# Patient Record
Sex: FEMALE | Race: WHITE | NOT HISPANIC OR LATINO | Employment: OTHER | URBAN - METROPOLITAN AREA
[De-identification: names, ages, dates, MRNs, and addresses within clinical notes are randomized per-mention and may not be internally consistent; named-entity substitution may affect disease eponyms.]

---

## 2017-01-26 ENCOUNTER — TRANSCRIBE ORDERS (OUTPATIENT)
Dept: ADMINISTRATIVE | Facility: HOSPITAL | Age: 62
End: 2017-01-26

## 2017-01-26 DIAGNOSIS — Z13.820 SPECIAL SCREENING FOR OSTEOPOROSIS: Primary | ICD-10-CM

## 2017-02-07 ENCOUNTER — HOSPITAL ENCOUNTER (OUTPATIENT)
Dept: BONE DENSITY | Facility: IMAGING CENTER | Age: 62
Discharge: HOME | End: 2017-02-07
Payer: MEDICARE

## 2017-02-07 DIAGNOSIS — Z13.820 SPECIAL SCREENING FOR OSTEOPOROSIS: ICD-10-CM

## 2017-02-07 PROCEDURE — 77080 DXA BONE DENSITY AXIAL: CPT

## 2017-06-26 ENCOUNTER — TRANSCRIBE ORDERS (OUTPATIENT)
Dept: ADMINISTRATIVE | Facility: HOSPITAL | Age: 62
End: 2017-06-26

## 2017-06-26 DIAGNOSIS — Z12.31 VISIT FOR SCREENING MAMMOGRAM: Primary | ICD-10-CM

## 2017-06-28 ENCOUNTER — HOSPITAL ENCOUNTER (OUTPATIENT)
Dept: RADIOLOGY | Facility: HOSPITAL | Age: 62
Discharge: HOME | End: 2017-06-28
Payer: MEDICARE

## 2017-06-28 ENCOUNTER — TRANSCRIBE ORDERS (OUTPATIENT)
Dept: ADMINISTRATIVE | Facility: HOSPITAL | Age: 62
End: 2017-06-28

## 2017-06-28 ENCOUNTER — HOSPITAL ENCOUNTER (OUTPATIENT)
Dept: BONE DENSITY | Facility: IMAGING CENTER | Age: 62
Discharge: HOME | End: 2017-06-28
Payer: MEDICARE

## 2017-06-28 DIAGNOSIS — M25.50 PAIN IN JOINT, SITE UNSPECIFIED: Primary | ICD-10-CM

## 2017-06-28 DIAGNOSIS — M81.8 IDIOPATHIC OSTEOPOROSIS: ICD-10-CM

## 2017-06-28 DIAGNOSIS — M25.50 PAIN IN JOINT, SITE UNSPECIFIED: ICD-10-CM

## 2017-06-28 DIAGNOSIS — Z12.31 VISIT FOR SCREENING MAMMOGRAM: ICD-10-CM

## 2017-06-28 PROCEDURE — 73502 X-RAY EXAM HIP UNI 2-3 VIEWS: CPT

## 2017-06-28 PROCEDURE — G0202 SCR MAMMO BI INCL CAD: HCPCS

## 2017-07-03 ENCOUNTER — HOSPITAL ENCOUNTER (OUTPATIENT)
Dept: MAMMOGRAPHY | Facility: CLINIC | Age: 62
Discharge: HOME | End: 2017-07-03
Payer: MEDICARE

## 2017-07-03 ENCOUNTER — HOSPITAL ENCOUNTER (OUTPATIENT)
Dept: ULTRASOUND IMAGING | Facility: CLINIC | Age: 62
Discharge: HOME | End: 2017-07-03
Payer: MEDICARE

## 2017-07-03 DIAGNOSIS — R92.8 ABNORMAL MAMMOGRAM: ICD-10-CM

## 2017-07-03 DIAGNOSIS — R92.8 MAMMOGRAM ABNORMAL: ICD-10-CM

## 2017-07-03 PROCEDURE — G0206 DX MAMMO INCL CAD UNI: HCPCS

## 2017-07-03 PROCEDURE — 76642 ULTRASOUND BREAST LIMITED: CPT

## 2017-12-27 ENCOUNTER — TRANSCRIBE ORDERS (OUTPATIENT)
Dept: ADMINISTRATIVE | Facility: HOSPITAL | Age: 62
End: 2017-12-27

## 2017-12-27 DIAGNOSIS — R92.8 ABNORMAL MAMMOGRAM: Primary | ICD-10-CM

## 2018-01-11 ENCOUNTER — TRANSCRIBE ORDERS (OUTPATIENT)
Dept: ULTRASOUND IMAGING | Facility: CLINIC | Age: 63
End: 2018-01-11

## 2018-01-11 ENCOUNTER — HOSPITAL ENCOUNTER (OUTPATIENT)
Dept: MAMMOGRAPHY | Facility: CLINIC | Age: 63
Discharge: HOME | End: 2018-01-11
Payer: MEDICARE

## 2018-01-11 ENCOUNTER — HOSPITAL ENCOUNTER (OUTPATIENT)
Dept: ULTRASOUND IMAGING | Facility: CLINIC | Age: 63
Discharge: HOME | End: 2018-01-11
Payer: MEDICARE

## 2018-01-11 ENCOUNTER — GENERIC CONVERSION - ENCOUNTER (OUTPATIENT)
Dept: OTHER | Facility: OTHER | Age: 63
End: 2018-01-11

## 2018-01-11 DIAGNOSIS — R92.8 ABNORMAL MAMMOGRAM: ICD-10-CM

## 2018-01-11 DIAGNOSIS — R92.8 ABNORMAL MAMMOGRAM: Primary | ICD-10-CM

## 2018-01-11 PROCEDURE — 76642 ULTRASOUND BREAST LIMITED: CPT

## 2018-07-12 ENCOUNTER — HOSPITAL ENCOUNTER (OUTPATIENT)
Dept: MAMMOGRAPHY | Facility: CLINIC | Age: 63
Discharge: HOME | End: 2018-07-12
Payer: MEDICARE

## 2018-07-12 ENCOUNTER — HOSPITAL ENCOUNTER (OUTPATIENT)
Dept: ULTRASOUND IMAGING | Facility: CLINIC | Age: 63
Discharge: HOME | End: 2018-07-12
Payer: MEDICARE

## 2018-07-12 DIAGNOSIS — R92.8 ABNORMAL MAMMOGRAM: ICD-10-CM

## 2018-07-12 DIAGNOSIS — Z12.39 SCREENING BREAST EXAMINATION: ICD-10-CM

## 2018-07-12 PROCEDURE — 77067 SCR MAMMO BI INCL CAD: CPT

## 2018-07-12 PROCEDURE — 76642 ULTRASOUND BREAST LIMITED: CPT

## 2018-09-19 ENCOUNTER — TELEPHONE (OUTPATIENT)
Dept: NEUROLOGY | Facility: CLINIC | Age: 63
End: 2018-09-19

## 2018-09-20 ENCOUNTER — TELEPHONE (OUTPATIENT)
Dept: NEUROLOGY | Facility: CLINIC | Age: 63
End: 2018-09-20

## 2018-09-24 ENCOUNTER — TELEPHONE (OUTPATIENT)
Dept: NEUROLOGY | Facility: CLINIC | Age: 63
End: 2018-09-24

## 2018-11-07 ENCOUNTER — OFFICE VISIT (OUTPATIENT)
Dept: NEUROLOGY | Facility: CLINIC | Age: 63
End: 2018-11-07
Payer: MEDICARE

## 2018-11-07 VITALS
DIASTOLIC BLOOD PRESSURE: 70 MMHG | BODY MASS INDEX: 20.4 KG/M2 | WEIGHT: 130 LBS | HEIGHT: 67 IN | HEART RATE: 77 BPM | SYSTOLIC BLOOD PRESSURE: 118 MMHG

## 2018-11-07 DIAGNOSIS — G21.11 NEUROLEPTIC-INDUCED PARKINSONISM (HCC): Primary | ICD-10-CM

## 2018-11-07 PROBLEM — F32.A DEPRESSION: Status: ACTIVE | Noted: 2018-11-07

## 2018-11-07 PROBLEM — E03.9 THYROID ACTIVITY DECREASED: Status: ACTIVE | Noted: 2018-11-07

## 2018-11-07 PROBLEM — F41.9 ANXIETY: Status: ACTIVE | Noted: 2018-11-07

## 2018-11-07 PROBLEM — G20 PARKINSONISM (HCC): Status: ACTIVE | Noted: 2018-11-07

## 2018-11-07 PROBLEM — G20.C PARKINSONISM: Status: ACTIVE | Noted: 2018-11-07

## 2018-11-07 PROCEDURE — 99204 OFFICE O/P NEW MOD 45 MIN: CPT | Performed by: PSYCHIATRY & NEUROLOGY

## 2018-11-07 RX ORDER — BUPROPION HYDROCHLORIDE 150 MG/1
TABLET, EXTENDED RELEASE ORAL 2 TIMES DAILY
COMMUNITY
Start: 2018-08-16

## 2018-11-07 RX ORDER — LEVOTHYROXINE SODIUM 0.03 MG/1
50 TABLET ORAL
COMMUNITY
Start: 2018-09-26

## 2018-11-07 RX ORDER — MIRTAZAPINE 30 MG/1
30 TABLET, FILM COATED ORAL
COMMUNITY
Start: 2018-09-12

## 2018-11-07 RX ORDER — RISPERIDONE 1 MG/1
1 TABLET, FILM COATED ORAL 4 TIMES DAILY
COMMUNITY
Start: 2018-08-16

## 2018-11-07 NOTE — LETTER
November 7, 2018     [place] [address]    Patient: Cathy Guillen   YOB: 1955   Date of Visit: 11/7/2018       Dear Dr Trevon Ontiveros: Thank you for referring Cathy Guillen to me for evaluation  Below are my notes for this consultation  If you have questions, please do not hesitate to call me  I look forward to following your patient along with you  Dr Abbe Power, thank you for your note, it was very helpful  Sincerely,        Marcos Fairchild MD        CC: Brian Rush MD  11/7/2018  5:52 PM  Sign at close encounter  Assessment/Plan:    Parkinsonism [facility])  The patient is a 69-year-old woman with past medical history significant for longstanding psychiatric disease on chronic neuroleptics who presents for evaluation of parkinsonism which began about 10 years ago  The patient has been on risperidone since 2001  On examination the patient has prominent global and focal bradykinesia but no rigidity, tremor or postural instability  I think most likely this is drug-induced parkinsonism however it is difficult to know for sure just based on physical exam   The lack of typical non motor symptoms often seen in Parkinson's disease further support the diagnosis of drug-induced parkinsonism over idiopathic Parkinson's disease  I presented a couple of options to the patient and her sister  One is watchful waiting to see how her symptoms change over time, as Parkinson's disease should progress but drug-induced parkinsonism should stay relatively stable  The other would be to acquire a ELIZ scan  Unfortunately Medicare may not approve this particular indication for ELIZ scan (it is approved for differentiating types of tremors but not isolated slowness)  Another complication with attempting to get the DaTSCAN is that the patient would need to come off of most of her psychotropic medications for 48-72 hours      The patient elected for the watchful waiting option at this point  She will follow up with me in 5 months  We also discussed the option of trying to switch her neuroleptics to 1 that is less likely to induce drug-induced parkinsonism, specifically Seroquel or Clozaril  She would like to discuss this possibility with you in clinic in the future  Thank you for referring this patient for consultation  There are no diagnoses linked to this encounter  Subjective:     Patient ID: Bernice Vasquez is a 70 y o  female  I had the pleasure of seeing your patient, Bernice Vasquez in the 2020 [facility] at the [facility] for Neuroscience  The patient is a 70y o  year old female who presents for evaluation of possible parkinson's disease  The patient has been following with her psychiatrist in [address] [place] [address] since [address]  She has a longstanding history of psychiatric disease and has been on neuroleptics for more than 2 decades  She moved to the [facility] about 2 years ago and has been living with her nephew which has been quite stressful at times  Recently she has started taking a 4th dose of risperidone as needed for her anxiety  The patient and her sister reports that her symptoms of parkinsonism began at least 10 years ago  Her symptoms have consisted of shuffling gait, stooped posture and drooling  They have been more prominent to the patient, her family and her psychiatrist over the past 2-3 years  He may attempt was made to reduce the patient's neuroleptics however this was not tolerated  The symptoms do not interfere with any of her activities of daily life  She has never seen a neurologist for this in the past   Has never been on any medications to treat the symptoms  She has started physical therapy which they found quite helpful for her gait  Regarding motor symptoms:   Tremor: very slight, occasional over the past few months with action   Slowness: yes   For so long  Stiffness: no   Dystonia: toes feel like they want to curl  Changes in facial expression: yes, many years   Changes in voice: about the same   Changes in writing: messy, smaller as she writes   Changes in gait: shuffling, slow  Falls: once 2012 broke hip  2016 broke shoulder  Loss of balance   Freezing: when she gets tired and in pain in the right hip it becomes hard to move the right leg  Trouble with swallowing:no   Clumsiness/dexterity: some trouble trying shoes  Regarding non-motor symptoms:   Anosmia: no   Constipation: no   Urinary sx: nocturia, uses depends  x2   Lightheadedness: no   Changes in Mood: well controlled  Trouble with sleep:     REM behavior Disorder: no   Memory trouble: Yes, sometimes gets in the way of day-to-day  Hallucinations: some misperceptions  Animals  Only lasts for a moment  Recent     The following portions of the patient's history were reviewed and updated as appropriate: allergies, current medications, past family history, past medical history, past social history, past surgical history and problem list       Objective:      /70 (BP Location: Right arm, Patient Position: Standing, Cuff Size: Standard)   Pulse 77   Ht 5' 7" (1 702 m)   Wt 59 kg (130 lb)   BMI 20 36 kg/m²    Not orthostic by pressure       Physical Exam    Neurological Exam    GENERAL MEDICAL EXAMINATION:  General appearance: alert, in no apparent distress  Appropriately dressed and groomed  Conversing and interacting appropriately  Eyes: Sclera are non-injected  Ears, nose, Mouth, Throat: Mucous membranes are moist    Resp: Breathing comfortably on RA   Musculoskeletal: No evidence of deformities  No contractures  No Edema  Skin: No visible rashes  Warm and well perfused  Psych: normal and appropriate affect     NEUROLOGICAL EXAMINATION:  Mental Status: Alert and oriented to person place and time  Able to relate history without difficulty   Attentive: able to name RAMIREZ backward without difficulty  Language is fluent and appropriate with normal prosody  There were no paraphasic errors  Speech was not dysarthric  There was no pallilalia noted  Able to follow both midline and appendicular commands  Significant response latency     Cranial Nerves:  II:  pupils equally round and briskly reactive to light, both directly and consensually  III-IV-VI: Full and conjugate, extra-ocular eye movements in all directions of gaze  No nystagmus or diplopia  Intact smooth pursuit  VII: Face is symmetric at rest and with activation; symmetric speed and excursion with smile  IX-X: Palate elevates symmetrically  XII: No tongue deviation or fasciculations    Motor: Overall   moderate reduction in spontaneous movements  Normal muscle bulk throughout  There were no dyskinesias or dystonia noted  No pronator drift or rebound  No asterixis or myoclonus noted                                      Speech  1    Facial Expression  3    Rigidity - Neck      Rigidity - Upper Extremity (Right)  0    Rigidity - Upper Extremity (Left)   0    Rigidity - Lower Extremity (Right)  0    Rigidity - Lower Extremity (Left)   0    Finger Taps (Right)   1 decrement    Finger Taps (Left)   2 slow    Hand Movement (Right)  1 slow     Hand Movement (Left)   1 slow     Pronation/Supination (Right)  1 slow     Pronation/Supination (Left)   1 slow     Toe Tapping (Right) 1 slow     Toe Tapping (Left) 1 slow     Leg Agility (Right)  1 slow     Leg Agility (Left)   1 slow     Arising from Chair   2    Gait   1    Freezing of Gait 0    Postural Stability   1    Posture 3    Global spontaneity of movement 3    Postural Tremor (Right) 1    Postural Tremor (Left) 0    Kinetic Tremor (Right)  1    Kinetic Tremor (Left)  1    Rest tremor amplitude RUE 0    Rest tremor amplitude LUE 0    Rest tremor amplitude RLE 0    Reset tremor amplitude LLE 0    Lip/Jaw Tremor  0    Consistency of tremor 0    Motor Exam Total:  28      Strength:   Delt Bi Tri We FE FF    C5 C6 C7 C6 C7 C8/T1   R 5 5 5 5 5 5   L 5 5 5 5 5 5      IP Quad Hamst TA    L2 L3 L4-S1 L4   R 5 5 5 5   L 5 5 5 5     Reflexes:  Brisk throughout    Coordination: Finger to nose without dysmetria bilaterally  No intention tremor  Gait: Able to rise from a chair with some difficulty  Posture was stooped, mostly bent at the waist  Narrow-base and stable stance  Normal initiation  Decreased stride length, step height, and arm swing R>L  Turn completed in 4 steps  Pull test was normal      Review of Systems   Constitutional: Positive for fatigue and unexpected weight change  Negative for appetite change and fever  HENT: Positive for trouble swallowing  Negative for hearing loss, tinnitus and voice change  Eyes: Negative  Negative for photophobia and pain  Respiratory: Negative  Negative for shortness of breath  Cardiovascular: Negative  Negative for palpitations  Gastrointestinal: Negative  Negative for nausea and vomiting  Endocrine: Negative  Negative for cold intolerance and heat intolerance  Hair loss   Genitourinary: Negative  Negative for dysuria, frequency and urgency  Loss of bladder control     Musculoskeletal: Positive for back pain, gait problem (balance issues), joint swelling and myalgias  Negative for neck pain  Skin: Negative  Negative for rash  Neurological: Negative for dizziness, tremors, seizures, syncope, facial asymmetry, speech difficulty, weakness, light-headedness, numbness and headaches  Memory problems     Hematological: Negative  Does not bruise/bleed easily  Psychiatric/Behavioral: Positive for confusion and sleep disturbance (snoring and increased sleepiness)  Negative for hallucinations  The patient is nervous/anxious           Depression       Guy Rojas MD  Medical Director   [facility]  [facility]

## 2018-11-07 NOTE — ASSESSMENT & PLAN NOTE
The patient is a 59-year-old woman with past medical history significant for longstanding psychiatric disease on chronic neuroleptics who presents for evaluation of parkinsonism which began about 10 years ago  The patient has been on risperidone since 2001  On examination the patient has prominent global and focal bradykinesia but no rigidity, tremor or postural instability  I think most likely this is drug-induced parkinsonism however it is difficult to know for sure just based on physical exam   The lack of typical non motor symptoms often seen in Parkinson's disease further support the diagnosis of drug-induced parkinsonism over idiopathic Parkinson's disease  I presented a couple of options to the patient and her sister  One is watchful waiting to see how her symptoms change over time, as Parkinson's disease should progress but drug-induced parkinsonism should stay relatively stable  The other would be to acquire a ELIZ scan  Unfortunately Medicare may not approve this particular indication for ELIZ scan (it is approved for differentiating types of tremors but not isolated slowness)  Another complication with attempting to get the DaTSCAN is that the patient would need to come off of most of her psychotropic medications for 48-72 hours  The patient elected for the watchful waiting option at this point  She will follow up with me in 5 months  We also discussed the option of trying to switch her neuroleptics to 1 that is less likely to induce drug-induced parkinsonism, specifically Seroquel or Clozaril  She would like to discuss this possibility with you in clinic in the future  Thank you for referring this patient for consultation

## 2018-11-07 NOTE — PROGRESS NOTES
Assessment/Plan:    Parkinsonism [facility])  The patient is a 78-year-old woman with past medical history significant for longstanding psychiatric disease on chronic neuroleptics who presents for evaluation of parkinsonism which began about 10 years ago  The patient has been on risperidone since 2001  On examination the patient has prominent global and focal bradykinesia but no rigidity, tremor or postural instability  I think most likely this is drug-induced parkinsonism however it is difficult to know for sure just based on physical exam   The lack of typical non motor symptoms often seen in Parkinson's disease further support the diagnosis of drug-induced parkinsonism over idiopathic Parkinson's disease  I presented a couple of options to the patient and her sister  One is watchful waiting to see how her symptoms change over time, as Parkinson's disease should progress but drug-induced parkinsonism should stay relatively stable  The other would be to acquire a ELIZ scan  Unfortunately Medicare may not approve this particular indication for ELIZ scan (it is approved for differentiating types of tremors but not isolated slowness)  Another complication with attempting to get the DaTSCAN is that the patient would need to come off of most of her psychotropic medications for 48-72 hours  The patient elected for the watchful waiting option at this point  She will follow up with me in 5 months  We also discussed the option of trying to switch her neuroleptics to 1 that is less likely to induce drug-induced parkinsonism, specifically Seroquel or Clozaril  She would like to discuss this possibility with you in clinic in the future  Thank you for referring this patient for consultation  There are no diagnoses linked to this encounter  Subjective:     Patient ID: Tatyana Watson is a 70 y o  female      I had the pleasure of seeing your patient, Tatyana Watson in the 2020 [facility] at the [facility]  [facility] for Neuroscience  The patient is a 70y o  year old female who presents for evaluation of possible parkinson's disease  The patient has been following with her psychiatrist in [address] [place] [address] since [address]  She has a longstanding history of psychiatric disease and has been on neuroleptics for more than 2 decades  She moved to the [facility] about 2 years ago and has been living with her nephew which has been quite stressful at times  Recently she has started taking a 4th dose of risperidone as needed for her anxiety  The patient and her sister reports that her symptoms of parkinsonism began at least 10 years ago  Her symptoms have consisted of shuffling gait, stooped posture and drooling  They have been more prominent to the patient, her family and her psychiatrist over the past 2-3 years  He may attempt was made to reduce the patient's neuroleptics however this was not tolerated  The symptoms do not interfere with any of her activities of daily life  She has never seen a neurologist for this in the past   Has never been on any medications to treat the symptoms  She has started physical therapy which they found quite helpful for her gait  Regarding motor symptoms:   Tremor: very slight, occasional over the past few months with action   Slowness: yes  For so long  Stiffness: no   Dystonia: toes feel like they want to curl  Changes in facial expression: yes, many years   Changes in voice: about the same   Changes in writing: messy, smaller as she writes   Changes in gait: shuffling, slow  Falls: once 2012 broke hip  2016 broke shoulder  Loss of balance   Freezing: when she gets tired and in pain in the right hip it becomes hard to move the right leg  Trouble with swallowing:no   Clumsiness/dexterity: some trouble trying shoes  Regarding non-motor symptoms:   Anosmia: no   Constipation: no   Urinary sx: nocturia, uses depends   x2   Lightheadedness: no   Changes in Mood: well controlled  Trouble with sleep:     REM behavior Disorder: no   Memory trouble: Yes, sometimes gets in the way of day-to-day  Hallucinations: some misperceptions  Animals  Only lasts for a moment  Recent     The following portions of the patient's history were reviewed and updated as appropriate: allergies, current medications, past family history, past medical history, past social history, past surgical history and problem list       Objective:      /70 (BP Location: Right arm, Patient Position: Standing, Cuff Size: Standard)   Pulse 77   Ht 5' 7" (1 702 m)   Wt 59 kg (130 lb)   BMI 20 36 kg/m²   Not orthostic by pressure       Physical Exam    Neurological Exam    GENERAL MEDICAL EXAMINATION:  General appearance: alert, in no apparent distress  Appropriately dressed and groomed  Conversing and interacting appropriately  Eyes: Sclera are non-injected  Ears, nose, Mouth, Throat: Mucous membranes are moist    Resp: Breathing comfortably on RA   Musculoskeletal: No evidence of deformities  No contractures  No Edema  Skin: No visible rashes  Warm and well perfused  Psych: normal and appropriate affect     NEUROLOGICAL EXAMINATION:  Mental Status: Alert and oriented to person place and time  Able to relate history without difficulty  Attentive: able to name RAMIREZ backward without difficulty  Language is fluent and appropriate with normal prosody  There were no paraphasic errors  Speech was not dysarthric  There was no pallilalia noted  Able to follow both midline and appendicular commands  Significant response latency     Cranial Nerves:  II:  pupils equally round and briskly reactive to light, both directly and consensually  III-IV-VI: Full and conjugate, extra-ocular eye movements in all directions of gaze  No nystagmus or diplopia  Intact smooth pursuit  VII: Face is symmetric at rest and with activation; symmetric speed and excursion with smile     IX-X: Palate elevates symmetrically  XII: No tongue deviation or fasciculations    Motor: Overall   moderate reduction in spontaneous movements  Normal muscle bulk throughout  There were no dyskinesias or dystonia noted  No pronator drift or rebound  No asterixis or myoclonus noted  Speech  1    Facial Expression  3    Rigidity - Neck      Rigidity - Upper Extremity (Right)  0    Rigidity - Upper Extremity (Left)   0    Rigidity - Lower Extremity (Right)  0    Rigidity - Lower Extremity (Left)   0    Finger Taps (Right)   1 decrement    Finger Taps (Left)   2 slow    Hand Movement (Right)  1 slow     Hand Movement (Left)   1 slow     Pronation/Supination (Right)  1 slow     Pronation/Supination (Left)   1 slow     Toe Tapping (Right) 1 slow     Toe Tapping (Left) 1 slow     Leg Agility (Right)  1 slow     Leg Agility (Left)   1 slow     Arising from Chair   2    Gait   1    Freezing of Gait 0    Postural Stability   1    Posture 3    Global spontaneity of movement 3    Postural Tremor (Right) 1    Postural Tremor (Left) 0    Kinetic Tremor (Right)  1    Kinetic Tremor (Left)  1    Rest tremor amplitude RUE 0    Rest tremor amplitude LUE 0    Rest tremor amplitude RLE 0    Reset tremor amplitude LLE 0    Lip/Jaw Tremor  0    Consistency of tremor 0    Motor Exam Total:  28      Strength:   Delt Bi Tri We FE FF    C5 C6 C7 C6 C7 C8/T1   R 5 5 5 5 5 5   L 5 5 5 5 5 5      IP Quad Hamst TA    L2 L3 L4-S1 L4   R 5 5 5 5   L 5 5 5 5     Reflexes:  Brisk throughout    Coordination: Finger to nose without dysmetria bilaterally  No intention tremor  Gait: Able to rise from a chair with some difficulty  Posture was stooped, mostly bent at the waist  Narrow-base and stable stance  Normal initiation  Decreased stride length, step height, and arm swing R>L  Turn completed in 4 steps  Pull test was normal      Review of Systems   Constitutional: Positive for fatigue and unexpected weight change   Negative for appetite change and fever  HENT: Positive for trouble swallowing  Negative for hearing loss, tinnitus and voice change  Eyes: Negative  Negative for photophobia and pain  Respiratory: Negative  Negative for shortness of breath  Cardiovascular: Negative  Negative for palpitations  Gastrointestinal: Negative  Negative for nausea and vomiting  Endocrine: Negative  Negative for cold intolerance and heat intolerance  Hair loss   Genitourinary: Negative  Negative for dysuria, frequency and urgency  Loss of bladder control     Musculoskeletal: Positive for back pain, gait problem (balance issues), joint swelling and myalgias  Negative for neck pain  Skin: Negative  Negative for rash  Neurological: Negative for dizziness, tremors, seizures, syncope, facial asymmetry, speech difficulty, weakness, light-headedness, numbness and headaches  Memory problems     Hematological: Negative  Does not bruise/bleed easily  Psychiatric/Behavioral: Positive for confusion and sleep disturbance (snoring and increased sleepiness)  Negative for hallucinations  The patient is nervous/anxious           Depression       Hudson Helms MD  Medical Director   [facility]  [facility]

## 2019-05-08 ENCOUNTER — APPOINTMENT (EMERGENCY)
Dept: RADIOLOGY | Facility: HOSPITAL | Age: 64
DRG: 481 | End: 2019-05-08
Payer: MEDICARE

## 2019-05-08 ENCOUNTER — HOSPITAL ENCOUNTER (INPATIENT)
Facility: HOSPITAL | Age: 64
LOS: 5 days | Discharge: NURSING, REHAB OR LONG-TERM CARE | DRG: 481 | End: 2019-05-13
Attending: EMERGENCY MEDICINE | Admitting: ORTHOPAEDIC SURGERY
Payer: MEDICARE

## 2019-05-08 DIAGNOSIS — S72.141A CLOSED DISPLACED INTERTROCHANTERIC FRACTURE OF RIGHT FEMUR, INITIAL ENCOUNTER (HCC): ICD-10-CM

## 2019-05-08 DIAGNOSIS — G21.11 NEUROLEPTIC-INDUCED PARKINSONISM (HCC): ICD-10-CM
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
## 2019-05-08 DIAGNOSIS — S72.001A CLOSED RIGHT HIP FRACTURE (HCC): ICD-10-CM

## 2019-05-08 LAB
ALBUMIN SERPL BCP-MCNC: 4 G/DL (ref 3.5–5)
ALP SERPL-CCNC: 100 U/L (ref 46–116)
ALT SERPL W P-5'-P-CCNC: 34 U/L (ref 12–78)
ANION GAP SERPL CALCULATED.3IONS-SCNC: 11 MMOL/L (ref 4–13)
APTT PPP: 20 SECONDS (ref 26–38)
AST SERPL W P-5'-P-CCNC: 28 U/L (ref 5–45)
ATRIAL RATE: 82 BPM
BASOPHILS # BLD AUTO: 0.01 THOUSANDS/ΜL (ref 0–0.1)
BASOPHILS NFR BLD AUTO: 0 % (ref 0–1)
BILIRUB SERPL-MCNC: 0.2 MG/DL (ref 0.2–1)
BUN SERPL-MCNC: 13 MG/DL (ref 5–25)
CALCIUM SERPL-MCNC: 9.7 MG/DL (ref 8.3–10.1)
CHLORIDE SERPL-SCNC: 104 MMOL/L (ref 100–108)
CO2 SERPL-SCNC: 28 MMOL/L (ref 21–32)
CREAT SERPL-MCNC: 1.18 MG/DL (ref 0.6–1.3)
EOSINOPHIL # BLD AUTO: 0.22 THOUSAND/ΜL (ref 0–0.61)
EOSINOPHIL NFR BLD AUTO: 5 % (ref 0–6)
ERYTHROCYTE [DISTWIDTH] IN BLOOD BY AUTOMATED COUNT: 12.5 % (ref 11.6–15.1)
GFR SERPL CREATININE-BSD FRML MDRD: 49 ML/MIN/1.73SQ M
GLUCOSE SERPL-MCNC: 88 MG/DL (ref 65–140)
HCT VFR BLD AUTO: 36.8 % (ref 34.8–46.1)
HGB BLD-MCNC: 11.6 G/DL (ref 11.5–15.4)
IMM GRANULOCYTES # BLD AUTO: 0.01 THOUSAND/UL (ref 0–0.2)
IMM GRANULOCYTES NFR BLD AUTO: 0 % (ref 0–2)
INR PPP: 0.95 (ref 0.86–1.17)
LYMPHOCYTES # BLD AUTO: 1.33 THOUSANDS/ΜL (ref 0.6–4.47)
LYMPHOCYTES NFR BLD AUTO: 29 % (ref 14–44)
MCH RBC QN AUTO: 30.6 PG (ref 26.8–34.3)
MCHC RBC AUTO-ENTMCNC: 31.5 G/DL (ref 31.4–37.4)
MCV RBC AUTO: 97 FL (ref 82–98)
MONOCYTES # BLD AUTO: 0.51 THOUSAND/ΜL (ref 0.17–1.22)
MONOCYTES NFR BLD AUTO: 11 % (ref 4–12)
NEUTROPHILS # BLD AUTO: 2.57 THOUSANDS/ΜL (ref 1.85–7.62)
NEUTS SEG NFR BLD AUTO: 55 % (ref 43–75)
NRBC BLD AUTO-RTO: 0 /100 WBCS
P AXIS: 49 DEGREES
PLATELET # BLD AUTO: 204 THOUSANDS/UL (ref 149–390)
PMV BLD AUTO: 9.7 FL (ref 8.9–12.7)
POTASSIUM SERPL-SCNC: 4 MMOL/L (ref 3.5–5.3)
PR INTERVAL: 130 MS
PROT SERPL-MCNC: 7.7 G/DL (ref 6.4–8.2)
PROTHROMBIN TIME: 12.1 SECONDS (ref 11.8–14.2)
QRS AXIS: 59 DEGREES
QRSD INTERVAL: 70 MS
QT INTERVAL: 386 MS
QTC INTERVAL: 450 MS
RBC # BLD AUTO: 3.79 MILLION/UL (ref 3.81–5.12)
SODIUM SERPL-SCNC: 143 MMOL/L (ref 136–145)
T WAVE AXIS: 34 DEGREES
VENTRICULAR RATE: 82 BPM
WBC # BLD AUTO: 4.65 THOUSAND/UL (ref 4.31–10.16)

## 2019-05-08 PROCEDURE — 85025 COMPLETE CBC W/AUTO DIFF WBC: CPT | Performed by: PHYSICIAN ASSISTANT

## 2019-05-08 PROCEDURE — 36415 COLL VENOUS BLD VENIPUNCTURE: CPT | Performed by: PHYSICIAN ASSISTANT

## 2019-05-08 PROCEDURE — 99284 EMERGENCY DEPT VISIT MOD MDM: CPT | Performed by: PHYSICIAN ASSISTANT

## 2019-05-08 PROCEDURE — 93005 ELECTROCARDIOGRAM TRACING: CPT

## 2019-05-08 PROCEDURE — 99221 1ST HOSP IP/OBS SF/LOW 40: CPT | Performed by: PHYSICIAN ASSISTANT

## 2019-05-08 PROCEDURE — 99285 EMERGENCY DEPT VISIT HI MDM: CPT

## 2019-05-08 PROCEDURE — 80053 COMPREHEN METABOLIC PANEL: CPT | Performed by: PHYSICIAN ASSISTANT

## 2019-05-08 PROCEDURE — 85610 PROTHROMBIN TIME: CPT | Performed by: PHYSICIAN ASSISTANT

## 2019-05-08 PROCEDURE — 93010 ELECTROCARDIOGRAM REPORT: CPT | Performed by: INTERNAL MEDICINE

## 2019-05-08 PROCEDURE — 85730 THROMBOPLASTIN TIME PARTIAL: CPT | Performed by: PHYSICIAN ASSISTANT

## 2019-05-08 PROCEDURE — 71046 X-RAY EXAM CHEST 2 VIEWS: CPT

## 2019-05-08 PROCEDURE — 96374 THER/PROPH/DIAG INJ IV PUSH: CPT

## 2019-05-08 PROCEDURE — 73502 X-RAY EXAM HIP UNI 2-3 VIEWS: CPT

## 2019-05-08 RX ORDER — MIRTAZAPINE 15 MG/1
30 TABLET, FILM COATED ORAL
Status: DISCONTINUED | OUTPATIENT
Start: 2019-05-08 | End: 2019-05-11

## 2019-05-08 RX ORDER — HYDROMORPHONE HCL/PF 1 MG/ML
0.5 SYRINGE (ML) INJECTION ONCE
Status: COMPLETED | OUTPATIENT
Start: 2019-05-08 | End: 2019-05-08

## 2019-05-08 RX ORDER — LEVOTHYROXINE SODIUM 0.03 MG/1
25 TABLET ORAL
Status: DISCONTINUED | OUTPATIENT
Start: 2019-05-09 | End: 2019-05-13 | Stop reason: HOSPADM

## 2019-05-08 RX ORDER — ONDANSETRON 2 MG/ML
4 INJECTION INTRAMUSCULAR; INTRAVENOUS EVERY 6 HOURS PRN
Status: DISCONTINUED | OUTPATIENT
Start: 2019-05-08 | End: 2019-05-11

## 2019-05-08 RX ORDER — BUPROPION HYDROCHLORIDE 150 MG/1
150 TABLET, EXTENDED RELEASE ORAL 2 TIMES DAILY
Status: DISCONTINUED | OUTPATIENT
Start: 2019-05-08 | End: 2019-05-13 | Stop reason: HOSPADM

## 2019-05-08 RX ORDER — SODIUM CHLORIDE, SODIUM LACTATE, POTASSIUM CHLORIDE, CALCIUM CHLORIDE 600; 310; 30; 20 MG/100ML; MG/100ML; MG/100ML; MG/100ML
75 INJECTION, SOLUTION INTRAVENOUS CONTINUOUS
Status: DISCONTINUED | OUTPATIENT
Start: 2019-05-09 | End: 2019-05-09

## 2019-05-08 RX ORDER — OXYCODONE HYDROCHLORIDE AND ACETAMINOPHEN 5; 325 MG/1; MG/1
1 TABLET ORAL EVERY 4 HOURS PRN
Status: DISCONTINUED | OUTPATIENT
Start: 2019-05-08 | End: 2019-05-11

## 2019-05-08 RX ORDER — OXYCODONE HYDROCHLORIDE AND ACETAMINOPHEN 5; 325 MG/1; MG/1
2 TABLET ORAL EVERY 4 HOURS PRN
Status: DISCONTINUED | OUTPATIENT
Start: 2019-05-08 | End: 2019-05-11

## 2019-05-08 RX ORDER — RISPERIDONE 1 MG/1
1 TABLET, FILM COATED ORAL 4 TIMES DAILY
Status: DISCONTINUED | OUTPATIENT
Start: 2019-05-08 | End: 2019-05-11

## 2019-05-08 RX ORDER — FENTANYL CITRATE 50 UG/ML
1 INJECTION, SOLUTION INTRAMUSCULAR; INTRAVENOUS ONCE
Status: COMPLETED | OUTPATIENT
Start: 2019-05-08 | End: 2019-05-08

## 2019-05-08 RX ADMIN — BUPROPION HYDROCHLORIDE 150 MG: 150 TABLET, FILM COATED, EXTENDED RELEASE ORAL at 23:00

## 2019-05-08 RX ADMIN — RISPERIDONE 1 MG: 1 TABLET ORAL at 23:00

## 2019-05-08 RX ADMIN — MIRTAZAPINE 30 MG: 15 TABLET, FILM COATED ORAL at 23:00

## 2019-05-08 RX ADMIN — MORPHINE SULFATE 2 MG: 2 INJECTION, SOLUTION INTRAMUSCULAR; INTRAVENOUS at 23:14

## 2019-05-08 RX ADMIN — OXYCODONE AND ACETAMINOPHEN 2 TABLET: 5; 325 TABLET ORAL at 21:26

## 2019-05-08 RX ADMIN — SODIUM CHLORIDE, SODIUM LACTATE, POTASSIUM CHLORIDE, AND CALCIUM CHLORIDE 75 ML/HR: .6; .31; .03; .02 INJECTION, SOLUTION INTRAVENOUS at 23:01

## 2019-05-08 RX ADMIN — HYDROMORPHONE HYDROCHLORIDE 0.5 MG: 1 INJECTION, SOLUTION INTRAMUSCULAR; INTRAVENOUS; SUBCUTANEOUS at 19:03

## 2019-05-08 RX ADMIN — HYDROMORPHONE HYDROCHLORIDE 0.5 MG: 1 INJECTION, SOLUTION INTRAMUSCULAR; INTRAVENOUS; SUBCUTANEOUS at 17:59

## 2019-05-09 ENCOUNTER — ANESTHESIA EVENT (INPATIENT)
Dept: PERIOP | Facility: HOSPITAL | Age: 64
DRG: 481 | End: 2019-05-09
Payer: MEDICARE

## 2019-05-09 ENCOUNTER — ANESTHESIA (INPATIENT)
Dept: PERIOP | Facility: HOSPITAL | Age: 64
DRG: 481 | End: 2019-05-09
Payer: MEDICARE

## 2019-05-09 ENCOUNTER — APPOINTMENT (INPATIENT)
Dept: RADIOLOGY | Facility: HOSPITAL | Age: 64
DRG: 481 | End: 2019-05-09
Payer: MEDICARE

## 2019-05-09 LAB
ANION GAP SERPL CALCULATED.3IONS-SCNC: 10 MMOL/L (ref 4–13)
BASOPHILS # BLD AUTO: 0.01 THOUSANDS/ΜL (ref 0–0.1)
BASOPHILS NFR BLD AUTO: 0 % (ref 0–1)
BUN SERPL-MCNC: 15 MG/DL (ref 5–25)
CALCIUM SERPL-MCNC: 9 MG/DL (ref 8.3–10.1)
CHLORIDE SERPL-SCNC: 103 MMOL/L (ref 100–108)
CO2 SERPL-SCNC: 26 MMOL/L (ref 21–32)
CREAT SERPL-MCNC: 0.91 MG/DL (ref 0.6–1.3)
EOSINOPHIL # BLD AUTO: 0.01 THOUSAND/ΜL (ref 0–0.61)
EOSINOPHIL NFR BLD AUTO: 0 % (ref 0–6)
ERYTHROCYTE [DISTWIDTH] IN BLOOD BY AUTOMATED COUNT: 12.4 % (ref 11.6–15.1)
GFR SERPL CREATININE-BSD FRML MDRD: 66 ML/MIN/1.73SQ M
GLUCOSE SERPL-MCNC: 125 MG/DL (ref 65–140)
HCT VFR BLD AUTO: 33.3 % (ref 34.8–46.1)
HGB BLD-MCNC: 10.6 G/DL (ref 11.5–15.4)
IMM GRANULOCYTES # BLD AUTO: 0.03 THOUSAND/UL (ref 0–0.2)
IMM GRANULOCYTES NFR BLD AUTO: 1 % (ref 0–2)
LYMPHOCYTES # BLD AUTO: 0.54 THOUSANDS/ΜL (ref 0.6–4.47)
LYMPHOCYTES NFR BLD AUTO: 8 % (ref 14–44)
MCH RBC QN AUTO: 30.5 PG (ref 26.8–34.3)
MCHC RBC AUTO-ENTMCNC: 31.8 G/DL (ref 31.4–37.4)
MCV RBC AUTO: 96 FL (ref 82–98)
MONOCYTES # BLD AUTO: 0.59 THOUSAND/ΜL (ref 0.17–1.22)
MONOCYTES NFR BLD AUTO: 9 % (ref 4–12)
NEUTROPHILS # BLD AUTO: 5.41 THOUSANDS/ΜL (ref 1.85–7.62)
NEUTS SEG NFR BLD AUTO: 82 % (ref 43–75)
NRBC BLD AUTO-RTO: 0 /100 WBCS
PLATELET # BLD AUTO: 201 THOUSANDS/UL (ref 149–390)
PMV BLD AUTO: 9.7 FL (ref 8.9–12.7)
POTASSIUM SERPL-SCNC: 4.6 MMOL/L (ref 3.5–5.3)
RBC # BLD AUTO: 3.47 MILLION/UL (ref 3.81–5.12)
SODIUM SERPL-SCNC: 139 MMOL/L (ref 136–145)
TSH SERPL DL<=0.05 MIU/L-ACNC: 2.44 UIU/ML (ref 0.36–3.74)
WBC # BLD AUTO: 6.59 THOUSAND/UL (ref 4.31–10.16)

## 2019-05-09 PROCEDURE — 73502 X-RAY EXAM HIP UNI 2-3 VIEWS: CPT

## 2019-05-09 PROCEDURE — 27245 TREAT THIGH FRACTURE: CPT | Performed by: ORTHOPAEDIC SURGERY

## 2019-05-09 PROCEDURE — 0QS606Z REPOSITION RIGHT UPPER FEMUR WITH INTRAMEDULLARY INTERNAL FIXATION DEVICE, OPEN APPROACH: ICD-10-PCS | Performed by: ORTHOPAEDIC SURGERY

## 2019-05-09 PROCEDURE — 99232 SBSQ HOSP IP/OBS MODERATE 35: CPT | Performed by: INTERNAL MEDICINE

## 2019-05-09 PROCEDURE — 80048 BASIC METABOLIC PNL TOTAL CA: CPT | Performed by: ORTHOPAEDIC SURGERY

## 2019-05-09 PROCEDURE — 84443 ASSAY THYROID STIM HORMONE: CPT | Performed by: INTERNAL MEDICINE

## 2019-05-09 PROCEDURE — 85025 COMPLETE CBC W/AUTO DIFF WBC: CPT | Performed by: ORTHOPAEDIC SURGERY

## 2019-05-09 PROCEDURE — C1713 ANCHOR/SCREW BN/BN,TIS/BN: HCPCS | Performed by: ORTHOPAEDIC SURGERY

## 2019-05-09 PROCEDURE — 99223 1ST HOSP IP/OBS HIGH 75: CPT | Performed by: ORTHOPAEDIC SURGERY

## 2019-05-09 PROCEDURE — C1769 GUIDE WIRE: HCPCS | Performed by: ORTHOPAEDIC SURGERY

## 2019-05-09 DEVICE — 11.0MM TI TROCH FIXATION NAIL SCREW/95MM - STERILE: Type: IMPLANTABLE DEVICE | Site: HIP | Status: FUNCTIONAL

## 2019-05-09 DEVICE — 5.0MM TI LOCKING SCREW 34MM- FOR IM NAILS-STERILE: Type: IMPLANTABLE DEVICE | Site: HIP | Status: FUNCTIONAL

## 2019-05-09 DEVICE — 12MM/130 DEG TI CANN TROCH FIXATION NAIL 170MM-STERILE: Type: IMPLANTABLE DEVICE | Site: HIP | Status: FUNCTIONAL

## 2019-05-09 RX ORDER — DOCUSATE SODIUM 100 MG/1
100 CAPSULE, LIQUID FILLED ORAL 2 TIMES DAILY
Status: DISCONTINUED | OUTPATIENT
Start: 2019-05-09 | End: 2019-05-13 | Stop reason: HOSPADM

## 2019-05-09 RX ORDER — MEPERIDINE HYDROCHLORIDE 25 MG/ML
12.5 INJECTION INTRAMUSCULAR; INTRAVENOUS; SUBCUTANEOUS
Status: DISCONTINUED | OUTPATIENT
Start: 2019-05-09 | End: 2019-05-09 | Stop reason: HOSPADM

## 2019-05-09 RX ORDER — LABETALOL 20 MG/4 ML (5 MG/ML) INTRAVENOUS SYRINGE
5 AS NEEDED
Status: DISCONTINUED | OUTPATIENT
Start: 2019-05-09 | End: 2019-05-09 | Stop reason: HOSPADM

## 2019-05-09 RX ORDER — EPHEDRINE SULFATE 50 MG/ML
INJECTION INTRAVENOUS AS NEEDED
Status: DISCONTINUED | OUTPATIENT
Start: 2019-05-09 | End: 2019-05-09 | Stop reason: SURG

## 2019-05-09 RX ORDER — PROPOFOL 10 MG/ML
INJECTION, EMULSION INTRAVENOUS AS NEEDED
Status: DISCONTINUED | OUTPATIENT
Start: 2019-05-09 | End: 2019-05-09 | Stop reason: SURG

## 2019-05-09 RX ORDER — MAGNESIUM HYDROXIDE/ALUMINUM HYDROXICE/SIMETHICONE 120; 1200; 1200 MG/30ML; MG/30ML; MG/30ML
30 SUSPENSION ORAL EVERY 6 HOURS PRN
Status: DISCONTINUED | OUTPATIENT
Start: 2019-05-09 | End: 2019-05-13 | Stop reason: HOSPADM

## 2019-05-09 RX ORDER — ONDANSETRON 2 MG/ML
4 INJECTION INTRAMUSCULAR; INTRAVENOUS ONCE AS NEEDED
Status: DISCONTINUED | OUTPATIENT
Start: 2019-05-09 | End: 2019-05-09 | Stop reason: HOSPADM

## 2019-05-09 RX ORDER — CEFAZOLIN SODIUM 1 G/50ML
1000 SOLUTION INTRAVENOUS EVERY 8 HOURS
Status: COMPLETED | OUTPATIENT
Start: 2019-05-09 | End: 2019-05-10

## 2019-05-09 RX ORDER — CEFAZOLIN SODIUM 1 G/50ML
1000 SOLUTION INTRAVENOUS ONCE
Status: COMPLETED | OUTPATIENT
Start: 2019-05-09 | End: 2019-05-09

## 2019-05-09 RX ORDER — HYDROMORPHONE HCL/PF 1 MG/ML
0.2 SYRINGE (ML) INJECTION
Status: DISCONTINUED | OUTPATIENT
Start: 2019-05-09 | End: 2019-05-09 | Stop reason: HOSPADM

## 2019-05-09 RX ORDER — FENTANYL CITRATE 50 UG/ML
INJECTION, SOLUTION INTRAMUSCULAR; INTRAVENOUS AS NEEDED
Status: DISCONTINUED | OUTPATIENT
Start: 2019-05-09 | End: 2019-05-09 | Stop reason: SURG

## 2019-05-09 RX ORDER — BISACODYL 10 MG
10 SUPPOSITORY, RECTAL RECTAL DAILY PRN
Status: DISCONTINUED | OUTPATIENT
Start: 2019-05-09 | End: 2019-05-13

## 2019-05-09 RX ORDER — BUPIVACAINE HYDROCHLORIDE 7.5 MG/ML
INJECTION, SOLUTION INTRASPINAL AS NEEDED
Status: DISCONTINUED | OUTPATIENT
Start: 2019-05-09 | End: 2019-05-09 | Stop reason: SURG

## 2019-05-09 RX ORDER — SODIUM CHLORIDE, SODIUM LACTATE, POTASSIUM CHLORIDE, CALCIUM CHLORIDE 600; 310; 30; 20 MG/100ML; MG/100ML; MG/100ML; MG/100ML
75 INJECTION, SOLUTION INTRAVENOUS CONTINUOUS
Status: DISCONTINUED | OUTPATIENT
Start: 2019-05-09 | End: 2019-05-10

## 2019-05-09 RX ORDER — ONDANSETRON 2 MG/ML
INJECTION INTRAMUSCULAR; INTRAVENOUS AS NEEDED
Status: DISCONTINUED | OUTPATIENT
Start: 2019-05-09 | End: 2019-05-09 | Stop reason: SURG

## 2019-05-09 RX ORDER — CALCIUM CARBONATE 200(500)MG
1000 TABLET,CHEWABLE ORAL DAILY PRN
Status: DISCONTINUED | OUTPATIENT
Start: 2019-05-09 | End: 2019-05-13 | Stop reason: HOSPADM

## 2019-05-09 RX ORDER — ACETAMINOPHEN 325 MG/1
650 TABLET ORAL EVERY 6 HOURS PRN
Status: DISCONTINUED | OUTPATIENT
Start: 2019-05-09 | End: 2019-05-13 | Stop reason: HOSPADM

## 2019-05-09 RX ORDER — HYDRALAZINE HYDROCHLORIDE 20 MG/ML
5 INJECTION INTRAMUSCULAR; INTRAVENOUS AS NEEDED
Status: DISCONTINUED | OUTPATIENT
Start: 2019-05-09 | End: 2019-05-09 | Stop reason: HOSPADM

## 2019-05-09 RX ORDER — PANTOPRAZOLE SODIUM 40 MG/1
40 TABLET, DELAYED RELEASE ORAL DAILY
Status: DISCONTINUED | OUTPATIENT
Start: 2019-05-10 | End: 2019-05-13 | Stop reason: HOSPADM

## 2019-05-09 RX ORDER — SIMETHICONE 80 MG
80 TABLET,CHEWABLE ORAL 4 TIMES DAILY PRN
Status: DISCONTINUED | OUTPATIENT
Start: 2019-05-09 | End: 2019-05-13 | Stop reason: HOSPADM

## 2019-05-09 RX ORDER — PROPOFOL 10 MG/ML
INJECTION, EMULSION INTRAVENOUS CONTINUOUS PRN
Status: DISCONTINUED | OUTPATIENT
Start: 2019-05-09 | End: 2019-05-09 | Stop reason: SURG

## 2019-05-09 RX ORDER — PROMETHAZINE HYDROCHLORIDE 25 MG/ML
6.25 INJECTION, SOLUTION INTRAMUSCULAR; INTRAVENOUS ONCE AS NEEDED
Status: DISCONTINUED | OUTPATIENT
Start: 2019-05-09 | End: 2019-05-09 | Stop reason: HOSPADM

## 2019-05-09 RX ORDER — SENNOSIDES 8.6 MG
1 TABLET ORAL DAILY
Status: DISCONTINUED | OUTPATIENT
Start: 2019-05-10 | End: 2019-05-13

## 2019-05-09 RX ORDER — FENTANYL CITRATE/PF 50 MCG/ML
25 SYRINGE (ML) INJECTION
Status: DISCONTINUED | OUTPATIENT
Start: 2019-05-09 | End: 2019-05-09 | Stop reason: HOSPADM

## 2019-05-09 RX ORDER — HYDROMORPHONE HCL/PF 1 MG/ML
0.5 SYRINGE (ML) INJECTION
Status: DISCONTINUED | OUTPATIENT
Start: 2019-05-09 | End: 2019-05-09 | Stop reason: HOSPADM

## 2019-05-09 RX ORDER — ONDANSETRON 2 MG/ML
4 INJECTION INTRAMUSCULAR; INTRAVENOUS EVERY 6 HOURS PRN
Status: DISCONTINUED | OUTPATIENT
Start: 2019-05-09 | End: 2019-05-13 | Stop reason: HOSPADM

## 2019-05-09 RX ADMIN — PROPOFOL 70 MCG/KG/MIN: 10 INJECTION, EMULSION INTRAVENOUS at 18:28

## 2019-05-09 RX ADMIN — MORPHINE SULFATE 2 MG: 2 INJECTION, SOLUTION INTRAMUSCULAR; INTRAVENOUS at 04:31

## 2019-05-09 RX ADMIN — EPHEDRINE SULFATE 5 MG: 50 INJECTION, SOLUTION INTRAVENOUS at 18:44

## 2019-05-09 RX ADMIN — CEFAZOLIN SODIUM 1000 MG: 1 SOLUTION INTRAVENOUS at 19:50

## 2019-05-09 RX ADMIN — PHENYLEPHRINE HYDROCHLORIDE 50 MCG: 10 INJECTION INTRAVENOUS at 18:48

## 2019-05-09 RX ADMIN — PROPOFOL 20 MG: 10 INJECTION, EMULSION INTRAVENOUS at 18:07

## 2019-05-09 RX ADMIN — OXYCODONE AND ACETAMINOPHEN 2 TABLET: 5; 325 TABLET ORAL at 05:30

## 2019-05-09 RX ADMIN — FENTANYL CITRATE 50 MCG: 50 INJECTION, SOLUTION INTRAMUSCULAR; INTRAVENOUS at 18:05

## 2019-05-09 RX ADMIN — PHENYLEPHRINE HYDROCHLORIDE 100 MCG: 10 INJECTION INTRAVENOUS at 18:58

## 2019-05-09 RX ADMIN — LEVOTHYROXINE SODIUM 25 MCG: 25 TABLET ORAL at 05:30

## 2019-05-09 RX ADMIN — BUPROPION HYDROCHLORIDE 150 MG: 150 TABLET, FILM COATED, EXTENDED RELEASE ORAL at 10:07

## 2019-05-09 RX ADMIN — ONDANSETRON 4 MG: 2 INJECTION INTRAMUSCULAR; INTRAVENOUS at 19:12

## 2019-05-09 RX ADMIN — RISPERIDONE 1 MG: 1 TABLET ORAL at 09:02

## 2019-05-09 RX ADMIN — BUPIVACAINE HYDROCHLORIDE IN DEXTROSE 1.4 ML: 7.5 INJECTION, SOLUTION SUBARACHNOID at 18:20

## 2019-05-09 RX ADMIN — PHENYLEPHRINE HYDROCHLORIDE 100 MCG: 10 INJECTION INTRAVENOUS at 18:53

## 2019-05-09 RX ADMIN — PROPOFOL 20 MG: 10 INJECTION, EMULSION INTRAVENOUS at 18:24

## 2019-05-09 RX ADMIN — PHENYLEPHRINE HYDROCHLORIDE 50 MCG: 10 INJECTION INTRAVENOUS at 18:43

## 2019-05-09 RX ADMIN — PHENYLEPHRINE HYDROCHLORIDE 100 MCG: 10 INJECTION INTRAVENOUS at 19:01

## 2019-05-09 RX ADMIN — CEFAZOLIN SODIUM 1000 MG: 1 SOLUTION INTRAVENOUS at 18:36

## 2019-05-09 RX ADMIN — RISPERIDONE 1 MG: 1 TABLET ORAL at 22:13

## 2019-05-09 RX ADMIN — SERTRALINE HYDROCHLORIDE 50 MG: 50 TABLET ORAL at 09:02

## 2019-05-09 RX ADMIN — OXYCODONE AND ACETAMINOPHEN 2 TABLET: 5; 325 TABLET ORAL at 10:06

## 2019-05-09 RX ADMIN — PHENYLEPHRINE HYDROCHLORIDE 200 MCG: 10 INJECTION INTRAVENOUS at 19:08

## 2019-05-09 RX ADMIN — MIRTAZAPINE 30 MG: 15 TABLET, FILM COATED ORAL at 22:13

## 2019-05-09 RX ADMIN — PHENYLEPHRINE HYDROCHLORIDE 100 MCG: 10 INJECTION INTRAVENOUS at 19:14

## 2019-05-10 ENCOUNTER — APPOINTMENT (INPATIENT)
Dept: CT IMAGING | Facility: HOSPITAL | Age: 64
DRG: 481 | End: 2019-05-10
Payer: MEDICARE

## 2019-05-10 ENCOUNTER — APPOINTMENT (INPATIENT)
Dept: NEUROLOGY | Facility: HOSPITAL | Age: 64
DRG: 481 | End: 2019-05-10
Attending: PSYCHIATRY & NEUROLOGY
Payer: MEDICARE

## 2019-05-10 PROBLEM — R41.0 DELIRIUM: Status: ACTIVE | Noted: 2019-05-10

## 2019-05-10 LAB
ANION GAP SERPL CALCULATED.3IONS-SCNC: 7 MMOL/L (ref 4–13)
BUN SERPL-MCNC: 10 MG/DL (ref 5–25)
CALCIUM SERPL-MCNC: 8.7 MG/DL (ref 8.3–10.1)
CHLORIDE SERPL-SCNC: 103 MMOL/L (ref 100–108)
CO2 SERPL-SCNC: 27 MMOL/L (ref 21–32)
CREAT SERPL-MCNC: 0.88 MG/DL (ref 0.6–1.3)
ERYTHROCYTE [DISTWIDTH] IN BLOOD BY AUTOMATED COUNT: 12.1 % (ref 11.6–15.1)
GFR SERPL CREATININE-BSD FRML MDRD: 69 ML/MIN/1.73SQ M
GLUCOSE SERPL-MCNC: 106 MG/DL (ref 65–140)
HCT VFR BLD AUTO: 30.3 % (ref 34.8–46.1)
HGB BLD-MCNC: 9.8 G/DL (ref 11.5–15.4)
MCH RBC QN AUTO: 30.7 PG (ref 26.8–34.3)
MCHC RBC AUTO-ENTMCNC: 32.3 G/DL (ref 31.4–37.4)
MCV RBC AUTO: 95 FL (ref 82–98)
PLATELET # BLD AUTO: 151 THOUSANDS/UL (ref 149–390)
PMV BLD AUTO: 9.2 FL (ref 8.9–12.7)
POTASSIUM SERPL-SCNC: 3.9 MMOL/L (ref 3.5–5.3)
RBC # BLD AUTO: 3.19 MILLION/UL (ref 3.81–5.12)
SODIUM SERPL-SCNC: 137 MMOL/L (ref 136–145)
WBC # BLD AUTO: 6.13 THOUSAND/UL (ref 4.31–10.16)

## 2019-05-10 PROCEDURE — 97530 THERAPEUTIC ACTIVITIES: CPT

## 2019-05-10 PROCEDURE — 95816 EEG AWAKE AND DROWSY: CPT | Performed by: PSYCHIATRY & NEUROLOGY

## 2019-05-10 PROCEDURE — 70450 CT HEAD/BRAIN W/O DYE: CPT

## 2019-05-10 PROCEDURE — 95816 EEG AWAKE AND DROWSY: CPT

## 2019-05-10 PROCEDURE — 99232 SBSQ HOSP IP/OBS MODERATE 35: CPT | Performed by: INTERNAL MEDICINE

## 2019-05-10 PROCEDURE — 97163 PT EVAL HIGH COMPLEX 45 MIN: CPT

## 2019-05-10 PROCEDURE — 80048 BASIC METABOLIC PNL TOTAL CA: CPT | Performed by: PHYSICIAN ASSISTANT

## 2019-05-10 PROCEDURE — G8979 MOBILITY GOAL STATUS: HCPCS

## 2019-05-10 PROCEDURE — G8987 SELF CARE CURRENT STATUS: HCPCS

## 2019-05-10 PROCEDURE — G8988 SELF CARE GOAL STATUS: HCPCS

## 2019-05-10 PROCEDURE — 85027 COMPLETE CBC AUTOMATED: CPT | Performed by: PHYSICIAN ASSISTANT

## 2019-05-10 PROCEDURE — G8978 MOBILITY CURRENT STATUS: HCPCS

## 2019-05-10 PROCEDURE — 99024 POSTOP FOLLOW-UP VISIT: CPT | Performed by: PHYSICIAN ASSISTANT

## 2019-05-10 PROCEDURE — 99223 1ST HOSP IP/OBS HIGH 75: CPT | Performed by: PSYCHIATRY & NEUROLOGY

## 2019-05-10 PROCEDURE — 97167 OT EVAL HIGH COMPLEX 60 MIN: CPT

## 2019-05-10 RX ORDER — LANOLIN ALCOHOL/MO/W.PET/CERES
3 CREAM (GRAM) TOPICAL
Status: DISCONTINUED | OUTPATIENT
Start: 2019-05-10 | End: 2019-05-13 | Stop reason: HOSPADM

## 2019-05-10 RX ORDER — OXYCODONE HYDROCHLORIDE AND ACETAMINOPHEN 5; 325 MG/1; MG/1
1 TABLET ORAL EVERY 6 HOURS PRN
Qty: 30 TABLET | Refills: 0 | Status: SHIPPED | OUTPATIENT
Start: 2019-05-10 | End: 2019-05-20

## 2019-05-10 RX ORDER — ASPIRIN 325 MG
325 TABLET, DELAYED RELEASE (ENTERIC COATED) ORAL 2 TIMES DAILY
Qty: 60 TABLET | Refills: 0 | Status: SHIPPED | OUTPATIENT
Start: 2019-05-10 | End: 2019-06-16

## 2019-05-10 RX ADMIN — MIRTAZAPINE 30 MG: 15 TABLET, FILM COATED ORAL at 21:32

## 2019-05-10 RX ADMIN — CEFAZOLIN SODIUM 1000 MG: 1 SOLUTION INTRAVENOUS at 05:50

## 2019-05-10 RX ADMIN — RISPERIDONE 1 MG: 1 TABLET ORAL at 12:02

## 2019-05-10 RX ADMIN — PANTOPRAZOLE SODIUM 40 MG: 40 TABLET, DELAYED RELEASE ORAL at 09:02

## 2019-05-10 RX ADMIN — BUPROPION HYDROCHLORIDE 150 MG: 150 TABLET, FILM COATED, EXTENDED RELEASE ORAL at 18:50

## 2019-05-10 RX ADMIN — ENOXAPARIN SODIUM 40 MG: 40 INJECTION SUBCUTANEOUS at 09:01

## 2019-05-10 RX ADMIN — RISPERIDONE 1 MG: 1 TABLET ORAL at 18:50

## 2019-05-10 RX ADMIN — RISPERIDONE 1 MG: 1 TABLET ORAL at 09:02

## 2019-05-10 RX ADMIN — ACETAMINOPHEN 650 MG: 325 TABLET ORAL at 09:39

## 2019-05-10 RX ADMIN — SENNOSIDES 8.6 MG: 8.6 TABLET, FILM COATED ORAL at 09:01

## 2019-05-10 RX ADMIN — DOCUSATE SODIUM 100 MG: 100 CAPSULE, LIQUID FILLED ORAL at 09:01

## 2019-05-10 RX ADMIN — LEVOTHYROXINE SODIUM 25 MCG: 25 TABLET ORAL at 06:28

## 2019-05-10 RX ADMIN — RISPERIDONE 1 MG: 1 TABLET ORAL at 21:32

## 2019-05-10 RX ADMIN — DOCUSATE SODIUM 100 MG: 100 CAPSULE, LIQUID FILLED ORAL at 18:50

## 2019-05-10 RX ADMIN — SODIUM CHLORIDE, SODIUM LACTATE, POTASSIUM CHLORIDE, AND CALCIUM CHLORIDE 75 ML/HR: .6; .31; .03; .02 INJECTION, SOLUTION INTRAVENOUS at 04:06

## 2019-05-10 RX ADMIN — BUPROPION HYDROCHLORIDE 150 MG: 150 TABLET, FILM COATED, EXTENDED RELEASE ORAL at 09:02

## 2019-05-10 RX ADMIN — ACETAMINOPHEN 650 MG: 325 TABLET ORAL at 14:26

## 2019-05-10 RX ADMIN — SERTRALINE HYDROCHLORIDE 50 MG: 50 TABLET ORAL at 09:01

## 2019-05-10 RX ADMIN — MELATONIN TAB 3 MG 3 MG: 3 TAB at 21:32

## 2019-05-11 LAB
ANION GAP SERPL CALCULATED.3IONS-SCNC: 6 MMOL/L (ref 4–13)
BUN SERPL-MCNC: 10 MG/DL (ref 5–25)
CALCIUM SERPL-MCNC: 8.5 MG/DL (ref 8.3–10.1)
CHLORIDE SERPL-SCNC: 104 MMOL/L (ref 100–108)
CO2 SERPL-SCNC: 29 MMOL/L (ref 21–32)
CREAT SERPL-MCNC: 0.98 MG/DL (ref 0.6–1.3)
ERYTHROCYTE [DISTWIDTH] IN BLOOD BY AUTOMATED COUNT: 12.4 % (ref 11.6–15.1)
GFR SERPL CREATININE-BSD FRML MDRD: 61 ML/MIN/1.73SQ M
GLUCOSE SERPL-MCNC: 106 MG/DL (ref 65–140)
HCT VFR BLD AUTO: 29.5 % (ref 34.8–46.1)
HGB BLD-MCNC: 9.3 G/DL (ref 11.5–15.4)
MCH RBC QN AUTO: 30.5 PG (ref 26.8–34.3)
MCHC RBC AUTO-ENTMCNC: 31.5 G/DL (ref 31.4–37.4)
MCV RBC AUTO: 97 FL (ref 82–98)
PLATELET # BLD AUTO: 148 THOUSANDS/UL (ref 149–390)
PMV BLD AUTO: 9.7 FL (ref 8.9–12.7)
POTASSIUM SERPL-SCNC: 3.7 MMOL/L (ref 3.5–5.3)
RBC # BLD AUTO: 3.05 MILLION/UL (ref 3.81–5.12)
SODIUM SERPL-SCNC: 139 MMOL/L (ref 136–145)
VIT B12 SERPL-MCNC: 473 PG/ML (ref 100–900)
WBC # BLD AUTO: 7.42 THOUSAND/UL (ref 4.31–10.16)

## 2019-05-11 PROCEDURE — 99232 SBSQ HOSP IP/OBS MODERATE 35: CPT | Performed by: INTERNAL MEDICINE

## 2019-05-11 PROCEDURE — 99024 POSTOP FOLLOW-UP VISIT: CPT | Performed by: ORTHOPAEDIC SURGERY

## 2019-05-11 PROCEDURE — 85027 COMPLETE CBC AUTOMATED: CPT | Performed by: PHYSICIAN ASSISTANT

## 2019-05-11 PROCEDURE — 99232 SBSQ HOSP IP/OBS MODERATE 35: CPT | Performed by: PSYCHIATRY & NEUROLOGY

## 2019-05-11 PROCEDURE — 82607 VITAMIN B-12: CPT | Performed by: PSYCHIATRY & NEUROLOGY

## 2019-05-11 PROCEDURE — 80048 BASIC METABOLIC PNL TOTAL CA: CPT | Performed by: PHYSICIAN ASSISTANT

## 2019-05-11 RX ORDER — MIRTAZAPINE 15 MG/1
15 TABLET, FILM COATED ORAL
Status: DISCONTINUED | OUTPATIENT
Start: 2019-05-11 | End: 2019-05-13 | Stop reason: HOSPADM

## 2019-05-11 RX ORDER — RISPERIDONE 1 MG/1
0.5 TABLET, FILM COATED ORAL 4 TIMES DAILY
Status: DISCONTINUED | OUTPATIENT
Start: 2019-05-11 | End: 2019-05-11

## 2019-05-11 RX ORDER — OXYCODONE HYDROCHLORIDE AND ACETAMINOPHEN 5; 325 MG/1; MG/1
0.5 TABLET ORAL EVERY 4 HOURS PRN
Status: DISCONTINUED | OUTPATIENT
Start: 2019-05-11 | End: 2019-05-13 | Stop reason: HOSPADM

## 2019-05-11 RX ADMIN — OXYCODONE AND ACETAMINOPHEN 1 TABLET: 5; 325 TABLET ORAL at 18:09

## 2019-05-11 RX ADMIN — SENNOSIDES 8.6 MG: 8.6 TABLET, FILM COATED ORAL at 08:51

## 2019-05-11 RX ADMIN — DOCUSATE SODIUM 100 MG: 100 CAPSULE, LIQUID FILLED ORAL at 18:09

## 2019-05-11 RX ADMIN — LEVOTHYROXINE SODIUM 25 MCG: 25 TABLET ORAL at 05:14

## 2019-05-11 RX ADMIN — RISPERIDONE 1 MG: 1 TABLET ORAL at 08:51

## 2019-05-11 RX ADMIN — ENOXAPARIN SODIUM 40 MG: 40 INJECTION SUBCUTANEOUS at 08:51

## 2019-05-11 RX ADMIN — BUPROPION HYDROCHLORIDE 150 MG: 150 TABLET, FILM COATED, EXTENDED RELEASE ORAL at 18:08

## 2019-05-11 RX ADMIN — BUPROPION HYDROCHLORIDE 150 MG: 150 TABLET, FILM COATED, EXTENDED RELEASE ORAL at 08:52

## 2019-05-11 RX ADMIN — PANTOPRAZOLE SODIUM 40 MG: 40 TABLET, DELAYED RELEASE ORAL at 08:51

## 2019-05-11 RX ADMIN — DOCUSATE SODIUM 100 MG: 100 CAPSULE, LIQUID FILLED ORAL at 08:51

## 2019-05-11 RX ADMIN — SERTRALINE HYDROCHLORIDE 50 MG: 50 TABLET ORAL at 08:30

## 2019-05-12 LAB
ANION GAP SERPL CALCULATED.3IONS-SCNC: 8 MMOL/L (ref 4–13)
BUN SERPL-MCNC: 17 MG/DL (ref 5–25)
CALCIUM SERPL-MCNC: 8.6 MG/DL (ref 8.3–10.1)
CHLORIDE SERPL-SCNC: 103 MMOL/L (ref 100–108)
CO2 SERPL-SCNC: 26 MMOL/L (ref 21–32)
CREAT SERPL-MCNC: 0.88 MG/DL (ref 0.6–1.3)
ERYTHROCYTE [DISTWIDTH] IN BLOOD BY AUTOMATED COUNT: 12.4 % (ref 11.6–15.1)
GFR SERPL CREATININE-BSD FRML MDRD: 69 ML/MIN/1.73SQ M
GLUCOSE SERPL-MCNC: 128 MG/DL (ref 65–140)
HCT VFR BLD AUTO: 29 % (ref 34.8–46.1)
HGB BLD-MCNC: 9.2 G/DL (ref 11.5–15.4)
MCH RBC QN AUTO: 30.7 PG (ref 26.8–34.3)
MCHC RBC AUTO-ENTMCNC: 31.7 G/DL (ref 31.4–37.4)
MCV RBC AUTO: 97 FL (ref 82–98)
PLATELET # BLD AUTO: 193 THOUSANDS/UL (ref 149–390)
PMV BLD AUTO: 9.3 FL (ref 8.9–12.7)
POTASSIUM SERPL-SCNC: 4.1 MMOL/L (ref 3.5–5.3)
RBC # BLD AUTO: 3 MILLION/UL (ref 3.81–5.12)
SODIUM SERPL-SCNC: 137 MMOL/L (ref 136–145)
WBC # BLD AUTO: 10.86 THOUSAND/UL (ref 4.31–10.16)

## 2019-05-12 PROCEDURE — 99024 POSTOP FOLLOW-UP VISIT: CPT | Performed by: ORTHOPAEDIC SURGERY

## 2019-05-12 PROCEDURE — 85027 COMPLETE CBC AUTOMATED: CPT | Performed by: PHYSICIAN ASSISTANT

## 2019-05-12 PROCEDURE — 99232 SBSQ HOSP IP/OBS MODERATE 35: CPT | Performed by: INTERNAL MEDICINE

## 2019-05-12 PROCEDURE — 0T9B70Z DRAINAGE OF BLADDER WITH DRAINAGE DEVICE, VIA NATURAL OR ARTIFICIAL OPENING: ICD-10-PCS | Performed by: ORTHOPAEDIC SURGERY

## 2019-05-12 PROCEDURE — 80048 BASIC METABOLIC PNL TOTAL CA: CPT | Performed by: PHYSICIAN ASSISTANT

## 2019-05-12 RX ORDER — RISPERIDONE 1 MG/1
0.5 TABLET, FILM COATED ORAL 3 TIMES DAILY
Status: DISCONTINUED | OUTPATIENT
Start: 2019-05-13 | End: 2019-05-13 | Stop reason: HOSPADM

## 2019-05-12 RX ORDER — RISPERIDONE 1 MG/1
0.5 TABLET, FILM COATED ORAL
Status: DISCONTINUED | OUTPATIENT
Start: 2019-05-12 | End: 2019-05-12

## 2019-05-12 RX ADMIN — SERTRALINE HYDROCHLORIDE 50 MG: 50 TABLET ORAL at 08:29

## 2019-05-12 RX ADMIN — MELATONIN TAB 3 MG 3 MG: 3 TAB at 22:21

## 2019-05-12 RX ADMIN — BUPROPION HYDROCHLORIDE 150 MG: 150 TABLET, FILM COATED, EXTENDED RELEASE ORAL at 08:28

## 2019-05-12 RX ADMIN — ENOXAPARIN SODIUM 40 MG: 40 INJECTION SUBCUTANEOUS at 08:29

## 2019-05-12 RX ADMIN — MIRTAZAPINE 15 MG: 15 TABLET, FILM COATED ORAL at 22:21

## 2019-05-12 RX ADMIN — SENNOSIDES 8.6 MG: 8.6 TABLET, FILM COATED ORAL at 08:29

## 2019-05-12 RX ADMIN — DOCUSATE SODIUM 100 MG: 100 CAPSULE, LIQUID FILLED ORAL at 19:00

## 2019-05-12 RX ADMIN — RISPERIDONE 0.5 MG: 1 TABLET ORAL at 19:00

## 2019-05-12 RX ADMIN — ONDANSETRON 4 MG: 2 INJECTION INTRAMUSCULAR; INTRAVENOUS at 08:42

## 2019-05-12 RX ADMIN — RISPERIDONE 0.5 MG: 1 TABLET ORAL at 14:00

## 2019-05-12 RX ADMIN — PANTOPRAZOLE SODIUM 40 MG: 40 TABLET, DELAYED RELEASE ORAL at 08:29

## 2019-05-12 RX ADMIN — LEVOTHYROXINE SODIUM 25 MCG: 25 TABLET ORAL at 05:02

## 2019-05-12 RX ADMIN — DOCUSATE SODIUM 100 MG: 100 CAPSULE, LIQUID FILLED ORAL at 08:29

## 2019-05-12 RX ADMIN — BUPROPION HYDROCHLORIDE 150 MG: 150 TABLET, FILM COATED, EXTENDED RELEASE ORAL at 18:59

## 2019-05-13 VITALS
WEIGHT: 135.14 LBS | SYSTOLIC BLOOD PRESSURE: 125 MMHG | HEART RATE: 82 BPM | RESPIRATION RATE: 18 BRPM | DIASTOLIC BLOOD PRESSURE: 63 MMHG | BODY MASS INDEX: 21.72 KG/M2 | OXYGEN SATURATION: 96 % | HEIGHT: 66 IN | TEMPERATURE: 98.2 F

## 2019-05-13 PROBLEM — R41.0 DELIRIUM: Status: RESOLVED | Noted: 2019-05-10 | Resolved: 2019-05-13

## 2019-05-13 PROCEDURE — 99024 POSTOP FOLLOW-UP VISIT: CPT | Performed by: PHYSICIAN ASSISTANT

## 2019-05-13 PROCEDURE — 99239 HOSP IP/OBS DSCHRG MGMT >30: CPT | Performed by: HOSPITALIST

## 2019-05-13 RX ADMIN — PSYLLIUM HUSK 1 PACKET: 3.4 POWDER ORAL at 10:46

## 2019-05-13 RX ADMIN — SERTRALINE HYDROCHLORIDE 50 MG: 50 TABLET ORAL at 10:44

## 2019-05-13 RX ADMIN — PANTOPRAZOLE SODIUM 40 MG: 40 TABLET, DELAYED RELEASE ORAL at 10:47

## 2019-05-13 RX ADMIN — BUPROPION HYDROCHLORIDE 150 MG: 150 TABLET, FILM COATED, EXTENDED RELEASE ORAL at 10:45

## 2019-05-13 RX ADMIN — OXYCODONE HYDROCHLORIDE AND ACETAMINOPHEN 0.5 TABLET: 5; 325 TABLET ORAL at 10:44

## 2019-05-13 RX ADMIN — RISPERIDONE 0.5 MG: 1 TABLET ORAL at 10:43

## 2019-05-13 RX ADMIN — LEVOTHYROXINE SODIUM 25 MCG: 25 TABLET ORAL at 05:24

## 2019-05-23 ENCOUNTER — OFFICE VISIT (OUTPATIENT)
Dept: OBGYN CLINIC | Facility: CLINIC | Age: 64
End: 2019-05-23

## 2019-05-23 ENCOUNTER — APPOINTMENT (OUTPATIENT)
Dept: RADIOLOGY | Facility: CLINIC | Age: 64
End: 2019-05-23
Payer: COMMERCIAL

## 2019-05-23 VITALS — DIASTOLIC BLOOD PRESSURE: 70 MMHG | SYSTOLIC BLOOD PRESSURE: 120 MMHG | HEART RATE: 78 BPM

## 2019-05-23 DIAGNOSIS — Z47.89 AFTERCARE FOLLOWING SURGERY OF THE MUSCULOSKELETAL SYSTEM: ICD-10-CM

## 2019-05-23 DIAGNOSIS — Z47.89 AFTERCARE FOLLOWING SURGERY OF THE MUSCULOSKELETAL SYSTEM: Primary | ICD-10-CM

## 2019-05-23 PROCEDURE — 73502 X-RAY EXAM HIP UNI 2-3 VIEWS: CPT

## 2019-05-23 PROCEDURE — 99024 POSTOP FOLLOW-UP VISIT: CPT | Performed by: ORTHOPAEDIC SURGERY

## 2019-05-23 RX ORDER — OXYCODONE HYDROCHLORIDE AND ACETAMINOPHEN 5; 325 MG/1; MG/1
1 TABLET ORAL EVERY 8 HOURS PRN
COMMUNITY
End: 2019-06-16

## 2019-06-16 ENCOUNTER — HOSPITAL ENCOUNTER (EMERGENCY)
Facility: HOSPITAL | Age: 64
Discharge: HOME | End: 2019-06-16
Attending: EMERGENCY MEDICINE | Admitting: EMERGENCY MEDICINE
Payer: MEDICARE

## 2019-06-16 ENCOUNTER — APPOINTMENT (EMERGENCY)
Dept: RADIOLOGY | Facility: HOSPITAL | Age: 64
End: 2019-06-16
Payer: MEDICARE

## 2019-06-16 ENCOUNTER — APPOINTMENT (EMERGENCY)
Dept: CT IMAGING | Facility: HOSPITAL | Age: 64
End: 2019-06-16
Payer: MEDICARE

## 2019-06-16 VITALS
OXYGEN SATURATION: 93 % | DIASTOLIC BLOOD PRESSURE: 61 MMHG | WEIGHT: 130 LBS | BODY MASS INDEX: 23.04 KG/M2 | SYSTOLIC BLOOD PRESSURE: 120 MMHG | RESPIRATION RATE: 18 BRPM | TEMPERATURE: 99.4 F | HEIGHT: 63 IN | HEART RATE: 88 BPM

## 2019-06-16 DIAGNOSIS — J18.9 COMMUNITY ACQUIRED PNEUMONIA OF RIGHT LOWER LOBE OF LUNG: Primary | ICD-10-CM

## 2019-06-16 LAB
ALBUMIN SERPL BCP-MCNC: 3.2 G/DL (ref 3.5–5)
ALP SERPL-CCNC: 130 U/L (ref 46–116)
ALT SERPL W P-5'-P-CCNC: 31 U/L (ref 12–78)
ANION GAP SERPL CALCULATED.3IONS-SCNC: 9 MMOL/L (ref 4–13)
APTT PPP: 38 SECONDS (ref 26–38)
AST SERPL W P-5'-P-CCNC: 19 U/L (ref 5–45)
BASOPHILS # BLD AUTO: 0.02 THOUSANDS/ΜL (ref 0–0.1)
BASOPHILS NFR BLD AUTO: 0 % (ref 0–1)
BILIRUB SERPL-MCNC: 0.4 MG/DL (ref 0.2–1)
BILIRUB UR QL STRIP: NEGATIVE
BUN SERPL-MCNC: 13 MG/DL (ref 5–25)
CALCIUM SERPL-MCNC: 9.9 MG/DL (ref 8.3–10.1)
CHLORIDE SERPL-SCNC: 101 MMOL/L (ref 100–108)
CLARITY UR: ABNORMAL
CO2 SERPL-SCNC: 28 MMOL/L (ref 21–32)
COLOR UR: YELLOW
CREAT SERPL-MCNC: 0.98 MG/DL (ref 0.6–1.3)
EOSINOPHIL # BLD AUTO: 0.07 THOUSAND/ΜL (ref 0–0.61)
EOSINOPHIL NFR BLD AUTO: 1 % (ref 0–6)
ERYTHROCYTE [DISTWIDTH] IN BLOOD BY AUTOMATED COUNT: 13.2 % (ref 11.6–15.1)
GFR SERPL CREATININE-BSD FRML MDRD: 61 ML/MIN/1.73SQ M
GLUCOSE SERPL-MCNC: 124 MG/DL (ref 65–140)
GLUCOSE UR STRIP-MCNC: NEGATIVE MG/DL
HCT VFR BLD AUTO: 31 % (ref 34.8–46.1)
HGB BLD-MCNC: 9.6 G/DL (ref 11.5–15.4)
HGB UR QL STRIP.AUTO: NEGATIVE
IMM GRANULOCYTES # BLD AUTO: 0.06 THOUSAND/UL (ref 0–0.2)
IMM GRANULOCYTES NFR BLD AUTO: 1 % (ref 0–2)
INR PPP: 1.14 (ref 0.86–1.17)
KETONES UR STRIP-MCNC: NEGATIVE MG/DL
LACTATE SERPL-SCNC: 1 MMOL/L (ref 0.5–2)
LEUKOCYTE ESTERASE UR QL STRIP: NEGATIVE
LIPASE SERPL-CCNC: 161 U/L (ref 73–393)
LYMPHOCYTES # BLD AUTO: 0.66 THOUSANDS/ΜL (ref 0.6–4.47)
LYMPHOCYTES NFR BLD AUTO: 6 % (ref 14–44)
MCH RBC QN AUTO: 29.7 PG (ref 26.8–34.3)
MCHC RBC AUTO-ENTMCNC: 31 G/DL (ref 31.4–37.4)
MCV RBC AUTO: 96 FL (ref 82–98)
MONOCYTES # BLD AUTO: 0.8 THOUSAND/ΜL (ref 0.17–1.22)
MONOCYTES NFR BLD AUTO: 7 % (ref 4–12)
NEUTROPHILS # BLD AUTO: 9.96 THOUSANDS/ΜL (ref 1.85–7.62)
NEUTS SEG NFR BLD AUTO: 85 % (ref 43–75)
NITRITE UR QL STRIP: NEGATIVE
NRBC BLD AUTO-RTO: 0 /100 WBCS
PH UR STRIP.AUTO: 8.5 [PH]
PLATELET # BLD AUTO: 460 THOUSANDS/UL (ref 149–390)
PMV BLD AUTO: 9.1 FL (ref 8.9–12.7)
POTASSIUM SERPL-SCNC: 4 MMOL/L (ref 3.5–5.3)
PROT SERPL-MCNC: 7.9 G/DL (ref 6.4–8.2)
PROT UR STRIP-MCNC: NEGATIVE MG/DL
PROTHROMBIN TIME: 13.9 SECONDS (ref 11.8–14.2)
RBC # BLD AUTO: 3.23 MILLION/UL (ref 3.81–5.12)
SODIUM SERPL-SCNC: 138 MMOL/L (ref 136–145)
SP GR UR STRIP.AUTO: 1.01 (ref 1–1.03)
UROBILINOGEN UR QL STRIP.AUTO: 0.2 E.U./DL
WBC # BLD AUTO: 11.57 THOUSAND/UL (ref 4.31–10.16)

## 2019-06-16 PROCEDURE — 74177 CT ABD & PELVIS W/CONTRAST: CPT

## 2019-06-16 PROCEDURE — 80053 COMPREHEN METABOLIC PANEL: CPT | Performed by: EMERGENCY MEDICINE

## 2019-06-16 PROCEDURE — 36415 COLL VENOUS BLD VENIPUNCTURE: CPT

## 2019-06-16 PROCEDURE — 87040 BLOOD CULTURE FOR BACTERIA: CPT | Performed by: EMERGENCY MEDICINE

## 2019-06-16 PROCEDURE — 83605 ASSAY OF LACTIC ACID: CPT | Performed by: EMERGENCY MEDICINE

## 2019-06-16 PROCEDURE — 85730 THROMBOPLASTIN TIME PARTIAL: CPT | Performed by: EMERGENCY MEDICINE

## 2019-06-16 PROCEDURE — 71046 X-RAY EXAM CHEST 2 VIEWS: CPT

## 2019-06-16 PROCEDURE — 93005 ELECTROCARDIOGRAM TRACING: CPT

## 2019-06-16 PROCEDURE — 85610 PROTHROMBIN TIME: CPT | Performed by: EMERGENCY MEDICINE

## 2019-06-16 PROCEDURE — 99284 EMERGENCY DEPT VISIT MOD MDM: CPT | Performed by: EMERGENCY MEDICINE

## 2019-06-16 PROCEDURE — 83690 ASSAY OF LIPASE: CPT | Performed by: EMERGENCY MEDICINE

## 2019-06-16 PROCEDURE — 99285 EMERGENCY DEPT VISIT HI MDM: CPT

## 2019-06-16 PROCEDURE — 81003 URINALYSIS AUTO W/O SCOPE: CPT | Performed by: EMERGENCY MEDICINE

## 2019-06-16 PROCEDURE — 85025 COMPLETE CBC W/AUTO DIFF WBC: CPT | Performed by: EMERGENCY MEDICINE

## 2019-06-16 RX ORDER — DOXYCYCLINE HYCLATE 100 MG/1
100 CAPSULE ORAL ONCE
Status: COMPLETED | OUTPATIENT
Start: 2019-06-16 | End: 2019-06-16

## 2019-06-16 RX ORDER — DOXYCYCLINE HYCLATE 100 MG/1
100 CAPSULE ORAL 2 TIMES DAILY
Qty: 14 CAPSULE | Refills: 0 | Status: SHIPPED | OUTPATIENT
Start: 2019-06-16 | End: 2019-06-23

## 2019-06-16 RX ORDER — DILTIAZEM HYDROCHLORIDE 5 MG/ML
INJECTION INTRAVENOUS
Status: DISCONTINUED
Start: 2019-06-16 | End: 2019-06-16

## 2019-06-16 RX ADMIN — DOXYCYCLINE HYCLATE 100 MG: 100 CAPSULE ORAL at 06:55

## 2019-06-16 RX ADMIN — IOHEXOL 85 ML: 350 INJECTION, SOLUTION INTRAVENOUS at 01:36

## 2019-06-17 LAB
ATRIAL RATE: 90 BPM
P AXIS: 29 DEGREES
PR INTERVAL: 126 MS
QRS AXIS: 43 DEGREES
QRSD INTERVAL: 68 MS
QT INTERVAL: 342 MS
QTC INTERVAL: 418 MS
T WAVE AXIS: 10 DEGREES
VENTRICULAR RATE: 90 BPM

## 2019-06-17 PROCEDURE — 93010 ELECTROCARDIOGRAM REPORT: CPT | Performed by: INTERNAL MEDICINE

## 2019-06-20 ENCOUNTER — APPOINTMENT (OUTPATIENT)
Dept: RADIOLOGY | Facility: CLINIC | Age: 64
End: 2019-06-20
Payer: MEDICARE

## 2019-06-20 ENCOUNTER — OFFICE VISIT (OUTPATIENT)
Dept: OBGYN CLINIC | Facility: CLINIC | Age: 64
End: 2019-06-20

## 2019-06-20 VITALS
SYSTOLIC BLOOD PRESSURE: 118 MMHG | DIASTOLIC BLOOD PRESSURE: 80 MMHG | WEIGHT: 127 LBS | HEIGHT: 63 IN | HEART RATE: 72 BPM | BODY MASS INDEX: 22.5 KG/M2

## 2019-06-20 DIAGNOSIS — Z47.89 AFTERCARE FOLLOWING SURGERY OF THE MUSCULOSKELETAL SYSTEM: Primary | ICD-10-CM

## 2019-06-20 DIAGNOSIS — Z47.89 AFTERCARE FOLLOWING SURGERY OF THE MUSCULOSKELETAL SYSTEM: ICD-10-CM

## 2019-06-20 PROCEDURE — 1124F ACP DISCUSS-NO DSCNMKR DOCD: CPT | Performed by: ORTHOPAEDIC SURGERY

## 2019-06-20 PROCEDURE — 99024 POSTOP FOLLOW-UP VISIT: CPT | Performed by: ORTHOPAEDIC SURGERY

## 2019-06-20 PROCEDURE — 73502 X-RAY EXAM HIP UNI 2-3 VIEWS: CPT

## 2019-06-21 LAB
BACTERIA BLD CULT: NORMAL
BACTERIA BLD CULT: NORMAL

## 2019-07-03 ENCOUNTER — TELEPHONE (OUTPATIENT)
Dept: OBGYN CLINIC | Facility: CLINIC | Age: 64
End: 2019-07-03

## 2019-07-03 DIAGNOSIS — Z47.89 AFTERCARE FOLLOWING SURGERY OF THE MUSCULOSKELETAL SYSTEM: Primary | ICD-10-CM

## 2019-07-03 NOTE — TELEPHONE ENCOUNTER
Dr Yasir Paz    Patient was dc'd from home PT as of today 7/3/2019  Please refer to outpatient services

## 2019-07-05 ENCOUNTER — TELEPHONE (OUTPATIENT)
Dept: OBGYN CLINIC | Facility: HOSPITAL | Age: 64
End: 2019-07-05

## 2019-07-05 NOTE — TELEPHONE ENCOUNTER
Patient is calling to provide information in reference to where to send her pt orders  [facility] [address] [address] [phone]  Fax [phone]    I forwarded the PT referral in the patient's chart to [facility] [address]

## 2019-09-12 ENCOUNTER — TRANSCRIBE ORDERS (OUTPATIENT)
Dept: ADMINISTRATIVE | Facility: HOSPITAL | Age: 64
End: 2019-09-12

## 2019-09-12 DIAGNOSIS — Z09 FOLLOW UP: Primary | ICD-10-CM

## 2019-09-19 ENCOUNTER — HOSPITAL ENCOUNTER (OUTPATIENT)
Dept: ULTRASOUND IMAGING | Facility: CLINIC | Age: 64
Discharge: HOME | End: 2019-09-19
Payer: MEDICARE

## 2019-09-19 ENCOUNTER — HOSPITAL ENCOUNTER (OUTPATIENT)
Dept: MAMMOGRAPHY | Facility: CLINIC | Age: 64
Discharge: HOME | End: 2019-09-19
Payer: MEDICARE

## 2019-09-19 VITALS — WEIGHT: 121 LBS | HEIGHT: 67 IN | BODY MASS INDEX: 18.99 KG/M2

## 2019-09-19 DIAGNOSIS — Z09 FOLLOW UP: ICD-10-CM

## 2019-09-19 PROCEDURE — 77066 DX MAMMO INCL CAD BI: CPT

## 2019-09-19 PROCEDURE — 76642 ULTRASOUND BREAST LIMITED: CPT

## 2019-09-20 ENCOUNTER — APPOINTMENT (OUTPATIENT)
Dept: RADIOLOGY | Facility: CLINIC | Age: 64
End: 2019-09-20
Payer: MEDICARE

## 2019-09-20 ENCOUNTER — OFFICE VISIT (OUTPATIENT)
Dept: OBGYN CLINIC | Facility: CLINIC | Age: 64
End: 2019-09-20
Payer: MEDICARE

## 2019-09-20 VITALS
HEIGHT: 67 IN | SYSTOLIC BLOOD PRESSURE: 116 MMHG | BODY MASS INDEX: 19.62 KG/M2 | HEART RATE: 68 BPM | WEIGHT: 125 LBS | DIASTOLIC BLOOD PRESSURE: 72 MMHG

## 2019-09-20 DIAGNOSIS — M25.551 PAIN OF BOTH HIP JOINTS: ICD-10-CM

## 2019-09-20 DIAGNOSIS — M25.551 PAIN IN RIGHT HIP: ICD-10-CM

## 2019-09-20 DIAGNOSIS — M70.61 GREATER TROCHANTERIC BURSITIS OF RIGHT HIP: ICD-10-CM

## 2019-09-20 DIAGNOSIS — M25.552 PAIN OF BOTH HIP JOINTS: ICD-10-CM

## 2019-09-20 DIAGNOSIS — M25.551 RIGHT HIP PAIN: ICD-10-CM

## 2019-09-20 DIAGNOSIS — Z47.89 AFTERCARE FOLLOWING SURGERY OF THE MUSCULOSKELETAL SYSTEM: Primary | ICD-10-CM

## 2019-09-20 PROBLEM — S72.141A CLOSED DISPLACED INTERTROCHANTERIC FRACTURE OF RIGHT FEMUR (HCC): Status: RESOLVED | Noted: 2019-05-08 | Resolved: 2019-09-20

## 2019-09-20 PROCEDURE — 1124F ACP DISCUSS-NO DSCNMKR DOCD: CPT | Performed by: ORTHOPAEDIC SURGERY

## 2019-09-20 PROCEDURE — 73502 X-RAY EXAM HIP UNI 2-3 VIEWS: CPT

## 2019-09-20 PROCEDURE — 99213 OFFICE O/P EST LOW 20 MIN: CPT | Performed by: ORTHOPAEDIC SURGERY

## 2019-09-20 PROCEDURE — 20610 DRAIN/INJ JOINT/BURSA W/O US: CPT | Performed by: ORTHOPAEDIC SURGERY

## 2019-09-20 RX ORDER — LIDOCAINE HYDROCHLORIDE 10 MG/ML
7 INJECTION, SOLUTION EPIDURAL; INFILTRATION; INTRACAUDAL; PERINEURAL
Status: COMPLETED | OUTPATIENT
Start: 2019-09-20 | End: 2019-09-20

## 2019-09-20 RX ORDER — BETAMETHASONE SODIUM PHOSPHATE AND BETAMETHASONE ACETATE 3; 3 MG/ML; MG/ML
6 INJECTION, SUSPENSION INTRA-ARTICULAR; INTRALESIONAL; INTRAMUSCULAR; SOFT TISSUE
Status: COMPLETED | OUTPATIENT
Start: 2019-09-20 | End: 2019-09-20

## 2019-09-20 RX ORDER — MAG HYDROX/ALUMINUM HYD/SIMETH 400-400-40
SUSPENSION, ORAL (FINAL DOSE FORM) ORAL
COMMUNITY

## 2019-09-20 RX ADMIN — BETAMETHASONE SODIUM PHOSPHATE AND BETAMETHASONE ACETATE 6 MG: 3; 3 INJECTION, SUSPENSION INTRA-ARTICULAR; INTRALESIONAL; INTRAMUSCULAR; SOFT TISSUE at 14:12

## 2019-09-20 RX ADMIN — LIDOCAINE HYDROCHLORIDE 7 ML: 10 INJECTION, SOLUTION EPIDURAL; INFILTRATION; INTRACAUDAL; PERINEURAL at 14:12

## 2019-09-20 NOTE — PROGRESS NOTES
Assessment:     1  Aftercare following surgery of the musculoskeletal system    2  Greater trochanteric bursitis of right hip    3  Pain in right hip        Plan:     Problem List Items Addressed This Visit        Musculoskeletal and Integument    Greater trochanteric bursitis of right hip    Relevant Medications    lidocaine (PF) (XYLOCAINE-MPF) 1 % injection 7 mL (Completed)    betamethasone acetate-betamethasone sodium phosphate (CELESTONE) injection 6 mg (Completed)    Other Relevant Orders    Ambulatory referral to Physical Therapy    Large joint arthrocentesis: R greater trochanteric bursa (Completed)       Other    Aftercare following surgery of the musculoskeletal system - Primary      Other Visit Diagnoses     Pain in right hip        Relevant Orders    XR hip/pelvis 4+ vw right if performed          Findings consistent with status post right hip TFN with greater trochanter bursitis  Discussed findings and treatment options with the patient  I reviewed patient's right hip x-ray with her and her family  I discussed prognosis of her hip condition  I think the greater trochanter bursa it is inflamed  I provided patient cortisone injection over the bursa  I advised patient to take over-the-counter NSAID  Patient will continue physical therapy at have added IT band stretching to her therapy  Cold compress over the right hip today  I discussed possibility of removing the hardware if her symptoms persist despite conservative treatment  All patient's questions were answered to their satisfaction  This note is created using dictation transcription  It may contain typographical errors, grammatical errors, improperly dictated words, background noise and other errors  Subjective:     Patient ID: Verdell Sandhoff is a 75 y o  female  Chief Complaint:  66-year-old female status post right TFN on 5/9/2019  Patient is walking with a cane    She has been complaining of pain along the outer aspect of her right hip when she is lying on the right side sleeping  She also has some pain with prolonged ambulation  She denies pain in her groin  She denies any injury  She has no fever, chills, or sweats  Allergy:  No Known Allergies  Medications:  all current active meds have been reviewed  Past Medical History:  Past Medical History:   Diagnosis Date    Anxiety     Depression     Memory loss     Thyroid activity decreased      Past Surgical History:  Past Surgical History:   Procedure Laterality Date    BREAST BIOPSY      years ago- Benign    JOINT REPLACEMENT Left     [place] OPEN RX FEMUR FX+INTRAMED KIRT Right 5/9/2019    Procedure: INSERTION NAIL IM FEMUR ANTEGRADE (TROCHANTERIC), RIGHT;  Surgeon: Gonzalo Maynard MD;  Location: [facility] OR;  Service: Orthopedics     Family History:  Family History   Problem Relation Age of Onset    Hypertension Mother     Parkinsonism Mother [de-identified]        memory issues     Depression Mother     Melanoma Father     No Known Problems Maternal Grandmother     No Known Problems Maternal Grandfather     No Known Problems Paternal Grandmother     Heart attack Paternal Grandfather     Depression Other     Skin cancer Sister     Breast cancer Maternal Aunt         over age 48   [de-identified] No Known Problems Paternal Aunt     No Known Problems Sister      Social History:  Social History     Substance and Sexual Activity   Alcohol Use Not Currently     Social History     Substance and Sexual Activity   Drug Use No     Social History     Tobacco Use   Smoking Status Never Smoker   Smokeless Tobacco Never Used     Review of Systems   Constitutional: Negative  HENT: Negative  Eyes: Negative  Respiratory: Negative  Cardiovascular: Negative  Gastrointestinal: Negative  Endocrine: Negative  Genitourinary: Negative  Musculoskeletal: Positive for arthralgias (Right hip) and gait problem (Use a cane)  Skin: Negative  Allergic/Immunologic: Negative  Hematological: Negative  Psychiatric/Behavioral: Negative  Objective:  BP Readings from Last 1 Encounters:   09/20/19 116/72      Wt Readings from Last 1 Encounters:   09/20/19 56 7 kg (125 lb)      BMI:   Estimated body mass index is 19 58 kg/m² as calculated from the following:    Height as of this encounter: 5' 7" (1 702 m)  Weight as of this encounter: 56 7 kg (125 lb)  BSA:   Estimated body surface area is 1 66 meters squared as calculated from the following:    Height as of this encounter: 5' 7" (1 702 m)  Weight as of this encounter: 56 7 kg (125 lb)  Physical Exam   Constitutional: She appears well-developed  HENT:   Head: Normocephalic and atraumatic  Eyes: Conjunctivae and EOM are normal    Neck: Neck supple  Pulmonary/Chest: Effort normal    Neurological: She is alert  Skin: Skin is warm  Psychiatric: She has a normal mood and affect  Nursing note and vitals reviewed  Right Hip Exam     Tenderness   The patient is experiencing tenderness in the greater trochanter  Range of Motion   The patient has normal right hip ROM  Tests   ALBERTO: negative  Bahman: positive    Other   Erythema: absent  Sensation: normal  Pulse: present            I have personally reviewed pertinent films in PACS and my interpretation is Right hip show TFN in good position  Intertrochanteric fracture has healed  There is slight protrusion of the dynamic screw laterally       Large joint arthrocentesis: R greater trochanteric bursa  Date/Time: 9/20/2019 2:12 PM  Consent given by: patient  Site marked: site marked  Timeout: Immediately prior to procedure a time out was called to verify the correct patient, procedure, equipment, support staff and site/side marked as required   Supporting Documentation  Indications: pain   Procedure Details  Location: hip - R greater trochanteric bursa  Preparation: Patient was prepped and draped in the usual sterile fashion  Needle size: 22 G  Ultrasound guidance: no  Approach: lateral  Medications administered: 7 mL lidocaine (PF) 1 %; 6 mg betamethasone acetate-betamethasone sodium phosphate 6 (3-3) mg/mL    Patient tolerance: patient tolerated the procedure well with no immediate complications  Dressing:  Sterile dressing applied

## 2019-12-05 ENCOUNTER — OFFICE VISIT (OUTPATIENT)
Dept: OBGYN CLINIC | Facility: CLINIC | Age: 64
End: 2019-12-05
Payer: MEDICARE

## 2019-12-05 VITALS
SYSTOLIC BLOOD PRESSURE: 112 MMHG | HEIGHT: 67 IN | HEART RATE: 78 BPM | DIASTOLIC BLOOD PRESSURE: 72 MMHG | WEIGHT: 125 LBS | BODY MASS INDEX: 19.62 KG/M2

## 2019-12-05 DIAGNOSIS — Z47.89 AFTERCARE FOLLOWING SURGERY OF THE MUSCULOSKELETAL SYSTEM: Primary | ICD-10-CM

## 2019-12-05 PROBLEM — M70.61 GREATER TROCHANTERIC BURSITIS OF RIGHT HIP: Status: RESOLVED | Noted: 2019-09-20 | Resolved: 2019-12-05

## 2019-12-05 PROCEDURE — 99213 OFFICE O/P EST LOW 20 MIN: CPT | Performed by: ORTHOPAEDIC SURGERY

## 2019-12-05 NOTE — ASSESSMENT & PLAN NOTE
Activities as tolerated  I advised patient to contact us if her symptoms returns  All patient's questions were answered to their satisfaction  This note is created using dictation transcription  It may contain typographical errors, grammatical errors, improperly dictated words, background noise and other errors

## 2019-12-05 NOTE — PROGRESS NOTES
Assessment:     1  Aftercare following surgery of the musculoskeletal system        Plan:     Problem List Items Addressed This Visit        Other    Aftercare following surgery of the musculoskeletal system - Primary     Activities as tolerated  I advised patient to contact us if her symptoms returns  All patient's questions were answered to their satisfaction  This note is created using dictation transcription  It may contain typographical errors, grammatical errors, improperly dictated words, background noise and other errors  Subjective:     Patient ID: Shahnaz José is a 75 y o  female  Chief Complaint:  57-year-old female follow-up right hip status post TFN nail on 5/9/2019  Patient is walking using a cane  The injection did provide her with good pain relief over the right hip  She still has occasional pain in the right hip especially sleeping on the right side      Allergy:  No Known Allergies  Medications:  all current active meds have been reviewed  Past Medical History:  Past Medical History:   Diagnosis Date    Anxiety     Depression     Memory loss     Thyroid activity decreased      Past Surgical History:  Past Surgical History:   Procedure Laterality Date    BREAST BIOPSY      years ago- Benign    JOINT REPLACEMENT Left     [place] OPEN RX FEMUR FX+INTRAMED KIRT Right 5/9/2019    Procedure: INSERTION NAIL IM FEMUR ANTEGRADE (TROCHANTERIC), RIGHT;  Surgeon: Deric Cortes MD;  Location: [facility] OR;  Service: Orthopedics     Family History:  Family History   Problem Relation Age of Onset    Hypertension Mother     Parkinsonism Mother [de-identified]        memory issues     Depression Mother     Melanoma Father     No Known Problems Maternal Grandmother     No Known Problems Maternal Grandfather     No Known Problems Paternal Grandmother     Heart attack Paternal Grandfather     Depression Other     Skin cancer Sister     Breast cancer Maternal Aunt         over age 48    No Known Problems Paternal Aunt     No Known Problems Sister      Social History:  Social History     Substance and Sexual Activity   Alcohol Use Not Currently     Social History     Substance and Sexual Activity   Drug Use No     Social History     Tobacco Use   Smoking Status Never Smoker   Smokeless Tobacco Never Used     Review of Systems   Constitutional: Negative  HENT: Negative  Eyes: Negative  Respiratory: Negative  Cardiovascular: Negative  Gastrointestinal: Negative  Endocrine: Negative  Genitourinary: Negative  Musculoskeletal: Positive for gait problem (Use a cane)  Negative for arthralgias  Skin: Negative  Allergic/Immunologic: Negative  Hematological: Negative  Psychiatric/Behavioral: Negative  Objective:  BP Readings from Last 1 Encounters:   12/05/19 112/72      Wt Readings from Last 1 Encounters:   12/05/19 56 7 kg (125 lb)      BMI:   Estimated body mass index is 19 58 kg/m² as calculated from the following:    Height as of this encounter: 5' 7" (1 702 m)  Weight as of this encounter: 56 7 kg (125 lb)  BSA:   Estimated body surface area is 1 66 meters squared as calculated from the following:    Height as of this encounter: 5' 7" (1 702 m)  Weight as of this encounter: 56 7 kg (125 lb)  Physical Exam   Constitutional: She is oriented to person, place, and time  She appears well-developed  HENT:   Head: Normocephalic and atraumatic  Eyes: Conjunctivae and EOM are normal    Neck: Neck supple  Pulmonary/Chest: Effort normal    Neurological: She is alert and oriented to person, place, and time  Skin: Skin is warm  Psychiatric: She has a normal mood and affect  Nursing note and vitals reviewed  Right Hip Exam     Tenderness   The patient is experiencing no tenderness  Range of Motion   The patient has normal right hip ROM  Muscle Strength   The patient has normal right hip strength      Tests   ALBERTO: negative  Bahman: negative    Other Erythema: absent  Scars: present (Incisions are well healed)  Sensation: normal  Pulse: present            No new images for review

## 2021-04-09 ENCOUNTER — IMMUNIZATIONS (OUTPATIENT)
Dept: FAMILY MEDICINE CLINIC | Facility: HOSPITAL | Age: 66
End: 2021-04-09

## 2021-04-09 DIAGNOSIS — Z23 ENCOUNTER FOR IMMUNIZATION: Primary | ICD-10-CM

## 2021-04-09 PROCEDURE — 91300 SARS-COV-2 / COVID-19 MRNA VACCINE (PFIZER-BIONTECH) 30 MCG: CPT

## 2021-04-09 PROCEDURE — 0001A SARS-COV-2 / COVID-19 MRNA VACCINE (PFIZER-BIONTECH) 30 MCG: CPT

## 2021-05-03 ENCOUNTER — IMMUNIZATIONS (OUTPATIENT)
Dept: FAMILY MEDICINE CLINIC | Facility: HOSPITAL | Age: 66
End: 2021-05-03

## 2021-05-03 DIAGNOSIS — Z23 ENCOUNTER FOR IMMUNIZATION: Primary | ICD-10-CM

## 2021-05-03 PROCEDURE — 0002A SARS-COV-2 / COVID-19 MRNA VACCINE (PFIZER-BIONTECH) 30 MCG: CPT

## 2021-05-03 PROCEDURE — 91300 SARS-COV-2 / COVID-19 MRNA VACCINE (PFIZER-BIONTECH) 30 MCG: CPT

## 2021-06-30 ENCOUNTER — APPOINTMENT (OUTPATIENT)
Dept: RADIOLOGY | Facility: CLINIC | Age: 66
End: 2021-06-30
Payer: MEDICARE

## 2021-06-30 ENCOUNTER — OFFICE VISIT (OUTPATIENT)
Dept: OBGYN CLINIC | Facility: CLINIC | Age: 66
End: 2021-06-30
Payer: MEDICARE

## 2021-06-30 VITALS
WEIGHT: 125 LBS | SYSTOLIC BLOOD PRESSURE: 116 MMHG | DIASTOLIC BLOOD PRESSURE: 68 MMHG | BODY MASS INDEX: 19.62 KG/M2 | HEIGHT: 67 IN

## 2021-06-30 DIAGNOSIS — Z47.89 AFTERCARE FOLLOWING SURGERY OF THE MUSCULOSKELETAL SYSTEM: ICD-10-CM

## 2021-06-30 DIAGNOSIS — M25.551 PAIN IN RIGHT HIP: Primary | ICD-10-CM

## 2021-06-30 DIAGNOSIS — M16.10 HIP ARTHRITIS: ICD-10-CM

## 2021-06-30 DIAGNOSIS — M70.61 GREATER TROCHANTERIC BURSITIS OF RIGHT HIP: ICD-10-CM

## 2021-06-30 PROCEDURE — 99213 OFFICE O/P EST LOW 20 MIN: CPT | Performed by: PHYSICIAN ASSISTANT

## 2021-06-30 PROCEDURE — 73502 X-RAY EXAM HIP UNI 2-3 VIEWS: CPT

## 2021-06-30 PROCEDURE — 20610 DRAIN/INJ JOINT/BURSA W/O US: CPT | Performed by: PHYSICIAN ASSISTANT

## 2021-06-30 RX ORDER — LIDOCAINE HYDROCHLORIDE 10 MG/ML
5 INJECTION, SOLUTION INFILTRATION; PERINEURAL
Status: COMPLETED | OUTPATIENT
Start: 2021-06-30 | End: 2021-06-30

## 2021-06-30 RX ORDER — BETAMETHASONE SODIUM PHOSPHATE AND BETAMETHASONE ACETATE 3; 3 MG/ML; MG/ML
6 INJECTION, SUSPENSION INTRA-ARTICULAR; INTRALESIONAL; INTRAMUSCULAR; SOFT TISSUE
Status: COMPLETED | OUTPATIENT
Start: 2021-06-30 | End: 2021-06-30

## 2021-06-30 RX ADMIN — BETAMETHASONE SODIUM PHOSPHATE AND BETAMETHASONE ACETATE 6 MG: 3; 3 INJECTION, SUSPENSION INTRA-ARTICULAR; INTRALESIONAL; INTRAMUSCULAR; SOFT TISSUE at 15:46

## 2021-06-30 RX ADMIN — LIDOCAINE HYDROCHLORIDE 5 ML: 10 INJECTION, SOLUTION INFILTRATION; PERINEURAL at 15:46

## 2021-06-30 NOTE — PROGRESS NOTES
77 y o female presents for right lateral hip pain has been present for few months  She notes pain when she sleeps on the right side or if she is walking  She walks with a cane  She is status post TFN for May of 2019  She denies any recent injury trauma or fall  Denies fevers or chills or any major weight changes  Denies left lower extremity complaints  Previous x-rays from September 2019 noted some prominence of the lag screw laterally  He denies groin pain  Review of Systems  Review of systems negative unless otherwise specified in HPI    Past Medical History  Past Medical History:   Diagnosis Date    Anxiety     Depression     Memory loss     Thyroid activity decreased      Past Surgical History  Past Surgical History:   Procedure Laterality Date    BREAST BIOPSY      years ago- Benign    JOINT REPLACEMENT Left     [place] OPEN RX FEMUR FX+INTRAMED KIRT Right 5/9/2019    Procedure: INSERTION NAIL IM FEMUR ANTEGRADE (TROCHANTERIC), RIGHT;  Surgeon: Britt Stanley MD;  Location:  MAIN OR;  Service: Orthopedics     Current Medications  Current Outpatient Medications on File Prior to Visit   Medication Sig Dispense Refill    buPROPion (WELLBUTRIN SR) 150 mg 12 hr tablet 2 (two) times a day        Calcium-Magnesium-Vitamin D (CALCIUM 500 PO) Take by mouth      Cholecalciferol (VITAMIN D3) 5000 units CAPS Take by mouth      levothyroxine 25 mcg tablet 50 mcg daily with breakfast       mirtazapine (REMERON) 30 mg tablet 30 mg daily at bedtime        Multiple Vitamins-Minerals (CENTRUM SILVER 50+WOMEN PO) Take 1 tablet by mouth daily      risperiDONE (RisperDAL) 1 mg tablet Take 1 mg by mouth 4 (four) times a day       sertraline (ZOLOFT) 50 mg tablet 50 mg daily with breakfast         No current facility-administered medications on file prior to visit       Recent Labs [facility])  0   Lab Value Date/Time    HCT 31 0 (L) 06/16/2019 0035    HGB 9 6 (L) 06/16/2019 0035    WBC 11 57 (H) 06/16/2019 0035    INR 1 14 06/16/2019 0035     Physical exam  Body mass index is 19 58 kg/m²  · General: Awake, Alert, Oriented  · Eyes: Pupils equal, round and reactive to light  · Heart: regular rate and rhythm  · Lungs: No audible wheezing  · Abdomen: soft  right Lower extremity  · Surgical incisions well healed without signs of infection  There is no swelling nor ecchymosis  There is some increased tenderness with palpation of the right greater trochanteric region and proximal IT band directly over the lag screw  Distally the IT band is nontender  Her pain about the hip is only on the lateral side  She has no posterior, anterior, medial, or groin pain right hip  She is able do a seated knee extension  Knee range of motion is 0-130 degrees  There is some pain with passive internal-external rotation of the hip  Light touch sensation is intact  Increased pain with abduction of the leg past midline over the lateral aspect  No pain with passive flexion of the hip in the seated position  Procedure  None today    Imaging  Personally reviewed x-rays right hip taken the office today which note slight progression and hip arthritis with prominent lag screw and bony overgrowth of the locking screw  1  Pain in right hip    2  Aftercare following surgery of the musculoskeletal system    3  Hip arthritis      Assessment:  right lateral hip pain status post TFN 05/09/2019 with prominent lag screw and progressive arthritis of the hip joint with IT band irritation  Large joint arthrocentesis: R greater trochanteric bursa  Universal Protocol:  Consent: Verbal consent obtained  Risks and benefits: risks, benefits and alternatives were discussed  Consent given by: patient  Time out: Immediately prior to procedure a "time out" was called to verify the correct patient, procedure, equipment, support staff and site/side marked as required    Timeout called at: 6/30/2021 3:45 PM   Patient understanding: patient states understanding of the procedure being performed  Site marked: the operative site was marked  Patient identity confirmed: verbally with patient    Supporting Documentation  Indications: pain   Procedure Details  Location: hip - R greater trochanteric bursa  Preparation: Patient was prepped and draped in the usual sterile fashion  Needle size: 22 G  Ultrasound guidance: no  Approach: lateral  Medications administered: 6 mg betamethasone acetate-betamethasone sodium phosphate 6 (3-3) mg/mL; 5 mL lidocaine 1 %    Patient tolerance: patient tolerated the procedure well with no immediate complications  Dressing:  Sterile dressing applied      Plan:  Weight-bearing as tolerated  Patient elected cortisone injection today  This is worked in the past   We will have her follow up on an as-needed basis only  She may apply ice 20 minutes once or twice to the area of the injection tonight and limit activity for the next 12-24 hours then resume normal activity  Was discussed with the patient that if her pain does not get better with injection that she may require hardware removal which is an operative procedure  This note was created with voice recognition software

## 2021-06-30 NOTE — PATIENT INSTRUCTIONS
Weight-bearing as tolerated  Patient elected cortisone injection today  This is worked in the past   We will have her follow up on an as-needed basis only  She may apply ice 20 minutes once or twice to the area of the injection tonight and limit activity for the next 12-24 hours then resume normal activity  Was discussed with the patient that if her pain does not get better with injection that she may require hardware removal which is an operative procedure

## 2021-09-30 ENCOUNTER — OFFICE VISIT (OUTPATIENT)
Dept: OBGYN CLINIC | Facility: CLINIC | Age: 66
End: 2021-09-30
Payer: MEDICARE

## 2021-09-30 VITALS
BODY MASS INDEX: 19.46 KG/M2 | WEIGHT: 124 LBS | DIASTOLIC BLOOD PRESSURE: 80 MMHG | HEIGHT: 67 IN | SYSTOLIC BLOOD PRESSURE: 130 MMHG

## 2021-09-30 DIAGNOSIS — M70.61 GREATER TROCHANTERIC BURSITIS OF RIGHT HIP: Primary | ICD-10-CM

## 2021-09-30 PROCEDURE — 99213 OFFICE O/P EST LOW 20 MIN: CPT | Performed by: ORTHOPAEDIC SURGERY

## 2021-09-30 PROCEDURE — 20610 DRAIN/INJ JOINT/BURSA W/O US: CPT | Performed by: ORTHOPAEDIC SURGERY

## 2021-09-30 RX ORDER — BETAMETHASONE SODIUM PHOSPHATE AND BETAMETHASONE ACETATE 3; 3 MG/ML; MG/ML
6 INJECTION, SUSPENSION INTRA-ARTICULAR; INTRALESIONAL; INTRAMUSCULAR; SOFT TISSUE
Status: COMPLETED | OUTPATIENT
Start: 2021-09-30 | End: 2021-09-30

## 2021-09-30 RX ORDER — LIDOCAINE HYDROCHLORIDE 10 MG/ML
7 INJECTION, SOLUTION EPIDURAL; INFILTRATION; INTRACAUDAL; PERINEURAL
Status: COMPLETED | OUTPATIENT
Start: 2021-09-30 | End: 2021-09-30

## 2021-09-30 RX ADMIN — BETAMETHASONE SODIUM PHOSPHATE AND BETAMETHASONE ACETATE 6 MG: 3; 3 INJECTION, SUSPENSION INTRA-ARTICULAR; INTRALESIONAL; INTRAMUSCULAR; SOFT TISSUE at 15:34

## 2021-09-30 RX ADMIN — LIDOCAINE HYDROCHLORIDE 7 ML: 10 INJECTION, SOLUTION EPIDURAL; INFILTRATION; INTRACAUDAL; PERINEURAL at 15:34

## 2021-09-30 NOTE — ASSESSMENT & PLAN NOTE
Findings consistent with right hip greater trochanteric bursitis  Findings and treatment options were discussed with the patient  Recommend repeat cortisone injection to the right greater trochanteric bursa today  She tolerated the procedure well  Advised to apply cold compress today  Dr Hussein Singh discussed with the screw from the TFN may be irritating that area  She can have surgery to remove the entire IMN, but patient wants to avoid any surgery at this time  She was given a new handout for IT band stretches  She will follow-up as needed if symptoms return  Advised patient soonest she can have another cortisone injection is in 3-4 months  All questions were answered to patient's satisfaction

## 2021-09-30 NOTE — PROGRESS NOTES
Assessment:     1  Greater trochanteric bursitis of right hip        Plan:    Patient was seen and examined by Dr Constance Francis and myself  Problem List Items Addressed This Visit        Musculoskeletal and Integument    Greater trochanteric bursitis of right hip - Primary     Findings consistent with right hip greater trochanteric bursitis  Findings and treatment options were discussed with the patient  Recommend repeat cortisone injection to the right greater trochanteric bursa today  She tolerated the procedure well  Advised to apply cold compress today  Dr Constance Francis discussed with the screw from the TFN may be irritating that area  She can have surgery to remove the entire IMN, but patient wants to avoid any surgery at this time  She was given a new handout for IT band stretches  She will follow-up as needed if symptoms return  Advised patient soonest she can have another cortisone injection is in 3-4 months  All questions were answered to patient's satisfaction  Relevant Medications    lidocaine (PF) (XYLOCAINE-MPF) 1 % injection 7 mL (Completed)    betamethasone acetate-betamethasone sodium phosphate (CELESTONE) injection 6 mg (Completed)    Other Relevant Orders    Large joint arthrocentesis: R greater trochanteric bursa (Completed)         Subjective:     Patient ID: Mg Jolley is a 74 y o  female  Chief Complaint: This is a 67-year-old female following up for right hip greater trochanteric bursitis  She has a history of a right TFN done in 2019 by Dr Constance Francis  She was last seen 3 months ago and had a cortisone injection to the right greater trochanteric bursa  It did provide some relief  It did not get rid of all the pain, but most of it  The pain returned a few weeks ago  It is still localized over the lateral aspect of the hip  She denies any groin pain  She is ambulating with assistance from a cane      Allergy:  No Known Allergies  Medications:  all current active meds have been reviewed  Past Medical History:  Past Medical History:   Diagnosis Date    Anxiety     Depression     Memory loss     Thyroid activity decreased      Past Surgical History:  Past Surgical History:   Procedure Laterality Date    BREAST BIOPSY      years ago- Benign    JOINT REPLACEMENT Left     [place] OPEN RX FEMUR FX+INTRAMED KIRT Right 5/9/2019    Procedure: INSERTION NAIL IM FEMUR ANTEGRADE (TROCHANTERIC), RIGHT;  Surgeon: Carlin Huffman MD;  Location: [facility] OR;  Service: Orthopedics     Family History:  Family History   Problem Relation Age of Onset    Hypertension Mother     Parkinsonism Mother [de-identified]        memory issues     Depression Mother     Melanoma Father     No Known Problems Maternal Grandmother     No Known Problems Maternal Grandfather     No Known Problems Paternal Grandmother     Heart attack Paternal Grandfather     Depression Other     Skin cancer Sister     Breast cancer Maternal Aunt         over age 48   [facility] No Known Problems Paternal Aunt     No Known Problems Sister      Social History:  Social History     Substance and Sexual Activity   Alcohol Use Not Currently     Social History     Substance and Sexual Activity   Drug Use No     Social History     Tobacco Use   Smoking Status Never Smoker   Smokeless Tobacco Never Used     Review of Systems   Constitutional: Negative  HENT: Negative  Eyes: Negative  Respiratory: Negative  Cardiovascular: Negative  Gastrointestinal: Negative  Endocrine: Negative  Genitourinary: Negative  Musculoskeletal: Positive for arthralgias and gait problem (using a cane)  Skin: Negative  Allergic/Immunologic: Negative  Hematological: Negative  Psychiatric/Behavioral: Negative            Objective:  BP Readings from Last 1 Encounters:   09/30/21 130/80      Wt Readings from Last 1 Encounters:   09/30/21 56 2 kg (124 lb)      BMI:   Estimated body mass index is 19 42 kg/m² as calculated from the following:    Height as of this encounter: 5' 7" (1 702 m)  Weight as of this encounter: 56 2 kg (124 lb)  BSA:   Estimated body surface area is 1 65 meters squared as calculated from the following:    Height as of this encounter: 5' 7" (1 702 m)  Weight as of this encounter: 56 2 kg (124 lb)  Physical Exam  Constitutional:       General: She is not in acute distress  Appearance: She is well-developed  HENT:      Head: Normocephalic  Eyes:      Conjunctiva/sclera: Conjunctivae normal       Pupils: Pupils are equal, round, and reactive to light  Pulmonary:      Effort: Pulmonary effort is normal  No respiratory distress  Musculoskeletal:         General: Tenderness present  Skin:     General: Skin is warm and dry  Neurological:      Mental Status: She is alert and oriented to person, place, and time  Psychiatric:         Behavior: Behavior normal        Right Hip Exam     Tenderness   The patient is experiencing tenderness in the greater trochanter  Range of Motion   Flexion: abnormal   External rotation: abnormal   Internal rotation: abnormal     Muscle Strength   Abduction: 4/5   Adduction: 4/5   Flexion: 4/5     Tests   Bahman: positive    Other   Erythema: absent  Scars: present  Sensation: normal  Pulse: present    Comments:    No pain in groin with passive range of motion of hip            No new imaging  Large joint arthrocentesis: R greater trochanteric bursa  Universal Protocol:  Consent: Verbal consent obtained    Risks and benefits: risks, benefits and alternatives were discussed  Consent given by: patient  Patient understanding: patient states understanding of the procedure being performed    Supporting Documentation  Indications: pain   Procedure Details  Location: hip - R greater trochanteric bursa  Preparation: Patient was prepped and draped in the usual sterile fashion  Needle size: 22 G  Ultrasound guidance: no  Approach: lateral  Medications administered: 7 mL lidocaine (PF) 1 %; 6 mg betamethasone acetate-betamethasone sodium phosphate 6 (3-3) mg/mL    Patient tolerance: patient tolerated the procedure well with no immediate complications  Dressing:  Sterile dressing applied

## 2021-12-30 ENCOUNTER — APPOINTMENT (OUTPATIENT)
Dept: RADIOLOGY | Facility: CLINIC | Age: 66
End: 2021-12-30
Payer: MEDICARE

## 2021-12-30 ENCOUNTER — OFFICE VISIT (OUTPATIENT)
Dept: URGENT CARE | Facility: CLINIC | Age: 66
End: 2021-12-30
Payer: MEDICARE

## 2021-12-30 ENCOUNTER — APPOINTMENT (EMERGENCY)
Dept: CT IMAGING | Facility: HOSPITAL | Age: 66
End: 2021-12-30
Payer: MEDICARE

## 2021-12-30 ENCOUNTER — APPOINTMENT (EMERGENCY)
Dept: RADIOLOGY | Facility: HOSPITAL | Age: 66
End: 2021-12-30
Payer: MEDICARE

## 2021-12-30 ENCOUNTER — HOSPITAL ENCOUNTER (EMERGENCY)
Facility: HOSPITAL | Age: 66
Discharge: HOME | End: 2021-12-30
Attending: EMERGENCY MEDICINE
Payer: MEDICARE

## 2021-12-30 VITALS
RESPIRATION RATE: 16 BRPM | HEART RATE: 85 BPM | TEMPERATURE: 98.6 F | HEIGHT: 67 IN | OXYGEN SATURATION: 97 % | BODY MASS INDEX: 20.4 KG/M2 | WEIGHT: 130 LBS

## 2021-12-30 VITALS
DIASTOLIC BLOOD PRESSURE: 77 MMHG | OXYGEN SATURATION: 94 % | BODY MASS INDEX: 20.4 KG/M2 | HEART RATE: 91 BPM | TEMPERATURE: 98.4 F | HEIGHT: 67 IN | SYSTOLIC BLOOD PRESSURE: 155 MMHG | RESPIRATION RATE: 16 BRPM | WEIGHT: 130 LBS

## 2021-12-30 DIAGNOSIS — S79.911A HIP INJURY, RIGHT, INITIAL ENCOUNTER: Primary | ICD-10-CM

## 2021-12-30 DIAGNOSIS — M25.551 HIP PAIN, ACUTE, RIGHT: ICD-10-CM

## 2021-12-30 DIAGNOSIS — S32.10XA SACRAL FRACTURE, CLOSED (HCC): Primary | ICD-10-CM

## 2021-12-30 DIAGNOSIS — S32.591A CLOSED FRACTURE OF RIGHT INFERIOR PUBIC RAMUS, INITIAL ENCOUNTER (HCC): ICD-10-CM

## 2021-12-30 PROCEDURE — 99284 EMERGENCY DEPT VISIT MOD MDM: CPT | Performed by: PHYSICIAN ASSISTANT

## 2021-12-30 PROCEDURE — 73502 X-RAY EXAM HIP UNI 2-3 VIEWS: CPT

## 2021-12-30 PROCEDURE — 99213 OFFICE O/P EST LOW 20 MIN: CPT

## 2021-12-30 PROCEDURE — G1004 CDSM NDSC: HCPCS

## 2021-12-30 PROCEDURE — G0463 HOSPITAL OUTPT CLINIC VISIT: HCPCS

## 2021-12-30 PROCEDURE — 72192 CT PELVIS W/O DYE: CPT

## 2021-12-30 PROCEDURE — 99284 EMERGENCY DEPT VISIT MOD MDM: CPT

## 2021-12-31 NOTE — DISCHARGE INSTRUCTIONS
Rest, ice  Tylenol/motrin for discomfort  Follow up with ortho for recheck  Take stool softener like colace twice a day to prevent constipation

## 2021-12-31 NOTE — ED PROVIDER NOTES
History  Chief Complaint   Patient presents with    Hip Pain     Pt arrives with sister, pt tripped over part of her bed and fell onto right hip on hardwood floor  Pt with previous right hip hardware from 2016  Denies head strike, denies LOC, denies thinners  Patient is a 73 y/o F with h/o Depression, memory loss that presents to the ED after a fall with right hip pain  History obtained by sister due to memory loss  Sister states she tripped on the bed frame last night and fell onto right side  She denies hitting head or LOC  She has pain to right hip  She was seen at urgent care, had xray which was suspicious for fracture of inferior pubic ramus  No numbness or weakness  Patient is having trouble ambulating  No other injuries  History provided by:  Patient and relative  Hip Pain  Location:  Right hip  Severity:  Moderate  Onset quality:  Sudden  Duration:  2 days  Timing:  Constant  Progression:  Unchanged  Chronicity:  New  Context:  Patient tripped over bed frame and fell onto right side  Relieved by:  Nothing  Worsened by:  Ambulation and palpation  Ineffective treatments:  Tylenol  Associated symptoms: no abdominal pain, no chest pain, no cough, no fever, no rash and no shortness of breath        Prior to Admission Medications   Prescriptions Last Dose Informant Patient Reported? Taking?    Calcium-Magnesium-Vitamin D (CALCIUM 500 PO)  Self Yes No   Sig: Take by mouth   Cholecalciferol (VITAMIN D3) 5000 units CAPS  Self Yes No   Sig: Take by mouth   Multiple Vitamins-Minerals (CENTRUM SILVER 50+WOMEN PO)  Self Yes No   Sig: Take 1 tablet by mouth daily   buPROPion (WELLBUTRIN SR) 150 mg 12 hr tablet  Self Yes No   Si (two) times a day     levothyroxine 25 mcg tablet  Self Yes No   Si mcg daily with breakfast    mirtazapine (REMERON) 30 mg tablet  Self Yes No   Si mg daily at bedtime     risperiDONE (RisperDAL) 1 mg tablet  Self Yes No   Sig: Take 1 mg by mouth 4 (four) times a day    sertraline (ZOLOFT) 50 mg tablet  Self Yes No   Si mg daily with breakfast        Facility-Administered Medications: None       Past Medical History:   Diagnosis Date    Anxiety     Depression     Memory loss     Thyroid activity decreased        Past Surgical History:   Procedure Laterality Date    BREAST BIOPSY      years ago- Benign    JOINT REPLACEMENT Left     DC OPEN RX FEMUR FX+INTRAMED KIRT Right 2019    Procedure: INSERTION NAIL IM FEMUR ANTEGRADE (TROCHANTERIC), RIGHT;  Surgeon: Yair Humphries MD;  Location:  MAIN OR;  Service: Orthopedics       Family History   Problem Relation Age of Onset    Hypertension Mother     Parkinsonism Mother [de-identified]        memory issues     Depression Mother     Melanoma Father     No Known Problems Maternal Grandmother     No Known Problems Maternal Grandfather     No Known Problems Paternal Grandmother     Heart attack Paternal Grandfather     Depression Other     Skin cancer Sister     Breast cancer Maternal Aunt         over age 48   Jean-Claude Luevano No Known Problems Paternal Aunt     No Known Problems Sister      I have reviewed and agree with the history as documented  E-Cigarette/Vaping     E-Cigarette/Vaping Substances     Social History     Tobacco Use    Smoking status: Never Smoker    Smokeless tobacco: Never Used   Substance Use Topics    Alcohol use: Not Currently    Drug use: No       Review of Systems   Constitutional: Negative for chills and fever  Respiratory: Negative for cough and shortness of breath  Cardiovascular: Negative for chest pain  Gastrointestinal: Negative for abdominal pain  Musculoskeletal:        Right hip pain   Skin: Negative for color change and rash  Neurological: Negative for dizziness, weakness and numbness  All other systems reviewed and are negative  Physical Exam  Physical Exam  Vitals and nursing note reviewed  Constitutional:       General: She is not in acute distress       Appearance: Normal appearance  She is well-developed, well-groomed and normal weight  She is not ill-appearing or diaphoretic  HENT:      Head: Normocephalic  No abrasion  Right Ear: External ear normal       Left Ear: External ear normal       Nose: Nose normal    Eyes:      Conjunctiva/sclera: Conjunctivae normal       Pupils: Pupils are equal    Cardiovascular:      Rate and Rhythm: Normal rate and regular rhythm  Heart sounds: Normal heart sounds  Pulmonary:      Effort: Pulmonary effort is normal       Breath sounds: Normal breath sounds  Chest:      Chest wall: No swelling or tenderness  Musculoskeletal:      Cervical back: Normal and normal range of motion  No spinous process tenderness or muscular tenderness  Thoracic back: Normal       Lumbar back: Normal       Right hip: Bony tenderness present  Left hip: Bony tenderness present  Right knee: Normal       Left knee: Normal       Right ankle: Normal       Left ankle: Normal       Comments: B/L UE FROM, Nontender to palpation   Skin:     General: Skin is warm and dry  Coloration: Skin is not pale  Findings: No bruising or erythema  Neurological:      Mental Status: She is alert and oriented to person, place, and time  Sensory: Sensation is intact  Motor: Motor function is intact  Gait: Gait abnormal    Psychiatric:         Mood and Affect: Mood normal          Speech: Speech normal          Behavior: Behavior is cooperative           Vital Signs  ED Triage Vitals   Temperature Pulse Respirations Blood Pressure SpO2   12/30/21 1455 12/30/21 1455 12/30/21 1455 12/30/21 1455 12/30/21 1455   98 4 °F (36 9 °C) 81 16 135/57 97 %      Temp Source Heart Rate Source Patient Position - Orthostatic VS BP Location FiO2 (%)   12/30/21 1455 12/30/21 1929 12/30/21 1455 12/30/21 1455 --   Temporal Monitor Sitting Left arm       Pain Score       12/30/21 1455       10 - Worst Possible Pain           Vitals:    12/30/21 1455 12/30/21 1929 12/30/21 1930   BP: 135/57 140/75 155/77   Pulse: 81 83 91   Patient Position - Orthostatic VS: Sitting Lying          Visual Acuity      ED Medications  Medications - No data to display    Diagnostic Studies  Results Reviewed     None                 CT pelvis wo contrast   Final Result by Sara Del Angel MD (12/30 1915)         1  Acute right sacral insufficiency fracture  2   Acute, nondisplaced right inferior pubic ramus fracture  Workstation performed: [id number]         XR hip/pelv 2-3 vws right if performed   Final Result by Padilla Stewart MD (12/30 1649)      No acute osseous abnormality  Degenerative changes as described  Workstation performed: [id number]                    Procedures  Procedures         ED Course  ED Course as of 12/30/21 1959   Thu Dec 30, 2021   1931 SPoke with patient and daughter  She states she would prefer to go home  She does have a wheelchair to get around and a walker  SBIRT 20yo+      Most Recent Value   SBIRT (22 yo +)    In order to provide better care to our patients, we are screening all of our patients for alcohol and drug use  Would it be okay to ask you these screening questions? Yes Filed at: 12/30/2021 1929   Initial Alcohol Screen: US AUDIT-C     1  How often do you have a drink containing alcohol? 0 Filed at: 12/30/2021 1929   2  How many drinks containing alcohol do you have on a typical day you are drinking? 0 Filed at: 12/30/2021 1929   3a  Male UNDER 65: How often do you have five or more drinks on one occasion? 0 Filed at: 12/30/2021 1929   3b  FEMALE Any Age, or MALE 65+: How often do you have 4 or more drinks on one occassion? 0 Filed at: 12/30/2021 1929   Audit-C Score 0 Filed at: 12/30/2021 1929   DOUG: How many times in the past year have you    Used an illegal drug or used a prescription medication for non-medical reasons?  Never Filed at: 12/30/2021 1929 MDM  Number of Diagnoses or Management Options  Closed fracture of right inferior pubic ramus, initial encounter [facility]): new and requires workup  Sacral fracture, closed [facility]): new and requires workup  Diagnosis management comments: Patient with hip pain after a fall last night, will order CT pelvis to r/o fracture  Patient with acute fracture of pubic ramus and sacrum  Patient would prefer to go home  She does get around at home with a W/C and lives with sister who agrees to take her home  ADvised f/u with ortho  Amount and/or Complexity of Data Reviewed  Tests in the radiology section of CPT®: ordered and reviewed  Obtain history from someone other than the patient: yes    Patient Progress  Patient progress: stable      Disposition  Final diagnoses:   Sacral fracture, closed ([facility] )   Closed fracture of right inferior pubic ramus, initial encounter ([facility] ) - right     Time reflects when diagnosis was documented in both MDM as applicable and the Disposition within this note     Time User Action Codes Description Comment    12/30/2021  7:26 PM David Model Add [S32 10XA] Sacral fracture, closed ([facility] [address] )     12/30/2021  7:27 PM Matute, [address] [place] [address] Closed fracture of right inferior pubic ramus, initial encounter ([facility] )     12/30/2021  7:27 PM Matute, [address] [place] [address] Closed fracture of right inferior pubic ramus, initial encounter [facility]) right      ED Disposition     ED Disposition Condition Date/Time Comment    Discharge Stable Thu Dec 30, 2021  7:32 PM Guru Mcelroy discharge to home/self care              Follow-up Information     Follow up With Specialties Details Why Contact Info Additional [address] [facility] Schedule an appointment as soon as possible for a visit  For recheck [address] [place] [phone]  [address] [place] [address] [facility], [address], [address]          Discharge Medication List as of 12/30/2021  7:34 PM      CONTINUE these medications which have NOT CHANGED    Details   buPROPion (WELLBUTRIN SR) 150 mg 12 hr tablet 2 (two) times a day  , Starting Thu 8/16/2018, Historical Med      Calcium-Magnesium-Vitamin D (CALCIUM 500 PO) Take by mouth, Historical Med      Cholecalciferol (VITAMIN D3) 5000 units CAPS Take by mouth, Historical Med      levothyroxine 25 mcg tablet 50 mcg daily with breakfast , Starting Wed 9/26/2018, Historical Med      mirtazapine (REMERON) 30 mg tablet 30 mg daily at bedtime  , Starting Wed 9/12/2018, Historical Med      Multiple Vitamins-Minerals (CENTRUM SILVER 50+WOMEN PO) Take 1 tablet by mouth daily, Historical Med      risperiDONE (RisperDAL) 1 mg tablet Take 1 mg by mouth 4 (four) times a day , Starting Thu 8/16/2018, Historical Med      sertraline (ZOLOFT) 50 mg tablet 50 mg daily with breakfast  , Starting Thu 8/16/2018, Historical Med             No discharge procedures on file      PDMP Review     None          ED Provider  Electronically Signed by           Vitaly Stokes PA-C  12/30/21 0004

## 2022-01-26 ENCOUNTER — APPOINTMENT (OUTPATIENT)
Dept: RADIOLOGY | Facility: CLINIC | Age: 67
End: 2022-01-26
Payer: MEDICARE

## 2022-01-26 ENCOUNTER — OFFICE VISIT (OUTPATIENT)
Dept: OBGYN CLINIC | Facility: CLINIC | Age: 67
End: 2022-01-26
Payer: MEDICARE

## 2022-01-26 VITALS — DIASTOLIC BLOOD PRESSURE: 68 MMHG | SYSTOLIC BLOOD PRESSURE: 112 MMHG

## 2022-01-26 DIAGNOSIS — M25.551 PAIN IN RIGHT HIP: ICD-10-CM

## 2022-01-26 DIAGNOSIS — S32.9XXA: Primary | ICD-10-CM

## 2022-01-26 PROCEDURE — 72170 X-RAY EXAM OF PELVIS: CPT

## 2022-01-26 PROCEDURE — 99213 OFFICE O/P EST LOW 20 MIN: CPT | Performed by: ORTHOPAEDIC SURGERY

## 2022-01-26 RX ORDER — OXYCODONE HYDROCHLORIDE AND ACETAMINOPHEN 5; 325 MG/1; MG/1
TABLET ORAL
COMMUNITY
Start: 2022-01-20

## 2022-01-26 NOTE — PROGRESS NOTES
Assessment:     1  Closed fracture of right pelvis, initial encounter [facility])        Plan:     Problem List Items Addressed This Visit        Musculoskeletal and Integument    Closed fracture of right pelvis, initial encounter ([address] ) - Primary     Findings consistent with healing right inferior pubic rami fracture and right sacral insufficiency fracture  Findings and treatment options were discussed with the patient and her sister  X-rays reviewed with them  She may transition to outpatient physical therapy at this time  Continue weight-bearing as tolerated to the right lower extremity with assistance  Discussed that she does have advanced osteoarthritis of the right hip which may be contributing to her pain  She will follow up in 2 months with repeat x-rays  All patient's questions were answered to their satisfaction  This note is created using dictation transcription  It may contain typographical errors, grammatical errors, improperly dictated words, background noise and other errors  Relevant Orders    XR pelvis ap only 1 or 2 vw    Ambulatory Referral to Physical Therapy         Subjective:     Patient ID: Erinn Dodge is a 74 y o  female  Chief Complaint: This is a 17-year-old white female with history of memory loss accompanied by her sister here for follow-up of her right hip  She arrives in a wheelchair today  She was last seen in September 2021 for right greater trochanteric bursitis  She was given a cortisone injection that time  Her sister states that she did not get any relief from the injection at that time  Then on December 30, 2021 she suffered a fall in her and landed on right hip  She seen emergency room and x-rays and CT scan revealed a right inferior rami fracture and right sacral insufficiency fracture  She is now residing home and getting home physical and occupational therapy  She gets up in ambulates with a walker    Her sister states she shows no discomfort when up and walking  She also has a history of a right hip TFN on 5/9/19  Allergy:  No Known Allergies  Medications:  all current active meds have been reviewed  Past Medical History:  Past Medical History:   Diagnosis Date    Anxiety     Depression     Memory loss     Thyroid activity decreased      Past Surgical History:  Past Surgical History:   Procedure Laterality Date    BREAST BIOPSY      years ago- Benign    JOINT REPLACEMENT Left     [place] OPEN RX FEMUR FX+INTRAMED KIRT Right 5/9/2019    Procedure: INSERTION NAIL IM FEMUR ANTEGRADE (TROCHANTERIC), RIGHT;  Surgeon: Ramiro Ortiz MD;  Location: [facility] OR;  Service: Orthopedics     Family History:  Family History   Problem Relation Age of Onset    Hypertension Mother     Parkinsonism Mother [de-identified]        memory issues     Depression Mother     Melanoma Father     No Known Problems Maternal Grandmother     No Known Problems Maternal Grandfather     No Known Problems Paternal Grandmother     Heart attack Paternal Grandfather     Depression Other     Skin cancer Sister     Breast cancer Maternal Aunt         over age 48   Mavis Cousin No Known Problems Paternal Aunt     No Known Problems Sister      Social History:  Social History     Substance and Sexual Activity   Alcohol Use Not Currently     Social History     Substance and Sexual Activity   Drug Use No     Social History     Tobacco Use   Smoking Status Never Smoker   Smokeless Tobacco Never Used     Review of Systems   Constitutional: Negative  HENT: Negative  Eyes: Negative  Respiratory: Negative  Cardiovascular: Negative  Gastrointestinal: Negative  Endocrine: Negative  Genitourinary: Negative  Musculoskeletal: Positive for arthralgias (Left hip) and gait problem (in a wheelchair)  Skin: Negative  Allergic/Immunologic: Negative  Hematological: Negative  Psychiatric/Behavioral: Negative            Objective:  BP Readings from Last 1 Encounters:   01/26/22 112/68      Wt Readings from Last 1 Encounters:   12/30/21 59 kg (130 lb)      BMI:   Estimated body mass index is 20 36 kg/m² as calculated from the following:    Height as of 12/30/21: 5' 7" (1 702 m)  Weight as of 12/30/21: 59 kg (130 lb)  BSA:   Estimated body surface area is 1 68 meters squared as calculated from the following:    Height as of 12/30/21: 5' 7" (1 702 m)  Weight as of 12/30/21: 59 kg (130 lb)  Physical Exam  Vitals and nursing note reviewed  Constitutional:       Appearance: Normal appearance  She is well-developed  HENT:      Head: Normocephalic and atraumatic  Right Ear: External ear normal       Left Ear: External ear normal    Eyes:      Extraocular Movements: Extraocular movements intact  Conjunctiva/sclera: Conjunctivae normal    Pulmonary:      Effort: Pulmonary effort is normal    Musculoskeletal:      Cervical back: Neck supple  Skin:     General: Skin is warm and dry  Neurological:      General: No focal deficit present  Mental Status: She is alert  Deep Tendon Reflexes: Reflexes are normal and symmetric  Psychiatric:         Mood and Affect: Mood normal          Behavior: Behavior normal        Right Hip Exam     Tenderness   The patient is experiencing tenderness in the greater trochanter  Tests   ALBERTO: negative  Bahman: negative    Other   Erythema: absent  Scars: present  Sensation: normal  Pulse: present    Comments:  Good passive ROM of hip  Patient shows no discomfort with passive ROM  Does not follow commands to do active ROM  Calf soft, compressible            I have personally reviewed pertinent films in PACS and my interpretation is X-rays of the pelvis reveal healing right sacral insufficiency and inferior rami fractures  There is advanced osteoarthritis of the right hip  Femoral IM nail is intact

## 2022-01-26 NOTE — ASSESSMENT & PLAN NOTE
Findings consistent with healing right inferior pubic rami fracture and right sacral insufficiency fracture  Findings and treatment options were discussed with the patient and her sister  X-rays reviewed with them  She may transition to outpatient physical therapy at this time  Continue weight-bearing as tolerated to the right lower extremity with assistance  Discussed that she does have advanced osteoarthritis of the right hip which may be contributing to her pain  She will follow up in 2 months with repeat x-rays  All patient's questions were answered to their satisfaction  This note is created using dictation transcription  It may contain typographical errors, grammatical errors, improperly dictated words, background noise and other errors

## 2022-05-10 ENCOUNTER — OFFICE VISIT (OUTPATIENT)
Dept: OBGYN CLINIC | Facility: CLINIC | Age: 67
End: 2022-05-10
Payer: MEDICARE

## 2022-05-10 ENCOUNTER — APPOINTMENT (OUTPATIENT)
Dept: RADIOLOGY | Facility: CLINIC | Age: 67
End: 2022-05-10
Payer: MEDICARE

## 2022-05-10 VITALS
HEIGHT: 67 IN | WEIGHT: 116 LBS | BODY MASS INDEX: 18.21 KG/M2 | SYSTOLIC BLOOD PRESSURE: 112 MMHG | DIASTOLIC BLOOD PRESSURE: 70 MMHG

## 2022-05-10 DIAGNOSIS — M70.61 GREATER TROCHANTERIC BURSITIS OF RIGHT HIP: ICD-10-CM

## 2022-05-10 DIAGNOSIS — S32.9XXD: ICD-10-CM

## 2022-05-10 DIAGNOSIS — S32.9XXD: Primary | ICD-10-CM

## 2022-05-10 PROBLEM — M16.11 PRIMARY OSTEOARTHRITIS OF RIGHT HIP: Status: ACTIVE | Noted: 2022-05-10

## 2022-05-10 PROCEDURE — 99213 OFFICE O/P EST LOW 20 MIN: CPT | Performed by: ORTHOPAEDIC SURGERY

## 2022-05-10 PROCEDURE — 72170 X-RAY EXAM OF PELVIS: CPT

## 2022-05-10 PROCEDURE — 20610 DRAIN/INJ JOINT/BURSA W/O US: CPT | Performed by: ORTHOPAEDIC SURGERY

## 2022-05-10 RX ORDER — LIDOCAINE HYDROCHLORIDE 10 MG/ML
7 INJECTION, SOLUTION INFILTRATION; PERINEURAL
Status: COMPLETED | OUTPATIENT
Start: 2022-05-10 | End: 2022-05-10

## 2022-05-10 RX ORDER — BETAMETHASONE SODIUM PHOSPHATE AND BETAMETHASONE ACETATE 3; 3 MG/ML; MG/ML
6 INJECTION, SUSPENSION INTRA-ARTICULAR; INTRALESIONAL; INTRAMUSCULAR; SOFT TISSUE
Status: COMPLETED | OUTPATIENT
Start: 2022-05-10 | End: 2022-05-10

## 2022-05-10 RX ADMIN — BETAMETHASONE SODIUM PHOSPHATE AND BETAMETHASONE ACETATE 6 MG: 3; 3 INJECTION, SUSPENSION INTRA-ARTICULAR; INTRALESIONAL; INTRAMUSCULAR; SOFT TISSUE at 13:23

## 2022-05-10 RX ADMIN — LIDOCAINE HYDROCHLORIDE 7 ML: 10 INJECTION, SOLUTION INFILTRATION; PERINEURAL at 13:23

## 2022-05-10 NOTE — ASSESSMENT & PLAN NOTE
Findings consistent with healed right sacral insufficiency and inferior pubic rami fractures, trochanteric bursitis  Imaging reviewed with patient and sister  In regards to fractures patient has no formal restrictions  Patient was given cortisone injection in trochanteric bursa, tolerated well, cold compress today  She can have cortisone injection every 3-4 months as needed  She can use tylenol or voltaren gel for pain control  Stationary bike or elliptical for low impact exercise  See patient as needed  All patient's questions were answered to her satisfaction  This note is created using dictation transcription  It may contain typographical errors, grammatical errors, improperly dictated words, background noise and other errors

## 2022-05-10 NOTE — PROGRESS NOTES
Assessment:     1  Closed fracture of right pelvis with routine healing, subsequent encounter    2  Greater trochanteric bursitis of right hip        Plan:     Problem List Items Addressed This Visit        Musculoskeletal and Integument    Greater trochanteric bursitis of right hip    Relevant Medications    lidocaine (XYLOCAINE) 1 % injection 7 mL (Completed)    betamethasone acetate-betamethasone sodium phosphate (CELESTONE) injection 6 mg (Completed)    Other Relevant Orders    Large joint arthrocentesis: R greater trochanteric bursa (Completed)      Other Visit Diagnoses     Closed fracture of right pelvis with routine healing, subsequent encounter    -  Primary    Relevant Orders    XR pelvis ap only 1 or 2 vw        Findings consistent with healed right sacral insufficiency and inferior pubic rami fractures, right greater trochanteric bursitis, and right hip osteoarthritis  Imaging reviewed with patient and sister  In regards to fractures patient has no formal restrictions  Patient was given cortisone injection in trochanteric bursa, tolerated well, cold compress today  She can have cortisone injection every 3-4 months as needed  She can use tylenol or voltaren gel for pain control  Stationary bike or elliptical for low impact exercise  See patient as needed  All patient's questions were answered to her satisfaction  This note is created using dictation transcription  It may contain typographical errors, grammatical errors, improperly dictated words, background noise and other errors  Subjective:     Patient ID: Alex Ledezma is a 74 y o  female  Chief Complaint:  74 yr old female in for follow up regarding her right sacral insufficiency and inferior pubic rami fracture from fall December 30, 2021  She has also treated for trochanteric bursitis last September  Patient has history of memory loss  She presents with her sister  It was discussed screw from TFN could be irritating trochanteric area   Patient is using SPC to ambulate  She does have pain with weight bearing in lateral hip region  Will experience intermittent anterior hip pain as well  Denies any back pain, numbness or tingling in right leg  Allergy:  No Known Allergies  Medications:  all current active meds have been reviewed  Past Medical History:  Past Medical History:   Diagnosis Date    Anxiety     Depression     Memory loss     Thyroid activity decreased      Past Surgical History:  Past Surgical History:   Procedure Laterality Date    BREAST BIOPSY      years ago- Benign    JOINT REPLACEMENT Left     [place] OPEN RX FEMUR FX+INTRAMED KIRT Right 5/9/2019    Procedure: INSERTION NAIL IM FEMUR ANTEGRADE (TROCHANTERIC), RIGHT;  Surgeon: Reinaldo Somers MD;  Location:  MAIN OR;  Service: Orthopedics     Family History:  Family History   Problem Relation Age of Onset    Hypertension Mother     Parkinsonism Mother [de-identified]        memory issues     Depression Mother     Melanoma Father     No Known Problems Maternal Grandmother     No Known Problems Maternal Grandfather     No Known Problems Paternal Grandmother     Heart attack Paternal Grandfather     Depression Other     Skin cancer Sister     Breast cancer Maternal Aunt         over age 48   Emaline Folds No Known Problems Paternal Aunt     No Known Problems Sister      Social History:  Social History     Substance and Sexual Activity   Alcohol Use Not Currently     Social History     Substance and Sexual Activity   Drug Use No     Social History     Tobacco Use   Smoking Status Never Smoker   Smokeless Tobacco Never Used     Review of Systems   Constitutional: Negative for chills and fever  HENT: Negative for ear pain and sore throat  Eyes: Negative for pain and visual disturbance  Respiratory: Negative for cough and shortness of breath  Cardiovascular: Negative for chest pain and palpitations  Gastrointestinal: Negative for abdominal pain and vomiting     Genitourinary: Negative for dysuria and hematuria  Musculoskeletal: Positive for arthralgias (Right hip)  Negative for back pain  Skin: Negative for color change and rash  Neurological: Negative for seizures and syncope  Psychiatric/Behavioral: Negative  All other systems reviewed and are negative  Objective:  BP Readings from Last 1 Encounters:   05/10/22 112/70      Wt Readings from Last 1 Encounters:   05/10/22 52 6 kg (116 lb)      BMI:   Estimated body mass index is 18 17 kg/m² as calculated from the following:    Height as of this encounter: 5' 7" (1 702 m)  Weight as of this encounter: 52 6 kg (116 lb)  BSA:   Estimated body surface area is 1 6 meters squared as calculated from the following:    Height as of this encounter: 5' 7" (1 702 m)  Weight as of this encounter: 52 6 kg (116 lb)  Physical Exam  Vitals and nursing note reviewed  Constitutional:       Appearance: Normal appearance  She is well-developed  HENT:      Head: Normocephalic and atraumatic  Right Ear: External ear normal       Left Ear: External ear normal    Eyes:      Extraocular Movements: Extraocular movements intact  Conjunctiva/sclera: Conjunctivae normal    Pulmonary:      Effort: Pulmonary effort is normal    Musculoskeletal:         General: Tenderness (right hip arthralgia ) present  Cervical back: Neck supple  Skin:     General: Skin is warm and dry  Neurological:      Mental Status: She is alert and oriented to person, place, and time  Deep Tendon Reflexes: Reflexes are normal and symmetric  Psychiatric:         Mood and Affect: Mood normal          Behavior: Behavior normal        Right Hip Exam     Tenderness   The patient is experiencing tenderness in the greater trochanter  Range of Motion   Flexion: normal   External rotation: 40   Internal rotation: 15     Muscle Strength   The patient has normal right hip strength      Tests   ALBERTO: negative  Bahman: negative    Other   Erythema: absent  Scars: absent  Sensation: normal  Pulse: present            I have personally reviewed pertinent films in PACS and my interpretation is xr pelvis demonstrates healed right inferior pubic rami and sacral insuffiency fractures maintained overal alignment and positon, stable hardware fixation TFN right, cannulated screws of left hip, severe osteoarthritis of right hip   Large joint arthrocentesis: R greater trochanteric bursa  Universal Protocol:  Consent: Verbal consent obtained    Risks and benefits: risks, benefits and alternatives were discussed  Consent given by: patient  Site marked: the operative site was marked  Patient identity confirmed: verbally with patient    Supporting Documentation  Indications: pain   Procedure Details  Location: hip - R greater trochanteric bursa  Preparation: Patient was prepped and draped in the usual sterile fashion  Needle size: 22 G  Ultrasound guidance: no  Approach: lateral  Medications administered: 7 mL lidocaine 1 %; 6 mg betamethasone acetate-betamethasone sodium phosphate 6 (3-3) mg/mL    Patient tolerance: patient tolerated the procedure well with no immediate complications  Dressing:  Sterile dressing applied        Scribe Attestation    I,:  Capo Rosenthal am acting as a scribe while in the presence of the attending physician :       I,:  Allyson Olvera MD personally performed the services described in this documentation    as scribed in my presence :

## 2022-09-06 ENCOUNTER — TELEPHONE (OUTPATIENT)
Dept: OBGYN CLINIC | Facility: HOSPITAL | Age: 67
End: 2022-09-06

## 2022-09-15 ENCOUNTER — OFFICE VISIT (OUTPATIENT)
Dept: OBGYN CLINIC | Facility: CLINIC | Age: 67
End: 2022-09-15
Payer: MEDICARE

## 2022-09-15 VITALS — DIASTOLIC BLOOD PRESSURE: 74 MMHG | SYSTOLIC BLOOD PRESSURE: 120 MMHG

## 2022-09-15 DIAGNOSIS — M70.61 GREATER TROCHANTERIC BURSITIS OF RIGHT HIP: Primary | ICD-10-CM

## 2022-09-15 PROCEDURE — 20610 DRAIN/INJ JOINT/BURSA W/O US: CPT | Performed by: ORTHOPAEDIC SURGERY

## 2022-09-15 PROCEDURE — 99213 OFFICE O/P EST LOW 20 MIN: CPT | Performed by: ORTHOPAEDIC SURGERY

## 2022-09-15 RX ORDER — BETAMETHASONE SODIUM PHOSPHATE AND BETAMETHASONE ACETATE 3; 3 MG/ML; MG/ML
6 INJECTION, SUSPENSION INTRA-ARTICULAR; INTRALESIONAL; INTRAMUSCULAR; SOFT TISSUE
Status: COMPLETED | OUTPATIENT
Start: 2022-09-15 | End: 2022-09-15

## 2022-09-15 RX ADMIN — BETAMETHASONE SODIUM PHOSPHATE AND BETAMETHASONE ACETATE 6 MG: 3; 3 INJECTION, SUSPENSION INTRA-ARTICULAR; INTRALESIONAL; INTRAMUSCULAR; SOFT TISSUE at 13:29

## 2022-09-15 NOTE — PROGRESS NOTES
Assessment:     1  Greater trochanteric bursitis of right hip        Plan:     Problem List Items Addressed This Visit        Musculoskeletal and Integument    Greater trochanteric bursitis of right hip - Primary     Findings consistent with healed right sacral insufficiency and inferior pubic rami fractures, right greater trochanteric bursitis, and right hip osteoarthritis  Patient was given cortisone injection in trochanteric bursa, tolerated well, cold compress today  She can have cortisone injection every 3-4 months as needed  However, I highly encouraged her to go to outpatient physical therapy for exercises  She did mention that she has no interest in going to formal physical therapy however  She can use tylenol or voltaren gel for pain control  I also discussed with her that she has significant osteoarthritis and hardware from previous surgery that feels prominent that may also be contributing to her pain  I discussed with her that there are surgical options to correct these problems if her symptoms worsen  She can follow up in 3-4 months or as needed for a possible repeat injection  All patient's questions were answered to her satisfaction  This note is created using dictation transcription  It may contain typographical errors, grammatical errors, improperly dictated words, background noise and other errors  Relevant Medications    betamethasone acetate-betamethasone sodium phosphate (CELESTONE) injection 6 mg (Completed)    Other Relevant Orders    Large joint arthrocentesis: R greater trochanteric bursa (Completed)    Ambulatory referral to Physical Therapy         Subjective:     Patient ID: Armando Valentin is a 69 y o  female  Chief Complaint:  74 yr old female in for follow up regarding her right sacral insufficiency and inferior pubic rami fracture from fall December 30, 2021  She presents in the office today with her sister, and mentions that she has a history of memory loss    She has also treated for trochanteric bursitis, which she has received cortisone injections in the past which have provided relief from her symptoms for at least 2 months  She states that her pain is located along the lateral aspect of her leg and isolated to her greater trochanter  She states that her pain is worse with activity and improved with periods of rest   She denies any groin or low back pain  She is currently ambulating with the use of a cane  She has not attempted any formal physical therapy or home exercise program   She denies any new injury or trauma to her hip  She denies any radiating symptoms      Allergy:  No Known Allergies  Medications:  all current active meds have been reviewed  Past Medical History:  Past Medical History:   Diagnosis Date    Anxiety     Depression     Memory loss     Thyroid activity decreased      Past Surgical History:  Past Surgical History:   Procedure Laterality Date    BREAST BIOPSY      years ago- Benign    JOINT REPLACEMENT Left     [place] OPEN RX FEMUR FX+INTRAMED KIRT Right 5/9/2019    Procedure: INSERTION NAIL IM FEMUR ANTEGRADE (TROCHANTERIC), RIGHT;  Surgeon: Papito Orozco MD;  Location: [facility] OR;  Service: Orthopedics     Family History:  Family History   Problem Relation Age of Onset    Hypertension Mother     Parkinsonism Mother [de-identified]        memory issues     Depression Mother     Melanoma Father     No Known Problems Maternal Grandmother     No Known Problems Maternal Grandfather     No Known Problems Paternal Grandmother     Heart attack Paternal Grandfather     Depression Other     Skin cancer Sister     Breast cancer Maternal Aunt         over age 48   Reid Ferrer No Known Problems Paternal Aunt     No Known Problems Sister      Social History:  Social History     Substance and Sexual Activity   Alcohol Use Not Currently     Social History     Substance and Sexual Activity   Drug Use No     Social History     Tobacco Use   Smoking Status Never Smoker   Smokeless Tobacco Never Used     Review of Systems   Constitutional: Negative for chills and fever  HENT: Negative for ear pain and sore throat  Eyes: Negative for pain and visual disturbance  Respiratory: Negative for cough and shortness of breath  Cardiovascular: Negative for chest pain and palpitations  Gastrointestinal: Negative for abdominal pain and vomiting  Genitourinary: Negative for dysuria and hematuria  Musculoskeletal: Positive for arthralgias (Right hip) and gait problem (Arrived in a wheelchair)  Negative for back pain  Skin: Negative for color change and rash  Neurological: Negative for seizures and syncope  Psychiatric/Behavioral: Negative  All other systems reviewed and are negative  Objective:  BP Readings from Last 1 Encounters:   09/15/22 120/74      Wt Readings from Last 1 Encounters:   05/10/22 52 6 kg (116 lb)      BMI:   Estimated body mass index is 18 17 kg/m² as calculated from the following:    Height as of 5/10/22: 5' 7" (1 702 m)  Weight as of 5/10/22: 52 6 kg (116 lb)  BSA:   Estimated body surface area is 1 6 meters squared as calculated from the following:    Height as of 5/10/22: 5' 7" (1 702 m)  Weight as of 5/10/22: 52 6 kg (116 lb)  Physical Exam  Constitutional:       Appearance: Normal appearance  HENT:      Head: Atraumatic  Nose: Nose normal    Eyes:      Extraocular Movements: Extraocular movements intact  Conjunctiva/sclera: Conjunctivae normal    Pulmonary:      Effort: Pulmonary effort is normal    Musculoskeletal:      Cervical back: Normal range of motion and neck supple  Comments: See HPI   Skin:     General: Skin is warm and dry  Neurological:      General: No focal deficit present  Mental Status: She is alert     Psychiatric:         Mood and Affect: Mood normal          Behavior: Behavior normal        Right Hip Exam     Tenderness   The patient is experiencing tenderness in the greater trochanter (denies groin tenderness)  Range of Motion   Flexion: normal   External rotation: 30   Internal rotation: 10     Muscle Strength   The patient has normal right hip strength  Tests   ALBERTO: negative  Bahman: negative    Other   Erythema: absent  Scars: absent  Sensation: normal  Pulse: present              I have personally reviewed pertinent films in PACS and my interpretation is xr pelvis performed on 05/10/2022demonstrates healed right inferior pubic rami and sacral insuffiency fractures maintained overal alignment and positon, stable hardware fixation TFN right, cannulated screws of left hip, severe osteoarthritis of right hip  Large joint arthrocentesis: R greater trochanteric bursa  Universal Protocol:  Consent: Verbal consent obtained    Risks and benefits: risks, benefits and alternatives were discussed  Consent given by: patient  Patient understanding: patient states understanding of the procedure being performed  Site marked: the operative site was marked  Patient identity confirmed: verbally with patient    Supporting Documentation  Indications: pain and diagnostic evaluation   Procedure Details  Location: hip - R greater trochanteric bursa  Preparation: Patient was prepped and draped in the usual sterile fashion  Needle size: 22 G  Ultrasound guidance: no  Approach: lateral  Medications administered: 6 mg betamethasone acetate-betamethasone sodium phosphate 6 (3-3) mg/mL (7 mL Marcaine 0 25% injection)    Patient tolerance: patient tolerated the procedure well with no immediate complications  Dressing:  Sterile dressing applied

## 2022-09-15 NOTE — ASSESSMENT & PLAN NOTE
Findings consistent with healed right sacral insufficiency and inferior pubic rami fractures, right greater trochanteric bursitis, and right hip osteoarthritis  Patient was given cortisone injection in trochanteric bursa, tolerated well, cold compress today  She can have cortisone injection every 3-4 months as needed  However, I highly encouraged her to go to outpatient physical therapy for exercises  She did mention that she has no interest in going to formal physical therapy however  She can use tylenol or voltaren gel for pain control  I also discussed with her that she has significant osteoarthritis and hardware from previous surgery that feels prominent that may also be contributing to her pain  I discussed with her that there are surgical options to correct these problems if her symptoms worsen  She can follow up in 3-4 months or as needed for a possible repeat injection  All patient's questions were answered to her satisfaction  This note is created using dictation transcription  It may contain typographical errors, grammatical errors, improperly dictated words, background noise and other errors

## 2022-09-15 NOTE — PROGRESS NOTES
Assessment:     1  Greater trochanteric bursitis of right hip        Plan:     Problem List Items Addressed This Visit        Musculoskeletal and Integument    Greater trochanteric bursitis of right hip - Primary         Subjective:     Patient ID: Tatyana Watson is a 69 y o  female  Chief Complaint:  HPI  Allergy:  No Known Allergies  Medications:  all current active meds have been reviewed  Past Medical History:  Past Medical History:   Diagnosis Date    Anxiety     Depression     Memory loss     Thyroid activity decreased      Past Surgical History:  Past Surgical History:   Procedure Laterality Date    BREAST BIOPSY      years ago- Benign    JOINT REPLACEMENT Left     [place] OPEN RX FEMUR FX+INTRAMED KIRT Right 5/9/2019    Procedure: INSERTION NAIL IM FEMUR ANTEGRADE (TROCHANTERIC), RIGHT;  Surgeon: Hilary Coronado MD;  Location: [facility] OR;  Service: Orthopedics     Family History:  Family History   Problem Relation Age of Onset    Hypertension Mother     Parkinsonism Mother [de-identified]        memory issues     Depression Mother     Melanoma Father     No Known Problems Maternal Grandmother     No Known Problems Maternal Grandfather     No Known Problems Paternal Grandmother     Heart attack Paternal Grandfather     Depression Other     Skin cancer Sister     Breast cancer Maternal Aunt         over age 48   [facility] No Known Problems Paternal Aunt     No Known Problems Sister      Social History:  Social History     Substance and Sexual Activity   Alcohol Use Not Currently     Social History     Substance and Sexual Activity   Drug Use No     Social History     Tobacco Use   Smoking Status Never Smoker   Smokeless Tobacco Never Used     Review of Systems   Constitutional: Negative for chills and fever  HENT: Negative for ear pain and sore throat  Eyes: Negative for pain and visual disturbance  Respiratory: Negative for cough and shortness of breath      Cardiovascular: Negative for chest pain and palpitations  Gastrointestinal: Negative for abdominal pain and vomiting  Genitourinary: Negative for dysuria and hematuria  Musculoskeletal: Negative for arthralgias and back pain  Skin: Negative for color change and rash  Neurological: Negative for seizures and syncope  All other systems reviewed and are negative  Objective:  BP Readings from Last 1 Encounters:   09/15/22 120/74      Wt Readings from Last 1 Encounters:   05/10/22 52 6 kg (116 lb)      BMI:   Estimated body mass index is 18 17 kg/m² as calculated from the following:    Height as of 5/10/22: 5' 7" (1 702 m)  Weight as of 5/10/22: 52 6 kg (116 lb)  BSA:   Estimated body surface area is 1 6 meters squared as calculated from the following:    Height as of 5/10/22: 5' 7" (1 702 m)  Weight as of 5/10/22: 52 6 kg (116 lb)  Physical Exam  Constitutional:       Appearance: Normal appearance  HENT:      Head: Atraumatic  Nose: Nose normal    Eyes:      Extraocular Movements: Extraocular movements intact  Conjunctiva/sclera: Conjunctivae normal    Pulmonary:      Effort: Pulmonary effort is normal    Musculoskeletal:      Cervical back: Normal range of motion and neck supple  Comments: See HPI   Skin:     General: Skin is warm and dry  Neurological:      Mental Status: She is alert and oriented to person, place, and time  Psychiatric:         Mood and Affect: Mood normal        Right Hip Exam     Tenderness   The patient is experiencing tenderness in the greater trochanter  Range of Motion   Flexion: normal   External rotation: 40   Internal rotation: 10     Muscle Strength   The patient has normal right hip strength      Tests   ALBERTO: negative  Bahman: negative    Other   Erythema: absent  Scars: absent  Sensation: normal  Pulse: present

## 2022-10-03 ENCOUNTER — EVALUATION (OUTPATIENT)
Dept: PHYSICAL THERAPY | Facility: CLINIC | Age: 67
End: 2022-10-03
Payer: MEDICARE

## 2022-10-03 DIAGNOSIS — M16.11 PRIMARY OSTEOARTHRITIS OF RIGHT HIP: Primary | ICD-10-CM

## 2022-10-03 DIAGNOSIS — M70.61 GREATER TROCHANTERIC BURSITIS OF RIGHT HIP: ICD-10-CM

## 2022-10-03 PROCEDURE — 97116 GAIT TRAINING THERAPY: CPT | Performed by: PHYSICAL THERAPIST

## 2022-10-03 PROCEDURE — 97161 PT EVAL LOW COMPLEX 20 MIN: CPT | Performed by: PHYSICAL THERAPIST

## 2022-10-03 PROCEDURE — 97110 THERAPEUTIC EXERCISES: CPT | Performed by: PHYSICAL THERAPIST

## 2022-10-03 NOTE — PROGRESS NOTES
PT Evaluation     Today's date: 10/3/2022  Patient name: Lacey Lutz  : 1955  MRN: [id number]  Referring provider: John Cardeans MD  Dx:   Encounter Diagnosis     ICD-10-CM    1  Primary osteoarthritis of right hip  M16 11    2  Greater trochanteric bursitis of right hip  M70 61 Ambulatory referral to Physical Therapy                  Assessment  Assessment details: Lacey Lutz is a 69 y o  female presenting to outpatient physical therapy at [place] [address] with complaints of right hip pain, stiffness and weakness   She presents with decreased L hip range of motion, decreased strength, limited flexibility, poor postural awareness, poor body mechanics, altered gait pattern, poor balance, decreased tolerance to activity and decreased functional mobility due to Primary osteoarthritis of right hip  (primary encounter diagnosis)  Greater trochanteric bursitis of right hip   She would benefit from skilled PT services in order to address these deficits and reach maximum level of function   Thank you for the referral!  Impairments: abnormal or restricted ROM, activity intolerance, impaired balance, impaired physical strength and pain with function    Symptom irritability: moderateUnderstanding of Dx/Px/POC: fair   Prognosis: fair    Goals  STG  1  Independent with HEP in 2 weeks  2  Decrease pain at worst by 50% in 2 weeks    LTG  1  Increase LLE strength in all planes to 5/5 in 4 weeks  2   Return to full, unassisted walking with cane 4 weeks    Plan  Patient would benefit from: skilled physical therapy  Planned modality interventions: thermotherapy: hydrocollator packs  Planned therapy interventions: manual therapy, therapeutic activities, therapeutic exercise, home exercise program and gait training  Frequency: 2x week  Duration in weeks: 8  Treatment plan discussed with: family and patient        Subjective Evaluation    History of Present Illness  Date of onset: 2021  Mechanism of injury: trauma  Mechanism of injury: Pt is s/p fall and R pelvic fracture, routine healing with pain and stiffness limiting her functional mobility  PMH includes memory loss, sister reports that pt was ambulating with SPC and no wheelchair    Quality of life: fair    Pain  Current pain ratin  At best pain ratin  At worst pain rating: 10  Quality: throbbing and sharp  Relieving factors: ice, medications, rest and relaxation  Aggravating factors: standing and walking  Progression: improved    Patient Goals  Patient goals for therapy: increased strength, independence with ADLs/IADLs, decreased pain and increased motion  Patient goal: Walk better, get rid of pain        Objective     Strength/Myotome Testing     Left Hip   Planes of Motion   Flexion: 4  Extension: 4  Abduction: 4    Right Hip   Planes of Motion   Flexion: 4-  Extension: 4-  Abduction: 4-    Left Knee   Flexion: 4  Extension: 4    Right Knee   Flexion: 4-  Extension: 4-    Left Ankle/Foot   Dorsiflexion: 4  Plantar flexion: 4    Right Ankle/Foot   Dorsiflexion: 4-  Plantar flexion: 4-    Ambulation   Weight-Bearing Status   Assistive device used: cane    Ambulation: Stairs   Able to perform: no  Neuro Exam:     Transfers Sit to stand: minimum assist Sit to supine: minimum assist Supine to sit: minimum assist   Roll: minimum assist    Functional outcomes   5x sit to stand: 30 sec (seconds)  2 minute walk test: 81 feet  TU (seconds)  Functional outcome gait comment: Decreased step length, decreased SLS time, decreased sera, forward trunk lean, decreased toe clearance, decreased gait speed      Balance assessments   MCTSIB   Eyes open level surface: 5 sec  Eyes open foam surface: 3 sec  Eyes closed level surface: 2 sec  Eyes closed foam surface: 1 sec  Single leg stance   Left eyes open firm surface: u/a  Left eyes closed firm surface: u/a  Left eyes open foam surface: u/a  Left eyes closed form surface: u/a  Right eyes open firm surface: u/a  Right eyes closed firm surface: u/a  Right eyes open foam surface: u/a  Right eyes closed foam surface: u/a             Precautions: Memory loss, min A / CGA for transfers & gait  EPOC: 12/5/22    Manuals 10/3                                                                Neuro Re-Ed             Narrow stance EO             Narrow stance EC             Airex narrow stance EO             Mini Squats x1 UE support                                                    Ther Ex             Heel raise             LAQ             Seated march             Seated hip ABD & ADD             Sit-stand x10            Lat Step up                                       Ther Activity                                       Gait Training             CGA with SPC 15'                         Modalities

## 2022-10-06 ENCOUNTER — OFFICE VISIT (OUTPATIENT)
Dept: PHYSICAL THERAPY | Facility: CLINIC | Age: 67
End: 2022-10-06
Payer: MEDICARE

## 2022-10-06 DIAGNOSIS — M16.11 PRIMARY OSTEOARTHRITIS OF RIGHT HIP: ICD-10-CM

## 2022-10-06 DIAGNOSIS — M70.61 GREATER TROCHANTERIC BURSITIS OF RIGHT HIP: Primary | ICD-10-CM

## 2022-10-06 PROCEDURE — 97112 NEUROMUSCULAR REEDUCATION: CPT | Performed by: PHYSICAL THERAPIST

## 2022-10-06 PROCEDURE — 97110 THERAPEUTIC EXERCISES: CPT | Performed by: PHYSICAL THERAPIST

## 2022-10-06 NOTE — PROGRESS NOTES
Daily Note     Today's date: 10/6/2022  Patient name: Erinn Dodge  : 1955  MRN: [id number]  Referring provider: Mango Bassett MD  Dx:   Encounter Diagnosis     ICD-10-CM    1  Greater trochanteric bursitis of right hip  M70 61    2  Primary osteoarthritis of right hip  M16 11                   Subjective: patient reports no new complaints  Objective: See treatment diary below      Assessment: patient tolerated treatment well  She was able to complete program as noted below with no reports of increased pain  She does report fatigue and feeling the muscles working especially with seated hip abd/add  She would benefit from continued PT to enable her to progress her current LOF  Plan: Continue per plan of care        Precautions: Memory loss, min A / CGA for transfers & gait  EPOC: 22    Manuals 10/3 10/6                                                               Neuro Re-Ed             Narrow stance EO  20"x3           Narrow stance EC  20"x3           Airex narrow stance EO  nv           Mini Squats x1 UE support  x10                                                  Ther Ex             Heel raise  x10 stand x10 seated           LAQ  2x10 ea           Seated march  2x10 ea           Seated hip ABD & ADD  Add - 5" x20  Abd - Red 5"x10           Sit-stand x10 x10           Lat Step up  nv                                     Ther Activity                                       Gait Training             CGA with SPC 15'                         Modalities

## 2022-10-10 ENCOUNTER — OFFICE VISIT (OUTPATIENT)
Dept: PHYSICAL THERAPY | Facility: CLINIC | Age: 67
End: 2022-10-10
Payer: MEDICARE

## 2022-10-10 DIAGNOSIS — M16.11 PRIMARY OSTEOARTHRITIS OF RIGHT HIP: ICD-10-CM

## 2022-10-10 DIAGNOSIS — M70.61 GREATER TROCHANTERIC BURSITIS OF RIGHT HIP: Primary | ICD-10-CM

## 2022-10-10 PROCEDURE — 97112 NEUROMUSCULAR REEDUCATION: CPT | Performed by: PHYSICAL THERAPIST

## 2022-10-10 PROCEDURE — 97110 THERAPEUTIC EXERCISES: CPT | Performed by: PHYSICAL THERAPIST

## 2022-10-10 NOTE — PROGRESS NOTES
Daily Note     Today's date: 10/10/2022  Patient name: Tiara Krishnamurthy  : 1955  MRN: [id number]  Referring provider: Erasto Montes De Oca MD  Dx:   Encounter Diagnosis     ICD-10-CM    1  Greater trochanteric bursitis of right hip  M70 61    2  Primary osteoarthritis of right hip  M16 11                   Subjective: patient reports she is still having some pain in the hip  Objective: See treatment diary below      Assessment: Patient tolerated treatment well  Able to progress patient as noted below  She requires verbal and tactile cuing throughout the session  Challenged with getting full extension with LAQ, but does better when aiming for therapist hand  She was able to complete forward step ups today with [facility] at mirror  She demonstrates and reports fatigue at end of session  She would benefit from continued physical therapy to progress her current level of function  Plan: Continue per plan of care        Precautions: Memory loss, min A / CGA for transfers & gait  EPOC: 22    Manuals 10/3 10/6 10/10                                                              Neuro Re-Ed             Narrow stance EO  20"x3 30"x3          Narrow stance EC  20"x3 30"x3          Airex narrow stance EO  nv 20"x3          Mini Squats x1 UE support  x10 x12                                                 Ther Ex             Heel raise  x10 stand x10 seated seated 2x10   3" hold          LAQ  2x10 ea 2x10 ea          Seated march  2x10 ea 2x10 ea          Seated hip ABD & ADD  Add - 5" x20  Abd - Red 5"x10 Add - 5" x20  Abd - Red 5"x15           Sit-stand x10 x10 x10          Lat Step up             Fwd Step Up   4" x5 ea                       Ther Activity                                       Gait Training             CGA with SPC 15'                         Modalities

## 2022-10-19 ENCOUNTER — APPOINTMENT (OUTPATIENT)
Dept: PHYSICAL THERAPY | Facility: CLINIC | Age: 67
End: 2022-10-19

## 2022-10-26 ENCOUNTER — OFFICE VISIT (OUTPATIENT)
Dept: PHYSICAL THERAPY | Facility: CLINIC | Age: 67
End: 2022-10-26
Payer: MEDICARE

## 2022-10-26 DIAGNOSIS — M16.11 PRIMARY OSTEOARTHRITIS OF RIGHT HIP: ICD-10-CM

## 2022-10-26 DIAGNOSIS — M70.61 GREATER TROCHANTERIC BURSITIS OF RIGHT HIP: Primary | ICD-10-CM

## 2022-10-26 PROCEDURE — 97110 THERAPEUTIC EXERCISES: CPT | Performed by: PHYSICAL THERAPIST

## 2022-10-26 PROCEDURE — 97112 NEUROMUSCULAR REEDUCATION: CPT | Performed by: PHYSICAL THERAPIST

## 2022-10-26 NOTE — PROGRESS NOTES
Daily Note     Today's date: 10/26/2022  Patient name: Markus Esquivel  : 1955  MRN: [id number]  Referring provider: Bobby Cabezas MD  Dx:   Encounter Diagnosis     ICD-10-CM    1  Greater trochanteric bursitis of right hip  M70 61    2  Primary osteoarthritis of right hip  M16 11        Subjective: pain in right hip reported today      Objective: See treatment diary below      Assessment: Tolerated treatment fair  Pt required GGA and constant verbal & tactile cues from PT for safe execution of therapeutic exercises today  Patient demonstrated fatigue post treatment and would benefit from continued PT      Plan: Progress treatment as tolerated         Precautions: Memory loss, min A / CGA for transfers & gait  EPOC: 22    Manuals 10/25                                                      Neuro Re-Ed           Narrow stance EO 30"x3          Narrow stance EC 30"x3          Airex narrow stance EO 20"x3          Mini Squats x1 UE support x12          Tandem walk at rail x6 10ft laps                                Ther Ex           Heel raise seated 2x10   3" hold          LAQ 2x10 ea          Seated march 2x10 ea          Seated hip ABD & ADD Add - 5" x20  Abd - Red 5"x15           Sit-stand 5x5 1:30 rest x2 airex booster          Lat Step up 4" x5 ea          Fwd Step Up 4" x5 ea                     Ther Activity           LE Ergo                      Gait Training           CGA with SPC                      Modalities

## 2022-10-31 ENCOUNTER — OFFICE VISIT (OUTPATIENT)
Dept: PHYSICAL THERAPY | Facility: CLINIC | Age: 67
End: 2022-10-31

## 2022-10-31 DIAGNOSIS — M70.61 GREATER TROCHANTERIC BURSITIS OF RIGHT HIP: Primary | ICD-10-CM

## 2022-10-31 DIAGNOSIS — M16.11 PRIMARY OSTEOARTHRITIS OF RIGHT HIP: ICD-10-CM

## 2022-10-31 NOTE — PROGRESS NOTES
Daily Note     Today's date: 10/31/2022  Patient name: Jermaine Grimaldo  : 1955  MRN: [id number]  Referring provider: Chad Abdi MD  Dx:   Encounter Diagnosis     ICD-10-CM    1  Greater trochanteric bursitis of right hip  M70 61    2  Primary osteoarthritis of right hip  M16 11        Subjective: Compliant with HEP, no questions regarding POC, motivated to continue PT       Objective: See treatment diary below  FOTO score assessed today, improved from 26 to 43/100  Assessment: Tolerated treatment well  Pt required GGA and constant verbal & tactile cues from PT for safe execution of therapeutic exercises today  Patient's FOTO score increased today, indicating functional improvements, however pt's full, pain-free right hip mobility & stability remain limited at this time, secondary to OA and bursitis,contributing to functional deficits  demonstrated fatigue post treatment and would benefit from continued PT      Plan: Progress treatment as tolerated         Precautions: Memory loss, min A / CGA for transfers & gait  EPOC: 22    Manuals 10/31                                                      Neuro Re-Ed           Narrow stance EO 30"x3          Narrow stance EC 30"x3          Airex narrow stance EO 20"x3          Mini Squats x1 UE support x12          Tandem walk at rail x6 10ft laps                                Ther Ex           Heel raise  2x10   3" hold          LAQ 2x10 ea 1#          Seated march 2x10 ea 1#          Seated hip ABD & ADD Add - 5" x20  Abd - Red 5"x15           Sit-stand 5x5 1:30 rest x2 airex booster          Lat Step up 6" 3x5 ea          Fwd Step Up 6" 3x5 ea                     Ther Activity           LE Ergo 8' L1                     Gait Training           CGA with SPC                      Modalities

## 2022-11-02 ENCOUNTER — OFFICE VISIT (OUTPATIENT)
Dept: PHYSICAL THERAPY | Facility: CLINIC | Age: 67
End: 2022-11-02

## 2022-11-02 DIAGNOSIS — M70.61 GREATER TROCHANTERIC BURSITIS OF RIGHT HIP: Primary | ICD-10-CM

## 2022-11-02 DIAGNOSIS — M16.11 PRIMARY OSTEOARTHRITIS OF RIGHT HIP: ICD-10-CM

## 2022-11-02 NOTE — PROGRESS NOTES
Daily Note     Today's date: 2022  Patient name: Loretta Pitts  : 1955  MRN: [id number]  Referring provider: Kenya Ha MD  Dx:   Encounter Diagnosis     ICD-10-CM    1  Greater trochanteric bursitis of right hip  M70 61    2  Primary osteoarthritis of right hip  M16 11        Subjective: Compliant with HEP, no questions regarding POC, motivated to continue PT       Objective: See treatment diary below  Assessment: Tolerated treatment well  Pt required SBA and less frequent verbal & tactile cues from PT for safe execution of therapeutic exercises today  Pable to perform sit-stand with no support from PT today  tdemonstrated fatigue post treatment and would benefit from continued PT      Plan: Progress treatment as tolerated  Trial x1 airex vs  x2 for sit-stand next visit   Perform RE     Precautions: Memory loss, min A / CGA for transfers & gait  EPOC: 22    Manuals                                                       Neuro Re-Ed           Narrow stance EO 30"x3          Narrow stance EC 30"x3          Airex narrow stance EO 20"x3          Mini Squats x1 UE support x12          Tandem walk at rail x6 10ft laps                                Ther Ex           Heel raise  3x10   3" hold 1#          LAQ 3x10 ea 1#          Seated march 2x10 ea 1#          Standing hip ABD & EXT x30 ea 1#           Sit-stand 5x5 1:30 rest x2 airex booster          Lat Step up 6" 3x5 ea 1#          Fwd Step Up 6" 3x5 ea 1#          Lat walk at rail 10ft x5 1#          Ther Activity           LE Ergo 8' L1                     Gait Training           CGA with SPC                      Modalities

## 2022-11-07 ENCOUNTER — OFFICE VISIT (OUTPATIENT)
Dept: PHYSICAL THERAPY | Facility: CLINIC | Age: 67
End: 2022-11-07

## 2022-11-07 DIAGNOSIS — M70.61 GREATER TROCHANTERIC BURSITIS OF RIGHT HIP: Primary | ICD-10-CM

## 2022-11-07 DIAGNOSIS — M16.11 PRIMARY OSTEOARTHRITIS OF RIGHT HIP: ICD-10-CM

## 2022-11-07 NOTE — PROGRESS NOTES
Daily Note     Today's date: 2022  Patient name: Humberto Lloyd  : 1955  MRN: [id number]  Referring provider: Brant Alpers, MD  Dx:   Encounter Diagnosis     ICD-10-CM    1  Greater trochanteric bursitis of right hip  M70 61    2  Primary osteoarthritis of right hip  M16 11                   Subjective: patient offers no new complaints  Objective: See treatment diary below      Assessment: Patient tolerated treatment well  She requires cuing while ambulating around clinic to use her [facility] and not let it drag behind her  She requires fequrent rest breaks throughout session but does well with provided program  She demonstrates and notes fatigue post treatment  She would benefit from continued PT  Plan: Continue per plan of care        Precautions: Memory loss, min A / CGA for transfers & gait  EPOC: 22    Manuals                                                      Neuro Re-Ed           Narrow stance EO 30"x3 30"x3         Narrow stance EC 30"x3 30"x3         Airex narrow stance EO 20"x3 20"x3         Mini Squats x1 UE support x12 x15         Tandem walk at rail x6 10ft laps 10ft x6                               Ther Ex           Heel raise  3x10   3" hold 1# 3x10   3" hold 1#         LAQ 3x10 ea 1# 3x10 ea 1#          Seated march 2x10 ea 1# 2x10 ea 1#          Standing hip ABD & EXT x30 ea 1# x30 ea 1#         Sit-stand 5x5 1:30 rest x2 airex booster 5x5 1:30 rest x2 airex booster         Lat Step up 6" 3x5 ea 1# 6" 3x5 ea 1#         Fwd Step Up 6" 3x5 ea 1# 6" 3x5 ea 1#         Lat walk at rail 10ft x5 1# 10ft x6 1#          Ther Activity           LE Ergo 8' L1 8' L1                    Gait Training           CGA with SPC                      Modalities

## 2022-11-09 ENCOUNTER — OFFICE VISIT (OUTPATIENT)
Dept: PHYSICAL THERAPY | Facility: CLINIC | Age: 67
End: 2022-11-09

## 2022-11-09 DIAGNOSIS — M70.61 GREATER TROCHANTERIC BURSITIS OF RIGHT HIP: Primary | ICD-10-CM

## 2022-11-09 DIAGNOSIS — M16.11 PRIMARY OSTEOARTHRITIS OF RIGHT HIP: ICD-10-CM

## 2022-11-16 ENCOUNTER — OFFICE VISIT (OUTPATIENT)
Dept: PHYSICAL THERAPY | Facility: CLINIC | Age: 67
End: 2022-11-16

## 2022-11-16 DIAGNOSIS — M70.61 GREATER TROCHANTERIC BURSITIS OF RIGHT HIP: Primary | ICD-10-CM

## 2022-11-16 DIAGNOSIS — M16.11 PRIMARY OSTEOARTHRITIS OF RIGHT HIP: ICD-10-CM

## 2022-11-16 NOTE — PROGRESS NOTES
Daily Note     Today's date: 2022  Patient name: Solomon Raza  : 1955  MRN: [id number]  Referring provider: Justin Henson MD  Dx:   Encounter Diagnosis     ICD-10-CM    1  Greater trochanteric bursitis of right hip  M70 61       2  Primary osteoarthritis of right hip  M16 11             Subjective: Pt did not have anyone at home to help her get to PT for 1 5 weeks, not compliant with HEP      Objective: See treatment diary below      Assessment: Tolerated treatment fair  Pt appears to have decreased endurance and strength today as evidenced by PT min-assistance with sit-stand repititions today as opposed to SBA in prior sessions  Patient demonstrated fatigue post treatment and would benefit from continued PT      Plan: Progress treatment as tolerated         Precautions: Memory loss, min A / CGA for transfers & gait  EPOC: 22    Manuals                                                  Neuro Re-Ed          Narrow stance EO 30"x3         Narrow stance EC 30"x3         Airex narrow stance EO 20"x3         Mini Squats x1 UE support x15         Tandem walk at rail 10ft x6                             Ther Ex          Heel raise 3x10   3" hold 1#         LAQ 3x10 ea 1#          Seated march 2x10 ea 1#          Standing hip ABD & EXT x30 ea 1#         Sit-stand 5x5 1:30 rest x2 airex booster         Lat Step up 6" 3x5 ea 1#         Fwd Step Up 6" 3x5 ea 1#         Lat walk at rail 10ft x6 1#          Ther Activity          LE Ergo 8' L1                   Gait Training          CGA with SPC                    Modalities

## 2022-11-21 ENCOUNTER — OFFICE VISIT (OUTPATIENT)
Dept: PHYSICAL THERAPY | Facility: CLINIC | Age: 67
End: 2022-11-21

## 2022-11-21 DIAGNOSIS — M70.61 GREATER TROCHANTERIC BURSITIS OF RIGHT HIP: Primary | ICD-10-CM

## 2022-11-21 DIAGNOSIS — M16.11 PRIMARY OSTEOARTHRITIS OF RIGHT HIP: ICD-10-CM

## 2022-11-21 NOTE — PROGRESS NOTES
PT Re-Evaluation     Today's date: 22  Patient name: Thao Carlisle  : 1955  MRN: [id number]  Referring provider: Yuri Daigle MD  Dx:   Encounter Diagnosis     ICD-10-CM    1  Primary osteoarthritis of right hip  M16 11    2  Greater trochanteric bursitis of right hip  M70 61 Ambulatory referral to Physical Therapy         Assessment  Assessment details: Thao Carlisle is a 69 y o  female seen in outpatient physical therapy at [facility] with complaints of right hip pain, stiffness and weakness   She has been treated for decreased L hip range of motion, decreased strength, limited flexibility, poor postural awareness, poor body mechanics, altered gait pattern, poor balance, decreased tolerance to activity and decreased functional mobility due to Primary osteoarthritis of right hip  (primary encounter diagnosis)  Greater trochanteric bursitis of right hip   Re-evaluation was performed today to assess if she would benefit from skilled PT services in order to address these deficits and reach maximum level of function   Thank you for the referral!  Impairments: abnormal or restricted ROM, activity intolerance, impaired balance, impaired physical strength and pain with function    Symptom irritability: moderateUnderstanding of Dx/Px/POC: fair   Prognosis: fair    Goals  STG  1  Independent with HEP in 2 weeks      Goal met   2  Decrease pain at worst by 50% in 2 weeks    Goal met    LTG  1  Increase LLE strength in all planes to 5/5 in 4 weeks   Good progress  2   Return to full, unassisted walking with cane 4 weeks   Good progress    Plan  Patient would benefit from: continued skilled physical therapy  Planned modality interventions: thermotherapy: hydrocollator packs  Planned therapy interventions: manual therapy, therapeutic activities, therapeutic exercise, home exercise program and gait training  Frequency: 2x week  Duration in weeks: 4  Treatment plan discussed with: family and patient        Subjective Evaluation    History of Present Illness  Date of onset: 2021  Mechanism of injury: trauma  Mechanism of injury: Pt is s/p fall and R pelvic fracture, routine healing with pain and stiffness limiting her functional mobility  PMH includes memory loss, sister reports that pt was ambulating with SPC and no wheelchair    Quality of life: fair    Pain  Current pain ratin        At best pain ratin  At worst pain rating: 10     5/10  Quality: throbbing and sharp  Relieving factors: ice, medications, rest and relaxation  Aggravating factors: standing and walking  Progression: improved    Patient Goals  Patient goals for therapy: increased strength, independence with ADLs/IADLs, decreased pain and increased motion  Patient goal: Walk better, get rid of pain        Objective     Strength/Myotome Testing     Left Hip   Planes of Motion   Flexion: 4     4+  Extension: 4     4+  Abduction: 4     4+    Right Hip   Planes of Motion   Flexion: 4-     4  Extension: 4-     4  Abduction: 4-     4    Left Knee   Flexion: 4     4+  Extension: 4     4+    Right Knee   Flexion: 4-     4  Extension: 4-     4    Left Ankle/Foot   Dorsiflexion: 4     4+  Plantar flexion: 4    4+    Right Ankle/Foot     Dorsiflexion: 4-    4  Plantar flexion: 4-    4       Ambulation   Weight-Bearing Status   Assistive device used: cane    Ambulation: Stairs   Able to perform: no    Able with non-reciprocal pattern   Neuro Exam:     Transfers Sit to stand: minimum assist Sit to supine: minimum assist Supine to sit: minimum assist    All SBA   Roll: minimum assist         Functional outcomes   5x sit to stand: 30 sec (seconds)    29 sec     2 minute walk test: 81 feet     165 ft    TU (seconds)      52 sec     Functional outcome gait comment: Decreased step length, decreased SLS time, decreased sera, forward trunk lean, decreased toe clearance, decreased gait speed            Balance assessments   MCTSIB   Eyes open level surface: 5 sec         Eyes open foam surface: 3 sec         Eyes closed level surface: 2 sec         Eyes closed foam surface: 1 sec         Single leg stance   Left eyes open firm surface: u/a  Left eyes closed firm surface: u/a  Left eyes open foam surface: u/a  Left eyes closed form surface: u/a  Right eyes open firm surface: u/a  Right eyes closed firm surface: u/a  Right eyes open foam surface: u/a  Right eyes closed foam surface: u/a      Precautions: Memory loss, min A / CGA for transfers & gait  EPOC: 12/5/22      Manuals 11/21                                                 Neuro Re-Ed          Narrow stance EO 30"x3         Narrow stance EC 30"x3         Airex narrow stance EO 20"x3         Mini Squats x1 UE support x15         Tandem walk at rail 10ft x6                             Ther Ex          Heel raise 3x10   3" hold 1#         LAQ 3x10 ea 1#          Seated march 2x10 ea 1#          Standing hip ABD & EXT x30 ea 1#         Sit-stand 5x5 1:30 rest x2 airex booster         Lat Step up 6" 3x5 ea 1#         Fwd Step Up 6" 3x5 ea 1#         Lat walk at rail 10ft x6 1#          Ther Activity          LE Ergo 8' L1                   Gait Training          CGA with SPC                    Modalities

## 2022-11-23 ENCOUNTER — APPOINTMENT (OUTPATIENT)
Dept: PHYSICAL THERAPY | Facility: CLINIC | Age: 67
End: 2022-11-23

## 2022-11-28 ENCOUNTER — OFFICE VISIT (OUTPATIENT)
Dept: PHYSICAL THERAPY | Facility: CLINIC | Age: 67
End: 2022-11-28

## 2022-11-28 DIAGNOSIS — M16.11 PRIMARY OSTEOARTHRITIS OF RIGHT HIP: ICD-10-CM

## 2022-11-28 DIAGNOSIS — M70.61 GREATER TROCHANTERIC BURSITIS OF RIGHT HIP: Primary | ICD-10-CM

## 2022-11-28 NOTE — PROGRESS NOTES
Daily Note     Today's date: 2022  Patient name: Socorro Pacheco  : 1955  MRN: [id number]  Referring provider: Meredith Ferrer MD  Dx:   Encounter Diagnosis     ICD-10-CM    1  Greater trochanteric bursitis of right hip  M70 61       2  Primary osteoarthritis of right hip  M16 11              Subjective: unable to attend PT last week due to schedule conflict & no transportation      Objective: See treatment diary below      Assessment: Tolerated treatment fair  Frequent verbal cues required to assist pt with correct exercise technique during session today  Patient demonstrated fatigue post treatment and would benefit from continued PT      Plan: Progress treatment as tolerated         Precautions: Memory loss, min A / CGA for transfers & gait  EPOC: 22    Manuals                                                  Neuro Re-Ed          Narrow stance EO 30"x3         Narrow stance EC 30"x3         Airex narrow stance EO 20"x3         Mini Squats x1 UE support x15         Tandem walk at rail 10ft x6                             Ther Ex          Heel raise 3x10   3" hold 1 5#         LAQ 3x10 ea 1 5#          Seated march 2x10 ea 1 5#          Standing hip ABD & EXT x30 ea 1 5#         Sit-stand 5x5 1:30 rest x1 airex booster         Lat Step up 6" 3x5 ea 1 5#         Fwd Step Up 6" 3x5 ea 1 5#         Lat walk at rail 10ft x6 1 5#          Ther Activity          LE Ergo 8' L1                   Gait Training          CGA with SPC 4'                    Modalities

## 2022-11-30 ENCOUNTER — APPOINTMENT (OUTPATIENT)
Dept: PHYSICAL THERAPY | Facility: CLINIC | Age: 67
End: 2022-11-30

## 2022-12-05 ENCOUNTER — OFFICE VISIT (OUTPATIENT)
Dept: PHYSICAL THERAPY | Facility: CLINIC | Age: 67
End: 2022-12-05

## 2022-12-05 DIAGNOSIS — M16.11 PRIMARY OSTEOARTHRITIS OF RIGHT HIP: ICD-10-CM

## 2022-12-05 DIAGNOSIS — M70.61 GREATER TROCHANTERIC BURSITIS OF RIGHT HIP: Primary | ICD-10-CM

## 2022-12-05 NOTE — PROGRESS NOTES
Daily Note     Today's date: 2022  Patient name: Maddie Cervantes  : 1955  MRN: [id number]  Referring provider: Michelle Monahan MD  Dx:   Encounter Diagnosis     ICD-10-CM    1  Greater trochanteric bursitis of right hip  M70 61       2  Primary osteoarthritis of right hip  M16 11         Total time in clinic (min): 45 minutes    Subjective: Patient states that she is having more (L) hip pain prior to start of session  Patient denies falls since LV  Patient states during session that she is more fatigued than LV  Objective: See treatment diary below      Assessment: Session initiated on bike for ROM and strength  Moderate fatigue demonstrated t/o session  Max VCs and TCs utilized for correct form, safety and reps  Frequent rest breaks taken t/o session to increase tolerance to activity  Resume held activities NV  Patient would benefit from continued PT  Plan: Progress treatment as tolerated         Precautions: Memory loss, min A / CGA for transfers & gait  EPOC: 22    Manuals                                                 Neuro Re-Ed          Narrow stance EO 30"x3 Hold        Narrow stance EC 30"x3 Hold        Airex narrow stance EO 20"x3 Hold        Mini Squats x1 UE support x15 Hold        Tandem walk at rail 10ft x6 Hold                            Ther Ex          Heel raise 3x10   3" hold 1 5# 3x10        LAQ 3x10 ea 1 5#  3x10 ea 1 5#         Seated march 2x10 ea 1 5#  2x10 ea 1 5#        Standing hip ABD & EXT x30 ea 1 5# x30 ea 1 5#        Sit-stand 5x5 1:30 rest x1 airex booster 3x5 1 rest x1 airex booster        Lat Step up 6" 3x5 ea 1 5# Hold        Fwd Step Up 6" 3x5 ea 1 5# 6" 3x5 ea 1 5#        Lat walk at rail 10ft x6 1 5#  Hold        Ther Activity          LE Ergo 8' L1 8' L1                  Gait Training          CGA with SPC 4'  In clinic /c cues                  Modalities

## 2022-12-08 ENCOUNTER — OFFICE VISIT (OUTPATIENT)
Dept: PHYSICAL THERAPY | Facility: CLINIC | Age: 67
End: 2022-12-08

## 2022-12-08 DIAGNOSIS — M70.61 GREATER TROCHANTERIC BURSITIS OF RIGHT HIP: Primary | ICD-10-CM

## 2022-12-08 DIAGNOSIS — M16.11 PRIMARY OSTEOARTHRITIS OF RIGHT HIP: ICD-10-CM

## 2022-12-08 NOTE — PROGRESS NOTES
Daily Note     Today's date: 2022  Patient name: Erinn Dodge  : 1955  MRN: [id number]  Referring provider: Mango Bassett MD  Dx:   Encounter Diagnosis     ICD-10-CM    1  Greater trochanteric bursitis of right hip  M70 61       2  Primary osteoarthritis of right hip  M16 11            Subjective: Compliant with HEP, no questions regarding POC, motivated to continue PT      Objective: See treatment diary below      Assessment: Tolerated treatment fair  Frequent verbal cues required to assist pt with correct exercise technique during session today  Patient demonstrated fatigue post treatment and would benefit from continued PT      Plan: Progress treatment as tolerated     Perform RE NV     Precautions: Memory loss, min A / CGA for transfers & gait  EPOC: 12/15/22    Manuals                                                  Neuro Re-Ed          Narrow stance EO 30"x3         Narrow stance EC 30"x3         Airex narrow stance EO 20"x3         Mini Squats x1 UE support x15         Tandem walk at rail 10ft x6                             Ther Ex          Heel raise 3x10   3" hold 1 5#         LAQ 3x10 ea 1 5#          Seated march 2x10 ea 1 5#          Standing hip ABD & EXT x30 ea 1 5#         Sit-stand 5x5 1:30 rest x1 airex booster         Lat Step up 6" 3x5 ea 1 5#         Fwd Step Up 6" 3x5 ea 1 5#         Lat walk at rail 10ft x6 1 5#          Ther Activity          LE Ergo for improved LE muscular  endurance 8' L1                   Gait Training          CGA with SPC 4'                    Modalities

## 2022-12-20 ENCOUNTER — OFFICE VISIT (OUTPATIENT)
Dept: OBGYN CLINIC | Facility: CLINIC | Age: 67
End: 2022-12-20

## 2022-12-20 VITALS
HEIGHT: 67 IN | BODY MASS INDEX: 17.42 KG/M2 | SYSTOLIC BLOOD PRESSURE: 100 MMHG | WEIGHT: 111 LBS | DIASTOLIC BLOOD PRESSURE: 68 MMHG

## 2022-12-20 DIAGNOSIS — M16.11 PRIMARY OSTEOARTHRITIS OF RIGHT HIP: ICD-10-CM

## 2022-12-20 DIAGNOSIS — M70.61 GREATER TROCHANTERIC BURSITIS OF RIGHT HIP: Primary | ICD-10-CM

## 2022-12-20 NOTE — PROGRESS NOTES
Assessment:     1  Greater trochanteric bursitis of right hip    2  Primary osteoarthritis of right hip        Plan:     Problem List Items Addressed This Visit        Musculoskeletal and Integument    Greater trochanteric bursitis of right hip - Primary    Primary osteoarthritis of right hip       Findings consistent with advanced right hip osteoarthritis and greater trochanteric bursitis  Patient states the pain she currently experiences is manageable  Maintain HEP as shown by therapy  Discussed if her symptoms flare call for follow up appt  Also discussed if pain is primarily related to arthritis/hardware then the only way to alleviate that pain is removal of hardware with transition to total hip replacement  OTC anti inflammatories as needed for pain  PRN  A all patient's questions were answered to her satisfaction  This note is created using dictation transcription  It may contain typographical errors, grammatical errors, improperly dictated words, background noise and other errors  Subjective:     Patient ID: Kassidy Candelaria is a 69 y o  female  Chief Complaint:  69 yr old female in for follow up regarding her right hip  Treating for trochanteric bursitis  She was given CSI 9/15/22 and has been attending physical therapy  She has healed right sacral insufficiency and inferior pubic rami fractures, right greater trochanteric bursitis, and right hip osteoarthritis  Discussed prominent hardware may be causing some of her pain in hip  She has been discharged from physical therapy  She states following CSI and PT here pain has improved but still get twinges at times  She is using SPC to ambulate  She states no increase in pain at night  Denies any groin pain       Allergy:  No Known Allergies  Medications:  all current active meds have been reviewed  Past Medical History:  Past Medical History:   Diagnosis Date   • Anxiety    • Depression    • Memory loss    • Thyroid activity decreased      Past Surgical History:  Past Surgical History:   Procedure Laterality Date   • BREAST BIOPSY      years ago- Benign   • JOINT REPLACEMENT Left    • SC OPEN RX FEMUR FX+INTRAMED KIRT Right 5/9/2019    Procedure: INSERTION NAIL IM FEMUR ANTEGRADE (TROCHANTERIC), RIGHT;  Surgeon: Stacey Enamorado MD;  Location: [facility] OR;  Service: Orthopedics     Family History:  Family History   Problem Relation Age of Onset   • Hypertension Mother    • Parkinsonism Mother [de-identified]        memory issues    • Depression Mother    • Melanoma Father    • No Known Problems Maternal Grandmother    • No Known Problems Maternal Grandfather    • No Known Problems Paternal Grandmother    • Heart attack Paternal Grandfather    • Depression Other    • Skin cancer Sister    • Breast cancer Maternal Aunt         over age 48   • No Known Problems Paternal Aunt    • No Known Problems Sister      Social History:  Social History     Substance and Sexual Activity   Alcohol Use Not Currently     Social History     Substance and Sexual Activity   Drug Use No     Social History     Tobacco Use   Smoking Status Never   Smokeless Tobacco Never     Review of Systems   Constitutional: Negative for chills and fever  HENT: Negative for ear pain and sore throat  Eyes: Negative for pain and visual disturbance  Respiratory: Negative for cough and shortness of breath  Cardiovascular: Negative for chest pain and palpitations  Gastrointestinal: Negative for abdominal pain and vomiting  Genitourinary: Negative for dysuria and hematuria  Musculoskeletal: Positive for gait problem (Ambulate with 2 canes)  Negative for arthralgias, back pain and joint swelling  Skin: Negative for color change and rash  Neurological: Negative for seizures and syncope  Psychiatric/Behavioral: Negative  All other systems reviewed and are negative          Objective:  BP Readings from Last 1 Encounters:   12/20/22 100/68      Wt Readings from Last 1 Encounters:   12/20/22 50 3 kg (111 lb) BMI:   Estimated body mass index is 17 39 kg/m² as calculated from the following:    Height as of this encounter: 5' 7" (1 702 m)  Weight as of this encounter: 50 3 kg (111 lb)  BSA:   Estimated body surface area is 1 57 meters squared as calculated from the following:    Height as of this encounter: 5' 7" (1 702 m)  Weight as of this encounter: 50 3 kg (111 lb)  Physical Exam  Vitals and nursing note reviewed  Constitutional:       Appearance: Normal appearance  She is well-developed  HENT:      Head: Normocephalic and atraumatic  Right Ear: External ear normal       Left Ear: External ear normal    Eyes:      Extraocular Movements: Extraocular movements intact  Conjunctiva/sclera: Conjunctivae normal    Pulmonary:      Effort: Pulmonary effort is normal    Musculoskeletal:         General: Tenderness (right hip arthralgia ) present  Cervical back: Neck supple  Skin:     General: Skin is warm and dry  Neurological:      Mental Status: She is alert and oriented to person, place, and time  Deep Tendon Reflexes: Reflexes are normal and symmetric  Psychiatric:         Mood and Affect: Mood normal          Behavior: Behavior normal        Right Hip Exam     Tenderness   The patient is experiencing no tenderness  Range of Motion   Flexion: 100   External rotation: 30   Internal rotation: 10     Muscle Strength   The patient has normal right hip strength      Tests   Bahman: negative    Other   Erythema: absent  Scars: present (well healed incisions)  Sensation: normal  Pulse: present            no new imaging to review      Scribe Attestation    I,:  Malka Powell am acting as a scribe while in the presence of the attending physician :       I,:  Thee Huston MD personally performed the services described in this documentation    as scribed in my presence :

## 2023-01-01 ENCOUNTER — APPOINTMENT (EMERGENCY)
Dept: MRI IMAGING | Facility: HOSPITAL | Age: 68
DRG: 175 | End: 2023-01-01
Payer: MEDICARE

## 2023-01-01 ENCOUNTER — APPOINTMENT (EMERGENCY)
Dept: CT IMAGING | Facility: HOSPITAL | Age: 68
DRG: 175 | End: 2023-01-01
Payer: MEDICARE

## 2023-01-01 ENCOUNTER — HOSPITAL ENCOUNTER (INPATIENT)
Facility: HOSPITAL | Age: 68
LOS: 3 days | DRG: 175 | End: 2023-08-07
Attending: EMERGENCY MEDICINE | Admitting: HOSPITALIST
Payer: MEDICARE

## 2023-01-01 ENCOUNTER — APPOINTMENT (INPATIENT)
Dept: ULTRASOUND IMAGING | Facility: HOSPITAL | Age: 68
DRG: 175 | End: 2023-01-01
Payer: MEDICARE

## 2023-01-01 ENCOUNTER — HOME CARE VISIT (OUTPATIENT)
Dept: HOME HEALTH SERVICES | Facility: HOME HEALTHCARE | Age: 68
End: 2023-01-01
Payer: MEDICARE

## 2023-01-01 ENCOUNTER — HOSPICE ADMISSION (OUTPATIENT)
Dept: HOME HOSPICE | Facility: HOSPICE | Age: 68
End: 2023-01-01
Payer: MEDICARE

## 2023-01-01 ENCOUNTER — APPOINTMENT (EMERGENCY)
Dept: RADIOLOGY | Facility: HOSPITAL | Age: 68
DRG: 175 | End: 2023-01-01
Payer: MEDICARE

## 2023-01-01 VITALS
DIASTOLIC BLOOD PRESSURE: 55 MMHG | SYSTOLIC BLOOD PRESSURE: 97 MMHG | TEMPERATURE: 97.4 F | HEART RATE: 78 BPM | OXYGEN SATURATION: 95 % | WEIGHT: 117.73 LBS | RESPIRATION RATE: 22 BRPM | BODY MASS INDEX: 17.9 KG/M2

## 2023-01-01 DIAGNOSIS — R77.8 ELEVATED TROPONIN: ICD-10-CM

## 2023-01-01 DIAGNOSIS — G21.11 NEUROLEPTIC-INDUCED PARKINSONISM (HCC): ICD-10-CM

## 2023-01-01 DIAGNOSIS — E87.5 ACUTE HYPERKALEMIA: ICD-10-CM

## 2023-01-01 DIAGNOSIS — I26.99 PULMONARY EMBOLISM (HCC): ICD-10-CM

## 2023-01-01 DIAGNOSIS — Z71.89 GOALS OF CARE, COUNSELING/DISCUSSION: ICD-10-CM

## 2023-01-01 DIAGNOSIS — R29.90 STROKE-LIKE SYMPTOM: ICD-10-CM

## 2023-01-01 DIAGNOSIS — G30.9 ALZHEIMER'S DEMENTIA (HCC): ICD-10-CM

## 2023-01-01 DIAGNOSIS — R41.89 UNRESPONSIVE EPISODE: Primary | ICD-10-CM

## 2023-01-01 DIAGNOSIS — F02.80 ALZHEIMER'S DEMENTIA (HCC): ICD-10-CM

## 2023-01-01 LAB
2HR DELTA HS TROPONIN: -125 NG/L
4HR DELTA HS TROPONIN: -88 NG/L
ALBUMIN SERPL BCP-MCNC: 3.3 G/DL (ref 3.5–5)
ALBUMIN SERPL BCP-MCNC: 3.6 G/DL (ref 3.5–5)
ALL TARGETS: NOT DETECTED
ALP SERPL-CCNC: 71 U/L (ref 34–104)
ALP SERPL-CCNC: 80 U/L (ref 34–104)
ALT SERPL W P-5'-P-CCNC: 425 U/L (ref 7–52)
ALT SERPL W P-5'-P-CCNC: 475 U/L (ref 7–52)
AMORPH URATE CRY URNS QL MICRO: ABNORMAL
ANION GAP SERPL CALCULATED.3IONS-SCNC: 10 MMOL/L
ANION GAP SERPL CALCULATED.3IONS-SCNC: 11 MMOL/L
ANION GAP SERPL CALCULATED.3IONS-SCNC: 15 MMOL/L
APTT PPP: 171 SECONDS (ref 23–37)
APTT PPP: 25 SECONDS (ref 23–37)
APTT PPP: 64 SECONDS (ref 23–37)
APTT PPP: 89 SECONDS (ref 23–37)
APTT PPP: >210 SECONDS (ref 23–37)
ARTERIAL PATENCY WRIST A: ABNORMAL
AST SERPL W P-5'-P-CCNC: 301 U/L (ref 13–39)
AST SERPL W P-5'-P-CCNC: 503 U/L (ref 13–39)
ATRIAL RATE: 90 BPM
BACTERIA BLD CULT: ABNORMAL
BACTERIA BLD CULT: NORMAL
BACTERIA UR QL AUTO: ABNORMAL /HPF
BASE EXCESS BLDA CALC-SCNC: -4 MMOL/L (ref -2–3)
BASOPHILS # BLD AUTO: 0.01 THOUSANDS/ÂΜL (ref 0–0.1)
BASOPHILS # BLD AUTO: 0.02 THOUSANDS/ÂΜL (ref 0–0.1)
BASOPHILS NFR BLD AUTO: 0 % (ref 0–1)
BASOPHILS NFR BLD AUTO: 0 % (ref 0–1)
BILIRUB SERPL-MCNC: 0.36 MG/DL (ref 0.2–1)
BILIRUB SERPL-MCNC: 0.38 MG/DL (ref 0.2–1)
BILIRUB UR QL STRIP: NEGATIVE
BNP SERPL-MCNC: 542 PG/ML (ref 0–100)
BUN SERPL-MCNC: 30 MG/DL (ref 5–25)
BUN SERPL-MCNC: 32 MG/DL (ref 5–25)
BUN SERPL-MCNC: 33 MG/DL (ref 5–25)
BUN SERPL-MCNC: 37 MG/DL (ref 5–25)
BUN SERPL-MCNC: 45 MG/DL (ref 5–25)
CA-I BLD-SCNC: 1.12 MMOL/L (ref 1.12–1.32)
CALCIUM ALBUM COR SERPL-MCNC: 8.9 MG/DL (ref 8.3–10.1)
CALCIUM SERPL-MCNC: 8.3 MG/DL (ref 8.4–10.2)
CALCIUM SERPL-MCNC: 8.7 MG/DL (ref 8.4–10.2)
CALCIUM SERPL-MCNC: 8.8 MG/DL (ref 8.4–10.2)
CALCIUM SERPL-MCNC: 9.3 MG/DL (ref 8.4–10.2)
CALCIUM SERPL-MCNC: 9.4 MG/DL (ref 8.4–10.2)
CAOX CRY URNS QL MICRO: ABNORMAL /HPF
CARDIAC TROPONIN I PNL SERPL HS: 663 NG/L
CARDIAC TROPONIN I PNL SERPL HS: 700 NG/L
CARDIAC TROPONIN I PNL SERPL HS: 788 NG/L
CHLORIDE SERPL-SCNC: 103 MMOL/L (ref 96–108)
CHLORIDE SERPL-SCNC: 105 MMOL/L (ref 96–108)
CHLORIDE SERPL-SCNC: 106 MMOL/L (ref 96–108)
CHLORIDE SERPL-SCNC: 110 MMOL/L (ref 96–108)
CHLORIDE SERPL-SCNC: 99 MMOL/L (ref 96–108)
CLARITY UR: CLEAR
CO2 SERPL-SCNC: 21 MMOL/L (ref 21–32)
CO2 SERPL-SCNC: 22 MMOL/L (ref 21–32)
CO2 SERPL-SCNC: 24 MMOL/L (ref 21–32)
COLOR UR: YELLOW
CREAT SERPL-MCNC: 2.05 MG/DL (ref 0.6–1.3)
CREAT SERPL-MCNC: 2.27 MG/DL (ref 0.6–1.3)
CREAT SERPL-MCNC: 2.53 MG/DL (ref 0.6–1.3)
CREAT SERPL-MCNC: 2.55 MG/DL (ref 0.6–1.3)
CREAT SERPL-MCNC: 2.73 MG/DL (ref 0.6–1.3)
D DIMER PPP FEU-MCNC: 13.64 UG/ML FEU
EOSINOPHIL # BLD AUTO: 0 THOUSAND/ÂΜL (ref 0–0.61)
EOSINOPHIL # BLD AUTO: 0 THOUSAND/ÂΜL (ref 0–0.61)
EOSINOPHIL NFR BLD AUTO: 0 % (ref 0–6)
EOSINOPHIL NFR BLD AUTO: 0 % (ref 0–6)
ERYTHROCYTE [DISTWIDTH] IN BLOOD BY AUTOMATED COUNT: 14.6 % (ref 11.6–15.1)
ERYTHROCYTE [DISTWIDTH] IN BLOOD BY AUTOMATED COUNT: 15.1 % (ref 11.6–15.1)
ERYTHROCYTE [DISTWIDTH] IN BLOOD BY AUTOMATED COUNT: 15.8 % (ref 11.6–15.1)
FIO2 GAS DIL.REBREATH: 44 L
FLUAV RNA RESP QL NAA+PROBE: NEGATIVE
FLUBV RNA RESP QL NAA+PROBE: NEGATIVE
GFR SERPL CREATININE-BSD FRML MDRD: 16 ML/MIN/1.73SQ M
GFR SERPL CREATININE-BSD FRML MDRD: 18 ML/MIN/1.73SQ M
GFR SERPL CREATININE-BSD FRML MDRD: 18 ML/MIN/1.73SQ M
GFR SERPL CREATININE-BSD FRML MDRD: 21 ML/MIN/1.73SQ M
GFR SERPL CREATININE-BSD FRML MDRD: 24 ML/MIN/1.73SQ M
GLUCOSE SERPL-MCNC: 118 MG/DL (ref 65–140)
GLUCOSE SERPL-MCNC: 126 MG/DL (ref 65–140)
GLUCOSE SERPL-MCNC: 131 MG/DL (ref 65–140)
GLUCOSE SERPL-MCNC: 140 MG/DL (ref 65–140)
GLUCOSE SERPL-MCNC: 145 MG/DL (ref 65–140)
GLUCOSE SERPL-MCNC: 156 MG/DL (ref 65–140)
GLUCOSE SERPL-MCNC: 156 MG/DL (ref 65–140)
GLUCOSE SERPL-MCNC: 174 MG/DL (ref 65–140)
GLUCOSE SERPL-MCNC: 54 MG/DL (ref 65–140)
GLUCOSE SERPL-MCNC: 91 MG/DL (ref 65–140)
GLUCOSE UR STRIP-MCNC: ABNORMAL MG/DL
GRAM STN SPEC: ABNORMAL
GRAN CASTS #/AREA URNS LPF: ABNORMAL /[LPF]
HCO3 BLDA-SCNC: 19 MMOL/L (ref 22–28)
HCT VFR BLD AUTO: 27.9 % (ref 34.8–46.1)
HCT VFR BLD AUTO: 27.9 % (ref 34.8–46.1)
HCT VFR BLD AUTO: 28.2 % (ref 34.8–46.1)
HCT VFR BLD CALC: 22 % (ref 34.8–46.1)
HGB BLD-MCNC: 8.3 G/DL (ref 11.5–15.4)
HGB BLD-MCNC: 8.4 G/DL (ref 11.5–15.4)
HGB BLD-MCNC: 8.5 G/DL (ref 11.5–15.4)
HGB BLDA-MCNC: 7.5 G/DL (ref 11.5–15.4)
HGB UR QL STRIP.AUTO: NEGATIVE
HYALINE CASTS #/AREA URNS LPF: ABNORMAL /LPF
IMM GRANULOCYTES # BLD AUTO: 0.16 THOUSAND/UL (ref 0–0.2)
IMM GRANULOCYTES # BLD AUTO: 0.24 THOUSAND/UL (ref 0–0.2)
IMM GRANULOCYTES NFR BLD AUTO: 1 % (ref 0–2)
IMM GRANULOCYTES NFR BLD AUTO: 2 % (ref 0–2)
INR PPP: 1.06 (ref 0.84–1.19)
KETONES UR STRIP-MCNC: NEGATIVE MG/DL
LACTATE SERPL-SCNC: 2.4 MMOL/L (ref 0.5–2)
LACTATE SERPL-SCNC: 4.1 MMOL/L (ref 0.5–2)
LACTATE SERPL-SCNC: 4.2 MMOL/L (ref 0.5–2)
LACTATE SERPL-SCNC: 5.5 MMOL/L (ref 0.5–2)
LEUKOCYTE ESTERASE UR QL STRIP: NEGATIVE
LYMPHOCYTES # BLD AUTO: 1.19 THOUSANDS/ÂΜL (ref 0.6–4.47)
LYMPHOCYTES # BLD AUTO: 1.32 THOUSANDS/ÂΜL (ref 0.6–4.47)
LYMPHOCYTES NFR BLD AUTO: 11 % (ref 14–44)
LYMPHOCYTES NFR BLD AUTO: 7 % (ref 14–44)
MCH RBC QN AUTO: 29.5 PG (ref 26.8–34.3)
MCH RBC QN AUTO: 29.5 PG (ref 26.8–34.3)
MCH RBC QN AUTO: 29.9 PG (ref 26.8–34.3)
MCHC RBC AUTO-ENTMCNC: 29.7 G/DL (ref 31.4–37.4)
MCHC RBC AUTO-ENTMCNC: 29.8 G/DL (ref 31.4–37.4)
MCHC RBC AUTO-ENTMCNC: 30.5 G/DL (ref 31.4–37.4)
MCV RBC AUTO: 100 FL (ref 82–98)
MCV RBC AUTO: 97 FL (ref 82–98)
MCV RBC AUTO: 99 FL (ref 82–98)
MONOCYTES # BLD AUTO: 0.57 THOUSAND/ÂΜL (ref 0.17–1.22)
MONOCYTES # BLD AUTO: 0.72 THOUSAND/ÂΜL (ref 0.17–1.22)
MONOCYTES NFR BLD AUTO: 4 % (ref 4–12)
MONOCYTES NFR BLD AUTO: 5 % (ref 4–12)
NEUTROPHILS # BLD AUTO: 10.12 THOUSANDS/ÂΜL (ref 1.85–7.62)
NEUTROPHILS # BLD AUTO: 14.26 THOUSANDS/ÂΜL (ref 1.85–7.62)
NEUTS SEG NFR BLD AUTO: 83 % (ref 43–75)
NEUTS SEG NFR BLD AUTO: 87 % (ref 43–75)
NITRITE UR QL STRIP: NEGATIVE
NON-SQ EPI CELLS URNS QL MICRO: ABNORMAL /HPF
NRBC BLD AUTO-RTO: 0 /100 WBCS
NRBC BLD AUTO-RTO: 1 /100 WBCS
OTHER STN SPEC: ABNORMAL
P AXIS: 60 DEGREES
PCO2 BLD: 20 MMOL/L (ref 21–32)
PCO2 BLD: 27.4 MM HG (ref 36–44)
PH BLD: 7.45 [PH] (ref 7.35–7.45)
PH UR STRIP.AUTO: 7 [PH]
PLATELET # BLD AUTO: 319 THOUSANDS/UL (ref 149–390)
PLATELET # BLD AUTO: 321 THOUSANDS/UL (ref 149–390)
PLATELET # BLD AUTO: 325 THOUSANDS/UL (ref 149–390)
PMV BLD AUTO: 8.8 FL (ref 8.9–12.7)
PMV BLD AUTO: 9.1 FL (ref 8.9–12.7)
PMV BLD AUTO: 9.2 FL (ref 8.9–12.7)
PO2 BLD: 117 MM HG (ref 75–129)
POTASSIUM BLD-SCNC: 5.3 MMOL/L (ref 3.5–5.3)
POTASSIUM SERPL-SCNC: 4.5 MMOL/L (ref 3.5–5.3)
POTASSIUM SERPL-SCNC: 4.7 MMOL/L (ref 3.5–5.3)
POTASSIUM SERPL-SCNC: 5.5 MMOL/L (ref 3.5–5.3)
POTASSIUM SERPL-SCNC: 5.9 MMOL/L (ref 3.5–5.3)
POTASSIUM SERPL-SCNC: 5.9 MMOL/L (ref 3.5–5.3)
PR INTERVAL: 116 MS
PROCALCITONIN SERPL-MCNC: 0.57 NG/ML
PROT SERPL-MCNC: 6 G/DL (ref 6.4–8.4)
PROT SERPL-MCNC: 6.1 G/DL (ref 6.4–8.4)
PROT UR STRIP-MCNC: ABNORMAL MG/DL
PROTHROMBIN TIME: 14.5 SECONDS (ref 11.6–14.5)
QRS AXIS: 82 DEGREES
QRSD INTERVAL: 76 MS
QT INTERVAL: 352 MS
QTC INTERVAL: 430 MS
RBC # BLD AUTO: 2.81 MILLION/UL (ref 3.81–5.12)
RBC # BLD AUTO: 2.81 MILLION/UL (ref 3.81–5.12)
RBC # BLD AUTO: 2.88 MILLION/UL (ref 3.81–5.12)
RBC #/AREA URNS AUTO: ABNORMAL /HPF
RSV RNA RESP QL NAA+PROBE: NEGATIVE
SAMPLE SITE: ABNORMAL
SAO2 % BLD FROM PO2: 99 % (ref 60–85)
SARS-COV-2 RNA RESP QL NAA+PROBE: NEGATIVE
SODIUM BLD-SCNC: 133 MMOL/L (ref 136–145)
SODIUM SERPL-SCNC: 135 MMOL/L (ref 135–147)
SODIUM SERPL-SCNC: 136 MMOL/L (ref 135–147)
SODIUM SERPL-SCNC: 138 MMOL/L (ref 135–147)
SODIUM SERPL-SCNC: 140 MMOL/L (ref 135–147)
SODIUM SERPL-SCNC: 141 MMOL/L (ref 135–147)
SP GR UR STRIP.AUTO: 1.01 (ref 1–1.03)
SPECIMEN SOURCE: ABNORMAL
T WAVE AXIS: 67 DEGREES
TSH SERPL DL<=0.05 MIU/L-ACNC: 2.21 UIU/ML (ref 0.45–4.5)
UROBILINOGEN UR STRIP-ACNC: <2 MG/DL
VENTRICULAR RATE: 90 BPM
WBC # BLD AUTO: 11.55 THOUSAND/UL (ref 4.31–10.16)
WBC # BLD AUTO: 12.18 THOUSAND/UL (ref 4.31–10.16)
WBC # BLD AUTO: 16.43 THOUSAND/UL (ref 4.31–10.16)
WBC #/AREA URNS AUTO: ABNORMAL /HPF

## 2023-01-01 PROCEDURE — 70498 CT ANGIOGRAPHY NECK: CPT

## 2023-01-01 PROCEDURE — 84484 ASSAY OF TROPONIN QUANT: CPT | Performed by: EMERGENCY MEDICINE

## 2023-01-01 PROCEDURE — 85730 THROMBOPLASTIN TIME PARTIAL: CPT | Performed by: HOSPITALIST

## 2023-01-01 PROCEDURE — 87154 CUL TYP ID BLD PTHGN 6+ TRGT: CPT | Performed by: EMERGENCY MEDICINE

## 2023-01-01 PROCEDURE — 70551 MRI BRAIN STEM W/O DYE: CPT

## 2023-01-01 PROCEDURE — 74176 CT ABD & PELVIS W/O CONTRAST: CPT

## 2023-01-01 PROCEDURE — 85379 FIBRIN DEGRADATION QUANT: CPT | Performed by: EMERGENCY MEDICINE

## 2023-01-01 PROCEDURE — 83605 ASSAY OF LACTIC ACID: CPT

## 2023-01-01 PROCEDURE — 84295 ASSAY OF SERUM SODIUM: CPT

## 2023-01-01 PROCEDURE — 99239 HOSP IP/OBS DSCHRG MGMT >30: CPT | Performed by: PHYSICIAN ASSISTANT

## 2023-01-01 PROCEDURE — 85025 COMPLETE CBC W/AUTO DIFF WBC: CPT | Performed by: INTERNAL MEDICINE

## 2023-01-01 PROCEDURE — 99291 CRITICAL CARE FIRST HOUR: CPT | Performed by: PSYCHIATRY & NEUROLOGY

## 2023-01-01 PROCEDURE — 80053 COMPREHEN METABOLIC PANEL: CPT | Performed by: INTERNAL MEDICINE

## 2023-01-01 PROCEDURE — 71045 X-RAY EXAM CHEST 1 VIEW: CPT

## 2023-01-01 PROCEDURE — 99285 EMERGENCY DEPT VISIT HI MDM: CPT | Performed by: EMERGENCY MEDICINE

## 2023-01-01 PROCEDURE — 84132 ASSAY OF SERUM POTASSIUM: CPT

## 2023-01-01 PROCEDURE — 80048 BASIC METABOLIC PNL TOTAL CA: CPT | Performed by: HOSPITALIST

## 2023-01-01 PROCEDURE — 81001 URINALYSIS AUTO W/SCOPE: CPT | Performed by: EMERGENCY MEDICINE

## 2023-01-01 PROCEDURE — 93005 ELECTROCARDIOGRAM TRACING: CPT

## 2023-01-01 PROCEDURE — 82330 ASSAY OF CALCIUM: CPT

## 2023-01-01 PROCEDURE — 70496 CT ANGIOGRAPHY HEAD: CPT

## 2023-01-01 PROCEDURE — 99222 1ST HOSP IP/OBS MODERATE 55: CPT | Performed by: INTERNAL MEDICINE

## 2023-01-01 PROCEDURE — 99223 1ST HOSP IP/OBS HIGH 75: CPT | Performed by: INTERNAL MEDICINE

## 2023-01-01 PROCEDURE — 85027 COMPLETE CBC AUTOMATED: CPT | Performed by: EMERGENCY MEDICINE

## 2023-01-01 PROCEDURE — G1004 CDSM NDSC: HCPCS

## 2023-01-01 PROCEDURE — 83605 ASSAY OF LACTIC ACID: CPT | Performed by: EMERGENCY MEDICINE

## 2023-01-01 PROCEDURE — 85014 HEMATOCRIT: CPT

## 2023-01-01 PROCEDURE — 82947 ASSAY GLUCOSE BLOOD QUANT: CPT

## 2023-01-01 PROCEDURE — 76705 ECHO EXAM OF ABDOMEN: CPT

## 2023-01-01 PROCEDURE — 85730 THROMBOPLASTIN TIME PARTIAL: CPT | Performed by: PHYSICIAN ASSISTANT

## 2023-01-01 PROCEDURE — 83880 ASSAY OF NATRIURETIC PEPTIDE: CPT | Performed by: EMERGENCY MEDICINE

## 2023-01-01 PROCEDURE — 85610 PROTHROMBIN TIME: CPT | Performed by: EMERGENCY MEDICINE

## 2023-01-01 PROCEDURE — 93010 ELECTROCARDIOGRAM REPORT: CPT | Performed by: INTERNAL MEDICINE

## 2023-01-01 PROCEDURE — 71250 CT THORAX DX C-: CPT

## 2023-01-01 PROCEDURE — 36600 WITHDRAWAL OF ARTERIAL BLOOD: CPT

## 2023-01-01 PROCEDURE — 82948 REAGENT STRIP/BLOOD GLUCOSE: CPT

## 2023-01-01 PROCEDURE — 36415 COLL VENOUS BLD VENIPUNCTURE: CPT | Performed by: EMERGENCY MEDICINE

## 2023-01-01 PROCEDURE — 80048 BASIC METABOLIC PNL TOTAL CA: CPT | Performed by: EMERGENCY MEDICINE

## 2023-01-01 PROCEDURE — 96361 HYDRATE IV INFUSION ADD-ON: CPT

## 2023-01-01 PROCEDURE — 99223 1ST HOSP IP/OBS HIGH 75: CPT | Performed by: HOSPITALIST

## 2023-01-01 PROCEDURE — 84145 PROCALCITONIN (PCT): CPT | Performed by: EMERGENCY MEDICINE

## 2023-01-01 PROCEDURE — 99232 SBSQ HOSP IP/OBS MODERATE 35: CPT | Performed by: INTERNAL MEDICINE

## 2023-01-01 PROCEDURE — 84443 ASSAY THYROID STIM HORMONE: CPT | Performed by: EMERGENCY MEDICINE

## 2023-01-01 PROCEDURE — 99233 SBSQ HOSP IP/OBS HIGH 50: CPT | Performed by: PHYSICIAN ASSISTANT

## 2023-01-01 PROCEDURE — 82803 BLOOD GASES ANY COMBINATION: CPT

## 2023-01-01 PROCEDURE — 85730 THROMBOPLASTIN TIME PARTIAL: CPT | Performed by: EMERGENCY MEDICINE

## 2023-01-01 PROCEDURE — 0241U HB NFCT DS VIR RESP RNA 4 TRGT: CPT | Performed by: EMERGENCY MEDICINE

## 2023-01-01 PROCEDURE — 85025 COMPLETE CBC W/AUTO DIFF WBC: CPT | Performed by: HOSPITALIST

## 2023-01-01 PROCEDURE — 99285 EMERGENCY DEPT VISIT HI MDM: CPT

## 2023-01-01 PROCEDURE — 99232 SBSQ HOSP IP/OBS MODERATE 35: CPT | Performed by: PHYSICIAN ASSISTANT

## 2023-01-01 PROCEDURE — 85730 THROMBOPLASTIN TIME PARTIAL: CPT | Performed by: INTERNAL MEDICINE

## 2023-01-01 PROCEDURE — 87040 BLOOD CULTURE FOR BACTERIA: CPT | Performed by: EMERGENCY MEDICINE

## 2023-01-01 PROCEDURE — 96360 HYDRATION IV INFUSION INIT: CPT

## 2023-01-01 PROCEDURE — 80053 COMPREHEN METABOLIC PANEL: CPT | Performed by: HOSPITALIST

## 2023-01-01 RX ORDER — NALOXONE HYDROCHLORIDE 1 MG/ML
1 INJECTION PARENTERAL ONCE
Status: COMPLETED | OUTPATIENT
Start: 2023-01-01 | End: 2023-01-01

## 2023-01-01 RX ORDER — DEXTROSE MONOHYDRATE 25 G/50ML
25 INJECTION, SOLUTION INTRAVENOUS ONCE
Status: COMPLETED | OUTPATIENT
Start: 2023-01-01 | End: 2023-01-01

## 2023-01-01 RX ORDER — HEPARIN SODIUM 1000 [USP'U]/ML
2200 INJECTION, SOLUTION INTRAVENOUS; SUBCUTANEOUS EVERY 6 HOURS PRN
Status: DISCONTINUED | OUTPATIENT
Start: 2023-01-01 | End: 2023-01-01

## 2023-01-01 RX ORDER — HEPARIN SODIUM 10000 [USP'U]/100ML
3-30 INJECTION, SOLUTION INTRAVENOUS
Status: DISCONTINUED | OUTPATIENT
Start: 2023-01-01 | End: 2023-01-01

## 2023-01-01 RX ORDER — CALCIUM GLUCONATE 20 MG/ML
1 INJECTION, SOLUTION INTRAVENOUS ONCE
Status: COMPLETED | OUTPATIENT
Start: 2023-01-01 | End: 2023-01-01

## 2023-01-01 RX ORDER — ONDANSETRON 2 MG/ML
4 INJECTION INTRAMUSCULAR; INTRAVENOUS EVERY 6 HOURS PRN
Status: DISCONTINUED | OUTPATIENT
Start: 2023-01-01 | End: 2023-01-01

## 2023-01-01 RX ORDER — GLYCOPYRROLATE 0.2 MG/ML
0.1 INJECTION INTRAMUSCULAR; INTRAVENOUS
Status: DISCONTINUED | OUTPATIENT
Start: 2023-01-01 | End: 2023-01-01

## 2023-01-01 RX ORDER — SODIUM CHLORIDE 9 MG/ML
75 INJECTION, SOLUTION INTRAVENOUS CONTINUOUS
Status: DISCONTINUED | OUTPATIENT
Start: 2023-01-01 | End: 2023-01-01

## 2023-01-01 RX ORDER — HYDROMORPHONE HCL/PF 1 MG/ML
0.5 SYRINGE (ML) INJECTION
Status: DISCONTINUED | OUTPATIENT
Start: 2023-01-01 | End: 2023-01-01

## 2023-01-01 RX ORDER — LORAZEPAM 0.5 MG/1
0.5 TABLET ORAL EVERY 8 HOURS PRN
Status: DISCONTINUED | OUTPATIENT
Start: 2023-01-01 | End: 2023-01-01 | Stop reason: HOSPADM

## 2023-01-01 RX ORDER — ALBUMIN, HUMAN INJ 5% 5 %
25 SOLUTION INTRAVENOUS ONCE
Status: COMPLETED | OUTPATIENT
Start: 2023-01-01 | End: 2023-01-01

## 2023-01-01 RX ORDER — LORAZEPAM 2 MG/ML
1 INJECTION INTRAMUSCULAR
Status: DISCONTINUED | OUTPATIENT
Start: 2023-01-01 | End: 2023-01-01

## 2023-01-01 RX ORDER — ONDANSETRON 4 MG/1
4 TABLET, ORALLY DISINTEGRATING ORAL EVERY 6 HOURS PRN
Status: DISCONTINUED | OUTPATIENT
Start: 2023-01-01 | End: 2023-01-01 | Stop reason: HOSPADM

## 2023-01-01 RX ORDER — HEPARIN SODIUM 1000 [USP'U]/ML
4400 INJECTION, SOLUTION INTRAVENOUS; SUBCUTANEOUS ONCE
Status: COMPLETED | OUTPATIENT
Start: 2023-01-01 | End: 2023-01-01

## 2023-01-01 RX ORDER — HYDROMORPHONE HCL/PF 1 MG/ML
0.5 SYRINGE (ML) INJECTION
Status: DISCONTINUED | OUTPATIENT
Start: 2023-01-01 | End: 2023-01-01 | Stop reason: HOSPADM

## 2023-01-01 RX ORDER — NALOXONE HYDROCHLORIDE 1 MG/ML
2 INJECTION INTRAMUSCULAR; INTRAVENOUS; SUBCUTANEOUS ONCE
Qty: 2 ML | Refills: 0 | Status: SHIPPED | OUTPATIENT
Start: 2023-01-01 | End: 2023-01-01

## 2023-01-01 RX ORDER — HEPARIN SODIUM 1000 [USP'U]/ML
4400 INJECTION, SOLUTION INTRAVENOUS; SUBCUTANEOUS EVERY 6 HOURS PRN
Status: DISCONTINUED | OUTPATIENT
Start: 2023-01-01 | End: 2023-01-01

## 2023-01-01 RX ADMIN — INSULIN HUMAN 5 UNITS: 100 INJECTION, SOLUTION PARENTERAL at 09:13

## 2023-01-01 RX ADMIN — CALCIUM GLUCONATE 1 G: 20 INJECTION, SOLUTION INTRAVENOUS at 20:18

## 2023-01-01 RX ADMIN — SODIUM CHLORIDE 75 ML/HR: 0.9 INJECTION, SOLUTION INTRAVENOUS at 11:17

## 2023-01-01 RX ADMIN — ALBUMIN (HUMAN) 25 G: 12.5 INJECTION, SOLUTION INTRAVENOUS at 23:29

## 2023-01-01 RX ADMIN — SODIUM CHLORIDE 75 ML/HR: 0.9 INJECTION, SOLUTION INTRAVENOUS at 20:30

## 2023-01-01 RX ADMIN — DEXTROSE MONOHYDRATE 25 ML: 25 INJECTION, SOLUTION INTRAVENOUS at 20:17

## 2023-01-01 RX ADMIN — HEPARIN SODIUM 12 UNITS/KG/HR: 10000 INJECTION, SOLUTION INTRAVENOUS at 23:52

## 2023-01-01 RX ADMIN — HYDROMORPHONE HYDROCHLORIDE 0.5 MG: 1 INJECTION, SOLUTION INTRAMUSCULAR; INTRAVENOUS; SUBCUTANEOUS at 05:55

## 2023-01-01 RX ADMIN — PIPERACILLIN AND TAZOBACTAM 3.38 G: 3; .375 INJECTION, POWDER, LYOPHILIZED, FOR SOLUTION INTRAVENOUS at 15:17

## 2023-01-01 RX ADMIN — HEPARIN SODIUM 4400 UNITS: 1000 INJECTION INTRAVENOUS; SUBCUTANEOUS at 18:20

## 2023-01-01 RX ADMIN — CALCIUM GLUCONATE 1 G: 20 INJECTION, SOLUTION INTRAVENOUS at 09:12

## 2023-01-01 RX ADMIN — IOHEXOL 85 ML: 350 INJECTION, SOLUTION INTRAVENOUS at 13:53

## 2023-01-01 RX ADMIN — INSULIN HUMAN 10 UNITS: 100 INJECTION, SOLUTION PARENTERAL at 20:18

## 2023-01-01 RX ADMIN — SODIUM CHLORIDE 1000 ML: 0.9 INJECTION, SOLUTION INTRAVENOUS at 13:32

## 2023-01-01 RX ADMIN — SODIUM BICARBONATE 50 MEQ: 84 INJECTION INTRAVENOUS at 20:18

## 2023-01-01 RX ADMIN — SODIUM CHLORIDE 1000 ML: 0.9 INJECTION, SOLUTION INTRAVENOUS at 15:34

## 2023-01-01 RX ADMIN — HEPARIN SODIUM 18 UNITS/KG/HR: 10000 INJECTION, SOLUTION INTRAVENOUS at 18:23

## 2023-01-01 RX ADMIN — SODIUM CHLORIDE 75 ML/HR: 0.9 INJECTION, SOLUTION INTRAVENOUS at 20:19

## 2023-01-01 RX ADMIN — DEXTROSE MONOHYDRATE 25 ML: 25 INJECTION, SOLUTION INTRAVENOUS at 09:12

## 2023-01-01 RX ADMIN — PIPERACILLIN AND TAZOBACTAM 3.38 G: 3; .375 INJECTION, POWDER, LYOPHILIZED, FOR SOLUTION INTRAVENOUS at 03:00

## 2023-01-01 RX ADMIN — PIPERACILLIN AND TAZOBACTAM 3.38 G: 3; .375 INJECTION, POWDER, LYOPHILIZED, FOR SOLUTION INTRAVENOUS at 10:20

## 2023-01-30 ENCOUNTER — TELEPHONE (OUTPATIENT)
Age: 68
End: 2023-01-30

## 2023-01-30 NOTE — TELEPHONE ENCOUNTER
[facility]  [address] [facility], [address] [place] [address], [address] [place] [address]    [phone]    Telephone Intake: Geriatric Assessment     Referral source: Self    Caller who is scheduling/relationship to pt: Prem Topete, sister  Caller's phone number: [phone]    Reason for referral: Patient concerns , Family member concerns and Provider concerns regarding memory concerns, behavior changes/concerns and home safety concerns  If there are behavioral concerns, is the pt prescribed medications to manage these? Yes, for anxiety and depression   If so, how many?  - buPROPion, risperiDONE, sertraline, mirtrazapine   Has the patient ever had an inpatient psychiatric hospitalization? Yes , when she was a teenager (had Anorexia)  What is the goal of the visit? Initial diagnosis, better quality of life      Has the patient been seen by a Neurologist or Geriatrician? No   If yes, is this appointment for a second opinion? No  Has the patient ever been diagnosed with dementia? No      Preferred language? English  Highest education level? HS  Does the patient wear glasses? Yes   Does the patient use hearing aids? No     Is there a living will/healthcare POA in place/If so, who? Yes , Prem Topete    Does the pt/caregiver have access for a virtual visit (computer/smart phone with audio/video)? Yes, email: [email or web address]    Caller was informed:   Please make sure the pt is accompanied by someone who knows them well / caregiver / family member to participate in this appointment  Who will accompany the pt (name and relationship)? Prem Topete  Phone number of person accompanying pt: [phone]  Please make sure the pt attends all appointments, including the assessment, care conference, follow-up, whether in-person or virtual     Office packet mailed out to: [address] [address]    Added to wait list for sooner appointments?  Yes     NOTE FOR : Please route to provider for chart review prior to scheduling and let the caller know that this phone intake will be reviewed IF -  • Pt was recently hospitalized  • Pt is prescribed medications for behavior management or has a history of psychiatric hospitalization  • Pt plans to attend alone

## 2023-02-01 ENCOUNTER — CONSULT (OUTPATIENT)
Age: 68
End: 2023-02-01

## 2023-02-01 VITALS
HEART RATE: 89 BPM | HEIGHT: 67 IN | BODY MASS INDEX: 18.46 KG/M2 | TEMPERATURE: 97.2 F | WEIGHT: 117.6 LBS | OXYGEN SATURATION: 99 %

## 2023-02-01 DIAGNOSIS — G21.11 NEUROLEPTIC-INDUCED PARKINSONISM (HCC): ICD-10-CM

## 2023-02-01 DIAGNOSIS — F32.A DEPRESSION, UNSPECIFIED DEPRESSION TYPE: ICD-10-CM

## 2023-02-01 DIAGNOSIS — M16.11 PRIMARY OSTEOARTHRITIS OF RIGHT HIP: ICD-10-CM

## 2023-02-01 DIAGNOSIS — F41.9 ANXIETY: ICD-10-CM

## 2023-02-01 DIAGNOSIS — R41.3 MEMORY CHANGES: Primary | ICD-10-CM

## 2023-02-01 NOTE — ASSESSMENT & PLAN NOTE
· Has history of anxiety/depression  · GDS 4  · Continues on Wellbutrin, Remeron, Zoloft, Risperdal-management by psychiatry  · Monitor polypharmacy effect on cognition  · Continue emotional support, relaxation techniques

## 2023-02-01 NOTE — PROGRESS NOTES
Assessment & Plan:   1  Memory changes  Assessment & Plan:  Pt assist for adl/dependent for iadls  Memory loss:  Progressively worsening   MOCA   Deficits in deficits in all areas   Recent Imagin [address] 2019:  No acute intracranial abnormality  Microangiopathic changes  Gerilabs:  2019 tsh/b12 wnl  History, physical, and cognitive screening are most consistent with:  Dementia, moderate level  Contributing factors:  Mood, polypharmacy  Further eval warrants the following:  MRI (if pt tolerates), labs  Cont supportive cares with sister   Ensure pt has phone and reinforce to not use stove or shower while gone  SW needs this visit:  See intake note - sister interested in life alert, HHA  Discussed safe environment  Some items to consider would be door video surveillance, ID bracelet, Tile GPS - in case wandering ever occurs  Continue to engage in physical, cognitive, social activity  Cont doing games, puzzles, trivia, current event discussions  Cont daily walks or exercise video  Cont outings with friends and family  Avoid social isolation  Optimize chronic and acute conditions  Cont to f/u with providers and ensure medication compliance  Monitor for changes in behavior/mood- if noted, notify provider for eval  Avoid medications that worsen cognition - check with provider or pharmacist before starting new or OTC meds  Do not overwhelm pt with info  Do one task at a time  Use slower pace  Keep to one conversation at a time  Do not multitask  Orders:  -     Folate; Future  -     Vitamin B12; Future  -     MRI brain NeuroQuant wo contrast; Future; Expected date: 2023  -     TSH, 3rd generation; Future    2   Depression, unspecified depression type  Assessment & Plan:  · Has history of anxiety/depression  · GDS 4  · Continues on Wellbutrin, Remeron, Zoloft, Risperdal-management by psychiatry  · Monitor polypharmacy effect on cognition  · Continue emotional support, relaxation techniques      3  Anxiety  Assessment & Plan:  · Has history of anxiety and depression, currently stable per patient and sister  · Follows with psychiatry  · Continue emotional support, relaxation techniques      4  Neuroleptic-induced parkinsonism ([facility] [address] )  Assessment & Plan:  · Per sister, pt has not ever been evaluated by neurology for PD (however, per notes, pt was seen about 5 years ago)  · Discussed that she should follow up with pcp to see if she would like to refer pt to be seen  · Symptoms may be related to psychiatric medications      5  Primary osteoarthritis of right hip  Assessment & Plan:  · Occasional right hip pain  · Recommend as needed Tylenol/topical analgesics  · Avoid NSAIDs      HPI:  We had the pleasure of evaluating Abdi Osman who is a 69 y o  female   in Geriatric Consultation today  Previous MOCA:  None noted in epic  Comorbidities include anxiety/depression, memory changes, right hip OA  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 , no children  Ms Fritz Israel is in the office with her sister, Luz Lux Concerns per family:   Noted about 5 years ago (stopped driving at that time)  Lives with sister  Over last 6-8 months is starting with confusions  Does recognize sister  Sister helps wash, puts tooth paste on brush so she can brush teeth  Sister does cooking, pt feeds self  Pt can do some light cleaning chore sometimes  Does have some urinary incont, mitch during overnight  Sister does banking  Pt used to enjoy reading, but hasn't done in last few months because she can't remember a lot  Doesn't use electronics  Sister helps with picking out clothes  Sometimes isn't always aware of outside weather  Was in hospital with broken hip - brain scan showed something, but nothing was done about it  Starting to doze more in the day  Watching more TV  Doesn't like word search  More inclined to stay home and not do things    Did stay home at dentist, but wouldn't know what to do in emergency  Did have HHA, but too much $   Other sister stayed with when pt's sister went to hospital   Family doctor was concerned with PD - did notice slight tremor recently  Pt never went to neurology  Memory Concerns per patient:  Patient notices memory loss  Knows her sister  Some trouble with time  Generally with sister  Might be moving  Has been forgetful for a while  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)  Can make own sandwich  Usually remembers to put things away right  Delayed answers, slow speech  Not reading as much recently - just not in mood  [place] stories  Doesn't do word finds or watch game shows on tv  Appetite good  Sleeps pretty good  Uses cane, no recent falls  No pain in back  Does some exercises sometimes  Tells also that aide is no longer availabe d/t cost   Still gets some anxiety/depression  Medications and therapy are helpful  Sometimes gets stuck in house too much - but sometimes sister is too tired to go out  She has problems operating household appliance such as TV remote, kitchen appliances, computer  She has difficulty finding the right word while speaking: No  Patient requires repeat information or ask the same question repeatedly: No    Function Concerns:    She lives with sister  ADL assist, iADL dependent, Medication management: sister does   Do you drive: No    Do you handle your own financial affairs such as balancing your checkbook, paying bills, investments: No  Have you noticed any gait or balance disorder:  Yes  Uses cane to assist with ambulation  Uses WC for longer distances d/t pain - takes tylenol prn  Any urinary/stool incontinence:  yes   Appetite/swallow: good, no swallow issues  Elimination issues:  occ constipation (increases fluids helps)  Hearing issues:none Vision issues: reading glasses  Dentition issues:  Regular dentist visits, all her own       Psychosocial concerns:  Have you or your family noted any change in your mood or personality:Yes- stable, follows with psychiatry  Does have less filter than she used to  Are you currently or have you been treated in the past for depression or anxiety: Yes  Any hallucination or delusion: No  Sleep Issues: No- uses melatonin prn - 1 or 2 times a weeks  Pain:  Chronic right hip pain    Do you have POA:Yes- sister  Do you have a Living will - unsure - will check    Past Medical, surgical, social, medication and allergy history and patients previous records reviewed  Family Review of Behavior [facility]:    pacing  No    agressive/combative behavior  No    agitated  No   wandering  No - forget which room she is going into (wanders into wrong room in house)  resistance to care  No   hoarding/hiding objects  No    suspicious  No  withdrawn No - sister takes out in wheel chair  misplacing/losing objects Yes  personal hygiene problems  No sister assist   forgetfulness of actions No    Family member with dementia and what type? yes - mom, mental health but did note dementia in later life  Have you had any head trauma No  No history of stroke  Does patient have history of alcohol abuse No    ROS:  Review of Systems   Constitutional: Negative for activity change, appetite change, chills and fatigue  HENT: Negative for congestion, hearing loss and trouble swallowing  Eyes: Negative for visual disturbance  Respiratory: Negative for cough and shortness of breath  Cardiovascular: Negative for chest pain  Gastrointestinal: Positive for constipation (occasional)  Negative for abdominal pain, diarrhea, nausea and vomiting  Genitourinary: Negative for difficulty urinating  Musculoskeletal: Positive for arthralgias (right hip sometimes) and gait problem  Negative for back pain  Neurological: Negative for dizziness and light-headedness  Psychiatric/Behavioral: Positive for decreased concentration (forgetful)   Negative for dysphoric mood and sleep disturbance  The patient is not nervous/anxious  Allergies:   No Known Allergies    Medications:      Current Outpatient Medications:   •  buPROPion (WELLBUTRIN SR) 150 mg 12 hr tablet, 2 (two) times a day  , Disp: , Rfl:   •  Calcium-Magnesium-Vitamin D (CALCIUM 500 PO), Take by mouth, Disp: , Rfl:   •  Cholecalciferol (VITAMIN D3) 5000 units CAPS, Take by mouth, Disp: , Rfl:   •  levothyroxine 25 mcg tablet, 50 mcg daily with breakfast , Disp: , Rfl:   •  mirtazapine (REMERON) 30 mg tablet, 30 mg daily at bedtime  , Disp: , Rfl:   •  Multiple Vitamins-Minerals (CENTRUM SILVER 50+WOMEN PO), Take 1 tablet by mouth daily, Disp: , Rfl:   •  oxyCODONE-acetaminophen (PERCOCET) 5-325 mg per tablet, , Disp: , Rfl:   •  risperiDONE (RisperDAL) 1 mg tablet, Take 1 mg by mouth 4 (four) times a day , Disp: , Rfl:   •  sertraline (ZOLOFT) 50 mg tablet, 50 mg daily with breakfast  , Disp: , Rfl:     Vitals: There were no vitals filed for this visit      History:  Past Medical History:   Diagnosis Date   • Anxiety    • Depression    • Memory loss    • Thyroid activity decreased      Past Surgical History:   Procedure Laterality Date   • BREAST BIOPSY      years ago- Benign   • JOINT REPLACEMENT Left    • AK OPTX FEM SHFT FX W/INSJ IMED IMPLT W/WO SCREW Right 5/9/2019    Procedure: INSERTION NAIL IM FEMUR ANTEGRADE (TROCHANTERIC), RIGHT;  Surgeon: Rolando Aburto MD;  Location[facility];  Service: Orthopedics     Family History   Problem Relation Age of Onset   • Hypertension Mother    • Parkinsonism Mother [de-identified]        memory issues    • Depression Mother    • Melanoma Father    • No Known Problems Maternal Grandmother    • No Known Problems Maternal Grandfather    • No Known Problems Paternal Grandmother    • Heart attack Paternal Grandfather    • Depression Other    • Skin cancer Sister    • Breast cancer Maternal Aunt         over age 48   • No Known Problems Paternal Aunt    • No Known Problems Sister      Social History     Socioeconomic History   • Marital status: Single     Spouse name: Not on file   • Number of children: Not on file   • Years of education: Not on file   • Highest education level: Not on file   Occupational History   • Not on file   Tobacco Use   • Smoking status: Never   • Smokeless tobacco: Never   Substance and Sexual Activity   • Alcohol use: Not Currently   • Drug use: No   • Sexual activity: Not on file   Other Topics Concern   • Not on file   Social History Narrative   • Not on file     Social Determinants of Health     Financial Resource Strain: Not on file   Food Insecurity: Not on file   Transportation Needs: Not on file   Physical Activity: Not on file   Stress: Not on file   Social Connections: Not on file   Intimate Partner Violence: Not on file   Housing Stability: Not on file     Financial Resource Strain: Not on file   Food Insecurity: Not on file   Transportation Needs: Not on file   Physical Activity: Not on file   Stress: Not on file   Social Connections: Not on file   Intimate Partner Violence: Not on file   Housing Stability: Not on file      Procedure Laterality Date   • BREAST BIOPSY      years ago- Benign   • JOINT REPLACEMENT Left    • SC OPTX FEM SHFT FX W/INSJ IMED IMPLT W/WO SCREW Right 5/9/2019    Procedure: INSERTION NAIL IM FEMUR ANTEGRADE (TROCHANTERIC), RIGHT;  Surgeon: Mark Ramsay MD;  Location: [facility] OR;  Service: Orthopedics       Physical Exam  Observed Ambulation:  Cane, somewhat slower and slightly unsteady  Physical Exam  Vitals and nursing note reviewed  Constitutional:       General: She is not in acute distress  Appearance: Normal appearance  She is well-developed  She is not diaphoretic  HENT:      Head: Normocephalic  Cardiovascular:      Rate and Rhythm: Normal rate  Heart sounds: No murmur heard  No friction rub  No gallop  Pulmonary:      Effort: Pulmonary effort is normal  No respiratory distress        Breath sounds: Normal breath sounds  No wheezing or rales  Abdominal:      General: Bowel sounds are normal  There is no distension  Palpations: Abdomen is soft  Tenderness: There is no abdominal tenderness  There is no rebound  Musculoskeletal:         General: Normal range of motion  Skin:     General: Skin is warm and dry  Neurological:      General: No focal deficit present  Mental Status: She is alert  Mental status is at baseline        Comments: Oriented to person, not tps  Pleasant, cooperative, forgetful   Psychiatric:         Mood and Affect: Mood normal          Behavior: Behavior normal

## 2023-02-01 NOTE — ASSESSMENT & PLAN NOTE
Pt assist for adl/dependent for iadls  Memory loss:  Progressively worsening   MOCA 5/30  Deficits in deficits in all areas   Recent Imaging:  [facility] 2019:  No acute intracranial abnormality  Microangiopathic changes  [facility]:  2019 tsh/b12 wnl  History, physical, and cognitive screening are most consistent with:  Dementia, moderate level  Contributing factors:  Mood, polypharmacy  Further eval warrants the following:  MRI (if pt tolerates), labs  Cont supportive cares with sister 24/7  Ensure pt has phone and reinforce to not use stove or shower while gone  SW needs this visit:  See intake note - sister interested in life alert, HHA  Discussed safe environment  Some items to consider would be door video surveillance, ID bracelet, [employer] - in case wandering ever occurs  Continue to engage in physical, cognitive, social activity  Cont doing games, puzzles, trivia, current event discussions  Cont daily walks or exercise video  Cont outings with friends and family  Avoid social isolation  Optimize chronic and acute conditions  Cont to f/u with providers and ensure medication compliance  Monitor for changes in behavior/mood- if noted, notify provider for eval  Avoid medications that worsen cognition - check with provider or pharmacist before starting new or OTC meds  Do not overwhelm pt with info  Do one task at a time  Use slower pace  Keep to one conversation at a time  Do not multitask

## 2023-02-01 NOTE — PROGRESS NOTES
[address]  [address] [facility] [address]  [phone]    Social Work Intake    Consuelo Reis presents today for a geriatric assessment, accompanied by her sister Margaret Mcneal   Social/Family History   Marital status: Single    Does patient have children? No   Family members assisting patient at home: sister Margaret Mcneal with cooking meals, pick out clothes (can dress herself), administers meds  Are any relationships causing problems right now: No   Who is available to provide care in case of illness or crisis (please specify relation to patient / level of care able to provide)? Margaret Mcneal        Employment and Education   Education: Highest Level of Education: [employer]    Currently Employed: No    Retired: Yes                        Date of MCC? at about 52 (2009)  Type of employment: [employer] and Organizations: attends [employer] regularly, [employer] study group almost every Friday at her home   Major life events in past two years (ex: MCC, death in family, major illness etc ): Margaret Mcneal fell and was in hospital; sister from [place] came in to take care of [facility] during that time  Have you ever used a home care agency, meal delivery service, or respite care?: used to have visiting nurse, ended around couple months ago    Services that assessment team feels would be beneficial to patient/family: home health aide     Financial   Total Monthly income: $1500     Source of income: Social Security and pension    Meet current needs? Yes    Funds available for home care? No; moving into mobile home with sister; HHA was too costly before so Margaret Mcneal started caring for her   Funds available for nursing home? No   Do you rent or own your home?  Own ; flo just sold their home; will be renting the lot and buying trailer for on the lot they are moving into on Feb 9th      End-Of-Life Checklist   Have wishes or desires for end-of-life care been discussed? Cannot confirm if has living will; Mitch Albert is medical and financial POA       For all patients, we encourage seeing a  to establish a Financial Power of , a [address], and an Advanced Directive (living will)  Particularly for patients experiencing memory concerns, we strongly recommend that this is completed as soon as possible  Safety Assessment   Is the patient still driving? No    Is the patient taking medications as prescribed? Yes     Is there a system in place for medication management? Mitch Albert administers; considering starting to use pillbox so does not forget/miss doses for Toyin Boning, etc    Are firearms or weapons in the home? No    Does the patient live alone? No   1  What is the layout of the home? 2 stories, but moving into single-story trailer on    2  Do you feel comfortable leaving the person home alone? not for very long; Alyssa Kerr takes with her as much as possible   3  Have you noticed any burned pans or other signs of issues with the stove or other appliances? No   4  Do you have any concerns about the person’s cooking or eating habits? Recently started complaining more/being more picky about eating; will eat less if she does not like something that Mitch Albert has prepared   5  Are there working smoke detectors and fire extinguishers in the home? Yes    6  Have you thought about when it will no longer be safe for the patient to live alone? Stay at home long as possible (the three daughters cared for their mother at home until she )    [facility] provided [employer], Steps to Apply for Waiver sheet, business card to call with any questions      [place] Cognitive Assessment (MoCA) Version 8 1  Education: HS    Points Earned POSSIBLE Points   Visuospatial/Executive   Alternating [place] Making 0 1   Visuoconstructional skills 0 1   Visuoconstructional skills (clock) 1 3   Naming   Naming Animals 2 3   Attention   Digit Span 1 2   Vigilance (letters) 0 1   Serial 7 subtraction 0 3   Language   Sentence Repetition 0 2   Verbal fluency 0 1   Abstraction   Abstraction (word pairings) 0 2   Delayed recall   Delayed recall 0 5   Memory index score: 3/15   Orientation   Orientation 0 6   TOTAL SCORE: 5/30  (Normal ?26/30)   Additional notes:        Geriatric Depression Scale (GDS) completed today, 4/15

## 2023-02-01 NOTE — PATIENT INSTRUCTIONS
Drug Name Drug Type and Use How It Works Common Side Effects Delivery Method For More Information   Aducanumab  (Aduhelm) Disease-  modifying immunotherapy prescribed to treat mild cognitive impairment or mild Alzheimer’s Removes abnormal beta-amyloid to help reduce the number of plaques in the brain Amyloid-related imaging abnormalities (ARIA), which can lead to fluid buildup or bleeding in the brain; also headache, dizziness, falls, diarrhea, confusion Intravenous: Dose is determined by a person’s weight; given over one hour every four weeks; most people will start with a lower dose and over a period of time increase the amount of medicine to reach the full prescription dose   For current information about this drug’s safety and use, visit [employer]  Click on Prescribing Information to see drug label  Donepezil  (Aricept) Cholinesterase inhibitor prescribed to treat symptoms of mild, moderate, and severe Alzheimer’s   Prevents the breakdown of acetylcholine in the brain Nausea, vomiting, diarrhea, muscle cramps, fatigue,   weight loss Tablet: Once a day; dosage may be increased over time if well tolerated  Orally disintegrating tablet: same dosing regimen as above For current information about this drug’s safety and use, visit [employer]  Galantamine  (Razadyne) Cholinesterase inhibitor prescribed to treat symptoms of mild to moderate Alzheimer’s Prevents the breakdown of acetylcholine and stimulates nicotinic receptors to release more acetylcholine in the brain Nausea, vomiting, diarrhea, decreased appetite, dizziness, headache Tablet: Twice a day; dosing may increase over time, at minimum four-week intervals, if well tolerated    Extended-release capsule: Same dosage as tablet but taken once a day    For current information about this drug’s safety and use, visit www v[employer]ourdata fda gov/scripts/cder/daf/index  cfm  Enter galantamine in the search field and click on drug-name links to see label information  Manufactured combination of memantine and donepezil  (Namzaric) NMDA antagonist and cholinesterase inhibitor prescribed to treat symptoms of moderate to severe Alzheimer’s Blocks the toxic effects associated with excess glutamate and prevents the breakdown of acetylcholine in the brain Headache, nausea, vomiting, diarrhea, dizziness, anorexia Extended-release capsule: Once a day; initial dosage depends on whether the person is already on a stable dose of memantine and/or donepezil; dosage may increase over time, at minimum one-week intervals, if well tolerated    For current information about this drug’s safety and use, visit [employer]  Click on full prescribing information to see the drug label  Memantine  (Namenda) N-methyl D-aspartate (NMDA) antagonist prescribed to treat symptoms of moderate to severe Alzheimer’s    Blocks the toxic effects associated with excess glutamate and regulates glutamate activation Dizziness, headache, diarrhea, constipation, confusion Tablet: Once a day; dosage may be increase in amount and frequency (up to twice a day) if well tolerated    Oral solution: Same dosage as tablet    Extended-release capsule: Once a day; dosage may increase in amount over time, at minimum one-week intervals, if well tolerated    For current information about this drug’s safety and use, visit [employer]  com and www namendaxr com  Click on full prescribing information to see the drug label  Rivastigmine  (Exelon) Cholinesterase inhibitor prescribed to treat symptoms of mild, moderate, and severe Alzheimer’s     Prevents the breakdown of acetylcholine and butyrylcholine (a brain chemical similar to acetylcholine) in the brain Nausea, vomiting, diarrhea, weight loss, indigestion, muscle weakness Capsule: Twice a day; dosage may be increased over time, at minimum two-week intervals, if well tolerated    Patch:  Once a day; dosage amount may be increased over time, at minimum four-week intervals, if well tolerated   For current information about this drug’s safety and use  Visit www accessdata fda gov/scripts/cder/daf/index  cfm  Enter rivastigmine in the search field and click on drug-name links to see label information     All information directly provided by the [employer] (Pat Fulton)

## 2023-02-01 NOTE — ASSESSMENT & PLAN NOTE
· Has history of anxiety and depression, currently stable per patient and sister  · Follows with psychiatry  · Continue emotional support, relaxation techniques

## 2023-02-01 NOTE — ASSESSMENT & PLAN NOTE
· Per sister, pt has not ever been evaluated by neurology for PD (however, per notes, pt was seen about 5 years ago)  · Discussed that she should follow up with pcp to see if she would like to refer pt to be seen  · Symptoms may be related to psychiatric medications

## 2023-02-28 ENCOUNTER — TELEPHONE (OUTPATIENT)
Age: 68
End: 2023-02-28

## 2023-02-28 NOTE — TELEPHONE ENCOUNTER
Patient's sister, Kendal Ramsay, called to say she is not able to get MRI schedule prior to 3/7/23 Care Conference  First available MRI appointment is mid-March; all appropriate locations were checked  Reschedule care conference to 4/13/23, and transferred caller to Central Schedule to schedule MRI

## 2023-02-28 NOTE — TELEPHONE ENCOUNTER
Spoke with patient, sister, Jae Garcia ([phone]) regarding outstanding MRI study  Patient does plan to complete  Transferred Mare Bigmela to [facility] so that she could schedule prior to 3/7/23 care conference  She will attempt to schedule no later than 3/5/23

## 2023-02-28 NOTE — TELEPHONE ENCOUNTER
----- Message from Paula Hazel sent at 2/28/2023  9:32 AM EST -----  Regarding: MRI  Can you check if Johnathan Persaud will be getting MRI, care conference 03/7/23

## 2023-03-21 ENCOUNTER — OFFICE VISIT (OUTPATIENT)
Dept: OBGYN CLINIC | Facility: CLINIC | Age: 68
End: 2023-03-21

## 2023-03-21 VITALS
SYSTOLIC BLOOD PRESSURE: 112 MMHG | HEIGHT: 67 IN | DIASTOLIC BLOOD PRESSURE: 72 MMHG | WEIGHT: 111 LBS | BODY MASS INDEX: 17.42 KG/M2

## 2023-03-21 DIAGNOSIS — M16.11 PRIMARY OSTEOARTHRITIS OF RIGHT HIP: Primary | ICD-10-CM

## 2023-03-21 DIAGNOSIS — M70.61 GREATER TROCHANTERIC BURSITIS OF RIGHT HIP: ICD-10-CM

## 2023-03-21 RX ORDER — BUPIVACAINE HYDROCHLORIDE 2.5 MG/ML
4 INJECTION, SOLUTION INFILTRATION; PERINEURAL
Status: COMPLETED | OUTPATIENT
Start: 2023-03-21 | End: 2023-03-21

## 2023-03-21 RX ORDER — BETAMETHASONE SODIUM PHOSPHATE AND BETAMETHASONE ACETATE 3; 3 MG/ML; MG/ML
6 INJECTION, SUSPENSION INTRA-ARTICULAR; INTRALESIONAL; INTRAMUSCULAR; SOFT TISSUE
Status: COMPLETED | OUTPATIENT
Start: 2023-03-21 | End: 2023-03-21

## 2023-03-21 RX ADMIN — BETAMETHASONE SODIUM PHOSPHATE AND BETAMETHASONE ACETATE 6 MG: 3; 3 INJECTION, SUSPENSION INTRA-ARTICULAR; INTRALESIONAL; INTRAMUSCULAR; SOFT TISSUE at 15:20

## 2023-03-21 RX ADMIN — BUPIVACAINE HYDROCHLORIDE 4 ML: 2.5 INJECTION, SOLUTION INFILTRATION; PERINEURAL at 15:20

## 2023-03-21 NOTE — PROGRESS NOTES
Assessment:     1  Primary osteoarthritis of right hip    2  Greater trochanteric bursitis of right hip        Plan:     Problem List Items Addressed This Visit        Musculoskeletal and Integument    Greater trochanteric bursitis of right hip    Relevant Medications    bupivacaine (MARCAINE) 0 25 % injection 4 mL (Completed)    betamethasone acetate-betamethasone sodium phosphate (CELESTONE) injection 6 mg (Completed)    Other Relevant Orders    Large joint arthrocentesis: R greater trochanteric bursa (Completed)    Primary osteoarthritis of right hip - Primary       Findings consistent with advanced right hip osteoarthritis and greater trochanteric bursitis  Findings and treatment options were discussed with the patient and her sister  Discussed that most likely the bursitis will continue to aggravate her due to the way she is walking from the hip osteoarthritis  Discussed that she is a candidate for total hip arthroplasty, but she would have to have all of the antegrade femoral nail hardware removed first   Patient wants to avoid surgery for as long as possible  She requested a repeat cortisone injection to the greater trochanteric bursa today  She tolerated the procedure well  Advised to apply cold compress today  Follow-up as needed if symptoms return  Advised patient as soonest she can have another cortisone injection is in 3 to 4 months  All patient's questions were answered to her satisfaction  This note is created using dictation transcription  It may contain typographical errors, grammatical errors, improperly dictated words, background noise and other errors  Subjective:     Patient ID: Fanta Lassiter is a 69 y o  female  Chief Complaint: This is a 68-year-old white female accompanied by her sister following up for advanced right hip osteoarthritis with greater trochanteric bursitis  History of antegrade femoral nail for intertrochanteric hip fracture    She has been treated with cortisone injections and physical therapy for the greater trochanteric bursitis  Last cortisone injection was on September 15, 2022 that injection did provide relief  She was last seen in December 2022 and did not have a cortisone injection at that time since she did not have much pain  She states pain has progressively worsened since then  She feels most of her pain is over the anterior aspect of the hip  She does have pain when walking and standing for prolonged periods of time  She states she has no pain at rest   She does have pain when lying on that side as well        Allergy:  No Known Allergies  Medications:  all current active meds have been reviewed  Past Medical History:  Past Medical History:   Diagnosis Date   • Anxiety    • Depression    • Memory loss    • Thyroid activity decreased      Past Surgical History:  Past Surgical History:   Procedure Laterality Date   • BREAST BIOPSY      years ago- Benign   • JOINT REPLACEMENT Left    • SD OPTX FEM SHFT FX W/INSJ IMED IMPLT W/WO SCREW Right 5/9/2019    Procedure: INSERTION NAIL IM FEMUR ANTEGRADE (TROCHANTERIC), RIGHT;  Surgeon: Eileen Oscar MD;  Location: [facility] OR;  Service: Orthopedics     Family History:  Family History   Problem Relation Age of Onset   • Hypertension Mother    • Parkinsonism Mother [de-identified]        memory issues    • Depression Mother    • Melanoma Father    • No Known Problems Maternal Grandmother    • No Known Problems Maternal Grandfather    • No Known Problems Paternal Grandmother    • Heart attack Paternal Grandfather    • Depression Other    • Skin cancer Sister    • Breast cancer Maternal Aunt         over age 48   • No Known Problems Paternal Aunt    • No Known Problems Sister      Social History:  Social History     Substance and Sexual Activity   Alcohol Use Not Currently     Social History     Substance and Sexual Activity   Drug Use No     Social History     Tobacco Use   Smoking Status Never   Smokeless Tobacco Never     Review of Systems   Constitutional: Negative for chills and fever  HENT: Negative for ear pain and sore throat  Eyes: Negative for pain and visual disturbance  Respiratory: Negative for cough and shortness of breath  Cardiovascular: Negative for chest pain and palpitations  Gastrointestinal: Negative for abdominal pain and vomiting  Genitourinary: Negative for dysuria and hematuria  Musculoskeletal: Positive for arthralgias and gait problem (Ambulate with one cane)  Negative for back pain and joint swelling  Skin: Negative for color change and rash  Neurological: Negative for seizures and syncope  Psychiatric/Behavioral: Negative  All other systems reviewed and are negative  Objective:  BP Readings from Last 1 Encounters:   03/21/23 112/72      Wt Readings from Last 1 Encounters:   03/21/23 50 3 kg (111 lb)      BMI:   Estimated body mass index is 17 39 kg/m² as calculated from the following:    Height as of this encounter: 5' 7" (1 702 m)  Weight as of this encounter: 50 3 kg (111 lb)  BSA:   Estimated body surface area is 1 57 meters squared as calculated from the following:    Height as of this encounter: 5' 7" (1 702 m)  Weight as of this encounter: 50 3 kg (111 lb)  Physical Exam  Vitals and nursing note reviewed  Constitutional:       Appearance: Normal appearance  She is well-developed  HENT:      Head: Normocephalic and atraumatic  Right Ear: External ear normal       Left Ear: External ear normal    Eyes:      Extraocular Movements: Extraocular movements intact  Conjunctiva/sclera: Conjunctivae normal    Pulmonary:      Effort: Pulmonary effort is normal    Musculoskeletal:      Cervical back: Neck supple  Skin:     General: Skin is warm and dry  Neurological:      Mental Status: She is alert and oriented to person, place, and time  Deep Tendon Reflexes: Reflexes are normal and symmetric     Psychiatric:         Mood and Affect: Mood normal  Behavior: Behavior normal        Right Hip Exam     Tenderness   The patient is experiencing tenderness in the greater trochanter  Range of Motion   Flexion: 100   External rotation: 30   Internal rotation: 10     Muscle Strength   The patient has normal right hip strength  Tests   Bahman: negative    Other   Erythema: absent  Scars: present (well healed incisions)  Sensation: normal  Pulse: present    Comments:  Pain with passive internal and external range of motion            no new imaging to review      Large joint arthrocentesis: R greater trochanteric bursa  Universal Protocol:  Consent: Verbal consent obtained    Risks and benefits: risks, benefits and alternatives were discussed  Consent given by: patient  Patient understanding: patient states understanding of the procedure being performed    Supporting Documentation  Indications: pain   Procedure Details  Location: hip - R greater trochanteric bursa  Preparation: Patient was prepped and draped in the usual sterile fashion  Needle size: 22 G  Ultrasound guidance: no  Approach: lateral  Medications administered: 4 mL bupivacaine 0 25 %; 6 mg betamethasone acetate-betamethasone sodium phosphate 6 (3-3) mg/mL    Patient tolerance: patient tolerated the procedure well with no immediate complications  Dressing:  Sterile dressing applied          Scribe Attestation    I,:  Angelica Julian PA-C am acting as a scribe while in the presence of the attending physician :       I,:  Cordelia Lantigua MD personally performed the services described in this documentation    as scribed in my presence :

## 2023-04-13 PROBLEM — F03.B0 MODERATE DEMENTIA (HCC): Status: ACTIVE | Noted: 2023-02-01

## 2023-04-13 PROBLEM — R26.2 AMBULATORY DYSFUNCTION: Status: ACTIVE | Noted: 2023-04-13

## 2023-06-27 ENCOUNTER — OFFICE VISIT (OUTPATIENT)
Dept: OBGYN CLINIC | Facility: CLINIC | Age: 68
End: 2023-06-27
Payer: MEDICARE

## 2023-06-27 VITALS
BODY MASS INDEX: 16.95 KG/M2 | WEIGHT: 108 LBS | SYSTOLIC BLOOD PRESSURE: 116 MMHG | HEIGHT: 67 IN | DIASTOLIC BLOOD PRESSURE: 72 MMHG

## 2023-06-27 DIAGNOSIS — T84.84XA PAINFUL ORTHOPAEDIC HARDWARE (HCC): Primary | ICD-10-CM

## 2023-06-27 PROCEDURE — 99214 OFFICE O/P EST MOD 30 MIN: CPT | Performed by: ORTHOPAEDIC SURGERY

## 2023-06-27 RX ORDER — CEFAZOLIN SODIUM 1 G/50ML
1000 SOLUTION INTRAVENOUS ONCE
Status: CANCELLED | OUTPATIENT
Start: 2023-06-30 | End: 2023-06-27

## 2023-06-27 RX ORDER — CHLORHEXIDINE GLUCONATE 0.12 MG/ML
15 RINSE ORAL ONCE
Status: CANCELLED | OUTPATIENT
Start: 2023-06-27 | End: 2023-06-27

## 2023-06-27 RX ORDER — CHLORHEXIDINE GLUCONATE 4 G/100ML
SOLUTION TOPICAL DAILY PRN
Status: CANCELLED | OUTPATIENT
Start: 2023-06-27

## 2023-06-27 NOTE — ASSESSMENT & PLAN NOTE
Findings consistent with advanced right hip osteoarthritis painful orthopedic hardware  Findings and treatment options were discussed with the patient and her sister  Discussed that at this point we can remove the dynamic screw from the right hip to see if that relieves her pain  Discussed there is a chance that her pain may be related to the osteoarthritis of her hip as well  If she has no improvement with the hardware removal, she may need to have a right total hip arthroplasty  Risk, benefits and complications of the hardware removal surgery were discussed in detail and informed consent was obtained  She will proceed with preoperative testing  All patient's questions were answered to her satisfaction  This note is created using dictation transcription  It may contain typographical errors, grammatical errors, improperly dictated words, background noise and other errors  Discussed with patient surgical risks and complications including but not limited to infection, persistent pain, nerve and vessel injury, complications associated with anesthesia, DVT, exposure to COVID virus, etc   Patient understands the risks and complication and consented to the surgery

## 2023-06-27 NOTE — H&P (VIEW-ONLY)
Assessment:     1  Painful orthopaedic hardware [facility])        Plan:     Problem List Items Addressed This Visit        Other    Painful orthopaedic hardware [facility]) - Primary     Findings consistent with advanced right hip osteoarthritis painful orthopedic hardware  Findings and treatment options were discussed with the patient and her sister  Discussed that at this point we can remove the dynamic screw from the right hip to see if that relieves her pain  Discussed there is a chance that her pain may be related to the osteoarthritis of her hip as well  If she has no improvement with the hardware removal, she may need to have a right total hip arthroplasty  Risk, benefits and complications of the hardware removal surgery were discussed in detail and informed consent was obtained  She will proceed with preoperative testing  All patient's questions were answered to her satisfaction  This note is created using dictation transcription  It may contain typographical errors, grammatical errors, improperly dictated words, background noise and other errors  Discussed with patient surgical risks and complications including but not limited to infection, persistent pain, nerve and vessel injury, complications associated with anesthesia, DVT, exposure to COVID virus, etc   Patient understands the risks and complication and consented to the surgery  Relevant Orders    Case request operating room: REMOVAL HARDWARE TFN DYNAMIC SCREW (Completed)    Ambulatory referral to Family Practice    CBC and Platelet    Basic metabolic panel    EKG 12 lead       Subjective:     Patient ID: Emma Welsh is a 69 y o  female  Chief Complaint: This is a 56-year-old white female accompanied by her sister following up for advanced right hip osteoarthritis with greater trochanteric bursitis  History of antegrade femoral nail for intertrochanteric hip fracture in 2019    She has been treated with cortisone injections and physical therapy for the greater trochanteric bursitis  Last cortisone injection was on first 2023  She feels that injection did not provide much relief  Pain is localized laterally  She states it is constant and aching  The patient and her sister are interested in removing the screw from prior TFN  Allergy:  No Known Allergies  Medications:  all current active meds have been reviewed  Past Medical History:  Past Medical History:   Diagnosis Date   • Anxiety    • Depression    • Memory loss    • Thyroid activity decreased      Past Surgical History:  Past Surgical History:   Procedure Laterality Date   • BREAST BIOPSY      years ago- Benign   • JOINT REPLACEMENT Left    • [place] OPTX FEM SHFT FX W/INSJ IMED IMPLT W/WO SCREW Right 5/9/2019    Procedure: INSERTION NAIL IM FEMUR ANTEGRADE (TROCHANTERIC), RIGHT;  Surgeon: Jeff Pandey MD;  Location: [facility] OR;  Service: Orthopedics     Family History:  Family History   Problem Relation Age of Onset   • Hypertension Mother    • Parkinsonism Mother [de-identified]        memory issues    • Depression Mother    • Melanoma Father    • No Known Problems Maternal Grandmother    • No Known Problems Maternal Grandfather    • No Known Problems Paternal Grandmother    • Heart attack Paternal Grandfather    • Depression Other    • Skin cancer Sister    • Breast cancer Maternal Aunt         over age 48   • No Known Problems Paternal Aunt    • No Known Problems Sister      Social History:  Social History     Substance and Sexual Activity   Alcohol Use Not Currently     Social History     Substance and Sexual Activity   Drug Use No     Social History     Tobacco Use   Smoking Status Never   Smokeless Tobacco Never     Review of Systems   Constitutional: Negative for chills and fever  HENT: Negative for ear pain and sore throat  Eyes: Negative for pain and visual disturbance  Respiratory: Negative for cough and shortness of breath  Cardiovascular: Negative for chest pain and palpitations  "  Gastrointestinal: Negative for abdominal pain and vomiting  Genitourinary: Negative for dysuria and hematuria  Musculoskeletal: Positive for arthralgias (Right hip) and gait problem (Ambulate with one cane)  Negative for back pain and joint swelling  Skin: Negative for color change and rash  Neurological: Negative for seizures and syncope  Psychiatric/Behavioral: Negative  All other systems reviewed and are negative  Objective:  BP Readings from Last 1 Encounters:   06/27/23 116/72      Wt Readings from Last 1 Encounters:   06/27/23 49 kg (108 lb)      BMI:   Estimated body mass index is 16 92 kg/m² as calculated from the following:    Height as of this encounter: 5' 7\" (1 702 m)  Weight as of this encounter: 49 kg (108 lb)  BSA:   Estimated body surface area is 1 56 meters squared as calculated from the following:    Height as of this encounter: 5' 7\" (1 702 m)  Weight as of this encounter: 49 kg (108 lb)  Physical Exam  Vitals and nursing note reviewed  Constitutional:       Appearance: Normal appearance  She is well-developed  HENT:      Head: Normocephalic and atraumatic  Right Ear: External ear normal       Left Ear: External ear normal    Eyes:      Extraocular Movements: Extraocular movements intact  Conjunctiva/sclera: Conjunctivae normal    Cardiovascular:      Rate and Rhythm: Normal rate and regular rhythm  Heart sounds: Normal heart sounds  No murmur heard  Pulmonary:      Effort: Pulmonary effort is normal  No respiratory distress  Breath sounds: Normal breath sounds  Chest:      Chest wall: No tenderness  Abdominal:      General: Abdomen is flat  There is no distension  Palpations: Abdomen is soft  Tenderness: There is no abdominal tenderness  Musculoskeletal:      Cervical back: Neck supple  Skin:     General: Skin is warm and dry  Neurological:      Mental Status: She is alert and oriented to person, place, and time        " Deep Tendon Reflexes: Reflexes are normal and symmetric  Psychiatric:         Mood and Affect: Mood normal          Behavior: Behavior normal        Right Hip Exam     Tenderness   The patient is experiencing tenderness in the greater trochanter  Range of Motion   Flexion: 100   External rotation: 30 (pain)   Internal rotation: 10 (pain)     Muscle Strength   The patient has normal right hip strength  Tests   Bahman: negative    Other   Erythema: absent  Scars: present (well healed incisions)  Sensation: normal  Pulse: present    Comments:  Pain with passive internal and external range of motion            I have personally reviewed pertinent films in PACS and my interpretation is Prior right hip x-rays show retained TFN anterograde nail  Advanced osteoarthritis with bone-on-bone appearance       Scribe Attestation    I,:  Yannick Oropeza PA-C am acting as a scribe while in the presence of the attending physician :       I,:  Karla Nieto MD personally performed the services described in this documentation    as scribed in my presence :

## 2023-06-27 NOTE — PROGRESS NOTES
Assessment:     1  Painful orthopaedic hardware [facility])        Plan:     Problem List Items Addressed This Visit        Other    Painful orthopaedic hardware [facility]) - Primary     Findings consistent with advanced right hip osteoarthritis painful orthopedic hardware  Findings and treatment options were discussed with the patient and her sister  Discussed that at this point we can remove the dynamic screw from the right hip to see if that relieves her pain  Discussed there is a chance that her pain may be related to the osteoarthritis of her hip as well  If she has no improvement with the hardware removal, she may need to have a right total hip arthroplasty  Risk, benefits and complications of the hardware removal surgery were discussed in detail and informed consent was obtained  She will proceed with preoperative testing  All patient's questions were answered to her satisfaction  This note is created using dictation transcription  It may contain typographical errors, grammatical errors, improperly dictated words, background noise and other errors  Discussed with patient surgical risks and complications including but not limited to infection, persistent pain, nerve and vessel injury, complications associated with anesthesia, DVT, exposure to COVID virus, etc   Patient understands the risks and complication and consented to the surgery  Relevant Orders    Case request operating room: REMOVAL HARDWARE TFN DYNAMIC SCREW (Completed)    Ambulatory referral to Family Practice    CBC and Platelet    Basic metabolic panel    EKG 12 lead       Subjective:     Patient ID: Sharita Puri is a 69 y o  female  Chief Complaint: This is a 59-year-old white female accompanied by her sister following up for advanced right hip osteoarthritis with greater trochanteric bursitis  History of antegrade femoral nail for intertrochanteric hip fracture in 2019    She has been treated with cortisone injections and physical therapy for the greater trochanteric bursitis  Last cortisone injection was on first 2023  She feels that injection did not provide much relief  Pain is localized laterally  She states it is constant and aching  The patient and her sister are interested in removing the screw from prior TFN  Allergy:  No Known Allergies  Medications:  all current active meds have been reviewed  Past Medical History:  Past Medical History:   Diagnosis Date   • Anxiety    • Depression    • Memory loss    • Thyroid activity decreased      Past Surgical History:  Past Surgical History:   Procedure Laterality Date   • BREAST BIOPSY      years ago- Benign   • JOINT REPLACEMENT Left    • [place] OPTX FEM SHFT FX W/INSJ IMED IMPLT W/WO SCREW Right 5/9/2019    Procedure: INSERTION NAIL IM FEMUR ANTEGRADE (TROCHANTERIC), RIGHT;  Surgeon: Claribel Manuel MD;  Location: [facility] OR;  Service: Orthopedics     Family History:  Family History   Problem Relation Age of Onset   • Hypertension Mother    • Parkinsonism Mother [de-identified]        memory issues    • Depression Mother    • Melanoma Father    • No Known Problems Maternal Grandmother    • No Known Problems Maternal Grandfather    • No Known Problems Paternal Grandmother    • Heart attack Paternal Grandfather    • Depression Other    • Skin cancer Sister    • Breast cancer Maternal Aunt         over age 48   • No Known Problems Paternal Aunt    • No Known Problems Sister      Social History:  Social History     Substance and Sexual Activity   Alcohol Use Not Currently     Social History     Substance and Sexual Activity   Drug Use No     Social History     Tobacco Use   Smoking Status Never   Smokeless Tobacco Never     Review of Systems   Constitutional: Negative for chills and fever  HENT: Negative for ear pain and sore throat  Eyes: Negative for pain and visual disturbance  Respiratory: Negative for cough and shortness of breath  Cardiovascular: Negative for chest pain and palpitations  "  Gastrointestinal: Negative for abdominal pain and vomiting  Genitourinary: Negative for dysuria and hematuria  Musculoskeletal: Positive for arthralgias (Right hip) and gait problem (Ambulate with one cane)  Negative for back pain and joint swelling  Skin: Negative for color change and rash  Neurological: Negative for seizures and syncope  Psychiatric/Behavioral: Negative  All other systems reviewed and are negative  Objective:  BP Readings from Last 1 Encounters:   06/27/23 116/72      Wt Readings from Last 1 Encounters:   06/27/23 49 kg (108 lb)      BMI:   Estimated body mass index is 16 92 kg/m² as calculated from the following:    Height as of this encounter: 5' 7\" (1 702 m)  Weight as of this encounter: 49 kg (108 lb)  BSA:   Estimated body surface area is 1 56 meters squared as calculated from the following:    Height as of this encounter: 5' 7\" (1 702 m)  Weight as of this encounter: 49 kg (108 lb)  Physical Exam  Vitals and nursing note reviewed  Constitutional:       Appearance: Normal appearance  She is well-developed  HENT:      Head: Normocephalic and atraumatic  Right Ear: External ear normal       Left Ear: External ear normal    Eyes:      Extraocular Movements: Extraocular movements intact  Conjunctiva/sclera: Conjunctivae normal    Cardiovascular:      Rate and Rhythm: Normal rate and regular rhythm  Heart sounds: Normal heart sounds  No murmur heard  Pulmonary:      Effort: Pulmonary effort is normal  No respiratory distress  Breath sounds: Normal breath sounds  Chest:      Chest wall: No tenderness  Abdominal:      General: Abdomen is flat  There is no distension  Palpations: Abdomen is soft  Tenderness: There is no abdominal tenderness  Musculoskeletal:      Cervical back: Neck supple  Skin:     General: Skin is warm and dry  Neurological:      Mental Status: She is alert and oriented to person, place, and time        " Deep Tendon Reflexes: Reflexes are normal and symmetric  Psychiatric:         Mood and Affect: Mood normal          Behavior: Behavior normal        Right Hip Exam     Tenderness   The patient is experiencing tenderness in the greater trochanter  Range of Motion   Flexion: 100   External rotation: 30 (pain)   Internal rotation: 10 (pain)     Muscle Strength   The patient has normal right hip strength  Tests   Bahman: negative    Other   Erythema: absent  Scars: present (well healed incisions)  Sensation: normal  Pulse: present    Comments:  Pain with passive internal and external range of motion            I have personally reviewed pertinent films in PACS and my interpretation is Prior right hip x-rays show retained TFN anterograde nail  Advanced osteoarthritis with bone-on-bone appearance       Scribe Attestation    I,:  Juan Tobin PA-C am acting as a scribe while in the presence of the attending physician :       I,:  Leta Hook MD personally performed the services described in this documentation    as scribed in my presence :

## 2023-06-28 LAB
BASOPHILS # BLD AUTO: 20 CELLS/UL (ref 0–200)
BASOPHILS NFR BLD AUTO: 0.5 %
BUN SERPL-MCNC: 23 MG/DL (ref 7–25)
BUN/CREAT SERPL: 21 (CALC) (ref 6–22)
CALCIUM SERPL-MCNC: 9.6 MG/DL (ref 8.6–10.4)
CHLORIDE SERPL-SCNC: 104 MMOL/L (ref 98–110)
CO2 SERPL-SCNC: 29 MMOL/L (ref 20–32)
CREAT SERPL-MCNC: 1.1 MG/DL (ref 0.5–1.05)
EOSINOPHIL # BLD AUTO: 168 CELLS/UL (ref 15–500)
EOSINOPHIL NFR BLD AUTO: 4.2 %
ERYTHROCYTE [DISTWIDTH] IN BLOOD BY AUTOMATED COUNT: 13.2 % (ref 11–15)
GFR/BSA.PRED SERPLBLD CYS-BASED-ARV: 54 ML/MIN/1.73M2
GLUCOSE SERPL-MCNC: 73 MG/DL (ref 65–99)
HCT VFR BLD AUTO: 33.1 % (ref 35–45)
HGB BLD-MCNC: 10.9 G/DL (ref 11.7–15.5)
LYMPHOCYTES # BLD AUTO: 1048 CELLS/UL (ref 850–3900)
LYMPHOCYTES NFR BLD AUTO: 26.2 %
MCH RBC QN AUTO: 30.2 PG (ref 27–33)
MCHC RBC AUTO-ENTMCNC: 32.9 G/DL (ref 32–36)
MCV RBC AUTO: 91.7 FL (ref 80–100)
MONOCYTES # BLD AUTO: 380 CELLS/UL (ref 200–950)
MONOCYTES NFR BLD AUTO: 9.5 %
NEUTROPHILS # BLD AUTO: 2384 CELLS/UL (ref 1500–7800)
NEUTROPHILS NFR BLD AUTO: 59.6 %
PLATELET # BLD AUTO: 247 THOUSAND/UL (ref 140–400)
PMV BLD REES-ECKER: 9.3 FL (ref 7.5–12.5)
POTASSIUM SERPL-SCNC: 4 MMOL/L (ref 3.5–5.3)
RBC # BLD AUTO: 3.61 MILLION/UL (ref 3.8–5.1)
SODIUM SERPL-SCNC: 141 MMOL/L (ref 135–146)
WBC # BLD AUTO: 4 THOUSAND/UL (ref 3.8–10.8)

## 2023-06-29 ENCOUNTER — ANESTHESIA EVENT (OUTPATIENT)
Dept: PERIOP | Facility: HOSPITAL | Age: 68
End: 2023-06-29
Payer: MEDICARE

## 2023-06-30 ENCOUNTER — APPOINTMENT (OUTPATIENT)
Dept: RADIOLOGY | Facility: HOSPITAL | Age: 68
End: 2023-06-30
Payer: MEDICARE

## 2023-06-30 ENCOUNTER — HOSPITAL ENCOUNTER (OUTPATIENT)
Facility: HOSPITAL | Age: 68
Setting detail: OUTPATIENT SURGERY
Discharge: HOME | End: 2023-06-30
Attending: ORTHOPAEDIC SURGERY | Admitting: ORTHOPAEDIC SURGERY
Payer: MEDICARE

## 2023-06-30 ENCOUNTER — ANESTHESIA (OUTPATIENT)
Dept: PERIOP | Facility: HOSPITAL | Age: 68
End: 2023-06-30
Payer: MEDICARE

## 2023-06-30 VITALS
OXYGEN SATURATION: 98 % | TEMPERATURE: 98.2 F | DIASTOLIC BLOOD PRESSURE: 76 MMHG | SYSTOLIC BLOOD PRESSURE: 173 MMHG | HEART RATE: 72 BPM | RESPIRATION RATE: 13 BRPM | BODY MASS INDEX: 17.07 KG/M2 | WEIGHT: 109 LBS

## 2023-06-30 DIAGNOSIS — T84.84XA PAINFUL ORTHOPAEDIC HARDWARE (HCC): Primary | ICD-10-CM

## 2023-06-30 PROCEDURE — 20680 REMOVAL OF IMPLANT DEEP: CPT | Performed by: PHYSICIAN ASSISTANT

## 2023-06-30 PROCEDURE — 20680 REMOVAL OF IMPLANT DEEP: CPT | Performed by: ORTHOPAEDIC SURGERY

## 2023-06-30 PROCEDURE — 73552 X-RAY EXAM OF FEMUR 2/>: CPT

## 2023-06-30 PROCEDURE — C1713 ANCHOR/SCREW BN/BN,TIS/BN: HCPCS | Performed by: ORTHOPAEDIC SURGERY

## 2023-06-30 RX ORDER — CHLORHEXIDINE GLUCONATE 4 G/100ML
SOLUTION TOPICAL DAILY PRN
Status: DISCONTINUED | OUTPATIENT
Start: 2023-06-30 | End: 2023-06-30 | Stop reason: HOSPADM

## 2023-06-30 RX ORDER — ONDANSETRON 2 MG/ML
4 INJECTION INTRAMUSCULAR; INTRAVENOUS ONCE AS NEEDED
Status: DISCONTINUED | OUTPATIENT
Start: 2023-06-30 | End: 2023-06-30 | Stop reason: HOSPADM

## 2023-06-30 RX ORDER — ONDANSETRON 2 MG/ML
4 INJECTION INTRAMUSCULAR; INTRAVENOUS EVERY 6 HOURS PRN
Status: DISCONTINUED | OUTPATIENT
Start: 2023-06-30 | End: 2023-06-30 | Stop reason: HOSPADM

## 2023-06-30 RX ORDER — FENTANYL CITRATE 50 UG/ML
INJECTION, SOLUTION INTRAMUSCULAR; INTRAVENOUS AS NEEDED
Status: DISCONTINUED | OUTPATIENT
Start: 2023-06-30 | End: 2023-06-30

## 2023-06-30 RX ORDER — LIDOCAINE HYDROCHLORIDE 10 MG/ML
INJECTION, SOLUTION EPIDURAL; INFILTRATION; INTRACAUDAL; PERINEURAL AS NEEDED
Status: DISCONTINUED | OUTPATIENT
Start: 2023-06-30 | End: 2023-06-30

## 2023-06-30 RX ORDER — CHLORHEXIDINE GLUCONATE 0.12 MG/ML
15 RINSE ORAL ONCE
Status: DISCONTINUED | OUTPATIENT
Start: 2023-06-30 | End: 2023-06-30 | Stop reason: HOSPADM

## 2023-06-30 RX ORDER — SODIUM CHLORIDE, SODIUM LACTATE, POTASSIUM CHLORIDE, CALCIUM CHLORIDE 600; 310; 30; 20 MG/100ML; MG/100ML; MG/100ML; MG/100ML
125 INJECTION, SOLUTION INTRAVENOUS CONTINUOUS
Status: DISCONTINUED | OUTPATIENT
Start: 2023-06-30 | End: 2023-06-30 | Stop reason: HOSPADM

## 2023-06-30 RX ORDER — PROPOFOL 10 MG/ML
INJECTION, EMULSION INTRAVENOUS CONTINUOUS PRN
Status: DISCONTINUED | OUTPATIENT
Start: 2023-06-30 | End: 2023-06-30

## 2023-06-30 RX ORDER — OXYCODONE HYDROCHLORIDE AND ACETAMINOPHEN 5; 325 MG/1; MG/1
1 TABLET ORAL EVERY 6 HOURS PRN
Status: DISCONTINUED | OUTPATIENT
Start: 2023-06-30 | End: 2023-06-30 | Stop reason: HOSPADM

## 2023-06-30 RX ORDER — ACETAMINOPHEN 325 MG/1
650 TABLET ORAL EVERY 6 HOURS PRN
Status: DISCONTINUED | OUTPATIENT
Start: 2023-06-30 | End: 2023-06-30 | Stop reason: HOSPADM

## 2023-06-30 RX ORDER — LIDOCAINE HYDROCHLORIDE 10 MG/ML
0.5 INJECTION, SOLUTION EPIDURAL; INFILTRATION; INTRACAUDAL; PERINEURAL ONCE AS NEEDED
Status: DISCONTINUED | OUTPATIENT
Start: 2023-06-30 | End: 2023-06-30 | Stop reason: HOSPADM

## 2023-06-30 RX ORDER — OXYCODONE HYDROCHLORIDE AND ACETAMINOPHEN 5; 325 MG/1; MG/1
1 TABLET ORAL EVERY 6 HOURS PRN
Qty: 15 TABLET | Refills: 0 | Status: SHIPPED | OUTPATIENT
Start: 2023-06-30 | End: 2023-07-10

## 2023-06-30 RX ORDER — CEFAZOLIN SODIUM 1 G/50ML
1000 SOLUTION INTRAVENOUS ONCE
Status: COMPLETED | OUTPATIENT
Start: 2023-06-30 | End: 2023-06-30

## 2023-06-30 RX ORDER — DEXAMETHASONE SODIUM PHOSPHATE 10 MG/ML
INJECTION, SOLUTION INTRAMUSCULAR; INTRAVENOUS AS NEEDED
Status: DISCONTINUED | OUTPATIENT
Start: 2023-06-30 | End: 2023-06-30

## 2023-06-30 RX ORDER — FENTANYL CITRATE/PF 50 MCG/ML
25 SYRINGE (ML) INJECTION
Status: DISCONTINUED | OUTPATIENT
Start: 2023-06-30 | End: 2023-06-30 | Stop reason: HOSPADM

## 2023-06-30 RX ORDER — PROPOFOL 10 MG/ML
INJECTION, EMULSION INTRAVENOUS AS NEEDED
Status: DISCONTINUED | OUTPATIENT
Start: 2023-06-30 | End: 2023-06-30

## 2023-06-30 RX ORDER — ONDANSETRON 2 MG/ML
INJECTION INTRAMUSCULAR; INTRAVENOUS AS NEEDED
Status: DISCONTINUED | OUTPATIENT
Start: 2023-06-30 | End: 2023-06-30

## 2023-06-30 RX ADMIN — DEXAMETHASONE SODIUM PHOSPHATE 10 MG: 10 INJECTION, SOLUTION INTRAMUSCULAR; INTRAVENOUS at 10:42

## 2023-06-30 RX ADMIN — PROPOFOL 100 MG: 10 INJECTION, EMULSION INTRAVENOUS at 10:42

## 2023-06-30 RX ADMIN — PHENYLEPHRINE HYDROCHLORIDE 20 MCG/MIN: 10 INJECTION, SOLUTION INTRAVENOUS at 10:48

## 2023-06-30 RX ADMIN — PROPOFOL 30 MG: 10 INJECTION, EMULSION INTRAVENOUS at 10:50

## 2023-06-30 RX ADMIN — SODIUM CHLORIDE, SODIUM LACTATE, POTASSIUM CHLORIDE, AND CALCIUM CHLORIDE 125 ML/HR: .6; .31; .03; .02 INJECTION, SOLUTION INTRAVENOUS at 10:24

## 2023-06-30 RX ADMIN — FENTANYL CITRATE 25 MCG: 50 INJECTION, SOLUTION INTRAMUSCULAR; INTRAVENOUS at 10:48

## 2023-06-30 RX ADMIN — PROPOFOL 70 MCG/KG/MIN: 10 INJECTION, EMULSION INTRAVENOUS at 10:46

## 2023-06-30 RX ADMIN — FENTANYL CITRATE 25 MCG: 50 INJECTION INTRAMUSCULAR; INTRAVENOUS at 12:16

## 2023-06-30 RX ADMIN — ONDANSETRON 4 MG: 2 INJECTION INTRAMUSCULAR; INTRAVENOUS at 11:16

## 2023-06-30 RX ADMIN — CEFAZOLIN SODIUM 1000 MG: 1 SOLUTION INTRAVENOUS at 10:49

## 2023-06-30 RX ADMIN — PROPOFOL 40 MG: 10 INJECTION, EMULSION INTRAVENOUS at 11:14

## 2023-06-30 RX ADMIN — FENTANYL CITRATE 25 MCG: 50 INJECTION, SOLUTION INTRAMUSCULAR; INTRAVENOUS at 10:42

## 2023-06-30 RX ADMIN — FENTANYL CITRATE 25 MCG: 50 INJECTION, SOLUTION INTRAMUSCULAR; INTRAVENOUS at 10:57

## 2023-06-30 RX ADMIN — LIDOCAINE HYDROCHLORIDE 50 MG: 10 INJECTION, SOLUTION EPIDURAL; INFILTRATION; INTRACAUDAL; PERINEURAL at 10:41

## 2023-06-30 RX ADMIN — FENTANYL CITRATE 25 MCG: 50 INJECTION, SOLUTION INTRAMUSCULAR; INTRAVENOUS at 11:07

## 2023-06-30 RX ADMIN — PROPOFOL 30 MG: 10 INJECTION, EMULSION INTRAVENOUS at 10:52

## 2023-06-30 NOTE — DISCHARGE INSTR - AVS FIRST PAGE
Discharge Instructions - Orthopedics  Ankur Richard 69 y o  female MRN: [id number]  Unit/Bed#: APU 07    Weight Bearing Status:                                           WBAT to RLE with assistance    DVT prophylaxis  ASA 81 mg BID until seen in office    Pain:  Continue analgesics as directed    Dressing Instructions:   Please keep clean, dry and intact until follow up  Dressing is waterproof and can get wet in shower  Appt Instructions: Followup as scheduled     Contact the office sooner if you experience any increased numbness/tingling in the extremities  Miscellaneous:   Ice to right hip

## 2023-06-30 NOTE — INTERVAL H&P NOTE
H&P reviewed  After examining the patient I find no changes in the patients condition since the H&P had been written      Vitals:    06/30/23 1022   BP: 154/67   Pulse: 70   Resp: 16   Temp: 98 4 °F (36 9 °C)   SpO2: 99%

## 2023-06-30 NOTE — ANESTHESIA POSTPROCEDURE EVALUATION
Post-Op Assessment Note    CV Status:  Stable  Pain Score: 0    Pain management: adequate     Mental Status:  Sleepy   Hydration Status:  Euvolemic   PONV Controlled:  None   Airway Patency:  Patent      Post Op Vitals Reviewed: Yes      Staff: Anesthesiologist, CRNA   Comments: report given to RN; VSS; 6l/min facemask for transport        No notable events documented      BP   131/63   Temp   98 2   Pulse  69   Resp   16   SpO2   100

## 2023-06-30 NOTE — OP NOTE
OPERATIVE REPORT  PATIENT NAME: Radha Pelletier    :  1955  MRN: [id number]  Pt Location:  OR ROOM 02    SURGERY DATE: 2023    Surgeon(s) and Role:     * Des Edge MD - Primary     * Grace Aguilar PA-C - Assisting    Preop Diagnosis:  Painful orthopaedic hardware [facility]) [facility]    Post-Op Diagnosis Codes:     * Painful orthopaedic hardware ([address] ) [facility]    Procedure(s):  Right - REMOVAL HARDWARE TFN DYNAMIC SCREW    Specimen(s):  * No specimens in log *    Estimated Blood Loss:   Minimal    Drains:  Urethral Catheter Latex 16 Fr  (Active)   Number of days: 1510     Anesthesia Type:   LMA    Operative Indications:  Painful orthopaedic hardware [facility]) [[id number]]    Indications: 69 y o  female diagnosed with right hip pain for orthopedic hardware  Patient failed conservative treatments and elected to proceed with surgical intervention  The risks and complications are discussed with the patient  The patient consented to the procedure  Procedure: Patient has brought to the OR and anesthetized intubated with LMA without any difficulty  She was placed on the fracture table and securing with a leg bledsoe and groin post   Patient was also taped onto the fracture table  A timeout was called identify the right hip as operative site  Old surgical incisions were utilized for removing the hardware  Image was used to localize the hardware and confirm the complete removal of the hardware  Kylah Fontan was used proximally lead to first loosen up the derotational screw without difficulty  And the guidepin was then inserted into the dynamic screw from lateral   The dynamic screw  was then placed onto the screw head  The dynamic screw was removed without difficulty  Final image was obtained to confirm the complete removal of the dynamic screw  The wound is then irrigated  Fascia macario was closed with 0 Vicryl  2-0 Vicryl was used to close the subcutaneous tissue  And staples on the skin    Sterile bulky dressing was applied  Patient taught the procedure without complication  She was then extubated and transferred to recovery for postop care  Family was contacted  There was no qualified resident available to assist     Mrs Lore Bansal was required in the OR in assist patient positioning, hardware removal and incisions closure      Complications:   None    Patient Disposition:  PACU     SIGNATURE: Phillip Bethea MD  DATE: June 30, 2023  TIME: 11:25 AM

## 2023-06-30 NOTE — ANESTHESIA PREPROCEDURE EVALUATION
Procedure:  REMOVAL HARDWARE TFN DYNAMIC SCREW (Right: Hip)    Relevant Problems   ENDO   (+) Acquired hypothyroidism      MUSCULOSKELETAL   (+) Primary osteoarthritis of right hip      NEURO/PSYCH   (+) Anxiety   (+) Depression   (+) Moderate dementia (HCC)      Other   (+) Painful orthopaedic hardware [facility])        Physical Exam    Airway    Mallampati score: II  TM Distance: >3 FB  Neck ROM: full     Dental   No notable dental hx     Cardiovascular      Pulmonary      Other Findings        Anesthesia Plan  ASA Score- 3     Anesthesia Type- general with ASA Monitors  Additional Monitors:   Airway Plan:     Comment: Post op Confusion discussed with pt's sister   Was confused after prior hip surgery  TIVA anesthesia planned          Plan Factors-Exercise tolerance (METS): <4 METS  Chart reviewed  Existing labs reviewed  Patient is not a current smoker  Induction- intravenous  Postoperative Plan-     Informed Consent- Anesthetic plan and risks discussed with patient, sibling and healthcare power of   I personally reviewed this patient with the CRNA  Discussed and agreed on the Anesthesia Plan with the CRNA  Manda Myrick

## 2023-07-13 ENCOUNTER — OFFICE VISIT (OUTPATIENT)
Dept: OBGYN CLINIC | Facility: CLINIC | Age: 68
End: 2023-07-13

## 2023-07-13 ENCOUNTER — APPOINTMENT (OUTPATIENT)
Dept: RADIOLOGY | Facility: CLINIC | Age: 68
End: 2023-07-13
Payer: MEDICARE

## 2023-07-13 VITALS — DIASTOLIC BLOOD PRESSURE: 88 MMHG | SYSTOLIC BLOOD PRESSURE: 152 MMHG

## 2023-07-13 DIAGNOSIS — Z47.89 AFTERCARE FOLLOWING SURGERY OF THE MUSCULOSKELETAL SYSTEM: Primary | ICD-10-CM

## 2023-07-13 DIAGNOSIS — M16.11 PRIMARY OSTEOARTHRITIS OF RIGHT HIP: ICD-10-CM

## 2023-07-13 DIAGNOSIS — Z47.89 AFTERCARE FOLLOWING SURGERY OF THE MUSCULOSKELETAL SYSTEM: ICD-10-CM

## 2023-07-13 DIAGNOSIS — S72.001A CLOSED DISPLACED FRACTURE OF RIGHT FEMORAL NECK (HCC): ICD-10-CM

## 2023-07-13 PROCEDURE — 72170 X-RAY EXAM OF PELVIS: CPT

## 2023-07-13 PROCEDURE — 99024 POSTOP FOLLOW-UP VISIT: CPT | Performed by: ORTHOPAEDIC SURGERY

## 2023-07-13 RX ORDER — OXYCODONE HYDROCHLORIDE AND ACETAMINOPHEN 5; 325 MG/1; MG/1
1 TABLET ORAL EVERY 6 HOURS PRN
Qty: 20 TABLET | Refills: 0 | Status: ON HOLD | OUTPATIENT
Start: 2023-07-13

## 2023-07-13 NOTE — PROGRESS NOTES
Assessment:     1. Aftercare following surgery of the musculoskeletal system    2. Closed displaced fracture of right femoral neck ([address])    3. Primary osteoarthritis of right hip        Plan:     Problem List Items Addressed This Visit        Musculoskeletal and Integument    Primary osteoarthritis of right hip    Closed displaced fracture of right femoral neck (HCC)    Relevant Medications    oxyCODONE-acetaminophen (Percocet) 5-325 mg per tablet    Other Relevant Orders    Ambulatory Referral to Orthopedic Surgery    DXA bone density spine hip and pelvis       Other    Aftercare following surgery of the musculoskeletal system - Primary    Relevant Orders    XR pelvis ap only 1 or 2 vw       Patient is status post removal of right TFN dynamic screw on June 30, 2023 with subsequent displaced femoral neck fracture and advanced osteoarthritis of the right hip. Findings and treatment options were discussed with the patient and her sister. X-rays were reviewed with them that revealed a new femoral neck fracture. Recommend surgical consultation with total joint specialist Dr. John Francisco to discuss removal of remaining TFN and total hip arthroplasty. She was given a refill for Percocet to take for severe pain. She has tried to avoid weightbearing to the right lower extremity is much as possible. She was scheduled to see Dr. John Francisco tomorrow. All patient's questions were answered to their satisfaction. This note is created using dictation transcription. It may contain typographical errors, grammatical errors, improperly dictated words, background noise and other errors. Subjective:     Patient ID: Chata Andrews is a 69 y.o. female. Chief Complaint: This is a 64-year-old white female accompanied by her sister who is status post removal of right TFN dynamic screw on June 30, 2023. Her sister states that she has significant pain since she ran out of Percocet yesterday. They deny having any fall.   Pain is significant when trying to bear weight. She denies any fevers or chills. She arrives in a wheelchair today. Allergy:  No Known Allergies  Medications:  all current active meds have been reviewed  Past Medical History:  Past Medical History:   Diagnosis Date   • Anxiety    • Depression    • Memory loss    • Thyroid activity decreased      Past Surgical History:  Past Surgical History:   Procedure Laterality Date   • BREAST BIOPSY      years ago- Benign   • JOINT REPLACEMENT Left    • [place] OPTX FEM SHFT FX W/INSJ IMED IMPLT W/WO SCREW Right 5/9/2019    Procedure: INSERTION NAIL IM FEMUR ANTEGRADE (TROCHANTERIC), RIGHT;  Surgeon: Leticia Cash MD;  Location:  MAIN OR;  Service: Orthopedics   • [place] REMOVAL IMPLANT DEEP Right 6/30/2023    Procedure: REMOVAL HARDWARE TFN DYNAMIC SCREW;  Surgeon: Leticia Cash MD;  Location:  MAIN OR;  Service: Orthopedics     Family History:  Family History   Problem Relation Age of Onset   • Hypertension Mother    • Parkinsonism Mother 80        memory issues    • Depression Mother    • Melanoma Father    • No Known Problems Maternal Grandmother    • No Known Problems Maternal Grandfather    • No Known Problems Paternal Grandmother    • Heart attack Paternal Grandfather    • Depression Other    • Skin cancer Sister    • Breast cancer Maternal Aunt         over age 48   • No Known Problems Paternal Aunt    • No Known Problems Sister      Social History:  Social History     Substance and Sexual Activity   Alcohol Use Not Currently     Social History     Substance and Sexual Activity   Drug Use No     Social History     Tobacco Use   Smoking Status Never   Smokeless Tobacco Never     Review of Systems   Constitutional: Negative. HENT: Negative. Eyes: Negative. Respiratory: Negative. Cardiovascular: Negative. Gastrointestinal: Negative. Endocrine: Negative. Genitourinary: Negative. Musculoskeletal: Positive for arthralgias and gait problem (in a wheelchair). Skin: Negative. Allergic/Immunologic: Negative. Hematological: Negative. Psychiatric/Behavioral: Negative. Objective:  BP Readings from Last 1 Encounters:   07/13/23 152/88      Wt Readings from Last 1 Encounters:   06/30/23 49.4 kg (109 lb)      BMI:   Estimated body mass index is 17.07 kg/m² as calculated from the following:    Height as of 6/27/23: 5' 7" (1.702 m). Weight as of 6/30/23: 49.4 kg (109 lb). BSA:   Estimated body surface area is 1.56 meters squared as calculated from the following:    Height as of 6/27/23: 5' 7" (1.702 m). Weight as of 6/30/23: 49.4 kg (109 lb). Physical Exam  Constitutional:       General: She is not in acute distress. Appearance: She is well-developed. HENT:      Head: Normocephalic. Eyes:      Conjunctiva/sclera: Conjunctivae normal.      Pupils: Pupils are equal, round, and reactive to light. Pulmonary:      Effort: Pulmonary effort is normal. No respiratory distress. Skin:     General: Skin is warm and dry. Neurological:      Mental Status: She is alert and oriented to person, place, and time. Psychiatric:         Behavior: Behavior normal.       Right Hip Exam     Tenderness   Right hip tenderness location: diffuse. Other   Erythema: absent  Scars: present (Well-healing incisions with no evidence of infection. No active drainage.)  Sensation: normal  Pulse: present    Comments:  Significant pain with passive range of motion of right hip  Right lower extremity shorter than left  Calf soft, nontender  Mild swelling of thigh            I have personally reviewed pertinent films in PACS and my interpretation is X-rays of the right hip reveal absence of TFN dynamic screw. There is a new displaced femoral neck fracture. Procedure: Staples removed from right hip.     Scribe Attestation    I,:  Que Suazo PA-C am acting as a scribe while in the presence of the attending physician.:       I,:  Malvin Gusman MD personally performed the services described in this documentation    as scribed in my presence.:

## 2023-07-19 ENCOUNTER — HOSPITAL ENCOUNTER (INPATIENT)
Facility: HOSPITAL | Age: 68
LOS: 10 days | Discharge: NURSING, REHAB OR LONG-TERM CARE | DRG: 521 | End: 2023-07-29
Attending: EMERGENCY MEDICINE | Admitting: INTERNAL MEDICINE
Payer: MEDICARE

## 2023-07-19 ENCOUNTER — APPOINTMENT (EMERGENCY)
Dept: CT IMAGING | Facility: HOSPITAL | Age: 68
DRG: 521 | End: 2023-07-19
Payer: MEDICARE

## 2023-07-19 DIAGNOSIS — T84.84XA PAINFUL ORTHOPAEDIC HARDWARE (HCC): ICD-10-CM

## 2023-07-19 DIAGNOSIS — G93.40 ENCEPHALOPATHY: ICD-10-CM

## 2023-07-19 DIAGNOSIS — I10 PRIMARY HYPERTENSION: ICD-10-CM

## 2023-07-19 DIAGNOSIS — I60.9 SUBARACHNOID HEMORRHAGE (HCC): ICD-10-CM

## 2023-07-19 DIAGNOSIS — R26.2 AMBULATORY DYSFUNCTION: ICD-10-CM

## 2023-07-19 DIAGNOSIS — R62.7 FAILURE TO THRIVE IN ADULT: ICD-10-CM

## 2023-07-19 DIAGNOSIS — R53.1 GENERALIZED WEAKNESS: Primary | ICD-10-CM

## 2023-07-19 DIAGNOSIS — H51.8 GAZE PREFERENCE: ICD-10-CM

## 2023-07-19 DIAGNOSIS — S72.001A CLOSED DISPLACED FRACTURE OF RIGHT FEMORAL NECK (HCC): ICD-10-CM

## 2023-07-19 DIAGNOSIS — F03.90 DEMENTIA (HCC): ICD-10-CM

## 2023-07-19 LAB
2HR DELTA HS TROPONIN: 0 NG/L
4HR DELTA HS TROPONIN: 0 NG/L
ALBUMIN SERPL BCP-MCNC: 4.2 G/DL (ref 3.5–5)
ALP SERPL-CCNC: 123 U/L (ref 34–104)
ALT SERPL W P-5'-P-CCNC: 12 U/L (ref 7–52)
ANION GAP SERPL CALCULATED.3IONS-SCNC: 12 MMOL/L
AST SERPL W P-5'-P-CCNC: 29 U/L (ref 13–39)
BASOPHILS # BLD AUTO: 0.03 THOUSANDS/ÂΜL (ref 0–0.1)
BASOPHILS NFR BLD AUTO: 0 % (ref 0–1)
BILIRUB SERPL-MCNC: 0.48 MG/DL (ref 0.2–1)
BUN SERPL-MCNC: 16 MG/DL (ref 5–25)
CALCIUM SERPL-MCNC: 9.9 MG/DL (ref 8.4–10.2)
CARDIAC TROPONIN I PNL SERPL HS: 13 NG/L
CHLORIDE SERPL-SCNC: 97 MMOL/L (ref 96–108)
CK SERPL-CCNC: 381 U/L (ref 26–192)
CO2 SERPL-SCNC: 28 MMOL/L (ref 21–32)
CREAT SERPL-MCNC: 0.82 MG/DL (ref 0.6–1.3)
EOSINOPHIL # BLD AUTO: 0.08 THOUSAND/ÂΜL (ref 0–0.61)
EOSINOPHIL NFR BLD AUTO: 1 % (ref 0–6)
ERYTHROCYTE [DISTWIDTH] IN BLOOD BY AUTOMATED COUNT: 12.6 % (ref 11.6–15.1)
GFR SERPL CREATININE-BSD FRML MDRD: 73 ML/MIN/1.73SQ M
GLUCOSE SERPL-MCNC: 96 MG/DL (ref 65–140)
HCT VFR BLD AUTO: 35 % (ref 34.8–46.1)
HGB BLD-MCNC: 10.8 G/DL (ref 11.5–15.4)
IMM GRANULOCYTES # BLD AUTO: 0.05 THOUSAND/UL (ref 0–0.2)
IMM GRANULOCYTES NFR BLD AUTO: 1 % (ref 0–2)
LYMPHOCYTES # BLD AUTO: 0.76 THOUSANDS/ÂΜL (ref 0.6–4.47)
LYMPHOCYTES NFR BLD AUTO: 8 % (ref 14–44)
MCH RBC QN AUTO: 29.5 PG (ref 26.8–34.3)
MCHC RBC AUTO-ENTMCNC: 30.9 G/DL (ref 31.4–37.4)
MCV RBC AUTO: 96 FL (ref 82–98)
MONOCYTES # BLD AUTO: 0.69 THOUSAND/ÂΜL (ref 0.17–1.22)
MONOCYTES NFR BLD AUTO: 7 % (ref 4–12)
NEUTROPHILS # BLD AUTO: 7.73 THOUSANDS/ÂΜL (ref 1.85–7.62)
NEUTS SEG NFR BLD AUTO: 83 % (ref 43–75)
NRBC BLD AUTO-RTO: 0 /100 WBCS
PLATELET # BLD AUTO: 425 THOUSANDS/UL (ref 149–390)
PMV BLD AUTO: 8.9 FL (ref 8.9–12.7)
POTASSIUM SERPL-SCNC: 3.8 MMOL/L (ref 3.5–5.3)
PROT SERPL-MCNC: 8.2 G/DL (ref 6.4–8.4)
RBC # BLD AUTO: 3.66 MILLION/UL (ref 3.81–5.12)
SODIUM SERPL-SCNC: 137 MMOL/L (ref 135–147)
WBC # BLD AUTO: 9.34 THOUSAND/UL (ref 4.31–10.16)

## 2023-07-19 PROCEDURE — 70450 CT HEAD/BRAIN W/O DYE: CPT

## 2023-07-19 PROCEDURE — G1004 CDSM NDSC: HCPCS

## 2023-07-19 PROCEDURE — 80053 COMPREHEN METABOLIC PANEL: CPT | Performed by: EMERGENCY MEDICINE

## 2023-07-19 PROCEDURE — 99285 EMERGENCY DEPT VISIT HI MDM: CPT | Performed by: EMERGENCY MEDICINE

## 2023-07-19 PROCEDURE — 99223 1ST HOSP IP/OBS HIGH 75: CPT | Performed by: PHYSICIAN ASSISTANT

## 2023-07-19 PROCEDURE — 82550 ASSAY OF CK (CPK): CPT | Performed by: EMERGENCY MEDICINE

## 2023-07-19 PROCEDURE — 84484 ASSAY OF TROPONIN QUANT: CPT | Performed by: EMERGENCY MEDICINE

## 2023-07-19 PROCEDURE — 85025 COMPLETE CBC W/AUTO DIFF WBC: CPT | Performed by: EMERGENCY MEDICINE

## 2023-07-19 PROCEDURE — 36415 COLL VENOUS BLD VENIPUNCTURE: CPT | Performed by: EMERGENCY MEDICINE

## 2023-07-19 PROCEDURE — 72125 CT NECK SPINE W/O DYE: CPT

## 2023-07-19 RX ADMIN — SODIUM CHLORIDE 500 ML: 0.9 INJECTION, SOLUTION INTRAVENOUS at 19:35

## 2023-07-19 NOTE — ED PROVIDER NOTES
History  Chief Complaint   Patient presents with   • Weakness - Generalized     Per EMS pt was on bedroom floor since 0200. Per EMS pt lives with sister. Per EMS pt sister knew pt was on floor and could not get her up. Per EMS pt sister dedcided to leave pt on floor and to try to get her up in AM. EMS states pt sister went to bed after 0200 and woke at 1700 and could not get sister off floor. Sister called EMS at that time     17-year-old female history of dementia living with her sister presenting due to fall. Per EMS had fall from standing onto the floor. Her sister currently had difficulty lifting her up so left her on the ground. Patient's sister apparently went to sleep at this point and woke up and then called for EMS when she realized she still could not assist her off the floor. Unable to obtain further HPI from patient due to dementia          Prior to Admission Medications   Prescriptions Last Dose Informant Patient Reported? Taking? Calcium-Magnesium-Vitamin D (CALCIUM 500 PO) 2023 Self Yes Yes   Sig: Take by mouth   Cholecalciferol (VITAMIN D3) 5000 units CAPS 2023 Self Yes Yes   Sig: Take by mouth   Multiple Vitamins-Minerals (CENTRUM SILVER 50+WOMEN PO) 2023 Self Yes Yes   Sig: Take 1 tablet by mouth daily   buPROPion (WELLBUTRIN SR) 150 mg 12 hr tablet 2023 Self Yes Yes   Si (two) times a day     levothyroxine 50 mcg tablet 2023  Yes Yes   Sig: Take 50 mcg by mouth every morning   mirtazapine (REMERON) 30 mg tablet 2023 Self Yes Yes   Si mg daily at bedtime     oxyCODONE-acetaminophen (Percocet) 5-325 mg per tablet 2023  No Yes   Sig: Take 1 tablet by mouth every 6 (six) hours as needed for severe pain Continue current therapy.  Max Daily Amount: 4 tablets   risperiDONE (RisperDAL) 1 mg tablet 2023 Self Yes Yes   Sig: Take 1 mg by mouth 4 (four) times a day    sertraline (ZOLOFT) 50 mg tablet 2023 Self Yes Yes   Si mg daily with breakfast        Facility-Administered Medications: None       Past Medical History:   Diagnosis Date   • Anxiety    • Depression    • Memory loss    • Thyroid activity decreased        Past Surgical History:   Procedure Laterality Date   • BREAST BIOPSY      years ago- Benign   • JOINT REPLACEMENT Left    • [place] OPTX FEM SHFT FX W/INSJ IMED IMPLT W/WO SCREW Right 5/9/2019    Procedure: INSERTION NAIL IM FEMUR ANTEGRADE (TROCHANTERIC), RIGHT;  Surgeon: Lauryn Godinez MD;  Location: [facility];  Service: Orthopedics   • [place] REMOVAL IMPLANT DEEP Right 6/30/2023    Procedure: REMOVAL HARDWARE TFN DYNAMIC SCREW;  Surgeon: Lauryn Godinez MD;  Location: [facility];  Service: Orthopedics       Family History   Problem Relation Age of Onset   • Hypertension Mother    • Parkinsonism Mother 80        memory issues    • Depression Mother    • Melanoma Father    • No Known Problems Maternal Grandmother    • No Known Problems Maternal Grandfather    • No Known Problems Paternal Grandmother    • Heart attack Paternal Grandfather    • Depression Other    • Skin cancer Sister    • Breast cancer Maternal Aunt         over age 48   • No Known Problems Paternal Aunt    • No Known Problems Sister      I have reviewed and agree with the history as documented. E-Cigarette/Vaping   • E-Cigarette Use Never User      E-Cigarette/Vaping Substances     Social History     Tobacco Use   • Smoking status: Never   • Smokeless tobacco: Never   Vaping Use   • Vaping Use: Never used   Substance Use Topics   • Alcohol use: Not Currently   • Drug use: No       Review of Systems   Unable to perform ROS: Dementia       Physical Exam  Physical Exam  Vitals and nursing note reviewed. Constitutional:       General: She is not in acute distress. Appearance: She is well-developed. Comments: Frail appearing   HENT:      Head: Normocephalic and atraumatic.    Eyes:      Conjunctiva/sclera: Conjunctivae normal.   Cardiovascular:      Rate and Rhythm: Normal rate and regular rhythm. Heart sounds: No murmur heard. Pulmonary:      Effort: Pulmonary effort is normal. No respiratory distress. Breath sounds: Normal breath sounds. Abdominal:      Palpations: Abdomen is soft. Tenderness: There is no abdominal tenderness. Musculoskeletal:         General: No swelling. Skin:     General: Skin is warm and dry. Capillary Refill: Capillary refill takes less than 2 seconds. Comments: Old surgical staple remaining in right upper leg incision site (removed by myself). No surround bleeding induration or erythema. Neurological:      Mental Status: She is alert. Mental status is at baseline.          Vital Signs  ED Triage Vitals   Temperature Pulse Respirations Blood Pressure SpO2   07/19/23 1813 07/19/23 1813 07/19/23 1813 07/19/23 1813 07/19/23 1813   98.7 °F (37.1 °C) 84 18 161/76 98 %      Temp Source Heart Rate Source Patient Position - Orthostatic VS BP Location FiO2 (%)   07/19/23 1813 07/19/23 1813 07/19/23 1813 07/19/23 1813 --   Oral Monitor Sitting Right arm       Pain Score       07/20/23 1604       5           Vitals:    07/19/23 2206 07/20/23 0708 07/20/23 1420 07/20/23 1450   BP: 158/90 124/60 160/78 146/73   Pulse: 86 65 88 75   Patient Position - Orthostatic VS:  Lying           Visual Acuity      ED Medications  Medications   buPROPion (WELLBUTRIN SR) 12 hr tablet 150 mg (150 mg Oral Given 7/20/23 1708)   levothyroxine tablet 50 mcg (50 mcg Oral Given 7/20/23 0540)   mirtazapine (REMERON) tablet 30 mg (30 mg Oral Given 7/20/23 0219)   risperiDONE (RisperDAL) tablet 1 mg (1 mg Oral Given 7/20/23 1708)   sertraline (ZOLOFT) tablet 50 mg (50 mg Oral Given 7/20/23 0720)   cholecalciferol (VITAMIN D3) tablet 5,000 Units (5,000 Units Oral Given 7/20/23 0824)   acetaminophen (TYLENOL) tablet 650 mg (650 mg Oral Given 7/20/23 1604)   senna (SENOKOT) tablet 8.6 mg (has no administration in time range)   ondansetron (ZOFRAN) injection 4 mg (has no administration in time range)   aluminum-magnesium hydroxide-simethicone (MAALOX) oral suspension 30 mL (has no administration in time range)   enoxaparin (LOVENOX) subcutaneous injection 40 mg (40 mg Subcutaneous Given 7/20/23 0824)   oxyCODONE (ROXICODONE) split tablet 2.5 mg (has no administration in time range)     Or   oxyCODONE (ROXICODONE) IR tablet 5 mg (has no administration in time range)   sodium chloride 0.9 % infusion (75 mL/hr Intravenous New Bag 7/20/23 1517)   sodium chloride 0.9 % bolus 500 mL (500 mL Intravenous New Bag 7/19/23 1935)       Diagnostic Studies  Results Reviewed     Procedure Component Value Units Date/Time    HS Troponin I 4hr [[id number]]  (Normal) Collected: 07/19/23 2244    Lab Status: Final result Specimen: Blood from Hand, Left Updated: 07/19/23 2320     hs TnI 4hr 13 ng/L      Delta 4hr hsTnI 0 ng/L     HS Troponin I 2hr [[id number]]  (Normal) Collected: 07/19/23 2132    Lab Status: Final result Specimen: Blood from Hand, Left Updated: 07/19/23 2202     hs TnI 2hr 13 ng/L      Delta 2hr hsTnI 0 ng/L     HS Troponin 0hr (reflex protocol) [[id number]]  (Normal) Collected: 07/19/23 1855    Lab Status: Final result Specimen: Blood from Arm, Left Updated: 07/19/23 1925     hs TnI 0hr 13 ng/L     Comprehensive metabolic panel [[id number]]  (Abnormal) Collected: 07/19/23 1855    Lab Status: Final result Specimen: Blood from Arm, Left Updated: 07/19/23 1917     Sodium 137 mmol/L      Potassium 3.8 mmol/L      Chloride 97 mmol/L      CO2 28 mmol/L      ANION GAP 12 mmol/L      BUN 16 mg/dL      Creatinine 0.82 mg/dL      Glucose 96 mg/dL      Calcium 9.9 mg/dL      AST 29 U/L      ALT 12 U/L      Alkaline Phosphatase 123 U/L      Total Protein 8.2 g/dL      Albumin 4.2 g/dL      Total Bilirubin 0.48 mg/dL      eGFR 73 ml/min/1.73sq m     Narrative:      [place] guidelines for Chronic Kidney Disease (CKD):   •  Stage 1 with normal or high GFR (GFR > 90 mL/min/1.73 square meters)  •  Stage 2 Mild CKD (GFR = 60-89 mL/min/1.73 square meters)  •  Stage 3A Moderate CKD (GFR = 45-59 mL/min/1.73 square meters)  •  Stage 3B Moderate CKD (GFR = 30-44 mL/min/1.73 square meters)  •  Stage 4 Severe CKD (GFR = 15-29 mL/min/1.73 square meters)  •  Stage 5 End Stage CKD (GFR <15 mL/min/1.73 square meters)  Note: GFR calculation is accurate only with a steady state creatinine    CK [[id number]]  (Abnormal) Collected: 07/19/23 1855    Lab Status: Final result Specimen: Blood from Arm, Left Updated: 07/19/23 1917     Total  U/L     CBC and differential [[id number]]  (Abnormal) Collected: 07/19/23 1855    Lab Status: Final result Specimen: Blood from Arm, Left Updated: 07/19/23 1900     WBC 9.34 Thousand/uL      RBC 3.66 Million/uL      Hemoglobin 10.8 g/dL      Hematocrit 35.0 %      MCV 96 fL      MCH 29.5 pg      MCHC 30.9 g/dL      RDW 12.6 %      MPV 8.9 fL      Platelets 798 Thousands/uL      nRBC 0 /100 WBCs      Neutrophils Relative 83 %      Immat GRANS % 1 %      Lymphocytes Relative 8 %      Monocytes Relative 7 %      Eosinophils Relative 1 %      Basophils Relative 0 %      Neutrophils Absolute 7.73 Thousands/µL      Immature Grans Absolute 0.05 Thousand/uL      Lymphocytes Absolute 0.76 Thousands/µL      Monocytes Absolute 0.69 Thousand/µL      Eosinophils Absolute 0.08 Thousand/µL      Basophils Absolute 0.03 Thousands/µL                  CT head wo contrast   Final Result by Tamiko Hodge MD (07/19 2102)      No acute intracranial abnormality. Workstation performed: [id number]         CT spine cervical without contrast   Final Result by Tamiko Hodge MD (07/19 2107)      No cervical spine fracture or traumatic malalignment.                   Workstation performed: [id number]         MRI inpatient order    (Results Pending)              Procedures  Procedures         ED Course  ED Course as of 07/20/23 2028 Wed Jul 19, 2023 2023 SO- admit for failure to thrive                                             Medical Decision Making  Patient's sister arrived in the ED and states patient hs gotten progressively weaker over the past few weeks and she can no longer care for her at home. Patient will be admitted to med for failure to thrive    Amount and/or Complexity of Data Reviewed  Labs: ordered. Radiology: ordered. Risk  Decision regarding hospitalization. Disposition  Final diagnoses:   Generalized weakness   Ambulatory dysfunction   Dementia ([address])   Failure to thrive in adult     Time reflects when diagnosis was documented in both MDM as applicable and the Disposition within this note     Time User Action Codes Description Comment    7/19/2023  9:31 PM Consuelo Brod Add [R53.1] Generalized weakness     7/19/2023  9:31 PM Consuelo Brod Add [R26.2] Ambulatory dysfunction     7/19/2023  9:31 PM Consuelo Brod Add [F03.90] Dementia ([address])     7/19/2023  9:31 PM Consuelo Brod Add [R62.7] Failure to thrive in adult     7/20/2023  2:40 PM Lang Mingle Add [H51.8] Gaze preference       ED Disposition     ED Disposition   Admit    Condition   Stable    Date/Time   Wed Jul 19, 2023  9:31 PM    Comment   Case was discussed with [facility] and the patient's admission status was agreed to be Admission Status: inpatient status to the service of Dr. Cirilo Huang .            Follow-up Information    None         Current Discharge Medication List      CONTINUE these medications which have NOT CHANGED    Details   buPROPion (WELLBUTRIN SR) 150 mg 12 hr tablet 2 (two) times a day        Calcium-Magnesium-Vitamin D (CALCIUM 500 PO) Take by mouth      Cholecalciferol (VITAMIN D3) 5000 units CAPS Take by mouth      levothyroxine 50 mcg tablet Take 50 mcg by mouth every morning      mirtazapine (REMERON) 30 mg tablet 30 mg daily at bedtime        Multiple Vitamins-Minerals (CENTRUM SILVER 50+WOMEN PO) Take 1 tablet by mouth daily oxyCODONE-acetaminophen (Percocet) 5-325 mg per tablet Take 1 tablet by mouth every 6 (six) hours as needed for severe pain Continue current therapy. Max Daily Amount: 4 tablets  Qty: 20 tablet, Refills: 0    Associated Diagnoses: Closed displaced fracture of right femoral neck (HCC)      risperiDONE (RisperDAL) 1 mg tablet Take 1 mg by mouth 4 (four) times a day       sertraline (ZOLOFT) 50 mg tablet 50 mg daily with breakfast               No discharge procedures on file.     PDMP Review       Value Time User    PDMP Reviewed  Yes 6/30/2023 10:06 AM Carol Dodson PA-C          ED Provider  Electronically Signed by           Bobby Sheth DO  07/20/23 2028

## 2023-07-20 PROBLEM — R74.8 ELEVATED CK: Status: ACTIVE | Noted: 2023-07-20

## 2023-07-20 PROBLEM — E44.0 MODERATE PROTEIN-CALORIE MALNUTRITION (HCC): Status: ACTIVE | Noted: 2023-07-20
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
## 2023-07-20 PROBLEM — Z74.2: Status: ACTIVE | Noted: 2023-07-20

## 2023-07-20 PROBLEM — R62.7 FAILURE TO THRIVE IN ADULT: Status: ACTIVE | Noted: 2023-07-20

## 2023-07-20 PROBLEM — G93.40 ENCEPHALOPATHY: Status: ACTIVE | Noted: 2023-07-20

## 2023-07-20 PROBLEM — D64.9 ANEMIA: Status: ACTIVE | Noted: 2023-07-20

## 2023-07-20 LAB
ANION GAP SERPL CALCULATED.3IONS-SCNC: 10 MMOL/L
BUN SERPL-MCNC: 16 MG/DL (ref 5–25)
CALCIUM SERPL-MCNC: 9.1 MG/DL (ref 8.4–10.2)
CHLORIDE SERPL-SCNC: 100 MMOL/L (ref 96–108)
CK SERPL-CCNC: 328 U/L (ref 26–192)
CO2 SERPL-SCNC: 25 MMOL/L (ref 21–32)
CREAT SERPL-MCNC: 0.76 MG/DL (ref 0.6–1.3)
ERYTHROCYTE [DISTWIDTH] IN BLOOD BY AUTOMATED COUNT: 12.6 % (ref 11.6–15.1)
GFR SERPL CREATININE-BSD FRML MDRD: 80 ML/MIN/1.73SQ M
GLUCOSE SERPL-MCNC: 86 MG/DL (ref 65–140)
HCT VFR BLD AUTO: 30.9 % (ref 34.8–46.1)
HGB BLD-MCNC: 9.6 G/DL (ref 11.5–15.4)
MCH RBC QN AUTO: 29.6 PG (ref 26.8–34.3)
MCHC RBC AUTO-ENTMCNC: 31.1 G/DL (ref 31.4–37.4)
MCV RBC AUTO: 95 FL (ref 82–98)
PLATELET # BLD AUTO: 344 THOUSANDS/UL (ref 149–390)
PMV BLD AUTO: 8.6 FL (ref 8.9–12.7)
POTASSIUM SERPL-SCNC: 3.6 MMOL/L (ref 3.5–5.3)
RBC # BLD AUTO: 3.24 MILLION/UL (ref 3.81–5.12)
SODIUM SERPL-SCNC: 135 MMOL/L (ref 135–147)
WBC # BLD AUTO: 9.04 THOUSAND/UL (ref 4.31–10.16)

## 2023-07-20 PROCEDURE — 85027 COMPLETE CBC AUTOMATED: CPT | Performed by: PHYSICIAN ASSISTANT

## 2023-07-20 PROCEDURE — 87505 NFCT AGENT DETECTION GI: CPT | Performed by: INTERNAL MEDICINE

## 2023-07-20 PROCEDURE — 97530 THERAPEUTIC ACTIVITIES: CPT

## 2023-07-20 PROCEDURE — 97535 SELF CARE MNGMENT TRAINING: CPT

## 2023-07-20 PROCEDURE — 99223 1ST HOSP IP/OBS HIGH 75: CPT | Performed by: PSYCHIATRY & NEUROLOGY

## 2023-07-20 PROCEDURE — 82550 ASSAY OF CK (CPK): CPT | Performed by: PHYSICIAN ASSISTANT

## 2023-07-20 PROCEDURE — 80048 BASIC METABOLIC PNL TOTAL CA: CPT | Performed by: PHYSICIAN ASSISTANT

## 2023-07-20 PROCEDURE — 97163 PT EVAL HIGH COMPLEX 45 MIN: CPT

## 2023-07-20 PROCEDURE — 97167 OT EVAL HIGH COMPLEX 60 MIN: CPT

## 2023-07-20 PROCEDURE — 87493 C DIFF AMPLIFIED PROBE: CPT | Performed by: INTERNAL MEDICINE

## 2023-07-20 PROCEDURE — 99232 SBSQ HOSP IP/OBS MODERATE 35: CPT | Performed by: INTERNAL MEDICINE

## 2023-07-20 PROCEDURE — 92610 EVALUATE SWALLOWING FUNCTION: CPT

## 2023-07-20 RX ORDER — ENOXAPARIN SODIUM 100 MG/ML
40 INJECTION SUBCUTANEOUS DAILY
Status: DISCONTINUED | OUTPATIENT
Start: 2023-07-20 | End: 2023-07-22

## 2023-07-20 RX ORDER — SENNOSIDES 8.6 MG
1 TABLET ORAL
Status: DISCONTINUED | OUTPATIENT
Start: 2023-07-20 | End: 2023-07-29 | Stop reason: HOSPADM

## 2023-07-20 RX ORDER — MAGNESIUM HYDROXIDE/ALUMINUM HYDROXICE/SIMETHICONE 120; 1200; 1200 MG/30ML; MG/30ML; MG/30ML
30 SUSPENSION ORAL EVERY 6 HOURS PRN
Status: DISCONTINUED | OUTPATIENT
Start: 2023-07-20 | End: 2023-07-29 | Stop reason: HOSPADM

## 2023-07-20 RX ORDER — RISPERIDONE 1 MG/1
1 TABLET ORAL 4 TIMES DAILY
Status: DISCONTINUED | OUTPATIENT
Start: 2023-07-20 | End: 2023-07-21

## 2023-07-20 RX ORDER — MIRTAZAPINE 15 MG/1
30 TABLET, FILM COATED ORAL
Status: DISCONTINUED | OUTPATIENT
Start: 2023-07-20 | End: 2023-07-29 | Stop reason: HOSPADM

## 2023-07-20 RX ORDER — LEVOTHYROXINE SODIUM 0.05 MG/1
50 TABLET ORAL
Status: DISCONTINUED | OUTPATIENT
Start: 2023-07-20 | End: 2023-07-29 | Stop reason: HOSPADM

## 2023-07-20 RX ORDER — BUPROPION HYDROCHLORIDE 150 MG/1
150 TABLET, EXTENDED RELEASE ORAL 2 TIMES DAILY
Status: DISCONTINUED | OUTPATIENT
Start: 2023-07-20 | End: 2023-07-29 | Stop reason: HOSPADM

## 2023-07-20 RX ORDER — MELATONIN
5000 DAILY
Status: DISCONTINUED | OUTPATIENT
Start: 2023-07-20 | End: 2023-07-25

## 2023-07-20 RX ORDER — ONDANSETRON 2 MG/ML
4 INJECTION INTRAMUSCULAR; INTRAVENOUS EVERY 6 HOURS PRN
Status: DISCONTINUED | OUTPATIENT
Start: 2023-07-20 | End: 2023-07-29 | Stop reason: HOSPADM

## 2023-07-20 RX ORDER — OXYCODONE HYDROCHLORIDE 5 MG/1
5 TABLET ORAL EVERY 6 HOURS PRN
Status: DISCONTINUED | OUTPATIENT
Start: 2023-07-20 | End: 2023-07-25

## 2023-07-20 RX ORDER — SODIUM CHLORIDE 9 MG/ML
75 INJECTION, SOLUTION INTRAVENOUS CONTINUOUS
Status: DISCONTINUED | OUTPATIENT
Start: 2023-07-20 | End: 2023-07-21

## 2023-07-20 RX ORDER — ACETAMINOPHEN 325 MG/1
650 TABLET ORAL EVERY 6 HOURS PRN
Status: DISCONTINUED | OUTPATIENT
Start: 2023-07-20 | End: 2023-07-25

## 2023-07-20 RX ORDER — SODIUM CHLORIDE, SODIUM GLUCONATE, SODIUM ACETATE, POTASSIUM CHLORIDE, MAGNESIUM CHLORIDE, SODIUM PHOSPHATE, DIBASIC, AND POTASSIUM PHOSPHATE .53; .5; .37; .037; .03; .012; .00082 G/100ML; G/100ML; G/100ML; G/100ML; G/100ML; G/100ML; G/100ML
75 INJECTION, SOLUTION INTRAVENOUS CONTINUOUS
Status: DISCONTINUED | OUTPATIENT
Start: 2023-07-20 | End: 2023-07-20

## 2023-07-20 RX ADMIN — MIRTAZAPINE 30 MG: 15 TABLET, FILM COATED ORAL at 02:19

## 2023-07-20 RX ADMIN — ENOXAPARIN SODIUM 40 MG: 100 INJECTION SUBCUTANEOUS at 08:24

## 2023-07-20 RX ADMIN — RISPERIDONE 1 MG: 1 TABLET ORAL at 21:21

## 2023-07-20 RX ADMIN — SERTRALINE 50 MG: 50 TABLET, FILM COATED ORAL at 07:20

## 2023-07-20 RX ADMIN — SODIUM CHLORIDE, SODIUM GLUCONATE, SODIUM ACETATE, POTASSIUM CHLORIDE, MAGNESIUM CHLORIDE, SODIUM PHOSPHATE, DIBASIC, AND POTASSIUM PHOSPHATE 75 ML/HR: .53; .5; .37; .037; .03; .012; .00082 INJECTION, SOLUTION INTRAVENOUS at 02:10

## 2023-07-20 RX ADMIN — SODIUM CHLORIDE 75 ML/HR: 0.9 INJECTION, SOLUTION INTRAVENOUS at 15:17

## 2023-07-20 RX ADMIN — RISPERIDONE 1 MG: 1 TABLET ORAL at 08:24

## 2023-07-20 RX ADMIN — RISPERIDONE 1 MG: 1 TABLET ORAL at 17:08

## 2023-07-20 RX ADMIN — BUPROPION HYDROCHLORIDE 150 MG: 150 TABLET, FILM COATED, EXTENDED RELEASE ORAL at 08:24

## 2023-07-20 RX ADMIN — Medication 5000 UNITS: at 08:24

## 2023-07-20 RX ADMIN — MIRTAZAPINE 30 MG: 15 TABLET, FILM COATED ORAL at 21:20

## 2023-07-20 RX ADMIN — ACETAMINOPHEN 325MG 650 MG: 325 TABLET ORAL at 16:04

## 2023-07-20 RX ADMIN — BUPROPION HYDROCHLORIDE 150 MG: 150 TABLET, FILM COATED, EXTENDED RELEASE ORAL at 17:08

## 2023-07-20 RX ADMIN — LEVOTHYROXINE SODIUM 50 MCG: 50 TABLET ORAL at 05:40

## 2023-07-20 NOTE — ASSESSMENT & PLAN NOTE
· Only oriented to self on admission  · Sister reportedly called nursing staff that this is patient's baseline since surgery on 6/30  · PT, OT, and CM consulted  · Home medications: Risperdal 1 Mg 4 times daily and Remeron 30 mg at bedtime  · Continue home medications

## 2023-07-20 NOTE — PLAN OF CARE
Problem: OCCUPATIONAL THERAPY ADULT  Goal: Performs self-care activities at highest level of function for planned discharge setting. See evaluation for individualized goals. Description: Treatment Interventions: ADL retraining, Functional transfer training, UE strengthening/ROM, Visual perceptual retraining, Cognitive reorientation, Patient/family training, Equipment evaluation/education, Neuromuscular reeducation, Fine motor coordination activities, Compensatory technique education, Continued evaluation, Activityengagement          See flowsheet documentation for full assessment, interventions and recommendations. Note: Limitation: Decreased ADL status, Decreased self-care trans, Decreased high-level ADLs, Decreased cognition, Decreased endurance  Prognosis: Fair  Assessment: Pt is a 69 y.o. female seen for OT evaluation at [facility], admitted 7/19/2023 s/p fall and being on floor extended period of time. Comorbidities affecting pt's functional performance at time of assessment include: dementia, recent right orthopedic hardware (screw) removal from right hip, depression, and hypothyroidism. Personal factors affecting pt at time of IE include:steps to enter environment, difficulty performing ADLS, difficulty performing IADLS , flat affect and decreased functional mobility. Prior to admission, pt was living with sister in a mobile home with few steps to manage. Pt  Required some assist w/  ADLS and IADLS & required cane and wheelchair  PTA. Upon evaluation: Pt requires max A x 1-2 for bed mobility, max Ax 2 for functional mobility/transfers,  Max -total A for [facility] ADLs and total A for [facility] ADLS 2* the following deficits impacting occupational performance: weakness, decreased strength, decreased balance, decreased tolerance, impaired arousal, impaired attention, impaired initiation and impaired memory. Full objective findings from OT assessment regarding body systems outlined above.  These impairments, as well as risk for falls  limit pt's ability to safely engage in all baseline areas of occupation and mobility. Pt to benefit from continued skilled OT tx while in the hospital to address deficits as defined above and maximize level of functional independence w ADL's and functional mobility. Occupational Performance areas to address include: bathing/shower, toilet hygiene, dressing and functional mobility. This evaluation required an extensive review of medical and/or therapy records and additional review of physical, cognitive and psychosocial history related to functional performance. Based upon functional performance deficits and assessments, this evaluation has been identified as a high complexity evaluation. The patient's raw score on the AM-PAC Daily Activity inpatient short form is 8, standardized score is 21.38  , less than 39.4. Patients at this level are likely to benefit from DC to post-acute rehabilitation services. However please refer to therapist recommendation for discharge planning given other factors that may influence destination. At this time, OT recommendations at time of discharge are level 2 moderate intensity resources.

## 2023-07-20 NOTE — PROGRESS NOTES
[address] [facility]  Progress Note  Name: Michelle Saleem  MRN: [id number]  Unit/Bed#: -01 I Date of Admission: 7/19/2023   Date of Service: 7/20/2023 I Hospital Day: 1    Assessment/Plan   Elevated CK  Assessment & Plan  · Total  on admission  · Patient was laying on the floor prolonged. · No renal dysfunction  · Placed on IV fluids. Improving  · Recheck CK in morning    Fall  Assessment & Plan  · Found on bedroom floor by sister  · CT head and CT C-spine negative for acute findings  · No signs of trauma otherwise on patient's body, no complaints of pain or tenderness on palpation  · PT, OT, and CM consulted    Anemia  Assessment & Plan  · Hemoglobin 10.8 on admission  · Baseline hemoglobin appears to be 9-10  · Trend CBC daily    Painful orthopaedic hardware [facility])  Assessment & Plan  · S/p removal of orthopedic screw on right on 6/30/23 by ortho, Dr. Nicole Wall   · Pain on Percocet at home  · Oxy 2.5/5 as needed ordered while inpatient    Ambulatory dysfunction  Assessment & Plan  Ambulatory dysfunction POA in setting of dementia evidenced by inability to stand after a fall.   CT is negative for any acute findings  Patient was noticed to have gaze preference  Will request neurology input    Moderate dementia [facility])  Assessment & Plan  · Only oriented to self on admission  · Sister reportedly called nursing staff that this is patient's baseline since surgery on 6/30  · PT, OT, and CM consulted  · Home medications: Risperdal 1 Mg 4 times daily and Remeron 30 mg at bedtime  · Continue home medications    Depression  Assessment & Plan  · Continue home Zoloft 50 Mg daily and Wellbutrin 150 mg twice daily    Acquired hypothyroidism  Assessment & Plan  · Continue home Synthroid 50 mcg daily    * No other household member able to render care  Assessment & Plan  · Found on bedroom floor by sister at 54 on 7/19, sister unable to get her up so sister went to sleep  · Sister woke at 63 and called EMS at that time  · CT head and CT C-spine negative for acute findings  · Patient without tenderness or signs of trauma throughout remainder of body  · PT, OT, and CM consults for placement             Labs & Imaging: I have personally reviewed pertinent reports. VTE Prophylaxis: in place. Code Status:   Level 1 - Full Code    Patient Centered Rounds: I have performed bedside rounds with nursing staff today. Discussions with Specialists or Other Care Team Provider: ROCHELLE    Current Length of Stay: 1 day(s)    Current Patient Status: Inpatient   Certification Statement: The patient will continue to require additional inpatient hospital stay due to see my assessment and plan. Subjective:   Patient is seen and examined at bedside. Patient is oriented x1. Afebrile  All other ROS are negative. Objective:    Vitals: Blood pressure 146/73, pulse 75, temperature 98.8 °F (37.1 °C), temperature source Temporal, resp. rate 14, height 5' 8" (1.727 m), weight 55.7 kg (122 lb 12.7 oz), SpO2 99 %. ,Body mass index is 18.67 kg/m². SPO2 RA Rest    Flowsheet Row ED to Hosp-Admission (Current) from 7/19/2023 in [facility] Med Surg Unit   SpO2 99 %   SpO2 Activity At Rest   O2 Device None (Room air)   O2 Flow Rate --        I&O: No intake or output data in the 24 hours ending 07/20/23 1458    Physical Exam:    General- Alert, lying comfortably in bed. Not in any acute distress. Neck- Supple, No JVD  CVS- regular, S1 and S2 normal  Chest- Bilateral Air entry, No rhochi, crackles or wheezing present. Abdomen- soft, nontender, not distended, no guarding or rigidity, BS+  Extremities-  No pedal edema, No calf tenderness  CNS-   Alert, awake and orientedx1. No focal deficits present.     Invasive Devices     Peripheral Intravenous Line  Duration           Peripheral IV 07/20/23 Distal;Right;Ventral (anterior) Forearm <1 day          Drain  Duration           External Urinary Catheter <1 day Social History  reviewed  Family History   Problem Relation Age of Onset   • Hypertension Mother    • Parkinsonism Mother 80        memory issues    • Depression Mother    • Melanoma Father    • No Known Problems Maternal Grandmother    • No Known Problems Maternal Grandfather    • No Known Problems Paternal Grandmother    • Heart attack Paternal Grandfather    • Depression Other    • Skin cancer Sister    • Breast cancer Maternal Aunt         over age 48   • No Known Problems Paternal Aunt    • No Known Problems Sister     reviewed    Meds:  Current Facility-Administered Medications   Medication Dose Route Frequency Provider Last Rate Last Admin   • acetaminophen (TYLENOL) tablet 650 mg  650 mg Oral Q6H PRN Rossy Rand PA-C       • aluminum-magnesium hydroxide-simethicone (MAALOX) oral suspension 30 mL  30 mL Oral Q6H PRN Rossy Rand PA-C       • buPROPion [facility]) 12 hr tablet 150 mg  150 mg Oral BID Rossy Rand PA-C   150 mg at 07/20/23 5914   • cholecalciferol (VITAMIN D3) tablet 5,000 Units  5,000 Units Oral Daily Rossy Rand PA-C   5,000 Units at 07/20/23 5417   • enoxaparin (LOVENOX) subcutaneous injection 40 mg  40 mg Subcutaneous Daily Rossy Rand PA-C   40 mg at 07/20/23 9497   • levothyroxine tablet 50 mcg  50 mcg Oral Early Morning Rossy Rand PA-C   50 mcg at 07/20/23 0540   • mirtazapine (REMERON) tablet 30 mg  30 mg Oral HS Rossy Rand PA-C   30 mg at 07/20/23 8800   • ondansetron (ZOFRAN) injection 4 mg  4 mg Intravenous Q6H PRN Rossy Rand PA-C       • oxyCODONE (ROXICODONE) split tablet 2.5 mg  2.5 mg Oral Q6H PRN Rossy Rand PA-C        Or   • oxyCODONE (ROXICODONE) IR tablet 5 mg  5 mg Oral Q6H PRN Rossy Rand PA-C       • risperiDONE (RisperDAL) tablet 1 mg  1 mg Oral 4x Daily Rossy Rand PA-C   1 mg at 07/20/23 8459   • senna (SENOKOT) tablet 8.6 mg  1 tablet Oral HS PRN Rossy Rand PA-C • sertraline (ZOLOFT) tablet 50 mg  50 mg Oral Daily With Breakfast Augustinasheeba Mendez PA-C   50 mg at 07/20/23 0720   • sodium chloride 0.9 % infusion  75 mL/hr Intravenous Continuous Mykel Banuelos MD          Medications Prior to Admission   Medication   • buPROPion (WELLBUTRIN SR) 150 mg 12 hr tablet   • Calcium-Magnesium-Vitamin D (CALCIUM 500 PO)   • Cholecalciferol (VITAMIN D3) 5000 units CAPS   • levothyroxine 50 mcg tablet   • mirtazapine (REMERON) 30 mg tablet   • Multiple Vitamins-Minerals (CENTRUM SILVER 50+WOMEN PO)   • oxyCODONE-acetaminophen (Percocet) 5-325 mg per tablet   • risperiDONE (RisperDAL) 1 mg tablet   • sertraline (ZOLOFT) 50 mg tablet       Labs:  Results from last 7 days   Lab Units 07/20/23  0424 07/19/23  1855   WBC Thousand/uL 9.04 9.34   HEMOGLOBIN g/dL 9.6* 10.8*   HEMATOCRIT % 30.9* 35.0   PLATELETS Thousands/uL 344 425*   NEUTROS PCT %  --  83*   LYMPHS PCT %  --  8*   MONOS PCT %  --  7   EOS PCT %  --  1     Results from last 7 days   Lab Units 07/20/23  0424 07/19/23  1855   POTASSIUM mmol/L 3.6 3.8   CHLORIDE mmol/L 100 97   CO2 mmol/L 25 28   BUN mg/dL 16 16   CREATININE mg/dL 0.76 0.82   CALCIUM mg/dL 9.1 9.9   ALK PHOS U/L  --  123*   ALT U/L  --  12   AST U/L  --  29     Lab Results   Component Value Date    CKTOTAL 328 (H) 07/20/2023    CKTOTAL 381 (H) 07/19/2023         Lab Results   Component Value Date    BLOODCX No Growth After 5 Days. 06/16/2019    BLOODCX No Growth After 5 Days. 06/16/2019         Imaging:  No results found for this or any previous visit. Results for orders placed during the hospital encounter of 06/16/19    XR chest 2 views    Narrative  CHEST    INDICATION:   Right upper quadrant pain. Possible infiltrate abdominal CT.    COMPARISON:  CT abdomen and pelvis dated the same    EXAM PERFORMED/VIEWS:  XR CHEST PA & LATERAL      FINDINGS:    No pneumothorax is seen.   Some patchy opacities are seen in the right lower lung field with associated mild hypoaeration and blunting of the right costophrenic sulcus, suspect small pleural effusion/atelectasis. Superimposed infection could be  considered in the appropriate clinical setting. Some linear atelectasis/infection is seen in the right upper lung field. Lungs otherwise grossly clear. The heart is top normal in size. The cardiac and mediastinal contours otherwise appear unremarkable. Metallic plate and screw fixation of the right humeral head and proximal humerus noted. Visualized bones otherwise appear intact. Impression  Suspected small right pleural effusion/atelectasis. Patchy opacities in the right lower lung field and linear opacities in the right upper lung field could represent superimposed infection in the appropriate clinical setting. Correlation with the  patient's symptoms and laboratory values recommended. Other findings as above.       Workstation performed: [id number]      Last 24 Hours Medication List:   Current Facility-Administered Medications   Medication Dose Route Frequency Provider Last Rate   • acetaminophen  650 mg Oral Q6H PRN Hollis Rizo PA-C     • aluminum-magnesium hydroxide-simethicone  30 mL Oral Q6H PRN Hollis Rizo PA-C     • buPROPion  150 mg Oral BID Hollis Rizo PA-C     • cholecalciferol  5,000 Units Oral Daily Hollis Rizo PA-C     • enoxaparin  40 mg Subcutaneous Daily Hollis Rizo PA-C     • levothyroxine  50 mcg Oral Early Morning Hollis Rizo PA-C     • mirtazapine  30 mg Oral HS Hollis Rizo PA-C     • ondansetron  4 mg Intravenous Q6H PRN Hollis Rizo PA-C     • oxyCODONE  2.5 mg Oral Q6H PRN Hollis Rizo PA-C      Or   • oxyCODONE  5 mg Oral Q6H PRN Hollis Rizo PA-C     • risperiDONE  1 mg Oral 4x Daily Hollis Rizo PA-C     • senna  1 tablet Oral HS PRN Hollis Rizo PA-C     • sertraline  50 mg Oral Daily With Breakfast Hollis Rizo PA-C     • sodium chloride  75 mL/hr Intravenous Continuous Mila Hollis MD          Today, Patient Was Seen By: Mila Hollis MD    ** Please Note: Dictation voice to text software may have been used in the creation of this document.  **

## 2023-07-20 NOTE — ASSESSMENT & PLAN NOTE
· Total  on admission  · Patient was laying on the floor prolonged.   · No renal dysfunction  · Placed on IV fluids overnight  · Recheck CK in morning

## 2023-07-20 NOTE — ASSESSMENT & PLAN NOTE
· Found on bedroom floor by sister  · CT head and CT C-spine negative for acute findings  · No signs of trauma otherwise on patient's body, no complaints of pain or tenderness on palpation  · PT, OT, and CM consulted

## 2023-07-20 NOTE — ASSESSMENT & PLAN NOTE
· S/p removal of orthopedic screw on right on 6/30/23 by ortho, Dr. Chung Sams   · Pain on Percocet at home  · Oxy 2.5/5 as needed ordered while inpatient

## 2023-07-20 NOTE — PHYSICAL THERAPY NOTE
PHYSICAL THERAPY EVALUATION  NAME: Kaden Hernadez  AGE:   69 y.o. MRN:  [id number]  ADMIT DX: Dementia ([address]) [F03.90]  Failure to thrive in adult [R62.7]  Generalized weakness [R53.1]  Ambulatory dysfunction [R26.2]    PMH:   Past Medical History:   Diagnosis Date    Anxiety     Depression     Memory loss     Thyroid activity decreased      LENGTH OF STAY: 1       07/20/23 1440   PT Last Visit   PT Visit Date 07/20/23   Note Type   Note type Evaluation   Pain Assessment   Pain Assessment Tool FLACC   Pain Rating: FLACC (Rest) - Face 0   Pain Rating: FLACC (Rest) - Legs 0   Pain Rating: FLACC (Rest) - Activity 0   Pain Rating: FLACC (Rest) - Cry 0   Pain Rating: FLACC (Rest) - Consolability 0   Score: FLACC (Rest) 0   Pain Rating: FLACC (Activity) - Face 0   Pain Rating: FLACC (Activity) - Legs 0   Pain Rating: FLACC (Activity) - Activity 0   Pain Rating: FLACC (Activity) - Cry 0   Pain Rating: FLACC (Activity) - Consolability 0   Score: FLACC (Activity) 0   Restrictions/Precautions   Weight Bearing Precautions Per Order No  (Per TT with ortho, WBAT per discharge instructions.)   Other Precautions Contact/isolation;Cognitive; Chair Alarm; Bed Alarm;Multiple lines; Fall Risk   Home Living   Type of [facility] One level;Stairs to enter with rails  (3 TOM)   Home Equipment Walker;Cane;Wheelchair-manual   Additional Comments Per sister, pt ambulates without AD in home and uses SPC outside of home at baseline. Pt is unable to provide information at this time due to altered mental status. Prior Function   Level of [place] Independent with functional mobility; Needs assistance with IADLS;Needs assistance with ADLs   Lives With Family  (sister)   Receives Help From Family   IADLs Family/Friend/Other provides transportation; Family/Friend/Other provides meals; Family/Friend/Other provides medication management   Falls in the last 6 months 1 to 4  (1 "slide" to the floor)   General   Additional Pertinent History Pt noted to have a R sided gaze preference as well as R sided lean during session. Family/Caregiver Present Yes  (sister)   Cognition   Overall Cognitive Status Impaired   Arousal/Participation Lethargic   Orientation Level Oriented to person;Disoriented to place; Disoriented to time;Disoriented to situation   Memory Unable to assess   Following Commands Follows one step commands inconsistently   Comments Pt identified by name and  on hospital bracelet. Subjective   Subjective Requires maximum stimulation to maintain eyes open, respond to questioning. RLE Assessment   RLE Assessment X   Strength RLE   RLE Overall Strength 3+/5  (functionally)   LLE Assessment   LLE Assessment X   Strength LLE   LLE Overall Strength 3+/5  (functionally)   Bed Mobility   Rolling R 2  Maximal assistance   Additional items Assist x 1;Bedrails; Increased time required;Verbal cues   Rolling L 2  Maximal assistance   Additional items Assist x 1; Increased time required; Bedrails;Verbal cues   Supine to Sit 2  Maximal assistance   Additional items HOB elevated; Bedrails; Increased time required;Verbal cues;LE management;Assist x 2   Sit to Supine 2  Maximal assistance   Additional items Increased time required;Verbal cues; Assist x 2   Transfers   Sit to Stand 2  Maximal assistance   Additional items Assist x 2; Increased time required;Verbal cues   Stand to Sit 2  Maximal assistance   Additional items Assist x 2; Increased time required; Bedrails;Verbal cues   Balance   Static Sitting Fair -   Dynamic Sitting Poor +   Static Standing Zero  (Ax2)   Endurance Deficit   Endurance Deficit Yes   Endurance Deficit Description limited standing tolerance, fatigue   Activity Tolerance   Activity Tolerance Patient limited by fatigue   Nurse Made Aware Per RN, pt appropriate to treat; updated on status   Assessment   Problem List Decreased strength;Decreased endurance; Impaired balance;Decreased mobility; Decreased range of motion; Impaired judgement;Decreased cognition;Decreased safety awareness   Assessment Pt is a 69 y.o. female seen for PT evaluation s/p admit to [address] [place] [address] on 8/18/2022 w/ No other household member able to render care. Order placed for PT. Comorbidities affecting pt's physical performance at time of assessment include: limited cognition and parkinsonism. Personal factors affecting pt at time of IE include: depression, steps to enter environment, inability to perform ADLs, inability to ambulate household distances and recent fall(s). Prior to admission, pt was was independent w/ all functional mobility w/ SPC outside only, lived in one floor environment, had 3 TOM (+) railing and lived with sister. Upon evaluation: Pt requires max A x1 for bed mobility and max A x2 for sit to stand. (Please find full objective findings from PT assessment regarding body systems outlined above). Impairments and limitations also listed above, especially due to  weakness, decreased ROM, impaired balance, decreased endurance, decreased activity tolerance, decreased safety awareness, impaired judgement, fall risk and decreased cognition. Pt's clinical presentation is currently unstable/unpredictable seen in pt's presentation of fall risk, limited insight into deficits, and significant decline in functional mobility compared to baseline. Pt to benefit from continued skilled PT tx while in hospital and upon DC to address deficits as defined above and maximize level of functional mobility. From PT/mobility standpoint, recommendation at time of d/c would be inpatient rehab pending progress. Recommend progression of transfers and ambulation as appropriate.    Goals   Patient Goals unable to state due to mental status   STG Expiration Date 07/30/23   Short Term Goal #1 Pt will be able to: (1) perform bed mobility with min A to promote upright posture and OOB mobility (2) perform sit to stand with min A to decrease burden of care (3) perform SPT with mod A x1 and use of RW (4) increase standing balance by 1 grade to decrease risk of falls   PT Treatment Day 1   Plan   Treatment/Interventions Functional transfer training;LE strengthening/ROM; Therapeutic exercise; Endurance training;Cognitive reorientation;Patient/family training;Equipment eval/education; Bed mobility;Gait training   PT Frequency 3-5x/wk   Recommendation   UB Rehab Discharge Recommendation (PT/OT) Level 2   Equipment Recommended Other (Comment)  (pending progress)   AM-PAC Basic Mobility Inpatient   Turning in Flat Bed Without Bedrails 2   Lying on Back to Sitting on Edge of Flat Bed Without Bedrails 2   Moving Bed to Chair 1   Standing Up From Chair Using Arms 1   Walk in Room 1   Climb 3-5 Stairs With Railing 1   Basic Mobility Inpatient Raw Score 8   Turning Head Towards Sound 2   Follow Simple Instructions 2   Low Function Basic Mobility Raw Score  12   Low Function Basic Mobility Standardized Score  18.33   Highest Level Of Mobility   JH-HLM Goal 3: Sit at edge of bed   JH-HLM Achieved 3: Sit at edge of bed   Additional Treatment Session   Start Time 1425   End Time 1440   Treatment Assessment Pt able to tolerate sitting EOB for ~ 10` with Fair (-) sitting balance and R sided lean. Multiple attempts to change gaze to L side and correct posture. Significantly delayed responses noted. Requires consistent verbal cues to maintain eyes open and respond. Pt will continue to benefit from ongoing skilled PT to maximize her functional mobility and increase her level of independence. Additional Treatment Day 1   End of Consult   Patient Position at End of Consult Supine;Bed/Chair alarm activated; All needs within reach   Pt was seen for a co-eval with OT due to potential need for significant physical assist, poor pain control, impaired mental status, limiting behaviors, and poor adherence to precautions.      The patient's AM-PAC Basic Mobility Inpatient Short Form Raw Score is 8, Standardized Score is  . A Raw Score of less than 16 suggests the patient may benefit from discharge to post-acute rehabilitation services. Adapted from [facility] “6-Clicks” Basic Mobility and Daily Activity Scores With Discharge Destination. Physical Therapy, 2021;101:1-9.  DOI: [email or web address]      Jenny Ibarra PT,DPT

## 2023-07-20 NOTE — PLAN OF CARE
Problem: Potential for Falls  Goal: Patient will remain free of falls  Description: INTERVENTIONS:  - Educate patient/family on patient safety including physical limitations  - Instruct patient to call for assistance with activity   - Consult OT/PT to assist with strengthening/mobility   - Keep Call bell within reach  - Keep bed low and locked with side rails adjusted as appropriate  - Keep care items and personal belongings within reach  - Initiate and maintain comfort rounds  - Make Fall Risk Sign visible to staff  - Offer Toileting every 2 Hours, in advance of need  - Initiate/Maintain alarm  - Obtain necessary fall risk management equipment  - Apply yellow socks and bracelet for high fall risk patients  - Consider moving patient to room near nurses station  Outcome: Progressing     Problem: MOBILITY - ADULT  Goal: Maintain or return to baseline ADL function  Description: INTERVENTIONS:  -  Assess patient's ability to carry out ADLs; assess patient's baseline for ADL function and identify physical deficits which impact ability to perform ADLs (bathing, care of mouth/teeth, toileting, grooming, dressing, etc.)  - Assess/evaluate cause of self-care deficits   - Assess range of motion  - Assess patient's mobility; develop plan if impaired  - Assess patient's need for assistive devices and provide as appropriate  - Encourage maximum independence but intervene and supervise when necessary  - Involve family in performance of ADLs  - Assess for home care needs following discharge   - Consider OT consult to assist with ADL evaluation and planning for discharge  - Provide patient education as appropriate  Outcome: Progressing  Goal: Maintains/Returns to pre admission functional level  Description: INTERVENTIONS:  - Perform BMAT or MOVE assessment daily.   - Set and communicate daily mobility goal to care team and patient/family/caregiver.    - Collaborate with rehabilitation services on mobility goals if consulted  - Perform Range of Motion 3 times a day. - Reposition patient every 3 hours.   - Dangle patient 3 times a day  - Stand patient 3 times a day  - Ambulate patient 3 times a day  - Out of bed to chair 3 times a day   - Out of bed for meals 3 times a day  - Out of bed for toileting  - Record patient progress and toleration of activity level   Outcome: Progressing     Problem: Prexisting or High Potential for Compromised Skin Integrity  Goal: Skin integrity is maintained or improved  Description: INTERVENTIONS:  - Identify patients at risk for skin breakdown  - Assess and monitor skin integrity  - Assess and monitor nutrition and hydration status  - Monitor labs   - Assess for incontinence   - Turn and reposition patient  - Assist with mobility/ambulation  - Relieve pressure over bony prominences  - Avoid friction and shearing  - Provide appropriate hygiene as needed including keeping skin clean and dry  - Evaluate need for skin moisturizer/barrier cream  - Collaborate with interdisciplinary team   - Patient/family teaching  - Consider wound care consult   Outcome: Progressing     Problem: PAIN - ADULT  Goal: Verbalizes/displays adequate comfort level or baseline comfort level  Description: Interventions:  - Encourage patient to monitor pain and request assistance  - Assess pain using appropriate pain scale  - Administer analgesics based on type and severity of pain and evaluate response  - Implement non-pharmacological measures as appropriate and evaluate response  - Consider cultural and social influences on pain and pain management  - Notify physician/advanced practitioner if interventions unsuccessful or patient reports new pain  Outcome: Progressing     Problem: INFECTION - ADULT  Goal: Absence or prevention of progression during hospitalization  Description: INTERVENTIONS:  - Assess and monitor for signs and symptoms of infection  - Monitor lab/diagnostic results  - Monitor all insertion sites, i.e. indwelling lines, tubes, and drains  - Monitor endotracheal if appropriate and nasal secretions for changes in amount and color  - [place] appropriate cooling/warming therapies per order  - Administer medications as ordered  - Instruct and encourage patient and family to use good hand hygiene technique  - Identify and instruct in appropriate isolation precautions for identified infection/condition  Outcome: Progressing     Problem: DISCHARGE PLANNING  Goal: Discharge to home or other facility with appropriate resources  Description: INTERVENTIONS:  - Identify barriers to discharge w/patient and caregiver  - Arrange for needed discharge resources and transportation as appropriate  - Identify discharge learning needs (meds, wound care, etc.)  - Arrange for interpretive services to assist at discharge as needed  - Refer to Case Management Department for coordinating discharge planning if the patient needs post-hospital services based on physician/advanced practitioner order or complex needs related to functional status, cognitive ability, or social support system  Outcome: Progressing     Problem: Knowledge Deficit  Goal: Patient/family/caregiver demonstrates understanding of disease process, treatment plan, medications, and discharge instructions  Description: Complete learning assessment and assess knowledge base.   Interventions:  - Provide teaching at level of understanding  - Provide teaching via preferred learning methods  Outcome: Progressing

## 2023-07-20 NOTE — H&P
[facility]  H&P  Name: Asad Pugh 69 y.o. female I MRN: [id number]  Unit/Bed#: -01 I Date of Admission: 7/19/2023   Date of Service: 7/20/2023 I Hospital Day: 1      Assessment/Plan   * No other household member able to render care  Assessment & Plan  · Found on bedroom floor by sister at 54 on 7/19, sister unable to get her up so sister went to sleep  · Sister woke at 63 and called EMS at that time  · CT head and CT C-spine negative for acute findings  · Patient without tenderness or signs of trauma throughout remainder of body  · PT, OT, and CM consults for placement    Fall  Assessment & Plan  · Found on bedroom floor by sister  · CT head and CT C-spine negative for acute findings  · No signs of trauma otherwise on patient's body, no complaints of pain or tenderness on palpation  · PT, OT, and CM consulted    Anemia  Assessment & Plan  · Hemoglobin 10.8 on admission  · Baseline hemoglobin appears to be 9-10  · Trend CBC daily    Moderate dementia ([address])  Assessment & Plan  · Only oriented to self on admission  · Sister reportedly called nursing staff that this is patient's baseline since surgery on 6/30  · PT, OT, and CM consulted  · Home medications: Risperdal 1 Mg 4 times daily and Remeron 30 mg at bedtime  · Continue home medications    Painful orthopaedic hardware [facility])  Assessment & Plan  · S/p removal of orthopedic screw on right on 6/30/23 by ortho, Dr. Lj Toro   · Pain on Percocet at home  · Oxy 2.5/5 as needed ordered while inpatient    Elevated CK  Assessment & Plan  · Total  on admission  · Patient was laying on the floor prolonged.   · No renal dysfunction  · Placed on IV fluids overnight  · Recheck CK in morning    Acquired hypothyroidism  Assessment & Plan  · Continue home Synthroid 50 mcg daily    Depression  Assessment & Plan  · Continue home Zoloft 50 Mg daily and Wellbutrin 150 mg twice daily       VTE Pharmacologic Prophylaxis: VTE Score: 4 Moderate Risk (Score 3-4) - Pharmacological DVT Prophylaxis Ordered: enoxaparin (Lovenox). Code Status: Level 1 - Full Code   Discussion with family: Attempted to call the sister, however there was no answer and voicemail box was full. Anticipated Length of Stay: Patient will be admitted on an inpatient basis with an anticipated length of stay of greater than 2 midnights secondary to No other household members are able to render care, elevated CK, fall. Total Time Spent on Date of Encounter in care of patient: 75 minutes This time was spent on one or more of the following: performing physical exam; counseling and coordination of care; obtaining or reviewing history; documenting in the medical record; reviewing/ordering tests, medications or procedures; communicating with other healthcare professionals and discussing with patient's family/caregivers. Chief Complaint: Fall    History of Present Illness:  Jared Matamoros is a 69 y.o. female with a PMH of dementia, recent right orthopedic hardware (screw) removal from right hip, depression, and hypothyroidism who presents with her sister for evaluation of fall. Patient reportedly was found in the bedroom by her sister at 18 100 on 7/19. Sister was unable to get her up, so the sister went back to bed. Sister woke at 63 and called EMS at that time as patient was still on the floor. Patient denies any pain. History obtained from ED documentation. History is extremely limited as patient has underlying dementia and is not a good historian and sister was unable to be reached on admission.     Review of Systems:  Review of Systems   Unable to perform ROS: Dementia       Past Medical and Surgical History:   Past Medical History:   Diagnosis Date   • Anxiety    • Depression    • Memory loss    • Thyroid activity decreased        Past Surgical History:   Procedure Laterality Date   • BREAST BIOPSY      years ago- Benign   • JOINT REPLACEMENT Left    • [place] OPTX FEM SHFT FX W/INSJ IMED IMPLT W/WO SCREW Right 5/9/2019    Procedure: INSERTION NAIL IM FEMUR ANTEGRADE (TROCHANTERIC), RIGHT;  Surgeon: Jaimie Osuna MD;  Location: [facility];  Service: Orthopedics   • [place] REMOVAL IMPLANT DEEP Right 6/30/2023    Procedure: REMOVAL HARDWARE TFN DYNAMIC SCREW;  Surgeon: Jaimie Osuna MD;  Location: [facility];  Service: Orthopedics       Meds/Allergies:  Prior to Admission medications    Medication Sig Start Date End Date Taking? Authorizing Provider   buPROPion [facility] SR) 150 mg 12 hr tablet 2 (two) times a day   8/16/18  Yes Historical Provider, MD   Calcium-Magnesium-Vitamin D (CALCIUM 500 PO) Take by mouth   Yes Historical Provider, MD   Cholecalciferol (VITAMIN D3) 5000 units CAPS Take by mouth   Yes Historical Provider, MD   levothyroxine 50 mcg tablet Take 50 mcg by mouth every morning 3/4/23  Yes Historical Provider, MD   mirtazapine (REMERON) 30 mg tablet 30 mg daily at bedtime   9/12/18  Yes Historical Provider, MD   Multiple Vitamins-Minerals (CENTRUM SILVER 50+WOMEN PO) Take 1 tablet by mouth daily   Yes Historical Provider, MD   oxyCODONE-acetaminophen (Percocet) 5-325 mg per tablet Take 1 tablet by mouth every 6 (six) hours as needed for severe pain Continue current therapy. Max Daily Amount: 4 tablets 7/13/23  Yes Benny Mojica PA-C   risperiDONE (RisperDAL) 1 mg tablet Take 1 mg by mouth 4 (four) times a day  8/16/18  Yes Historical Provider, MD   sertraline (ZOLOFT) 50 mg tablet 50 mg daily with breakfast   8/16/18  Yes Historical Provider, MD     I have reviewed home medications with patient personally.     Allergies: No Known Allergies    Social History:  Marital Status: Single   Occupation: disabled  Patient Pre-hospital Living Situation: Home, With other family member: sister  Patient Pre-hospital Level of Mobility: manual wheelchair  Patient Pre-hospital Diet Restrictions: none   Substance Use History:   Social History     Substance and Sexual Activity   Alcohol Use Not Currently     Social History     Tobacco Use   Smoking Status Never   Smokeless Tobacco Never     Social History     Substance and Sexual Activity   Drug Use No       Family History:  Family History   Problem Relation Age of Onset   • Hypertension Mother    • Parkinsonism Mother 80        memory issues    • Depression Mother    • Melanoma Father    • No Known Problems Maternal Grandmother    • No Known Problems Maternal Grandfather    • No Known Problems Paternal Grandmother    • Heart attack Paternal Grandfather    • Depression Other    • Skin cancer Sister    • Breast cancer Maternal Aunt         over age 48   • No Known Problems Paternal Aunt    • No Known Problems Sister        Physical Exam:     Vitals:   Blood Pressure: 158/90 (07/19/23 2206)  Pulse: 86 (07/19/23 2206)  Temperature: 98.6 °F (37 °C) (07/19/23 2206)  Temp Source: Oral (07/19/23 2206)  Respirations: 16 (07/19/23 2206)  Height: 5' 8" (172.7 cm) (07/19/23 2206)  Weight - Scale: 55.7 kg (122 lb 12.7 oz) (07/19/23 2206)  SpO2: 99 % (07/19/23 2205)    Physical Exam  Vitals and nursing note reviewed. Constitutional:       General: She is not in acute distress. Appearance: Normal appearance. She is not ill-appearing. Comments: Follows commands with repeat prompting. HENT:      Head: Normocephalic. Mouth/Throat:      Mouth: Mucous membranes are dry. Eyes:      Extraocular Movements: Extraocular movements intact. Conjunctiva/sclera: Conjunctivae normal.      Pupils: Pupils are equal, round, and reactive to light. Cardiovascular:      Rate and Rhythm: Normal rate and regular rhythm. Pulses: Normal pulses. Heart sounds: No murmur heard. Pulmonary:      Effort: Pulmonary effort is normal.      Breath sounds: Normal breath sounds. Abdominal:      General: Abdomen is flat. There is no distension. Palpations: Abdomen is soft. Tenderness: There is no abdominal tenderness. There is no guarding or rebound. Comments: No chest wall or rib tenderness on palpation. Musculoskeletal:      Cervical back: Normal range of motion. Right lower leg: No edema. Left lower leg: No edema. Comments: No pelvic tenderness   Skin:     General: Skin is warm and dry. Coloration: Skin is not pale. Neurological:      Mental Status: She is alert. Comments: Oriented to self only. Psychiatric:      Comments: Unable to assess due to dementia. Cooperative. flat affect. Additional Data:     Lab Results:  Results from last 7 days   Lab Units 07/19/23  1855   WBC Thousand/uL 9.34   HEMOGLOBIN g/dL 10.8*   HEMATOCRIT % 35.0   PLATELETS Thousands/uL 425*   NEUTROS PCT % 83*   LYMPHS PCT % 8*   MONOS PCT % 7   EOS PCT % 1     Results from last 7 days   Lab Units 07/19/23  1855   SODIUM mmol/L 137   POTASSIUM mmol/L 3.8   CHLORIDE mmol/L 97   CO2 mmol/L 28   BUN mg/dL 16   CREATININE mg/dL 0.82   ANION GAP mmol/L 12   CALCIUM mg/dL 9.9   ALBUMIN g/dL 4.2   TOTAL BILIRUBIN mg/dL 0.48   ALK PHOS U/L 123*   ALT U/L 12   AST U/L 29   GLUCOSE RANDOM mg/dL 96                       Lines/Drains:  Invasive Devices     Peripheral Intravenous Line  Duration           Peripheral IV 07/19/23 Left Antecubital 1 day          Drain  Duration           Urethral Catheter Latex 16 Fr. 1529 days                         Imaging: Reviewed radiology reports from this admission including: CT head and ct c-spine  CT head wo contrast   Final Result by Nicole Hager MD (07/19 2102)      No acute intracranial abnormality. Workstation performed: [id number]         CT spine cervical without contrast   Final Result by Nicole Hager MD (07/19 2107)      No cervical spine fracture or traumatic malalignment. Workstation performed: [id number]             EKG and Other Studies Reviewed on Admission:   · EKG: No EKG obtained. ** Please Note: This note has been constructed using a voice recognition system.  **

## 2023-07-20 NOTE — PLAN OF CARE
Problem: PHYSICAL THERAPY ADULT  Goal: Performs mobility at highest level of function for planned discharge setting. See evaluation for individualized goals. Description: Treatment/Interventions: Functional transfer training, LE strengthening/ROM, Therapeutic exercise, Endurance training, Cognitive reorientation, Patient/family training, Equipment eval/education, Bed mobility, Gait training  Equipment Recommended: Other (Comment) (pending progress)       See flowsheet documentation for full assessment, interventions and recommendations. Note:    Problem List: Decreased strength, Decreased endurance, Impaired balance, Decreased mobility, Decreased range of motion, Impaired judgement, Decreased cognition, Decreased safety awareness  Assessment: Pt is a 69 y.o. female seen for PT evaluation s/p admit to [address] [place] [address] on 8/18/2022 w/ No other household member able to render care. Order placed for PT. Comorbidities affecting pt's physical performance at time of assessment include: limited cognition and parkinsonism. Personal factors affecting pt at time of IE include: depression, steps to enter environment, inability to perform ADLs, inability to ambulate household distances and recent fall(s). Prior to admission, pt was was independent w/ all functional mobility w/ SPC outside only, lived in one floor environment, had 3 TOM (+) railing and lived with sister. Upon evaluation: Pt requires max A x1 for bed mobility and max A x2 for sit to stand. (Please find full objective findings from PT assessment regarding body systems outlined above). Impairments and limitations also listed above, especially due to  weakness, decreased ROM, impaired balance, decreased endurance, decreased activity tolerance, decreased safety awareness, impaired judgement, fall risk and decreased cognition.   Pt's clinical presentation is currently unstable/unpredictable seen in pt's presentation of fall risk, limited insight into deficits, and significant decline in functional mobility compared to baseline. Pt to benefit from continued skilled PT tx while in hospital and upon DC to address deficits as defined above and maximize level of functional mobility. From PT/mobility standpoint, recommendation at time of d/c would be inpatient rehab pending progress. Recommend progression of transfers and ambulation as appropriate. See flowsheet documentation for full assessment.

## 2023-07-20 NOTE — SPEECH THERAPY NOTE
Speech Language/Pathology    Speech-Language Pathology Bedside Swallow Evaluation      Patient Name: Itzel Rollins    Today's Date: 7/20/2023     Problem List  Principal Problem:    No other household member able to render care  Active Problems:    Acquired hypothyroidism    Depression    Moderate dementia ([address])    Painful orthopaedic hardware ([address])    Anemia    Fall    Elevated CK      Past Medical History  Past Medical History:   Diagnosis Date   • Anxiety    • Depression    • Memory loss    • Thyroid activity decreased        Past Surgical History  Past Surgical History:   Procedure Laterality Date   • BREAST BIOPSY      years ago- Benign   • JOINT REPLACEMENT Left    • [place] OPTX FEM SHFT FX W/INSJ IMED IMPLT W/WO SCREW Right 5/9/2019    Procedure: INSERTION NAIL IM FEMUR ANTEGRADE (TROCHANTERIC), RIGHT;  Surgeon: Isaak Tompkins MD;  Location:  MAIN OR;  Service: Orthopedics   • [place] REMOVAL IMPLANT DEEP Right 6/30/2023    Procedure: REMOVAL HARDWARE TFN DYNAMIC SCREW;  Surgeon: Isaak Tompkins MD;  Location:  MAIN OR;  Service: Orthopedics       Summary   Pt presented with s/s suggestive of moderate oral and suspected minimal pharyngeal dysphagia. Reduced oral opening for retrieval and overall limited bolus acceptance. Reduced and slowed mastication suellen by anterior munching. Pt requires frequent stimulation to alert to attend to mastication, at times falling asleep while masticating. Slowed transfers. Swallows w/ suspected delay. Swallows of liquids are audible, ?incoordination. No overt s/s aspiration.         Risk/s for Aspiration: Mild      Recommended Diet: puree/level 1 diet and thin liquids   Recommended Form of Meds: crushed with puree   Aspiration precautions and swallowing strategies: upright posture, only feed when fully alert, slow rate of feeding and small bites/sips  Other Recommendations: Continue frequent oral care, full feed         Current Medical Status  Pt is a 69 y.o. female who presented to [address] [facility] [place] [facility] with  a PMH of dementia, recent right orthopedic hardware (screw) removal from right hip, depression, and hypothyroidism who presents with her sister for evaluation of fall. Patient reportedly was found in the bedroom by her sister at 18 100 on 7/19. Sister was unable to get her up, so the sister went back to bed. Sister woke at 65 and called EMS at that time as patient was still on the floor. Patient denies any pain.     History obtained from ED documentation. History is extremely limited as patient has underlying dementia and is not a good historian and sister was unable to be reached on admission. Current Precautions: Allergies:  No known food allergies    Past medical history:  Please see H&P for details    Special Studies:  CT spine cervical 7/19: No cervical spine fracture or traumatic malalignment. CT head 7/19: No acute intracranial abnormality.       Social/Education/Vocational Hx:  Pt lives with family    Swallow Information   Current Risks for Dysphagia & Aspiration: AMS  Current Symptoms/Concerns: poor intake, inability to masticate   Current Diet: puree/level 1 diet and nectar thick liquids   Baseline Diet: regular diet and thin liquids      Baseline Assessment   Behavior/Cognition: waxing and waning arousal level  Speech/Language Status: able to participate in basic conversation, able to follow commands inconsistently and limited verbal output  Patient Positioning: upright in bed  Pain Status/Interventions/Response to Interventions:  No report of or nonverbal indications of pain.        Swallow Mechanism Exam  Facial: symmetrical  Labial: WFL  Lingual: unable to test 2/2 limited command following  Velum: unable to visualize  Mandible:  decreased ROM  Dentition: adequate  Vocal quality:clear/adequate   Volitional Cough: unable to initiate volitional cough   Respiratory Status: on RA      Consistencies Assessed and Performance   Consistencies Administered: thin liquids, puree, soft solids and hard solids       Oral Stage: moderate  Mastication is slowed/reduced, anterior munching. Pt frequently falls asleep while masticating, requiring stimulation to attend to PO. Slowed lingual transfer. Pharyngeal Stage: minimal  Swallow Mechanics:  Swallowing initiation suspected delayed. Laryngeal rise was palpated and judged to be fair. No coughing, throat clearing, change in vocal quality or respiratory status noted today. Esophageal Concerns: none reported    Strategies and Efficacy: Verbal cues to attend to PO     Summary and Recommendations (see above)    Results Reviewed with: patient, RN and MD     Treatment Recommended: Yes     Frequency of treatment: As able/appropriate     Patient Stated Goal: "I don't know"     Dysphagia LTG  -Patient will demonstrate safe and effective oral intake (without overt s/s significant oral/pharyngeal dysphagia including s/s penetration or aspiration) for the highest appropriate diet level.      Short Term Goals:  -Pt will tolerate Dysphagia 1/pureed diet and thin liquid with no significant s/s oral or pharyngeal dysphagia across 1-3 diagnostic session/s    -Patient will tolerate trials of upgraded food and/or liquid texture with no significant s/s of oral or pharyngeal dysphagia including aspiration across 1-3 diagnostic sessions       Speech Therapy Prognosis   Prognosis: fair    Prognosis Considerations: age, medical status and cognitive status

## 2023-07-20 NOTE — ASSESSMENT & PLAN NOTE
69 y.o. female with dementia, recent orthopaedic surgery, depression and hypothyroidism who presented to the ED on 7/19/23 for evaluation of increased somnolence and overall weakness since her hip surgery/removal of pin on 6/30/23. Narcotics were held and patient's somnolence slowly improved back to baseline. On 7/23 patient taken back to the OR for right hemiarthroplasty due to fracture of the right femoral neck. On 7/24 Neurology reconsulted due to worsened mentation. Imaging revealed small spontaneous SAH which was not thought to be the cause of her AMS. Over the last several days, patient's encephalopathy has slowly continued to improve as detailed below:    · 7/24: Patient described as somnolent, unresponsive and "rigid on neurological exam with increased rigidity appreciated in upper and lower extremities as well as her muscles of the neck"  · 7/25: Patient with normal tone. Opens eyes to voice. No command following spontaneously moves head/neck, BUEs and LLE. When examiner elevates arms, patient symmetrically lowers them. Patient also localizing to light noxious stimuli in all extremities. · 7/26: Patient opens eyes to name and responds "Hi."  Follows axial and appendicular commands. Purposeful movements. Normal tone. Non-lateralizing exam. Falls back to sleep without stimulation. Imaging  - [facility] 7/19/23:  "No acute intracranial abnormality. "  - MRI Neuroquant 7/21/23:  "1.  No acute infarction, edema, or mass effect. 2.  Moderate chronic microangiopathic ischemic changes. 3.  NeuroQuant analysis was performed: Lower limits of normal hippocampal volume and enlarged inferior lateral and overall ventricular system, suggestive of global ex-vacuo dilatation. The additional finding of an abnormal Hippocampal Occupancy Score, ([address]), is concerning for a mesial temporal lobe focused Neurodegenerative process.   Recommend reevaluation with Neuroquant imaging in 6 months."  - CTH 7/24/23:  "New sulcal hyperdensity in the right posterior parietal lobe suspicious for small volume subarachnoid hemorrhage new since the prior CT of July 19, 2023. Review of the recent MRI from July 21, 2023 demonstrates hemosiderin deposition in sulci slightly more anterior to the current finding. The possibility of recurrent subarachnoid hemorrhage in this location should be considered. "  - CTA H/N wwo contrast 7/24/23:  "Unchanged acute trace subarachnoid hemorrhage in right posterior parietal region. Stable mild ventriculomegaly out of proportion to degree of cerebral atrophy. Question normal pressure hydrocephalus. Question small arteriovenous fistula in right anteromedial orbital region. Recommend consultation with the [facility], a division of [facility] at [phone]. Negative CTA head and neck for large vessel occlusion, dissection, aneurysm, or high-grade stenosis. Additional chronic/incidental findings as detailed above."  - 7/24/23 MRI brain wo: Stable punctate focus of acute hemorrhage in the right posterior parietal region. More laterally located foci of stable, likely subacute to chronic subarachnoid hemorrhage in the right inferior parietal and superior temporal region. No mass effect or vasogenic edema. No evidence of acute infarct.   - 7/24/23 EEG: "Consistent with moderate cerebral dysfunction. No electrographic seizures, interictal epileptiform discharges (seizure tendency) or focal slowing were seen. "  - Blood cultures NGTD x >24hrs  - TSH 0.337, Free T4 pending  - UA with small leuks, 2-4 WBCs and moderate bacteria - Obtain urine culture     Patient's exam continues to improve, demonstrating normal tone, now responding "Hi" to examiner, following axial and appendicular commands and demonstrating purposeful movements with a non-lateralizing exam.    Etiology due to toxic metabolic encephalopthy in the setting of recent anesthesia, and suboptimal nutrition superimposed on chronic dementia. No evidence of infectious process. Would expect patient to have similar course re: altered arousal and mentation any time she undergoes anesthesia or requires narcotics (as has occurred now on the last three occasions). Patient with small spontaneous SAH noted on imaging from 7/24. Etiology unclear. Not the etiology for patient's decreased arousal.    Recommend continued evaluation of nutrition/swallowing/PO intake. As arousal improves would expect patient's ability to safely eat to also improve. Plan:  · Serial neuro exams  · Repeat CTH in 2- 3 weeks following discharge - this has been ordered   · Ceftriaxone on board for possible UTI - management as per primary team  · Ok to restart Lovenox as SAH stable. · Ortho recommending Aspirin 81 mg BID x 6 weeks on discharge  · Cancel LP as patient's exam improving  · Free T4 pending  · Patient has not been receiving home Risperdal due to NPO/Poor arousal  · Avoid narcotics if possible, use Acetaminophen for analgesia  · OT/PT/SLP  · Swallow evaluation when patient's arousal improved  · S/p NGT placement for tubes feeds since no PO since 7/21; however patient removed x2.  As patient's arousal and mentation improving would hold off on replacing and encourage PO as able/safe   · Conservative management of delirium - minimize noise, maintain day/night cycle, provide frequent redirection, avoid restraints if possible, etc.  · Evaluation and management of metabolic and toxic derangements as per primary team

## 2023-07-20 NOTE — ASSESSMENT & PLAN NOTE
· S/p removal of orthopedic screw on right on 6/30/23 by ortho, Dr. Sydney Ortega   · Pain on Percocet at home  · Oxy 2.5/5 as needed ordered while inpatient

## 2023-07-20 NOTE — ASSESSMENT & PLAN NOTE
· Total  on admission  · Patient was laying on the floor prolonged. · No renal dysfunction  · Placed on IV fluids.   Improving  · Recheck CK in morning

## 2023-07-20 NOTE — CONSULTS
Consultation - Neurology   Tyler Pritchett 69 y.o. female MRN: [id number]  Unit/Bed#: -01 Encounter: [id number]      Assessment/Plan     * Encephalopathy  Assessment & Plan  69 y.o. female with dementia, recent orthopaedic surgery, depression and hypothyroidism who presented to the ED on 7/19/23 for evaluation of increased somnolence and overall weakness since her surgery on 6/30/23. Work-up:  ·   · WBC WNL, Hgb 10.8-->9.6  · CMP WNL aside from Alk phos 123  · CTH negative for acute abnormality. Patient does have slight gaze preference to the right; however this may be due to positioning. The remainder of her neuro exam is nonfocal. Etiology of symptoms likely multifactorial in the setting of recent surgery/general anesthesia, narcotic pain medication, and poor oral intake superimposed on dementia. This event appears very similar to prior prolonged encephalopathy after shoulder surgery/general anesthesia in 2019. As we cannot exntirely exclude stroke, will obtain MRI for further evaluation. Plan:  · Obtain MRI brain with neuro quant  · Cdiff pending  · Obtain UA  · Recommend holding Resperidal for a few days and restarting when patient more alert  · Avoid narcotics if possible, use Acetaminophen  · Conservative management of delirium - minimize noise, maintain day/night cycle, provide frequent redirection, avoid restraints if possible, etc.      Recommendations for outpatient neurological follow up have yet to be determined. History of Present Illness     Reason for Consult / Principal Problem: Ambulatory dysfunction  Hx and PE limited by: dementia/somnolence  HPI: Tyler Pritchett is a 69 y.o. female with dementia, recent orthopaedic surgery, depression and hypothyroidism who presented to the ED on 7/19/23 for evaluation of increased somnolence and overall weakness.  Sister explained to me that in the middle of the night she tried to help the patient transfer but Eranrosendo Vu didn't have any strength and therefore her sister had to slowly lower her to the floor. She put a pillow and pad under her and covered her with a blanket and left her there overnight because she was unable to get her up. In the morning, she called EMS when she still was unable to get her up. CK elevated to 381 on admission. Sister and great nephew report that first symptoms of dementia occurred about 5 years ago. They also report that about a year ago she was diagnosed with Parkinson's disease by her PCP due to her shuffling gait and slow movements. She does not have a significant tremor and she is not on PD medications. Further review of her chart; however, revealed that she was seen by movement specialist Dr. 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 Patient elected for "watchful waiting" to see if symptoms would indeed progress. Family report that prior to her recent surgery on 6/30 for removal of screw from R hip hardware, was oriented to person and place, ambulated independently with a cane routinely and wheelchair for long distances and independently completed ADLs. She did require assistance with bathing and meal prep. After her surgery she had worsened overall. They note that often she doesn't know who family members are, needs assistance for all ADLs, sleeps all the time and is unable to transfer from bed to wheelchair. They also note that she hasn't eaten anything in a about a week. They have been giving her Oxycodone 2-3x/day when complaining of pain, as well as Tylenol. On my exam, patient is quite somnolent, awaking to providers calling her name for 15-30 second periods and quickly falling back to sleep.    Family also report that the patient had a similar episode several years ago when she went under anesthesia at that time for a shoulder surgery. Upon chart review, patient had an EEG on 5/10/2019 for persistent encephalopathy following surgery which showed findings consistent with moderate to severe diffuse cerebral dysfunction and no evidence of epileptiform discharges or electrographic seizures. She has not had a prior MRI, but the last [address] completed 5/10/19 was negative for acute abnormality and noted microangiopathic changes. Neurology evaluate the patient during this admission and over days she improved. Prolonged encephalopathy thought to be multifactorial in the setting of general anesthesia, pain medications, antibiotics and unfamiliar environment superimposed on suspected dementia.        Inpatient consult to Neurology  Consult performed by: Elliot Porter PA-C  Consult ordered by: Selena Emerson MD          Review of Systems Unable to complete due to somnolence though patient does complain of right hip pain when asked and when right lower extremity is moved    Historical Information   Past Medical History:   Diagnosis Date   • Anxiety    • Depression    • Memory loss    • Thyroid activity decreased      Past Surgical History:   Procedure Laterality Date   • BREAST BIOPSY      years ago- Benign   • JOINT REPLACEMENT Left    • IA OPTX FEM SHFT FX W/INSJ IMED IMPLT W/WO SCREW Right 5/9/2019    Procedure: INSERTION NAIL IM FEMUR ANTEGRADE ([place]), RIGHT;  Surgeon: Nisha Terrell MD;  Location:  MAIN OR;  Service: Orthopedics   • IA REMOVAL IMPLANT DEEP Right 6/30/2023    Procedure: REMOVAL HARDWARE TFN DYNAMIC SCREW;  Surgeon: Nisha Terrell MD;  Location:  MAIN OR;  Service: Orthopedics     Social History   Social History     Substance and Sexual Activity   Alcohol Use Not Currently     Social History     Substance and Sexual Activity   Drug Use No     E-Cigarette/Vaping   • E-Cigarette Use Never User      E-Cigarette/Vaping Substances     Social History     Tobacco Use   Smoking Status Never Smokeless Tobacco Never     Family History:   Family History   Problem Relation Age of Onset   • Hypertension Mother    • Parkinsonism Mother 80        memory issues    • Depression Mother    • Melanoma Father    • No Known Problems Maternal Grandmother    • No Known Problems Maternal Grandfather    • No Known Problems Paternal Grandmother    • Heart attack Paternal Grandfather    • Depression Other    • Skin cancer Sister    • Breast cancer Maternal Aunt         over age 48   • No Known Problems Paternal Aunt    • No Known Problems Sister        Review of previous medical records was ompleted. Meds/Allergies   all current active meds have been reviewed    No Known Allergies    Objective   Vitals:Blood pressure 146/73, pulse 75, temperature 98.8 °F (37.1 °C), temperature source Temporal, resp. rate 14, height 5' 8" (1.727 m), weight 55.7 kg (122 lb 12.7 oz), SpO2 99 %. ,Body mass index is 18.67 kg/m². No intake or output data in the 24 hours ending 07/20/23 1829    Invasive Devices: Invasive Devices     Peripheral Intravenous Line  Duration           Peripheral IV 07/20/23 Distal;Right;Ventral (anterior) Forearm <1 day          Drain  Duration           External Urinary Catheter <1 day                Physical Exam  Constitutional:       General: She is not in acute distress. Appearance: She is not toxic-appearing. Comments: Somnolent elderly female seated upright and supported in bed, frail, thin   HENT:      Head: Normocephalic and atraumatic. Mouth/Throat:      Mouth: Mucous membranes are moist.   Eyes:      Extraocular Movements: Extraocular movements intact and EOM normal.   Pulmonary:      Effort: No respiratory distress. Musculoskeletal:      Cervical back: Neck supple. Neurologic Exam     Mental Status   Somnolent. Delayed responses. Oriented to person only. Able to identify last name when given 2 choices.  Can name items in room when awake and focused; however, due to somnolence this is difficult at times. Speech is clear when fully awake. Cranial Nerves     CN II   Visual acuity: normal    CN III, IV, VI   Extraocular motions are normal.   Conjugate gaze: present    CN VII   Facial expression full, symmetric. Blink to threat bilaterally     Motor Exam   Muscle bulk: decreased  Overall muscle tone: normal  Able to maintain antigravity position with BUEs x 10 seconds without drift. Reports pain with PROM of RLE. No attempt to lift LLE antigravity. Wiggles toes symmetrically on command. Sensory Exam   Symmetric localized response with light noxious stimuli in all extremities. Gait, Coordination, and Reflexes DTRs:  BUEs 1  R patellar absent  L patellar 1+       Lab Results: I have personally reviewed pertinent reports. Imaging Studies: I have personally reviewed pertinent films in PACS [facility]  EKG, Pathology, and Other Studies: I have personally reviewed pertinent reports.     VTE Prophylaxis: Enoxaparin (Lovenox)    Code Status: Level 1 - Full Code

## 2023-07-20 NOTE — ASSESSMENT & PLAN NOTE
· Found on bedroom floor by sister at 54 on 7/19, sister unable to get her up so sister went to sleep  · Sister woke at 63 and called EMS at that time  · CT head and CT C-spine negative for acute findings  · Patient without tenderness or signs of trauma throughout remainder of body  · PT, OT, and CM consults for placement

## 2023-07-20 NOTE — ASSESSMENT & PLAN NOTE
Ambulatory dysfunction POA in setting of dementia evidenced by inability to stand after a fall.   CT is negative for any acute findings  Patient was noticed to have gaze preference  Will request neurology input

## 2023-07-20 NOTE — CASE MANAGEMENT
Case Management Assessment & Discharge Planning Note    Patient name Itzel Rollins  Location [address] [facility] 230/-01 MRN [id number]  : 1955 Date 2023       Current Admission Date: 2023  Current Admission Diagnosis:No other household member able to render care   Patient Active Problem List    Diagnosis Date Noted   • Anemia 2023   • No other household member able to render care 2023   • Fall 2023   • Elevated CK 2023   • Closed displaced fracture of right femoral neck ([address]) 2023   • Painful orthopaedic hardware ([address]) 2023   • Ambulatory dysfunction 2023   • Moderate dementia ([address]) 2023   • Primary osteoarthritis of right hip 05/10/2022   • Closed fracture of right pelvis, initial encounter ([address]) 2022   • Greater trochanteric bursitis of right hip 2019   • Aftercare following surgery of the musculoskeletal system 2019   • Parkinsonism ([address]) 2018   • Acquired hypothyroidism 2018   • Depression 2018   • Anxiety 2018      LOS (days): 1  Geometric Mean LOS (GMLOS) (days): 2.50  Days to GMLOS:1.7     OBJECTIVE:    Risk of Unplanned Readmission Score: 12.31         Current admission status: Inpatient       Preferred Pharmacy:   [address] [place] [address]  Phone: [phone] Fax: [phone]    Primary Care Provider: Alberto James DO    Primary Insurance: MEDICARE  Secondary Insurance: COMMERCIAL MISCELLANEOUS    ASSESSMENT:  [place]ma Proxies    There are no active Health Care Proxies on file.        Advance Directives  Does patient have a Health Care POA?: Yes  Does patient have Advance Directives?: No  Was patient offered paperwork?: Yes (declined)  Primary Contact: SisterLindsey         Readmission Root Cause  30 Day Readmission: No    Patient Information  Admitted from[de-identified] Home  Mental Status: Confused  During Assessment patient was accompanied by: Sister  Assessment information provided by[de-identified] Sister  Primary Caregiver: Family  Caregiver's Name[de-identified] Sister, Ananda Estes Relationship to Patient[de-identified] Family Member  Support Systems: Family members (Sister, Randall June)  [place] of Residence: [address] do you live in?: [address] [facility] entry access options. Select all that apply.: Stairs  Number of steps to enter home.: 3  Do the steps have railings?: Yes  Type of Current Residence: Travel Trailer/ Mobile Home  In the last 12 months, was there a time when you were not able to pay the mortgage or rent on time?: No  In the last 12 months, how many places have you lived?: 1  In the last 12 months, was there a time when you did not have a steady place to sleep or slept in a shelter (including now)?: No  Homeless/housing insecurity resource given?: N/A  Living Arrangements: Other (Comment) (Lives w/ Sister)    Activities of Daily Living Prior to Admission  Functional Status: Assistance  Completes ADLs independently?: No  Level of ADL dependence: Assistance  Ambulates independently?: No  Level of ambulatory dependence: Assistance  Does patient use assisted devices?: Yes  Assisted Devices (DME) used:  Shower Chair, [address], Wheelchair  Does patient currently own DME?: Yes  What DME does the patient currently own?: Shower Chair, [address], Wheelchair  Does patient have a history of Outpatient Therapy (PT/OT)?: No  Does the patient have a history of Short-Term Rehab?: No  Does patient have a history of HHC?: Yes ([facility] Mcgr[facility])  Does patient currently have [facility]?: No         Patient Information Continued  Income Source: Pension/custodial  Does patient have prescription coverage?: Yes  Within the past 12 months, you worried that your food would run out before you got the money to buy more.: Never true  Within the past 12 months, the food you bought just didn't last and you didn't have money to get more.: Never true  Food insecurity resource given?: N/A  Does patient receive dialysis treatments?: No  Does patient have a history of substance abuse?: No  Does patient have a history of Mental Health Diagnosis?: No         Means of Transportation  Means of Transport to Appts[de-identified] Family transport  In the past 12 months, has lack of transportation kept you from medical appointments or from getting medications?: No  In the past 12 months, has lack of transportation kept you from meetings, work, or from getting things needed for daily living?: No  Was application for public transport provided?: N/A        DISCHARGE DETAILS:    Discharge planning discussed with[de-identified] Patient's sister  [place] of Choice: Yes     CM contacted family/caregiver?: Yes  Were Treatment Team discharge recommendations reviewed with patient/caregiver?: Yes  Did patient/caregiver verbalize understanding of patient care needs?: Yes  Were patient/caregiver advised of the risks associated with not following Treatment Team discharge recommendations?: Yes    Contacts  Patient Contacts: Silvana  Relationship to Patient[de-identified] Family  Contact Method: In Person  Reason/Outcome: Continuity of Care, Emergency Contact, Referral, Discharge [facility]         Is the patient interested in [facility] at discharge?: No    DME Referral Provided  Referral made for DME?: No    Other Referral/Resources/Interventions Provided:  Interventions: Short Term Rehab         Treatment Team Recommendation: Short Term Rehab  Discharge Destination Plan[de-identified] Short Term Rehab  Transport at Discharge : Family                                      Additional Comments: Met with pt and sister/CHINASilvana at bedside to discuss role of CM and needs prior to discharge. Pt lives w/ sister in mobile home w/ 3 TOM. Sister is having ramp built for better accessibility. Supervision to  [address] w/ ADLs and able to feed self at baseline.  Pt ambulates with a cane prn and wheelchair for longer distances. Pt prefers [facility] in [facility] and is vaccinated for Covid. No hx STR/OP PT/DA/MH. Hx HH through [facility] (OUTPATIENT CAMP[facility]). Therapy is recommending rehab and sister is open to referrals being made. Referrals made in [place]. Transportation to be arranged.

## 2023-07-20 NOTE — OCCUPATIONAL THERAPY NOTE
Occupational Therapy Evaluation & Treat     Patient Name: Alma Rosa Trejo  Today's Date: 7/20/2023  Problem List  Principal Problem:    No other household member able to render care  Active Problems:    Acquired hypothyroidism    Depression    Moderate dementia ([address])    Ambulatory dysfunction    Painful orthopaedic hardware ([address])    Anemia    Fall    Elevated CK    Past Medical History  Past Medical History:   Diagnosis Date    Anxiety     Depression     Memory loss     Thyroid activity decreased      Past Surgical History  Past Surgical History:   Procedure Laterality Date    BREAST BIOPSY      years ago- Benign    JOINT REPLACEMENT Left     KS OPTX FEM SHFT FX W/INSJ IMED IMPLT W/WO SCREW Right 5/9/2019    Procedure: INSERTION NAIL IM FEMUR ANTEGRADE (TROCHANTERIC), RIGHT;  Surgeon: Beto Lezama MD;  Location: [facility];  Service: Orthopedics    KS REMOVAL IMPLANT DEEP Right 6/30/2023    Procedure: REMOVAL HARDWARE TFN DYNAMIC SCREW;  Surgeon: Beto Lezama MD;  Location: [facility];  Service: Orthopedics         07/20/23 1441   OT Last Visit   OT Visit Date 07/20/23   Note Type   Note type Evaluation  (& Treat)   Pain Assessment   Pain Assessment Tool FLACC   Pain Rating: FLACC (Rest) - Face 0   Pain Rating: FLACC (Rest) - Legs 0   Pain Rating: FLACC (Rest) - Activity 0   Pain Rating: FLACC (Rest) - Cry 0   Pain Rating: FLACC (Rest) - Consolability 0   Score: FLACC (Rest) 0   Pain Rating: FLACC (Activity) - Face 0   Pain Rating: FLACC (Activity) - Legs 0   Pain Rating: FLACC (Activity) - Activity 0   Pain Rating: FLACC (Activity) - Cry 0   Pain Rating: FLACC (Activity) - Consolability 0   Score: FLACC (Activity) 0   Restrictions/Precautions   Weight Bearing Precautions Per Order No   Home Living   Type of Home Mobile home   Home Layout One level;Stairs to enter with rails   Bathroom Shower/Tub Tub/shower unit   Bathroom Toilet Standard   Bathroom Accessibility Accessible   Home Equipment Cane;Wheelchair-manual   Prior Function   Level of [place] Needs assistance with ADLs; Needs assistance with functional mobility; Needs assistance with IADLS  (Reports that she requires occasional assist with ADLs)   Lives With Family  (Sister)   Receives Help From Family   IADLs Family/Friend/Other provides transportation; Family/Friend/Other provides meals; Family/Friend/Other provides medication management   Falls in the last 6 months 1 to 4   Subjective   Subjective Pt received in supine. + flat affect. + R gaze preference. ADL   Eating Assistance 2  Maximal Assistance   Grooming Assistance 2  Maximal Assistance   UB Bathing Assistance 1  Total Assistance   LB Bathing Assistance 1  Total Assistance   UB Dressing Assistance 1  Total Assistance   LB Dressing Assistance 1  Total Assistance   Toileting Assistance  1  Total Assistance   Bed Mobility   Rolling R 2  Maximal assistance   Additional items Assist x 1;Verbal cues   Rolling L 2  Maximal assistance   Additional items Assist x 1;Verbal cues   Supine to Sit 2  Maximal assistance   Additional items Assist x 2;Verbal cues; Increased time required   Sit to Supine 2  Maximal assistance   Additional items Assist x 2;Verbal cues   Additional Comments Pt listing to R. Requried max A initially. Able to progress to min A x 1. Transfers   Sit to Stand 2  Maximal assistance   Additional items Assist x 2;Verbal cues; Increased time required   Stand to Sit 2  Maximal assistance   Additional items Assist x 2;Verbal cues   Balance   Static Sitting   (F- to P+)   Dynamic Sitting Poor +   Static Standing Zero   Activity Tolerance   Activity Tolerance Patient limited by fatigue   Medical Staff Made Aware PT Chinyere Hernandez   Nurse Made Aware NIKITA Yap, Dr. Ander Bond   ([facility])   LUE Assessment   LUE Assessment   ([facility])   Hand Function   Gross Motor Coordination   (Pt unable to follow commands appropriately.)   Fine Motor Coordination   (Pt unable to follow commands appropriately.)   Vision - Complex Assessment   Tracking Impaired  (Unable to track to R)   Cognition   Overall Cognitive Status Impaired   Arousal/Participation Lethargic;Persistent stimuli required   Attention Difficulty attending to directions   Orientation Level Oriented to person;Disoriented to place; Disoriented to time;Disoriented to situation   Memory Unable to assess   Following Commands Follows one step commands inconsistently   Assessment   Limitation Decreased ADL status; Decreased self-care trans;Decreased high-level ADLs; Decreased cognition;Decreased endurance   Prognosis Fair   Assessment Pt is a 69 y.o. female seen for OT evaluation at [facility], admitted 7/19/2023 s/p fall and being on floor extended period of time. Comorbidities affecting pt's functional performance at time of assessment include: dementia, recent right orthopedic hardware (screw) removal from right hip, depression, and hypothyroidism. Personal factors affecting pt at time of IE include:steps to enter environment, difficulty performing ADLS, difficulty performing IADLS , flat affect and decreased functional mobility. Prior to admission, pt was living with sister in a mobile home with few steps to manage. Pt  Required some assist w/  ADLS and IADLS & required cane and wheelchair  PTA. Upon evaluation: Pt requires max A x 1-2 for bed mobility, max Ax 2 for functional mobility/transfers,  Max -total A for UB ADLs and total A for LB ADLS 2* the following deficits impacting occupational performance: weakness, decreased strength, decreased balance, decreased tolerance, impaired arousal, impaired attention, impaired initiation and impaired memory. Full objective findings from OT assessment regarding body systems outlined above. These impairments, as well as risk for falls  limit pt's ability to safely engage in all baseline areas of occupation and mobility.  Pt to benefit from continued skilled OT tx while in the hospital to address deficits as defined above and maximize level of functional independence w ADL's and functional mobility. Occupational Performance areas to address include: bathing/shower, toilet hygiene, dressing and functional mobility. This evaluation required an extensive review of medical and/or therapy records and additional review of physical, cognitive and psychosocial history related to functional performance. Based upon functional performance deficits and assessments, this evaluation has been identified as a high complexity evaluation. The patient's raw score on the AM-PAC Daily Activity inpatient short form is 8, standardized score is 21.38  , less than 39.4. Patients at this level are likely to benefit from DC to post-acute rehabilitation services. However please refer to therapist recommendation for discharge planning given other factors that may influence destination. At this time, OT recommendations at time of discharge are level 2 moderate intensity resources. Goals   Patient Goals Pt unable to verbalize goals. Plan   Treatment Interventions ADL retraining;Functional transfer training;UE strengthening/ROM; Visual perceptual retraining;Cognitive reorientation;Patient/family training;Equipment evaluation/education; Neuromuscular reeducation; Fine motor coordination activities; Compensatory technique education;Continued evaluation; Activityengagement   Goal Expiration Date 07/30/23   OT Treatment Day 0   OT Frequency 3-5x/wk   Recommendation   UB Rehab Discharge Recommendation (PT/OT) Level 2   AM-PAC Daily Activity Inpatient   Lower Body Dressing 1   Bathing 1   Toileting 1   Upper Body Dressing 1   Grooming 2   Eating 2   Daily Activity Raw Score 8   Turning Head Towards Sound 2   Follow Simple Instructions 2   Low Function Daily Activity Raw Score 12   Low Function Daily Activity Standardized Score  21.38   AM-[facility] Applied Cognition Inpatient   Following a Speech/Presentation 1 Understanding Ordinary Conversation 2   Taking Medications 1   Remembering Where Things Are Placed or Put Away 1   Remembering List of 4-5 Errands 1   Taking Care of Complicated Tasks 1   Applied Cognition Raw Score 7   Applied Cognition Standardized Score 15.17   Additional Treatment Session   Start Time 1430   End Time 1441   Treatment Assessment Pt noted to be incontinent of bowels. Performed rolling in bed with max A x1. Required total A for toileting. Performed supine>sit with max Ax 2. Pt listing to R. Required multiple VC to maintain midline. Initially required max A x 1 to maintain sitting balance. Progressed to min Ax 1. Performed grooming with mod A-max A. Pt remained supine in bed. NAD. All needs met. + bed alarm. Call bell in reach. RN aware. End of Consult   Education Provided Yes;Family or social support of family present for education by provider   Patient Position at End of Consult Supine;Bed/Chair alarm activated; All needs within reach   Nurse Communication Nurse aware of consult         Pt will achieve the following goals within 10 days. *Pt will complete grooming with sup. *Pt will complete UB bathing and dressing with min A.    *Pt will complete LB bathing and dressing with mod A .    * Pt will complete toileting w/ mod A w/ G hygiene/thoroughness using DME PRN    *Pt will complete bed mobility with mod A x1, with bed flat and no side rail to prep for purposeful tasks    *Pt will perform functional transfers with on/off all surfaces with mod Ax 1 using DME as needed w/ G balance/safety. *Pt will increase standing tolerance x 2  minutes for inc'd tolerance with standing purposeful tasks. *Pt will sit on EOB for 20 minutes for increased safety with seated activity tolerance during ADL tasks.           Yomaira Avalos, OTR/L

## 2023-07-21 ENCOUNTER — APPOINTMENT (INPATIENT)
Dept: MRI IMAGING | Facility: HOSPITAL | Age: 68
DRG: 521 | End: 2023-07-21
Payer: MEDICARE

## 2023-07-21 LAB
ALBUMIN SERPL BCP-MCNC: 3 G/DL (ref 3.5–5)
ALP SERPL-CCNC: 87 U/L (ref 34–104)
ALT SERPL W P-5'-P-CCNC: 10 U/L (ref 7–52)
ANION GAP SERPL CALCULATED.3IONS-SCNC: 4 MMOL/L
AST SERPL W P-5'-P-CCNC: 18 U/L (ref 13–39)
BACTERIA UR QL AUTO: ABNORMAL /HPF
BASOPHILS # BLD AUTO: 0.03 THOUSANDS/ÂΜL (ref 0–0.1)
BASOPHILS NFR BLD AUTO: 1 % (ref 0–1)
BILIRUB SERPL-MCNC: 0.3 MG/DL (ref 0.2–1)
BILIRUB UR QL STRIP: NEGATIVE
BUN SERPL-MCNC: 14 MG/DL (ref 5–25)
C DIFF TOX GENS STL QL NAA+PROBE: NEGATIVE
CALCIUM ALBUM COR SERPL-MCNC: 9.4 MG/DL (ref 8.3–10.1)
CALCIUM SERPL-MCNC: 8.6 MG/DL (ref 8.4–10.2)
CAMPYLOBACTER DNA SPEC NAA+PROBE: NORMAL
CHLORIDE SERPL-SCNC: 107 MMOL/L (ref 96–108)
CK SERPL-CCNC: 128 U/L (ref 26–192)
CLARITY UR: CLEAR
CO2 SERPL-SCNC: 29 MMOL/L (ref 21–32)
COLOR UR: ABNORMAL
CREAT SERPL-MCNC: 0.8 MG/DL (ref 0.6–1.3)
EOSINOPHIL # BLD AUTO: 0.21 THOUSAND/ÂΜL (ref 0–0.61)
EOSINOPHIL NFR BLD AUTO: 4 % (ref 0–6)
ERYTHROCYTE [DISTWIDTH] IN BLOOD BY AUTOMATED COUNT: 12.8 % (ref 11.6–15.1)
GFR SERPL CREATININE-BSD FRML MDRD: 75 ML/MIN/1.73SQ M
GLUCOSE SERPL-MCNC: 86 MG/DL (ref 65–140)
GLUCOSE UR STRIP-MCNC: NEGATIVE MG/DL
HCT VFR BLD AUTO: 29.1 % (ref 34.8–46.1)
HGB BLD-MCNC: 9.1 G/DL (ref 11.5–15.4)
HGB UR QL STRIP.AUTO: NEGATIVE
IMM GRANULOCYTES # BLD AUTO: 0.02 THOUSAND/UL (ref 0–0.2)
IMM GRANULOCYTES NFR BLD AUTO: 0 % (ref 0–2)
KETONES UR STRIP-MCNC: NEGATIVE MG/DL
LEUKOCYTE ESTERASE UR QL STRIP: ABNORMAL
LYMPHOCYTES # BLD AUTO: 1.02 THOUSANDS/ÂΜL (ref 0.6–4.47)
LYMPHOCYTES NFR BLD AUTO: 17 % (ref 14–44)
MAGNESIUM SERPL-MCNC: 2 MG/DL (ref 1.9–2.7)
MCH RBC QN AUTO: 29.9 PG (ref 26.8–34.3)
MCHC RBC AUTO-ENTMCNC: 31.3 G/DL (ref 31.4–37.4)
MCV RBC AUTO: 96 FL (ref 82–98)
MONOCYTES # BLD AUTO: 0.58 THOUSAND/ÂΜL (ref 0.17–1.22)
MONOCYTES NFR BLD AUTO: 10 % (ref 4–12)
NEUTROPHILS # BLD AUTO: 4.14 THOUSANDS/ÂΜL (ref 1.85–7.62)
NEUTS SEG NFR BLD AUTO: 68 % (ref 43–75)
NITRITE UR QL STRIP: NEGATIVE
NON-SQ EPI CELLS URNS QL MICRO: ABNORMAL /HPF
NRBC BLD AUTO-RTO: 0 /100 WBCS
PH UR STRIP.AUTO: 7 [PH]
PLATELET # BLD AUTO: 290 THOUSANDS/UL (ref 149–390)
PMV BLD AUTO: 8.6 FL (ref 8.9–12.7)
POTASSIUM SERPL-SCNC: 3.4 MMOL/L (ref 3.5–5.3)
PROT SERPL-MCNC: 6 G/DL (ref 6.4–8.4)
PROT UR STRIP-MCNC: NEGATIVE MG/DL
RBC # BLD AUTO: 3.04 MILLION/UL (ref 3.81–5.12)
RBC #/AREA URNS AUTO: ABNORMAL /HPF
SALMONELLA DNA SPEC QL NAA+PROBE: NORMAL
SHIGA TOXIN STX GENE SPEC NAA+PROBE: NORMAL
SHIGELLA DNA SPEC QL NAA+PROBE: NORMAL
SODIUM SERPL-SCNC: 140 MMOL/L (ref 135–147)
SP GR UR STRIP.AUTO: 1.01 (ref 1–1.03)
UROBILINOGEN UR STRIP-ACNC: <2 MG/DL
WBC # BLD AUTO: 6 THOUSAND/UL (ref 4.31–10.16)
WBC #/AREA URNS AUTO: ABNORMAL /HPF

## 2023-07-21 PROCEDURE — 92526 ORAL FUNCTION THERAPY: CPT

## 2023-07-21 PROCEDURE — 70551 MRI BRAIN STEM W/O DYE: CPT

## 2023-07-21 PROCEDURE — 83735 ASSAY OF MAGNESIUM: CPT | Performed by: INTERNAL MEDICINE

## 2023-07-21 PROCEDURE — 85025 COMPLETE CBC W/AUTO DIFF WBC: CPT | Performed by: INTERNAL MEDICINE

## 2023-07-21 PROCEDURE — 82550 ASSAY OF CK (CPK): CPT | Performed by: INTERNAL MEDICINE

## 2023-07-21 PROCEDURE — 81001 URINALYSIS AUTO W/SCOPE: CPT | Performed by: INTERNAL MEDICINE

## 2023-07-21 PROCEDURE — 80053 COMPREHEN METABOLIC PANEL: CPT | Performed by: INTERNAL MEDICINE

## 2023-07-21 PROCEDURE — 99232 SBSQ HOSP IP/OBS MODERATE 35: CPT | Performed by: INTERNAL MEDICINE

## 2023-07-21 RX ORDER — RISPERIDONE 1 MG/1
1 TABLET ORAL 2 TIMES DAILY
Status: DISCONTINUED | OUTPATIENT
Start: 2023-07-21 | End: 2023-07-29 | Stop reason: HOSPADM

## 2023-07-21 RX ORDER — POTASSIUM CHLORIDE 20 MEQ/1
40 TABLET, EXTENDED RELEASE ORAL ONCE
Status: COMPLETED | OUTPATIENT
Start: 2023-07-21 | End: 2023-07-21

## 2023-07-21 RX ADMIN — BUPROPION HYDROCHLORIDE 150 MG: 150 TABLET, FILM COATED, EXTENDED RELEASE ORAL at 18:39

## 2023-07-21 RX ADMIN — MIRTAZAPINE 30 MG: 15 TABLET, FILM COATED ORAL at 21:06

## 2023-07-21 RX ADMIN — ACETAMINOPHEN 325MG 650 MG: 325 TABLET ORAL at 00:39

## 2023-07-21 RX ADMIN — SERTRALINE 50 MG: 50 TABLET, FILM COATED ORAL at 07:33

## 2023-07-21 RX ADMIN — RISPERIDONE 1 MG: 1 TABLET ORAL at 10:17

## 2023-07-21 RX ADMIN — Medication 5000 UNITS: at 10:17

## 2023-07-21 RX ADMIN — LEVOTHYROXINE SODIUM 50 MCG: 50 TABLET ORAL at 05:46

## 2023-07-21 RX ADMIN — RISPERIDONE 1 MG: 1 TABLET ORAL at 12:29

## 2023-07-21 RX ADMIN — SODIUM CHLORIDE 75 ML/HR: 0.9 INJECTION, SOLUTION INTRAVENOUS at 04:16

## 2023-07-21 RX ADMIN — POTASSIUM CHLORIDE 40 MEQ: 1500 TABLET, EXTENDED RELEASE ORAL at 15:55

## 2023-07-21 RX ADMIN — BUPROPION HYDROCHLORIDE 150 MG: 150 TABLET, FILM COATED, EXTENDED RELEASE ORAL at 10:17

## 2023-07-21 RX ADMIN — RISPERIDONE 1 MG: 1 TABLET ORAL at 18:39

## 2023-07-21 RX ADMIN — ENOXAPARIN SODIUM 40 MG: 100 INJECTION SUBCUTANEOUS at 10:17

## 2023-07-21 NOTE — PROGRESS NOTES
[facility]  Progress Note  Name: Georjean Castleman  MRN: [id number]  Unit/Bed#: -01 I Date of Admission: 7/19/2023   Date of Service: 7/21/2023 I Hospital Day: 2    Assessment/Plan   Moderate protein-calorie malnutrition ([address])  Assessment & Plan  Malnutrition Findings:   Adult Malnutrition type: Chronic illness  Adult Degree of Malnutrition: Malnutrition of moderate degree  Malnutrition Characteristics: Fat loss, Muscle loss                  360 Statement: Pt presents with moderate protein calorie malnutrution as evidenced by prominent clavicle bone and sunken orbitals. Treat with Pureed Nectar Thick Liquids and Magic Cup BID. BMI Findings: Body mass index is 18.67 kg/m². Patient has moderate protein calorie malnutrition in setting of chronic illness as evidenced by prominent clavicle bone, sunken orbitals  Nutrition consult and recommendations    Elevated CK  Assessment & Plan  · Total  on admission  · Patient was laying on the floor prolonged. · No renal dysfunction  · Placed on IV fluids.     · Resolved with IV fluids    Fall  Assessment & Plan  · Found on bedroom floor by sister  · CT head and CT C-spine negative for acute findings  · No signs of trauma otherwise on patient's body, no complaints of pain or tenderness on palpation  · PT, OT, and CM consulted    No other household member able to render care  Assessment & Plan  · Found on bedroom floor by sister at 54 on 7/19, sister unable to get her up so sister went to sleep  · Sister woke at 63 and called EMS at that time  · CT head and CT C-spine negative for acute findings  · Patient without tenderness or signs of trauma throughout remainder of body  · PT, OT, and CM consults for placement    Anemia  Assessment & Plan  · Hemoglobin 10.8 on admission  · Baseline hemoglobin appears to be 9-10  · Trend CBC daily    Painful orthopaedic hardware [facility])  Assessment & Plan  · S/p removal of orthopedic screw on right on 6/30/23 by orthoDr. Yazmin   · Pain on Percocet at home  · Oxy 2.5/5 as needed ordered while inpatient    Ambulatory dysfunction  Assessment & Plan  Ambulatory dysfunction POA in setting of dementia evidenced by inability to stand after a fall. CT is negative for any acute findings  Patient was noticed to have gaze preference  MRI brain pending  See above    Moderate dementia [facility])  Assessment & Plan  · Only oriented to self on admission  · Sister reportedly called nursing staff that this is patient's baseline since surgery on 6/30  · PT, OT, and CM consulted  · Home medications: Risperdal 1 Mg 4 times daily and Remeron 30 mg at bedtime  · Continue home medications    Depression  Assessment & Plan  · Continue home Zoloft 50 Mg daily and Wellbutrin 150 mg twice daily    Acquired hypothyroidism  Assessment & Plan  · Continue home Synthroid 50 mcg daily    * Encephalopathy  Assessment & Plan  Patient presented with acute encephalopathy POA  Neurology consult appreciated  CT head negative for acute abnormality  Patient has slight gaze preference to the right  MRI brain is pending  Stool for C. difficile and culture are negative             Labs & Imaging: I have personally reviewed pertinent reports. VTE Prophylaxis: in place. Code Status:   Level 1 - Full Code    Patient Centered Rounds: I have performed bedside rounds with nursing staff today. Discussions with Specialists or Other Care Team Provider: CM    Education and Discussions with Family / Patient: Sister of bedside    Current Length of Stay: 2 day(s)    Current Patient Status: Inpatient   Certification Statement: The patient will continue to require additional inpatient hospital stay due to see my assessment and plan. Subjective:   Patient is seen and examined at bedside. No new complaints. Diarrhea has resolved. Mentation is back to baseline as per sister  All other ROS are negative.     Objective:    Vitals: Blood pressure 140/74, pulse 72, temperature (!) 97.3 °F (36.3 °C), temperature source Temporal, resp. rate 14, height 5' 8" (1.727 m), weight 55.7 kg (122 lb 12.7 oz), SpO2 98 %. ,Body mass index is 18.67 kg/m². SPO2 RA Rest    Flowsheet Row ED to Hosp-Admission (Current) from 7/19/2023 in [facility] Med Surg Unit   SpO2 98 %   SpO2 Activity At Rest   O2 Device None (Room air)   O2 Flow Rate --        I&O:     Intake/Output Summary (Last 24 hours) at 7/21/2023 1414  Last data filed at 7/21/2023 1086  Gross per 24 hour   Intake 1093.75 ml   Output 400 ml   Net 693.75 ml       Physical Exam:    General- Alert, lying comfortably in bed. Not in any acute distress. Neck- Supple, No JVD  CVS- regular, S1 and S2 normal  Chest- Bilateral Air entry, No rhochi, crackles or wheezing present. Abdomen- soft, nontender, not distended, no guarding or rigidity, BS+  Extremities-  No pedal edema, No calf tenderness  CNS-   Alert, awake and orientedx2. No focal deficits present.     Invasive Devices     Peripheral Intravenous Line  Duration           Peripheral IV 07/20/23 Distal;Right;Ventral (anterior) Forearm 1 day                      Social History  reviewed  Family History   Problem Relation Age of Onset   • Hypertension Mother    • Parkinsonism Mother 80        memory issues    • Depression Mother    • Melanoma Father    • No Known Problems Maternal Grandmother    • No Known Problems Maternal Grandfather    • No Known Problems Paternal Grandmother    • Heart attack Paternal Grandfather    • Depression Other    • Skin cancer Sister    • Breast cancer Maternal Aunt         over age 48   • No Known Problems Paternal Aunt    • No Known Problems Sister     reviewed    Meds:  Current Facility-Administered Medications   Medication Dose Route Frequency Provider Last Rate Last Admin   • acetaminophen (TYLENOL) tablet 650 mg  650 mg Oral Q6H PRN Rita Aburto PA-C   650 mg at 07/21/23 0039   • aluminum-magnesium hydroxide-simethicone (MAALOX) oral suspension 30 mL  30 mL Oral Q6H PRN Woodard Schlatter, PA-C       • buPROPion [facility] SR) 12 hr tablet 150 mg  150 mg Oral BID Woodard Schlatter, PA-C   150 mg at 07/21/23 1017   • cholecalciferol (VITAMIN D3) tablet 5,000 Units  5,000 Units Oral Daily Woodard Schlatter, PA-C   5,000 Units at 07/21/23 1017   • enoxaparin (LOVENOX) subcutaneous injection 40 mg  40 mg Subcutaneous Daily Woodard Schlatter, PA-C   40 mg at 07/21/23 1017   • levothyroxine tablet 50 mcg  50 mcg Oral Early Morning Woodard Schlatter, PA-C   50 mcg at 07/21/23 0546   • mirtazapine (REMERON) tablet 30 mg  30 mg Oral HS Woodard Schlatter, PA-C   30 mg at 07/20/23 2120   • ondansetron (ZOFRAN) injection 4 mg  4 mg Intravenous Q6H PRN Woodard Schlatter, PA-C       • oxyCODONE (ROXICODONE) split tablet 2.5 mg  2.5 mg Oral Q6H PRN Woodard Schlatter, PA-C        Or   • oxyCODONE (ROXICODONE) IR tablet 5 mg  5 mg Oral Q6H PRN Woodard Schlatter, PA-C       • potassium chloride (K-DUR,KLOR-CON) CR tablet 40 mEq  40 mEq Oral Once Murphy Velarde MD       • risperiDONE (RisperDAL) tablet 1 mg  1 mg Oral BID Murphy Velarde MD       • senna (SENOKOT) tablet 8.6 mg  1 tablet Oral HS PRN Woodard Schlatter, PA-C       • sertraline (ZOLOFT) tablet 50 mg  50 mg Oral Daily With Breakfast Woodard Schlatter, PA-C   50 mg at 07/21/23 0129      Medications Prior to Admission   Medication   • buPROPion (WELLBUTRIN SR) 150 mg 12 hr tablet   • Calcium-Magnesium-Vitamin D (CALCIUM 500 PO)   • Cholecalciferol (VITAMIN D3) 5000 units CAPS   • levothyroxine 50 mcg tablet   • mirtazapine (REMERON) 30 mg tablet   • Multiple Vitamins-Minerals (CENTRUM SILVER 50+WOMEN PO)   • oxyCODONE-acetaminophen (Percocet) 5-325 mg per tablet   • risperiDONE (RisperDAL) 1 mg tablet   • sertraline (ZOLOFT) 50 mg tablet       Labs:  Results from last 7 days   Lab Units 07/21/23  0451 07/20/23  0424 07/19/23  1855   WBC Thousand/uL 6.00 9.04 9.34   HEMOGLOBIN g/dL 9. 1* 9.6* 10.8*   HEMATOCRIT % 29.1* 30.9* 35.0   PLATELETS Thousands/uL 290 344 425*   NEUTROS PCT % 68  --  83*   LYMPHS PCT % 17  --  8*   MONOS PCT % 10  --  7   EOS PCT % 4  --  1     Results from last 7 days   Lab Units 07/21/23  0451 07/20/23  0424 07/19/23  1855   POTASSIUM mmol/L 3.4* 3.6 3.8   CHLORIDE mmol/L 107 100 97   CO2 mmol/L 29 25 28   BUN mg/dL 14 16 16   CREATININE mg/dL 0.80 0.76 0.82   CALCIUM mg/dL 8.6 9.1 9.9   ALK PHOS U/L 87  --  123*   ALT U/L 10  --  12   AST U/L 18  --  29     Lab Results   Component Value Date    CKTOTAL 128 07/21/2023    CKTOTAL 328 (H) 07/20/2023    CKTOTAL 381 (H) 07/19/2023         Lab Results   Component Value Date    BLOODCX No Growth After 5 Days. 06/16/2019    BLOODCX No Growth After 5 Days. 06/16/2019         Imaging:  No results found for this or any previous visit. Results for orders placed during the hospital encounter of 06/16/19    XR chest 2 views    Narrative  CHEST    INDICATION:   Right upper quadrant pain. Possible infiltrate abdominal CT.    COMPARISON:  CT abdomen and pelvis dated the same    EXAM PERFORMED/VIEWS:  XR CHEST PA & LATERAL      FINDINGS:    No pneumothorax is seen. Some patchy opacities are seen in the right lower lung field with associated mild hypoaeration and blunting of the right costophrenic sulcus, suspect small pleural effusion/atelectasis. Superimposed infection could be  considered in the appropriate clinical setting. Some linear atelectasis/infection is seen in the right upper lung field. Lungs otherwise grossly clear. The heart is top normal in size. The cardiac and mediastinal contours otherwise appear unremarkable. Metallic plate and screw fixation of the right humeral head and proximal humerus noted. Visualized bones otherwise appear intact. Impression  Suspected small right pleural effusion/atelectasis.   Patchy opacities in the right lower lung field and linear opacities in the right upper lung field could represent superimposed infection in the appropriate clinical setting. Correlation with the  patient's symptoms and laboratory values recommended. Other findings as above. Workstation performed: [id number]      Last 24 Hours Medication List:   Current Facility-Administered Medications   Medication Dose Route Frequency Provider Last Rate   • acetaminophen  650 mg Oral Q6H PRN Shanika Wang PA-C     • aluminum-magnesium hydroxide-simethicone  30 mL Oral Q6H PRN Shanika Wang PA-C     • buPROPion  150 mg Oral BID Shanika Wagn PA-C     • cholecalciferol  5,000 Units Oral Daily Shanika Wang PA-C     • enoxaparin  40 mg Subcutaneous Daily Shanika Wang PA-C     • levothyroxine  50 mcg Oral Early Morning Shanika Wang PA-C     • mirtazapine  30 mg Oral HS Shanika Wang PA-C     • ondansetron  4 mg Intravenous Q6H PRN Shanika Wang PA-C     • oxyCODONE  2.5 mg Oral Q6H PRN Shanika Wang PA-C      Or   • oxyCODONE  5 mg Oral Q6H PRN Shanika Wang PA-C     • potassium chloride  40 mEq Oral Once Michelle Aiken MD     • risperiDONE  1 mg Oral BID Michelle Aiken MD     • senna  1 tablet Oral HS PRN Shanika Wang PA-C     • sertraline  50 mg Oral Daily With Breakfast Shanika Wang PA-C          Today, Patient Was Seen By: Michelle Aiken MD    ** Please Note: Dictation voice to text software may have been used in the creation of this document.  **

## 2023-07-21 NOTE — CASE MANAGEMENT
Case Management Discharge Planning Note    Patient name Yancy Valdes  Location [address] [facility] [address]/-01 MRN [id number]  : 1955 Date 2023       Current Admission Date: 2023  Current Admission Diagnosis:Encephalopathy   Patient Active Problem List    Diagnosis Date Noted   • Anemia 2023   • No other household member able to render care 2023   • Fall 2023   • Elevated CK 2023   • Moderate protein-calorie malnutrition ([address]) 2023   • Encephalopathy 2023   • Closed displaced fracture of right femoral neck ([address]) 2023   • Painful orthopaedic hardware ([address]) 2023   • Ambulatory dysfunction 2023   • Moderate dementia ([address]) 2023   • Primary osteoarthritis of right hip 05/10/2022   • Closed fracture of right pelvis, initial encounter ([address]) 2022   • Greater trochanteric bursitis of right hip 2019   • Aftercare following surgery of the musculoskeletal system 2019   • Parkinsonism ([address]) 2018   • Acquired hypothyroidism 2018   • Depression 2018   • Anxiety 2018      LOS (days): 2  Geometric Mean LOS (GMLOS) (days): 3.40  Days to GMLOS:1.8     OBJECTIVE:  Risk of Unplanned Readmission Score: 12.33         Current admission status: Inpatient   Preferred Pharmacy:   [address] [place] [address]  Phone: [phone] Fax: [phone]    Primary Care Provider: Armand Leyden, DO    Primary Insurance: MEDICARE  Secondary Insurance: COMMERCIAL MISCELLANEOUS    DISCHARGE DETAILS:     Spoke with pt's sister, Matt Gaines, and provided her with choice of str. She selected [facility]. [facility] reserved in [place]. Anticipate discharge tomorrow pending MRI and rule out C. Diff.

## 2023-07-21 NOTE — PLAN OF CARE
Problem: Potential for Falls  Goal: Patient will remain free of falls  Description: INTERVENTIONS:  - Educate patient/family on patient safety including physical limitations  - Instruct patient to call for assistance with activity   - Consult OT/PT to assist with strengthening/mobility   - Keep Call bell within reach  - Keep bed low and locked with side rails adjusted as appropriate  - Keep care items and personal belongings within reach  - Initiate and maintain comfort rounds  - Make Fall Risk Sign visible to staff  - Initiate/Maintain bed alarm  - Apply yellow socks and bracelet for high fall risk patients  - Consider moving patient to room near nurses station  Outcome: Progressing     Problem: Prexisting or High Potential for Compromised Skin Integrity  Goal: Skin integrity is maintained or improved  Description: INTERVENTIONS:  - Identify patients at risk for skin breakdown  - Assess and monitor skin integrity  - Assess and monitor nutrition and hydration status  - Monitor labs   - Assess for incontinence   - Turn and reposition patient  - Assist with mobility/ambulation  - Relieve pressure over bony prominences  - Avoid friction and shearing  - Provide appropriate hygiene as needed including keeping skin clean and dry  - Evaluate need for skin moisturizer/barrier cream  - Collaborate with interdisciplinary team   - Patient/family teaching  - Consider wound care consult   Outcome: Progressing     Problem: PAIN - ADULT  Goal: Verbalizes/displays adequate comfort level or baseline comfort level  Description: Interventions:  - Encourage patient to monitor pain and request assistance  - Assess pain using appropriate pain scale  - Administer analgesics based on type and severity of pain and evaluate response  - Implement non-pharmacological measures as appropriate and evaluate response  - Consider cultural and social influences on pain and pain management  - Notify physician/advanced practitioner if interventions unsuccessful or patient reports new pain  Outcome: Progressing

## 2023-07-21 NOTE — ASSESSMENT & PLAN NOTE
· Total  on admission  · Patient was laying on the floor prolonged. · No renal dysfunction  · Placed on IV fluids.     · Resolved with IV fluids

## 2023-07-21 NOTE — PROGRESS NOTES
-- Patient:  -- MRN: [id number]  -- Aidin Request ID: [id number]  -- Level of care reserved: [address]  -- Partner Reserved: Lawson Fuentes, [address] [phone]  -- Clinical needs requested:  -- Geography searched: 10 miles around [address]  -- Start of Service:  -- Request sent: 4:18pm EDT on 7/20/2023 by Ricardo Aldana  -- Partner reserved: 12:55pm EDT on 7/21/2023 by Ricardo Aldana  -- Choice list shared: 12:49pm EDT on 7/21/2023 by Ricardo Aldana

## 2023-07-21 NOTE — ASSESSMENT & PLAN NOTE
· S/p removal of orthopedic screw on right on 6/30/23 by Dr. Mary Jane bradshaw   · Pain on Percocet at home  · Oxy 2.5/5 as needed ordered while inpatient

## 2023-07-21 NOTE — ASSESSMENT & PLAN NOTE
Ambulatory dysfunction POA in setting of dementia evidenced by inability to stand after a fall.   CT is negative for any acute findings  Patient was noticed to have gaze preference  MRI brain pending  See above

## 2023-07-21 NOTE — PLAN OF CARE
Problem: Potential for Falls  Goal: Patient will remain free of falls  Description: INTERVENTIONS:  - Educate patient/family on patient safety including physical limitations  - Instruct patient to call for assistance with activity   - Consult OT/PT to assist with strengthening/mobility   - Keep Call bell within reach  - Keep bed low and locked with side rails adjusted as appropriate  - Keep care items and personal belongings within reach  - Initiate and maintain comfort rounds  - Make Fall Risk Sign visible to staff  - Offer Toileting every 2 Hours, in advance of need  - Initiate/Maintain alarm  - Obtain necessary fall risk management equipment  - Apply yellow socks and bracelet for high fall risk patients  - Consider moving patient to room near nurses station  Outcome: Progressing     Problem: MOBILITY - ADULT  Goal: Maintain or return to baseline ADL function  Description: INTERVENTIONS:  -  Assess patient's ability to carry out ADLs; assess patient's baseline for ADL function and identify physical deficits which impact ability to perform ADLs (bathing, care of mouth/teeth, toileting, grooming, dressing, etc.)  - Assess/evaluate cause of self-care deficits   - Assess range of motion  - Assess patient's mobility; develop plan if impaired  - Assess patient's need for assistive devices and provide as appropriate  - Encourage maximum independence but intervene and supervise when necessary  - Involve family in performance of ADLs  - Assess for home care needs following discharge   - Consider OT consult to assist with ADL evaluation and planning for discharge  - Provide patient education as appropriate  Outcome: Progressing  Goal: Maintains/Returns to pre admission functional level  Description: INTERVENTIONS:  - Perform BMAT or MOVE assessment daily.   - Set and communicate daily mobility goal to care team and patient/family/caregiver.    - Collaborate with rehabilitation services on mobility goals if consulted  - Perform Range of Motion 3 times a day. - Reposition patient every 3 hours.   - Dangle patient 3 times a day  - Stand patient 3 times a day  - Ambulate patient 3 times a day  - Out of bed to chair 3 times a day   - Out of bed for meals 3 times a day  - Out of bed for toileting  - Record patient progress and toleration of activity level   Outcome: Progressing     Problem: Prexisting or High Potential for Compromised Skin Integrity  Goal: Skin integrity is maintained or improved  Description: INTERVENTIONS:  - Identify patients at risk for skin breakdown  - Assess and monitor skin integrity  - Assess and monitor nutrition and hydration status  - Monitor labs   - Assess for incontinence   - Turn and reposition patient  - Assist with mobility/ambulation  - Relieve pressure over bony prominences  - Avoid friction and shearing  - Provide appropriate hygiene as needed including keeping skin clean and dry  - Evaluate need for skin moisturizer/barrier cream  - Collaborate with interdisciplinary team   - Patient/family teaching  - Consider wound care consult   Outcome: Progressing     Problem: PAIN - ADULT  Goal: Verbalizes/displays adequate comfort level or baseline comfort level  Description: Interventions:  - Encourage patient to monitor pain and request assistance  - Assess pain using appropriate pain scale  - Administer analgesics based on type and severity of pain and evaluate response  - Implement non-pharmacological measures as appropriate and evaluate response  - Consider cultural and social influences on pain and pain management  - Notify physician/advanced practitioner if interventions unsuccessful or patient reports new pain  Outcome: Progressing     Problem: INFECTION - ADULT  Goal: Absence or prevention of progression during hospitalization  Description: INTERVENTIONS:  - Assess and monitor for signs and symptoms of infection  - Monitor lab/diagnostic results  - Monitor all insertion sites, i.e. indwelling lines, tubes, and drains  - Monitor endotracheal if appropriate and nasal secretions for changes in amount and color  - [place] appropriate cooling/warming therapies per order  - Administer medications as ordered  - Instruct and encourage patient and family to use good hand hygiene technique  - Identify and instruct in appropriate isolation precautions for identified infection/condition  Outcome: Progressing     Problem: DISCHARGE PLANNING  Goal: Discharge to home or other facility with appropriate resources  Description: INTERVENTIONS:  - Identify barriers to discharge w/patient and caregiver  - Arrange for needed discharge resources and transportation as appropriate  - Identify discharge learning needs (meds, wound care, etc.)  - Arrange for interpretive services to assist at discharge as needed  - Refer to Case Management Department for coordinating discharge planning if the patient needs post-hospital services based on physician/advanced practitioner order or complex needs related to functional status, cognitive ability, or social support system  Outcome: Progressing     Problem: Knowledge Deficit  Goal: Patient/family/caregiver demonstrates understanding of disease process, treatment plan, medications, and discharge instructions  Description: Complete learning assessment and assess knowledge base. Interventions:  - Provide teaching at level of understanding  - Provide teaching via preferred learning methods  Outcome: Progressing     Problem: Nutrition/Hydration-ADULT  Goal: Nutrient/Hydration intake appropriate for improving, restoring or maintaining nutritional needs  Description: Monitor and assess patient's nutrition/hydration status for malnutrition. Collaborate with interdisciplinary team and initiate plan and interventions as ordered. Monitor patient's weight and dietary intake as ordered or per policy. Utilize nutrition screening tool and intervene as necessary.  Determine patient's food preferences and provide high-protein, high-caloric foods as appropriate.      INTERVENTIONS:  - Monitor oral intake, urinary output, labs, and treatment plans  - Assess nutrition and hydration status and recommend course of action  - Evaluate amount of meals eaten  - Assist patient with eating if necessary   - Allow adequate time for meals  - Recommend/ encourage appropriate diets, oral nutritional supplements, and vitamin/mineral supplements  - Order, calculate, and assess calorie counts as needed  - Recommend, monitor, and adjust tube feedings and TPN/PPN based on assessed needs  - Assess need for intravenous fluids  - Provide specific nutrition/hydration education as appropriate  - Include patient/family/caregiver in decisions related to nutrition  Outcome: Progressing

## 2023-07-21 NOTE — QUICK NOTE
Chart reviewed. Patient reportedly more alert and interactive and according to sister back to baseline and diarrhea has resolved. MRI completed:  1. No acute infarction, edema, or mass effect. 2.  Moderate chronic microangiopathic ischemic changes. 3.  NeuroQuant analysis was performed: Lower limits of normal hippocampal volume and enlarged inferior lateral and overall ventricular system, suggestive of global ex-vacuo dilatation. The additional finding of an abnormal Hippocampal Occupancy Score, ([address]), is concerning for a mesial temporal lobe focused Neurodegenerative process. Recommend reevaluation with Neuroquant imaging in 6 months. MRI consistent with Alzheimer's dementia. Continue to suspect etiology of encephalopathy, now reportedly resolved, to be multifactorial in the setting of recent surgery/general anesthesia, narcotic pain medication, poor oral intake and recent bouts of diarrhea superimposed on dementia. This event appears very similar to prior prolonged encephalopathy after shoulder surgery/general anesthesia in 2019. Additionally of note, patient's exam is not consistent with Parkinson's. No further inpatient neurologic recommendations. Patient can follow-up with Geriatrics for her dementia.

## 2023-07-21 NOTE — SPEECH THERAPY NOTE
Speech Language/Pathology    Speech/Language Pathology Progress Note    Patient Name: Itzel Rollins  Today's Date: 7/21/2023     Problem List  Principal Problem:    Encephalopathy  Active Problems:    Acquired hypothyroidism    Depression    Moderate dementia ([address])    Ambulatory dysfunction    Painful orthopaedic hardware ([address])    Anemia    No other household member able to render care    Fall    Elevated CK    Moderate protein-calorie malnutrition ([address])       Past Medical History  Past Medical History:   Diagnosis Date   • Anxiety    • Depression    • Memory loss    • Thyroid activity decreased         Past Surgical History  Past Surgical History:   Procedure Laterality Date   • BREAST BIOPSY      years ago- Benign   • JOINT REPLACEMENT Left    • NE OPTX FEM SHFT FX W/INSJ IMED IMPLT W/WO SCREW Right 5/9/2019    Procedure: INSERTION NAIL IM FEMUR ANTEGRADE (TROCHANTERIC), RIGHT;  Surgeon: Isaak Tompkins MD;  Location: [facility];  Service: Orthopedics   • NE REMOVAL IMPLANT DEEP Right 6/30/2023    Procedure: REMOVAL HARDWARE TFN DYNAMIC SCREW;  Surgeon: Isaak Tompkins MD;  Location:  MAIN OR;  Service: Orthopedics         Subjective:  Pt sitting upright in bed, alerts easily upon arrival. Per RN, improved alertness/intake today. "Laughter is good medicine"     Current Diet:  Puree/thin     Objective:  Pt seen for dx dysphagia tx f/u to assess appropriateness for potential diet upgrade. Pt assessed w/ soft baked cookie and thin liquids (self-fed). No difficulty drawing liquids via straw. Bite strength is adequate. Anterior munching mastication of soft solids, mildly prolonged though ultimately effective. Suspect slowed lingual motion for bolus transport. Swallows remain audible, suspected fairly prompt. No overt s/s aspiration. Attempted trials of hard solids, though pt declined at this time. S/w pt regarding potential upgrade to mechanical soft and continuation of thin liquids, pt agreeable.      Assessment:  Pt w/ improved alertness though continues w/ lethargy during PO trials. Pt presents w/ s/s suggestive of mild oral dysphagia, though appropriate for soft solid textures and thin liquids at this time.      Plan/Recommendations:  Mechanical soft w/ thin liquids  Frequent/thorough oral care  ST to f/u as able/appropriate

## 2023-07-21 NOTE — ASSESSMENT & PLAN NOTE
Patient presented with acute encephalopathy POA  Neurology consult appreciated  CT head negative for acute abnormality  Patient has slight gaze preference to the right  MRI brain is pending  Stool for C. difficile and culture are negative

## 2023-07-21 NOTE — ASSESSMENT & PLAN NOTE
Malnutrition Findings:   Adult Malnutrition type: Chronic illness  Adult Degree of Malnutrition: Malnutrition of moderate degree  Malnutrition Characteristics: Fat loss, Muscle loss                  360 Statement: Pt presents with moderate protein calorie malnutrution as evidenced by prominent clavicle bone and sunken orbitals. Treat with Pureed Nectar Thick Liquids and Magic Cup BID. BMI Findings: Body mass index is 18.67 kg/m².    Patient has moderate protein calorie malnutrition in setting of chronic illness as evidenced by prominent clavicle bone, sunken orbitals  Nutrition consult and recommendations

## 2023-07-22 ENCOUNTER — APPOINTMENT (INPATIENT)
Dept: RADIOLOGY | Facility: HOSPITAL | Age: 68
DRG: 521 | End: 2023-07-22
Payer: MEDICARE

## 2023-07-22 LAB
ANION GAP SERPL CALCULATED.3IONS-SCNC: 5 MMOL/L
BASOPHILS # BLD AUTO: 0.03 THOUSANDS/ÂΜL (ref 0–0.1)
BASOPHILS NFR BLD AUTO: 0 % (ref 0–1)
BUN SERPL-MCNC: 9 MG/DL (ref 5–25)
CALCIUM SERPL-MCNC: 9.4 MG/DL (ref 8.4–10.2)
CHLORIDE SERPL-SCNC: 105 MMOL/L (ref 96–108)
CO2 SERPL-SCNC: 28 MMOL/L (ref 21–32)
CREAT SERPL-MCNC: 0.7 MG/DL (ref 0.6–1.3)
EOSINOPHIL # BLD AUTO: 0.3 THOUSAND/ÂΜL (ref 0–0.61)
EOSINOPHIL NFR BLD AUTO: 4 % (ref 0–6)
ERYTHROCYTE [DISTWIDTH] IN BLOOD BY AUTOMATED COUNT: 12.7 % (ref 11.6–15.1)
GFR SERPL CREATININE-BSD FRML MDRD: 88 ML/MIN/1.73SQ M
GLUCOSE SERPL-MCNC: 94 MG/DL (ref 65–140)
HCT VFR BLD AUTO: 31.6 % (ref 34.8–46.1)
HGB BLD-MCNC: 9.9 G/DL (ref 11.5–15.4)
IMM GRANULOCYTES # BLD AUTO: 0.02 THOUSAND/UL (ref 0–0.2)
IMM GRANULOCYTES NFR BLD AUTO: 0 % (ref 0–2)
LYMPHOCYTES # BLD AUTO: 0.97 THOUSANDS/ÂΜL (ref 0.6–4.47)
LYMPHOCYTES NFR BLD AUTO: 14 % (ref 14–44)
MCH RBC QN AUTO: 29.5 PG (ref 26.8–34.3)
MCHC RBC AUTO-ENTMCNC: 31.3 G/DL (ref 31.4–37.4)
MCV RBC AUTO: 94 FL (ref 82–98)
MONOCYTES # BLD AUTO: 0.59 THOUSAND/ÂΜL (ref 0.17–1.22)
MONOCYTES NFR BLD AUTO: 9 % (ref 4–12)
NEUTROPHILS # BLD AUTO: 5 THOUSANDS/ÂΜL (ref 1.85–7.62)
NEUTS SEG NFR BLD AUTO: 73 % (ref 43–75)
NRBC BLD AUTO-RTO: 0 /100 WBCS
PLATELET # BLD AUTO: 352 THOUSANDS/UL (ref 149–390)
PMV BLD AUTO: 8.6 FL (ref 8.9–12.7)
POTASSIUM SERPL-SCNC: 4 MMOL/L (ref 3.5–5.3)
RBC # BLD AUTO: 3.36 MILLION/UL (ref 3.81–5.12)
SODIUM SERPL-SCNC: 138 MMOL/L (ref 135–147)
WBC # BLD AUTO: 6.91 THOUSAND/UL (ref 4.31–10.16)

## 2023-07-22 PROCEDURE — 85025 COMPLETE CBC W/AUTO DIFF WBC: CPT | Performed by: INTERNAL MEDICINE

## 2023-07-22 PROCEDURE — 73502 X-RAY EXAM HIP UNI 2-3 VIEWS: CPT

## 2023-07-22 PROCEDURE — 99223 1ST HOSP IP/OBS HIGH 75: CPT | Performed by: PHYSICIAN ASSISTANT

## 2023-07-22 PROCEDURE — 99232 SBSQ HOSP IP/OBS MODERATE 35: CPT | Performed by: INTERNAL MEDICINE

## 2023-07-22 PROCEDURE — 80048 BASIC METABOLIC PNL TOTAL CA: CPT | Performed by: INTERNAL MEDICINE

## 2023-07-22 RX ORDER — CEFTRIAXONE 1 G/50ML
1000 INJECTION, SOLUTION INTRAVENOUS EVERY 24 HOURS
Status: DISCONTINUED | OUTPATIENT
Start: 2023-07-22 | End: 2023-07-23

## 2023-07-22 RX ORDER — CEFAZOLIN SODIUM 1 G/50ML
1000 SOLUTION INTRAVENOUS
Status: COMPLETED | OUTPATIENT
Start: 2023-07-23 | End: 2023-07-23

## 2023-07-22 RX ADMIN — MIRTAZAPINE 30 MG: 15 TABLET, FILM COATED ORAL at 21:52

## 2023-07-22 RX ADMIN — Medication 5000 UNITS: at 08:38

## 2023-07-22 RX ADMIN — RISPERIDONE 1 MG: 1 TABLET ORAL at 16:58

## 2023-07-22 RX ADMIN — NYSTATIN 500000 UNITS: 100000 SUSPENSION ORAL at 16:58

## 2023-07-22 RX ADMIN — ENOXAPARIN SODIUM 40 MG: 100 INJECTION SUBCUTANEOUS at 08:38

## 2023-07-22 RX ADMIN — SERTRALINE 50 MG: 50 TABLET, FILM COATED ORAL at 08:39

## 2023-07-22 RX ADMIN — CEFTRIAXONE 1000 MG: 1 INJECTION, SOLUTION INTRAVENOUS at 08:38

## 2023-07-22 RX ADMIN — BUPROPION HYDROCHLORIDE 150 MG: 150 TABLET, FILM COATED, EXTENDED RELEASE ORAL at 08:38

## 2023-07-22 RX ADMIN — NYSTATIN 500000 UNITS: 100000 SUSPENSION ORAL at 13:01

## 2023-07-22 RX ADMIN — RISPERIDONE 1 MG: 1 TABLET ORAL at 08:39

## 2023-07-22 RX ADMIN — LEVOTHYROXINE SODIUM 50 MCG: 50 TABLET ORAL at 05:25

## 2023-07-22 RX ADMIN — NYSTATIN 500000 UNITS: 100000 SUSPENSION ORAL at 21:52

## 2023-07-22 RX ADMIN — BUPROPION HYDROCHLORIDE 150 MG: 150 TABLET, FILM COATED, EXTENDED RELEASE ORAL at 16:58

## 2023-07-22 NOTE — PLAN OF CARE
Problem: Potential for Falls  Goal: Patient will remain free of falls  Description: INTERVENTIONS:  - Educate patient/family on patient safety including physical limitations  - Instruct patient to call for assistance with activity   - Consult OT/PT to assist with strengthening/mobility   - Keep Call bell within reach  - Keep bed low and locked with side rails adjusted as appropriate  - Keep care items and personal belongings within reach  - Initiate and maintain comfort rounds  - Make Fall Risk Sign visible to staff  - Offer Toileting every 2 Hours, in advance of need  - Initiate/Maintain bed  alarm  - Apply yellow socks and bracelet for high fall risk patients  - Consider moving patient to room near nurses station  Outcome: Progressing

## 2023-07-22 NOTE — ASSESSMENT & PLAN NOTE
Patient presented with acute encephalopathy POA  Neurology consult appreciated  CT head negative for acute abnormality  Patient has slight gaze preference to the right  Stool for C. difficile and culture are negative  MRI brain- "1. No acute infarction, edema, or mass effect. 2.  Moderate chronic microangiopathic ischemic changes. 3.  NeuroQuant analysis was performed: Lower limits of normal hippocampal volume and enlarged inferior lateral and overall ventricular system, suggestive of global ex-vacuo dilatation.  The additional finding of an abnormal Hippocampal Occupancy Score, (HOC), is concerning for a mesial temporal lobe focused Neurodegenerative process.  Recommend reevaluation with Neuroquant imaging in 6 months. MRI consistent with Alzheimer's dementia.    Neurology on board

## 2023-07-22 NOTE — ASSESSMENT & PLAN NOTE
· Total  on admission  · Patient was laying on the floor prolonged.   · No renal dysfunction    · Resolved with IV fluids

## 2023-07-22 NOTE — ASSESSMENT & PLAN NOTE
· S/p removal of orthopedic screw on right on 6/30/23 by ortho, Dr. Mary Jane Martin   · Pain on Percocet at home  · Oxy 2.5/5 as needed ordered while inpatient  · Complaining of right hip pain.   · Order right pelvis x-rays  · Orthopedic consult

## 2023-07-22 NOTE — ASSESSMENT & PLAN NOTE
· Found on bedroom floor by sister at 54 on 7/19, sister unable to get her up so sister went to sleep  · Sister woke at 63 and called EMS at that time  · CT head and CT C-spine negative for acute findings  · Patient without tenderness or signs of trauma throughout remainder of body  · PT, OT, and CM consults for placement  · She will require rehab placement at discharge

## 2023-07-22 NOTE — PROGRESS NOTES
[address]  Progress Note  Name: Lynda Tran  MRN: [id number]  Unit/Bed#: -01 I Date of Admission: 7/19/2023   Date of Service: 7/22/2023 I Hospital Day: 3    Assessment/Plan   Moderate protein-calorie malnutrition ([address])  Assessment & Plan  Malnutrition Findings:   Adult Malnutrition type: Chronic illness  Adult Degree of Malnutrition: Malnutrition of moderate degree  Malnutrition Characteristics: Fat loss, Muscle loss                  360 Statement: Pt presents with moderate protein calorie malnutrution as evidenced by prominent clavicle bone and sunken orbitals. Treat with Pureed Nectar Thick Liquids and Magic Cup BID. BMI Findings: Body mass index is 18.67 kg/m². Patient has moderate protein calorie malnutrition in setting of chronic illness as evidenced by prominent clavicle bone, sunken orbitals  Nutrition consult and recommendations    Elevated CK  Assessment & Plan  · Total  on admission  · Patient was laying on the floor prolonged.   · No renal dysfunction    · Resolved with IV fluids    Fall  Assessment & Plan  · Found on bedroom floor by sister  · CT head and CT C-spine negative for acute findings  · No signs of trauma otherwise on patient's body, no complaints of pain or tenderness on palpation  · PT, OT, and CM consulted    No other household member able to render care  Assessment & Plan  · Found on bedroom floor by sister at 54 on 7/19, sister unable to get her up so sister went to sleep  · Sister woke at 63 and called EMS at that time  · CT head and CT C-spine negative for acute findings  · Patient without tenderness or signs of trauma throughout remainder of body  · PT, OT, and CM consults for placement  · She will require rehab placement at discharge    Anemia  Assessment & Plan  · Hemoglobin 10.8 on admission  · Baseline hemoglobin appears to be 9-10  · Trend CBC daily    Painful orthopaedic hardware [facility])  Assessment & Plan  · S/p removal of orthopedic screw on right on 6/30/23 by ortho, Dr. Kari Salmeron   · Pain on Percocet at home  · Oxy 2.5/5 as needed ordered while inpatient  · Complaining of right hip pain. · Order right pelvis x-rays  · Orthopedic consult    Ambulatory dysfunction  Assessment & Plan  Ambulatory dysfunction POA in setting of dementia evidenced by inability to stand after a fall. CT is negative for any acute findings  Patient was noticed to have gaze preference  MRI brain as above  See above    Moderate dementia [facility])  Assessment & Plan  · Only oriented to self on admission  · Sister reportedly called nursing staff that this is patient's baseline since surgery on 6/30  · PT, OT, and CM consulted  · Home medications: Risperdal 1 Mg 4 times daily and Remeron 30 mg at bedtime  · Continue home medications    Depression  Assessment & Plan  · Continue home Zoloft 50 Mg daily and Wellbutrin 150 mg twice daily    Acquired hypothyroidism  Assessment & Plan  · Continue home Synthroid 50 mcg daily    * Encephalopathy  Assessment & Plan  Patient presented with acute encephalopathy POA  Neurology consult appreciated  CT head negative for acute abnormality  Patient has slight gaze preference to the right  Stool for C. difficile and culture are negative  MRI brain- "1. No acute infarction, edema, or mass effect. 2.  Moderate chronic microangiopathic ischemic changes. 3.  NeuroQuant analysis was performed: Lower limits of normal hippocampal volume and enlarged inferior lateral and overall ventricular system, suggestive of global ex-vacuo dilatation.  The additional finding of an abnormal Hippocampal Occupancy Score, (HOC), is concerning for a mesial temporal lobe focused Neurodegenerative process.  Recommend reevaluation with Neuroquant imaging in 6 months. MRI consistent with Alzheimer's dementia. Neurology on board           Labs & Imaging: I have personally reviewed pertinent reports. VTE Prophylaxis: in place.     Code Status: Level 1 - Full Code    Patient Centered Rounds: I have performed bedside rounds with nursing staff today. Discussions with Specialists or Other Care Team Provider: CM and ortho    Education and Discussions with Family / Patient: Sister on phone    Current Length of Stay: 3 day(s)    Current Patient Status: Inpatient   Certification Statement: The patient will continue to require additional inpatient hospital stay due to see my assessment and plan. Subjective:   Patient is seen and examined at bedside. Complains of right hip pain. Afebrile. All other ROS are negative. Objective:    Vitals: Blood pressure 160/74, pulse 75, temperature 98.1 °F (36.7 °C), resp. rate 14, height 5' 8" (1.727 m), weight 55.7 kg (122 lb 12.7 oz), SpO2 97 %. ,Body mass index is 18.67 kg/m². SPO2 RA Rest    Flowsheet Row ED to Hosp-Admission (Current) from 7/19/2023 in [facility] Med Surg Unit   SpO2 97 %   SpO2 Activity At Rest   O2 Device None (Room air)   O2 Flow Rate --        I&O:     Intake/Output Summary (Last 24 hours) at 7/22/2023 1052  Last data filed at 7/22/2023 0819  Gross per 24 hour   Intake 120 ml   Output 1200 ml   Net -1080 ml       Physical Exam:    General- Alert, lying comfortably in bed. Not in any acute distress. Neck- Supple, No JVD  CVS- regular, S1 and S2 normal  Chest- Bilateral Air entry, No rhochi, crackles or wheezing present. Abdomen- soft, nontender, not distended, no guarding or rigidity, BS+  Extremities-  No pedal edema, No calf tenderness  CNS-   Alert, awake and orientedx2. No focal deficits present.     Invasive Devices     Peripheral Intravenous Line  Duration           Peripheral IV 07/20/23 Distal;Right;Ventral (anterior) Forearm 2 days                      Social History  reviewed  Family History   Problem Relation Age of Onset   • Hypertension Mother    • Parkinsonism Mother 80        memory issues    • Depression Mother    • Melanoma Father    • No Known Problems Maternal Grandmother    • No Known Problems Maternal Grandfather    • No Known Problems Paternal Grandmother    • Heart attack Paternal Grandfather    • Depression Other    • Skin cancer Sister    • Breast cancer Maternal Aunt         over age 48   • No Known Problems Paternal Aunt    • No Known Problems Sister     reviewed    Meds:  Current Facility-Administered Medications   Medication Dose Route Frequency Provider Last Rate Last Admin   • acetaminophen (TYLENOL) tablet 650 mg  650 mg Oral Q6H PRN VIK Ria-C   650 mg at 07/21/23 0039   • aluminum-magnesium hydroxide-simethicone (MAALOX) oral suspension 30 mL  30 mL Oral Q6H PRN Minal Diaz PACorettaC       • buPROPion [facility]) 12 hr tablet 150 mg  150 mg Oral BID VIK Rai-C   150 mg at 07/22/23 3126   • cefTRIAXone (ROCEPHIN) IVPB (premix in dextrose) 1,000 mg 50 mL  1,000 mg Intravenous Q24H Shawna Glynn  mL/hr at 07/22/23 0838 1,000 mg at 07/22/23 2688   • cholecalciferol (VITAMIN D3) tablet 5,000 Units  5,000 Units Oral Daily Minal Diaz PA-C   5,000 Units at 07/22/23 0307   • enoxaparin (LOVENOX) subcutaneous injection 40 mg  40 mg Subcutaneous Daily Minal Diaz PA-C   40 mg at 07/22/23 6224   • levothyroxine tablet 50 mcg  50 mcg Oral Early Morning VIK Rai-C   50 mcg at 07/22/23 1558   • mirtazapine (REMERON) tablet 30 mg  30 mg Oral HS Minal Diaz PA-C   30 mg at 07/21/23 2106   • nystatin (MYCOSTATIN) oral suspension 500,000 Units  500,000 Units Swish & Swallow 4x Daily Shawna Glynn MD       • ondansetron (ZOFRAN) injection 4 mg  4 mg Intravenous Q6H PRN Minal Diaz PA-C       • oxyCODONE (ROXICODONE) split tablet 2.5 mg  2.5 mg Oral Q6H PRN Minal Diaz PA-C        Or   • oxyCODONE (ROXICODONE) IR tablet 5 mg  5 mg Oral Q6H PRN Minal Diaz PA-C       • risperiDONE (RisperDAL) tablet 1 mg  1 mg Oral BID Shawna Glynn MD   1 mg at 07/22/23 9354   • senna (SENOKOT) tablet 8.6 mg  1 tablet Oral HS PRN Rita Aburto PA-C       • sertraline (ZOLOFT) tablet 50 mg  50 mg Oral Daily With Breakfast Rita Aburto PA-C   50 mg at 07/22/23 1776      Medications Prior to Admission   Medication   • buPROPion (WELLBUTRIN SR) 150 mg 12 hr tablet   • Calcium-Magnesium-Vitamin D (CALCIUM 500 PO)   • Cholecalciferol (VITAMIN D3) 5000 units CAPS   • levothyroxine 50 mcg tablet   • mirtazapine (REMERON) 30 mg tablet   • Multiple Vitamins-Minerals (CENTRUM SILVER 50+WOMEN PO)   • oxyCODONE-acetaminophen (Percocet) 5-325 mg per tablet   • risperiDONE (RisperDAL) 1 mg tablet   • sertraline (ZOLOFT) 50 mg tablet       Labs:  Results from last 7 days   Lab Units 07/22/23  0529 07/21/23  0451 07/20/23  0424 07/19/23  1855   WBC Thousand/uL 6.91 6.00 9.04 9.34   HEMOGLOBIN g/dL 9.9* 9.1* 9.6* 10.8*   HEMATOCRIT % 31.6* 29.1* 30.9* 35.0   PLATELETS Thousands/uL 352 290 344 425*   NEUTROS PCT % 73 68  --  83*   LYMPHS PCT % 14 17  --  8*   MONOS PCT % 9 10  --  7   EOS PCT % 4 4  --  1     Results from last 7 days   Lab Units 07/22/23  0529 07/21/23  0451 07/20/23  0424 07/19/23  1855   POTASSIUM mmol/L 4.0 3.4* 3.6 3.8   CHLORIDE mmol/L 105 107 100 97   CO2 mmol/L 28 29 25 28   BUN mg/dL 9 14 16 16   CREATININE mg/dL 0.70 0.80 0.76 0.82   CALCIUM mg/dL 9.4 8.6 9.1 9.9   ALK PHOS U/L  --  87  --  123*   ALT U/L  --  10  --  12   AST U/L  --  18  --  29     Lab Results   Component Value Date    CKTOTAL 128 07/21/2023    CKTOTAL 328 (H) 07/20/2023    CKTOTAL 381 (H) 07/19/2023         Lab Results   Component Value Date    BLOODCX No Growth After 5 Days. 06/16/2019    BLOODCX No Growth After 5 Days. 06/16/2019         Imaging:  No results found for this or any previous visit. Results for orders placed during the hospital encounter of 06/16/19    XR chest 2 views    Narrative  CHEST    INDICATION:   Right upper quadrant pain.   Possible infiltrate abdominal CT.    COMPARISON:  CT abdomen and pelvis dated the same    EXAM PERFORMED/VIEWS:  XR CHEST PA & LATERAL      FINDINGS:    No pneumothorax is seen. Some patchy opacities are seen in the right lower lung field with associated mild hypoaeration and blunting of the right costophrenic sulcus, suspect small pleural effusion/atelectasis. Superimposed infection could be  considered in the appropriate clinical setting. Some linear atelectasis/infection is seen in the right upper lung field. Lungs otherwise grossly clear. The heart is top normal in size. The cardiac and mediastinal contours otherwise appear unremarkable. Metallic plate and screw fixation of the right humeral head and proximal humerus noted. Visualized bones otherwise appear intact. Impression  Suspected small right pleural effusion/atelectasis. Patchy opacities in the right lower lung field and linear opacities in the right upper lung field could represent superimposed infection in the appropriate clinical setting. Correlation with the  patient's symptoms and laboratory values recommended. Other findings as above.       Workstation performed: [id number]      Last 24 Hours Medication List:   Current Facility-Administered Medications   Medication Dose Route Frequency Provider Last Rate   • acetaminophen  650 mg Oral Q6H PRN Jaime Lam PA-C     • aluminum-magnesium hydroxide-simethicone  30 mL Oral Q6H PRN Jaime Lam PA-C     • buPROPion  150 mg Oral BID Jaime Lam PA-C     • cefTRIAXone  1,000 mg Intravenous Q24H Monika Elias MD 1,000 mg (07/22/23 9106)   • cholecalciferol  5,000 Units Oral Daily Jaime Lam PA-C     • enoxaparin  40 mg Subcutaneous Daily Jaime Lam PA-C     • levothyroxine  50 mcg Oral Early Morning Jaime Lam PA-C     • mirtazapine  30 mg Oral HS Jaime Lam PA-C     • nystatin  500,000 Units Swish & Swallow 4x Daily Monika Elias MD     • ondansetron  4 mg Intravenous Q6H PRN Ravi Jaquez PA-C     • oxyCODONE  2.5 mg Oral Q6H PRN Ravi Jaquez PA-C      Or   • oxyCODONE  5 mg Oral Q6H PRN Ravi Jaquez PA-C     • risperiDONE  1 mg Oral BID Ebony Kennedy MD     • senna  1 tablet Oral HS PRN Ravi Jaquez PA-C     • sertraline  50 mg Oral Daily With Breakfast Ravi Jaquez PA-C          Today, Patient Was Seen By: Ebony Kennedy MD    ** Please Note: Dictation voice to text software may have been used in the creation of this document.  **

## 2023-07-22 NOTE — PLAN OF CARE
Problem: Potential for Falls  Goal: Patient will remain free of falls  Description: INTERVENTIONS:  - Educate patient/family on patient safety including physical limitations  - Instruct patient to call for assistance with activity   - Consult OT/PT to assist with strengthening/mobility   - Keep Call bell within reach  - Keep bed low and locked with side rails adjusted as appropriate  - Keep care items and personal belongings within reach  - Initiate and maintain comfort rounds  - Make Fall Risk Sign visible to staff  - Apply yellow socks and bracelet for high fall risk patients  - Consider moving patient to room near nurses station  Outcome: Progressing  Problem: MOBILITY - ADULT  Goal: Maintain or return to baseline ADL function  Description: INTERVENTIONS:  -  Assess patient's ability to carry out ADLs; assess patient's baseline for ADL function and identify physical deficits which impact ability to perform ADLs (bathing, care of mouth/teeth, toileting, grooming, dressing, etc.)  - Assess/evaluate cause of self-care deficits   - Assess range of motion  - Assess patient's mobility; develop plan if impaired  - Assess patient's need for assistive devices and provide as appropriate  - Encourage maximum independence but intervene and supervise when necessary  - Involve family in performance of ADLs  - Assess for home care needs following discharge   - Consider OT consult to assist with ADL evaluation and planning for discharge  - Provide patient education as appropriate  Outcome: Progressing     Problem: Prexisting or High Potential for Compromised Skin Integrity  Goal: Skin integrity is maintained or improved  Description: INTERVENTIONS:  - Identify patients at risk for skin breakdown  - Assess and monitor skin integrity  - Assess and monitor nutrition and hydration status  - Monitor labs   - Assess for incontinence   - Turn and reposition patient  - Assist with mobility/ambulation  - Relieve pressure over bony prominences  - Avoid friction and shearing  - Provide appropriate hygiene as needed including keeping skin clean and dry  - Evaluate need for skin moisturizer/barrier cream  - Collaborate with interdisciplinary team   - Patient/family teaching  - Consider wound care consult   Outcome: Progressing     Problem: PAIN - ADULT  Goal: Verbalizes/displays adequate comfort level or baseline comfort level  Description: Interventions:  - Encourage patient to monitor pain and request assistance  - Assess pain using appropriate pain scale  - Administer analgesics based on type and severity of pain and evaluate response  - Implement non-pharmacological measures as appropriate and evaluate response  - Consider cultural and social influences on pain and pain management  - Notify physician/advanced practitioner if interventions unsuccessful or patient reports new pain  Outcome: Progressing     Problem: INFECTION - ADULT  Goal: Absence or prevention of progression during hospitalization  Description: INTERVENTIONS:  - Assess and monitor for signs and symptoms of infection  - Monitor lab/diagnostic results  - Monitor all insertion sites, i.e. indwelling lines, tubes, and drains  - Monitor endotracheal if appropriate and nasal secretions for changes in amount and color  - Gary appropriate cooling/warming therapies per order  - Administer medications as ordered  - Instruct and encourage patient and family to use good hand hygiene technique  - Identify and instruct in appropriate isolation precautions for identified infection/condition  Outcome: Progressing

## 2023-07-22 NOTE — ASSESSMENT & PLAN NOTE
Ambulatory dysfunction POA in setting of dementia evidenced by inability to stand after a fall.   CT is negative for any acute findings  Patient was noticed to have gaze preference  MRI brain as above  See above

## 2023-07-22 NOTE — DISCHARGE INSTR - AVS FIRST PAGE
Follow-up with PCP in 1 week  Follow-up with the neurology/geriatrics as outpatient    POSTOPERATIVE INSTRUCTIONS    MEDICATIONS:  Blood Clot Prevention:  Aspirin 81 mg, 1 tablet twice daily for 30 days  Pump your foot up and down 20 times per hour while you are less mobile. WOUND CARE:  Keep the dressing clean and dry. Light drainage may occur the first 2 days postop. Mepilex dressing for 7 days then dry dressing only if needed. Please call our office ([phone]) if you experience any of the following:  Sudden increase in swelling, redness, or warmth at the surgical site  Excessive incisional drainage that persists beyond the 3rd day after surgery  Oral temperature greater than 101 degrees, not relieved with Tylenol  Shortness of breath, chest pain, nausea, or any other concerning symptoms    SWELLING CONTROL:  Cold Therapy:  Apply ice (20 min on, 20 min off) as often as you feel is necessary. Elevation:  Elevate the entire leg above heart level as much as possible. Compression:  Apply ACE wraps or a compression stocking as needed. RANGE OF MOTION:  Follow posterior hip precautions as instructed by PT    ACTIVITY:  BEAR FULL WEIGHT AS TOLERATED on the operative leg. Use crutches to assist only as needed. Using Crutches on Stairs:  Going up, lead with your "good" (nonoperative) leg. Going down, lead with your "bad" (operative) leg. Use a hand rail when available. Quad Sets:  Sit or lie with your knee straight. Tighten your quadriceps (front thigh) muscle. Hold for 3 seconds, then relax. Repeat 20 times per hour while awake. PHYSICAL THERAPY:  Begin therapy 5 TO 7 DAYS AFTER SURGERY. You were given a prescription for therapy at your preoperative office visit. If you do not have physical therapy scheduled yet, please call our office for assistance.     FOLLOW-UP APPOINTMENT:  2 weeks after surgery with:    Dr. Domingo Stevens

## 2023-07-22 NOTE — PLAN OF CARE
Problem: Potential for Falls  Goal: Patient will remain free of falls  Description: INTERVENTIONS:  - Educate patient/family on patient safety including physical limitations  - Instruct patient to call for assistance with activity   - Consult OT/PT to assist with strengthening/mobility   - Keep Call bell within reach  - Keep bed low and locked with side rails adjusted as appropriate  - Keep care items and personal belongings within reach  - Initiate and maintain comfort rounds  - Make Fall Risk Sign visible to staff  - Apply yellow socks and bracelet for high fall risk patients  - Consider moving patient to room near nurses station  7/21/2023 2012 by Veronica Kaplan RN  Outcome: Progressing  7/21/2023 2012 by Veronica Kaplan RN  Outcome: Progressing     Problem: MOBILITY - ADULT  Goal: Maintain or return to baseline ADL function  Description: INTERVENTIONS:  -  Assess patient's ability to carry out ADLs; assess patient's baseline for ADL function and identify physical deficits which impact ability to perform ADLs (bathing, care of mouth/teeth, toileting, grooming, dressing, etc.)  - Assess/evaluate cause of self-care deficits   - Assess range of motion  - Assess patient's mobility; develop plan if impaired  - Assess patient's need for assistive devices and provide as appropriate  - Encourage maximum independence but intervene and supervise when necessary  - Involve family in performance of ADLs  - Assess for home care needs following discharge   - Consider OT consult to assist with ADL evaluation and planning for discharge  - Provide patient education as appropriate  7/21/2023 2012 by Veronica Kaplan RN  Outcome: Progressing  7/21/2023 2012 by Veronica Kaplan RN  Outcome: Progressing     Problem: Knowledge Deficit  Goal: Patient/family/caregiver demonstrates understanding of disease process, treatment plan, medications, and discharge instructions  Description: Complete learning assessment and assess knowledge base.  Interventions:  - Provide teaching at level of understanding  - Provide teaching via preferred learning methods  7/21/2023 2012 by Keith Sandoval RN  Outcome: Progressing  7/21/2023 2012 by Keith Sandoval, RN  Outcome: Progressing

## 2023-07-22 NOTE — CONSULTS
Orthopaedic Surgery - Consultation  Mat Rivas (89 y.o. female)   : 1955   MRN: [id number]  Date: 2023   Encounter: [id number]   Unit/Bed#: -01    Consulting Physician:  Kelly De La Cruz PA-C  Requesting Physician: Michelle Aiken MD  Reason for Consult / Principal Problem: R hip femoral neck fracture with retained hardware    Assessment / Plan  Right hip femoral neck fracture with retained hardware    · After discussion of risk and benefits with the patient's sister who is power of  we should proceed with removal of hardware and conversion to right hip hemiarthroplasty. We did discuss conversion to total hip replacement but due to the patient's current level of activity and cognitive function it was felt to be safer to proceed with hemiarthroplasty. The patient's family was concerned again that her cognitive function would get worse with further surgery. · Consent was signed and is on the patient's chart. · Continue with pain management using ice and analgesics as needed. · Nonweightbearing right lower extremity at this time. · N.p.o. after midnight. · Hold anticoagulation at this time in preparation for surgery on 2023. · Ortho will continue to follow at this time. History of Present Illness  Mat Rivas is a 69 y.o. female who presents to [facility] [place] after a fall at home on 2023. She was found on the ground and was unable to get up. They assume that she had been on the ground for an extended period. Due to patient's Alzheimer's dementia she was brought to the hospital for further evaluation due to the patient's inability to take care of herself at home and just generalized weakness and general in association with her dementia. Prior to this she did have removal of the right TFN dynamic screw on 2023 by Dr. Isela Ornelas with subsequent displaced femoral neck fracture.   She was seen by Dr. Isela Ornelas in the office on 2023 with instructions of minimal weightbearing and follow-up with total joint specialist Dr. Kassandra Al to discuss removal of TFN with conversion to replacement. After discussion with the patient's sister who is the patient's power of  due to her dementia they stated that they were hesitant to proceed with further surgery due to the patient's continued decline in cognitive function especially following surgery. The patient has been ambulating with pain using a walker since the surgery on June 30. She does complain of pain with movement of the hip while in bed at this time. She is denying any distal numbness or tingling at this time. Her hip pain seems to be localized in the right groin area. At rest the patient seems to be comfortable. The patient does not verbalize her pain level well at this time. Patient's family has been giving her Percocet at home for pain management.     Review of Systems  Negative for chest pain and shortness of breath    Medical, Surgical, Family, and Social History    Past Medical History:   Diagnosis Date   • Anxiety    • Depression    • Memory loss    • Thyroid activity decreased        Past Surgical History:   Procedure Laterality Date   • BREAST BIOPSY      years ago- Benign   • JOINT REPLACEMENT Left    • MN OPTX FEM SHFT FX W/INSJ IMED IMPLT W/WO SCREW Right 5/9/2019    Procedure: INSERTION NAIL IM FEMUR ANTEGRADE (TROCHANTERIC), RIGHT;  Surgeon: Amy Stauffer MD;  Location:  MAIN OR;  Service: Orthopedics   • MN REMOVAL IMPLANT DEEP Right 6/30/2023    Procedure: REMOVAL HARDWARE TFN DYNAMIC SCREW;  Surgeon: Amy Stauffer MD;  Location:  MAIN OR;  Service: Orthopedics       Family History   Problem Relation Age of Onset   • Hypertension Mother    • Parkinsonism Mother 80        memory issues    • Depression Mother    • Melanoma Father    • No Known Problems Maternal Grandmother    • No Known Problems Maternal Grandfather    • No Known Problems Paternal Grandmother    • Heart attack Paternal Grandfather    • Depression Other    • Skin cancer Sister    • Breast cancer Maternal Aunt         over age 48   • No Known Problems Paternal Aunt    • No Known Problems Sister        Social History     Occupational History   • Not on file   Tobacco Use   • Smoking status: Never   • Smokeless tobacco: Never   Vaping Use   • Vaping Use: Never used   Substance and Sexual Activity   • Alcohol use: Not Currently   • Drug use: No   • Sexual activity: Not on file       No Known Allergies    Current Facility-Administered Medications   Medication Dose Route Frequency   • acetaminophen (TYLENOL) tablet 650 mg  650 mg Oral Q6H PRN   • aluminum-magnesium hydroxide-simethicone (MAALOX) oral suspension 30 mL  30 mL Oral Q6H PRN   • buPROPion (WELLBUTRIN SR) 12 hr tablet 150 mg  150 mg Oral BID   • [START ON 7/23/2023] ceFAZolin (ANCEF) IVPB (premix in dextrose) 1,000 mg 50 mL  1,000 mg Intravenous On Call To OR   • cefTRIAXone (ROCEPHIN) IVPB (premix in dextrose) 1,000 mg 50 mL  1,000 mg Intravenous Q24H   • cholecalciferol (VITAMIN D3) tablet 5,000 Units  5,000 Units Oral Daily   • levothyroxine tablet 50 mcg  50 mcg Oral Early Morning   • mirtazapine (REMERON) tablet 30 mg  30 mg Oral HS   • nystatin (MYCOSTATIN) oral suspension 500,000 Units  500,000 Units Swish & Swallow 4x Daily   • ondansetron (ZOFRAN) injection 4 mg  4 mg Intravenous Q6H PRN   • oxyCODONE (ROXICODONE) split tablet 2.5 mg  2.5 mg Oral Q6H PRN    Or   • oxyCODONE (ROXICODONE) IR tablet 5 mg  5 mg Oral Q6H PRN   • risperiDONE (RisperDAL) tablet 1 mg  1 mg Oral BID   • senna (SENOKOT) tablet 8.6 mg  1 tablet Oral HS PRN   • sertraline (ZOLOFT) tablet 50 mg  50 mg Oral Daily With Breakfast     Medications Prior to Admission   Medication   • buPROPion (WELLBUTRIN SR) 150 mg 12 hr tablet   • Calcium-Magnesium-Vitamin D (CALCIUM 500 PO)   • Cholecalciferol (VITAMIN D3) 5000 units CAPS   • levothyroxine 50 mcg tablet   • mirtazapine (REMERON) 30 mg tablet   • Multiple Vitamins-Minerals (CENTRUM SILVER 50+WOMEN PO)   • oxyCODONE-acetaminophen (Percocet) 5-325 mg per tablet   • risperiDONE (RisperDAL) 1 mg tablet   • sertraline (ZOLOFT) 50 mg tablet       Vitals  Temp:  [98.1 °F (36.7 °C)] 98.1 °F (36.7 °C)  HR:  [75-82] 81  BP: (125-160)/(62-78) 125/62  Body mass index is 18.67 kg/m². I/O last 24 hours: In: 600 [P.O.:600]  Out: 1750 [Urine:1750]    Physical Exam  General:  Alert & oriented x3, no distress, appears stated age  HEENT:  EOMI, eyes clear, hearing intact, mucous membranes moist  Neck:  Supple, non-tender, trachea midline, no lymphadenopathy  Cardiovascular:  Regular rate, no discernable arrhythmia  Pulmonary:  Regular rate, respirations non-labored   Abdominal:  Soft, non-tender    Right Hip Exam  Alignment / Posture: At rest the patient lies with her leg slightly internally rotated and shortened. Inspection:  No obvious bruising or deformity seen around the hip joint. Incisions from previous surgery healed well at this time. Palpation:  Mild tenderness at Kerrie-incisional areas and groin with deep palpation. ROM:  Patient grimaces with flexion of the hip at this time along with logroll over 20 degrees internally and 10 degrees externally. Strength:  5/5 AT, GSC, PT, and peroneals. Stability:  (+) Logroll. Tests:  No pertinent positive or negative tests. Neurovascular:  Sensation intact in DP/SP/Coppola/Sa/T nerve distributions. Sensation intact in all digital nerve distributions. Toes warm and perfused. Gait:  Not tested. Imaging  I have personally reviewed pertinent films in PACS. XR of right hip - From 7/22/2023 shows femoral neck fracture with varus angulation status post removal of dynamic hip screw. Some lucency noted along the fracture line possibly representing mild interval displacement versus reabsorptive change. Trochanteric nail remains in place.     Lab Results  (I have personally reviewed pertinent lab results.)  Results from last 7 days   Lab Units 07/22/23  0529 07/21/23  0451 07/20/23  0424 07/19/23  1855   WBC Thousand/uL 6.91 6.00 9.04 9.34   HEMOGLOBIN g/dL 9.9* 9.1* 9.6* 10.8*   HEMATOCRIT % 31.6* 29.1* 30.9* 35.0   PLATELETS Thousands/uL 352 290 344 425*   RBC Million/uL 3.36* 3.04* 3.24* 3.66*   MCV fL 94 96 95 96   MCH pg 29.5 29.9 29.6 29.5   MCHC g/dL 31.3* 31.3* 31.1* 30.9*   RDW % 12.7 12.8 12.6 12.6   MPV fL 8.6* 8.6* 8.6* 8.9   NRBC AUTO /100 WBCs 0 0  --  0         Results from last 7 days   Lab Units 07/22/23  0529 07/21/23 0451 07/20/23  0424 07/19/23  1855   POTASSIUM mmol/L 4.0 3.4* 3.6 3.8   CHLORIDE mmol/L 105 107 100 97   CO2 mmol/L 28 29 25 28   BUN mg/dL 9 14 16 16   CREATININE mg/dL 0.70 0.80 0.76 0.82   EGFR ml/min/1.73sq m 88 75 80 73   CALCIUM mg/dL 9.4 8.6 9.1 9.9   ALT U/L  --  10  --  12   AST U/L  --  18  --  29                 Code Status  Level 1 - Full Code    Counseling / Coordination of Care  Total floor / unit time spent today 20 minutes. Greater than 50% of total time was spent with the patient and / or family counseling and / or coordination of care.     Asael Maurice PA-C

## 2023-07-23 ENCOUNTER — APPOINTMENT (INPATIENT)
Dept: RADIOLOGY | Facility: HOSPITAL | Age: 68
DRG: 521 | End: 2023-07-23
Payer: MEDICARE

## 2023-07-23 ENCOUNTER — ANESTHESIA (INPATIENT)
Dept: PERIOP | Facility: HOSPITAL | Age: 68
DRG: 521 | End: 2023-07-23
Payer: MEDICARE

## 2023-07-23 ENCOUNTER — ANESTHESIA EVENT (INPATIENT)
Dept: PERIOP | Facility: HOSPITAL | Age: 68
DRG: 521 | End: 2023-07-23
Payer: MEDICARE

## 2023-07-23 LAB
ABO GROUP BLD: NORMAL
ANION GAP SERPL CALCULATED.3IONS-SCNC: 7 MMOL/L
BASOPHILS # BLD AUTO: 0.02 THOUSANDS/ÂΜL (ref 0–0.1)
BASOPHILS NFR BLD AUTO: 0 % (ref 0–1)
BLD GP AB SCN SERPL QL: NEGATIVE
BUN SERPL-MCNC: 18 MG/DL (ref 5–25)
CALCIUM SERPL-MCNC: 9.3 MG/DL (ref 8.4–10.2)
CHLORIDE SERPL-SCNC: 102 MMOL/L (ref 96–108)
CO2 SERPL-SCNC: 27 MMOL/L (ref 21–32)
CREAT SERPL-MCNC: 0.9 MG/DL (ref 0.6–1.3)
EOSINOPHIL # BLD AUTO: 0.27 THOUSAND/ÂΜL (ref 0–0.61)
EOSINOPHIL NFR BLD AUTO: 4 % (ref 0–6)
ERYTHROCYTE [DISTWIDTH] IN BLOOD BY AUTOMATED COUNT: 12.6 % (ref 11.6–15.1)
GFR SERPL CREATININE-BSD FRML MDRD: 65 ML/MIN/1.73SQ M
GLUCOSE SERPL-MCNC: 100 MG/DL (ref 65–140)
GLUCOSE SERPL-MCNC: 104 MG/DL (ref 65–140)
HCT VFR BLD AUTO: 33.3 % (ref 34.8–46.1)
HGB BLD-MCNC: 10.4 G/DL (ref 11.5–15.4)
IMM GRANULOCYTES # BLD AUTO: 0.02 THOUSAND/UL (ref 0–0.2)
IMM GRANULOCYTES NFR BLD AUTO: 0 % (ref 0–2)
LYMPHOCYTES # BLD AUTO: 1.09 THOUSANDS/ÂΜL (ref 0.6–4.47)
LYMPHOCYTES NFR BLD AUTO: 15 % (ref 14–44)
MCH RBC QN AUTO: 29.2 PG (ref 26.8–34.3)
MCHC RBC AUTO-ENTMCNC: 31.2 G/DL (ref 31.4–37.4)
MCV RBC AUTO: 94 FL (ref 82–98)
MONOCYTES # BLD AUTO: 0.65 THOUSAND/ÂΜL (ref 0.17–1.22)
MONOCYTES NFR BLD AUTO: 9 % (ref 4–12)
NEUTROPHILS # BLD AUTO: 5.49 THOUSANDS/ÂΜL (ref 1.85–7.62)
NEUTS SEG NFR BLD AUTO: 72 % (ref 43–75)
NRBC BLD AUTO-RTO: 0 /100 WBCS
PLATELET # BLD AUTO: 372 THOUSANDS/UL (ref 149–390)
PMV BLD AUTO: 8.4 FL (ref 8.9–12.7)
POTASSIUM SERPL-SCNC: 4 MMOL/L (ref 3.5–5.3)
RBC # BLD AUTO: 3.56 MILLION/UL (ref 3.81–5.12)
RH BLD: POSITIVE
SODIUM SERPL-SCNC: 136 MMOL/L (ref 135–147)
SPECIMEN EXPIRATION DATE: NORMAL
WBC # BLD AUTO: 7.54 THOUSAND/UL (ref 4.31–10.16)

## 2023-07-23 PROCEDURE — 27236 TREAT THIGH FRACTURE: CPT | Performed by: ORTHOPAEDIC SURGERY

## 2023-07-23 PROCEDURE — C1776 JOINT DEVICE (IMPLANTABLE): HCPCS | Performed by: ORTHOPAEDIC SURGERY

## 2023-07-23 PROCEDURE — 86850 RBC ANTIBODY SCREEN: CPT | Performed by: NURSE ANESTHETIST, CERTIFIED REGISTERED

## 2023-07-23 PROCEDURE — 99232 SBSQ HOSP IP/OBS MODERATE 35: CPT | Performed by: INTERNAL MEDICINE

## 2023-07-23 PROCEDURE — 85025 COMPLETE CBC W/AUTO DIFF WBC: CPT | Performed by: INTERNAL MEDICINE

## 2023-07-23 PROCEDURE — 72170 X-RAY EXAM OF PELVIS: CPT

## 2023-07-23 PROCEDURE — 86901 BLOOD TYPING SEROLOGIC RH(D): CPT | Performed by: NURSE ANESTHETIST, CERTIFIED REGISTERED

## 2023-07-23 PROCEDURE — 0SRR0J9 REPLACEMENT OF RIGHT HIP JOINT, FEMORAL SURFACE WITH SYNTHETIC SUBSTITUTE, CEMENTED, OPEN APPROACH: ICD-10-PCS | Performed by: ORTHOPAEDIC SURGERY

## 2023-07-23 PROCEDURE — 82948 REAGENT STRIP/BLOOD GLUCOSE: CPT

## 2023-07-23 PROCEDURE — 20680 REMOVAL OF IMPLANT DEEP: CPT | Performed by: PHYSICIAN ASSISTANT

## 2023-07-23 PROCEDURE — 0QP604Z REMOVAL OF INTERNAL FIXATION DEVICE FROM RIGHT UPPER FEMUR, OPEN APPROACH: ICD-10-PCS | Performed by: ORTHOPAEDIC SURGERY

## 2023-07-23 PROCEDURE — 86900 BLOOD TYPING SEROLOGIC ABO: CPT | Performed by: NURSE ANESTHETIST, CERTIFIED REGISTERED

## 2023-07-23 PROCEDURE — 80048 BASIC METABOLIC PNL TOTAL CA: CPT | Performed by: INTERNAL MEDICINE

## 2023-07-23 PROCEDURE — 20680 REMOVAL OF IMPLANT DEEP: CPT | Performed by: ORTHOPAEDIC SURGERY

## 2023-07-23 PROCEDURE — C1713 ANCHOR/SCREW BN/BN,TIS/BN: HCPCS | Performed by: ORTHOPAEDIC SURGERY

## 2023-07-23 PROCEDURE — 27236 TREAT THIGH FRACTURE: CPT | Performed by: PHYSICIAN ASSISTANT

## 2023-07-23 DEVICE — ARTICUL/EZE FEMORAL HEAD DIAMETER 28MM +1.5 12/14 TAPER
Type: IMPLANTABLE DEVICE | Site: HIP | Status: FUNCTIONAL
Brand: ARTICUL/EZE

## 2023-07-23 DEVICE — CEMENTRALIZER STEM CENTRALIZER 9.25MM CEMENTED
Type: IMPLANTABLE DEVICE | Site: HIP | Status: FUNCTIONAL
Brand: CEMENTRALIZER

## 2023-07-23 DEVICE — SMARTSET HIGH PERFORMANCE MV MEDIUM VISCOSITY BONE CEMENT 40G
Type: IMPLANTABLE DEVICE | Site: HIP | Status: FUNCTIONAL
Brand: SMARTSET

## 2023-07-23 DEVICE — SELF CENTERING BI-POLAR HEAD 28MM ID 51MM OD
Type: IMPLANTABLE DEVICE | Site: HIP | Status: FUNCTIONAL
Brand: SELF CENTERING

## 2023-07-23 DEVICE — SUMMIT FEMORAL STEM 12/14 TAPER CEMENTED SIZE 4 STD 114MM
Type: IMPLANTABLE DEVICE | Site: HIP | Status: FUNCTIONAL
Brand: SUMMIT

## 2023-07-23 RX ORDER — ENOXAPARIN SODIUM 100 MG/ML
40 INJECTION SUBCUTANEOUS
Status: DISCONTINUED | OUTPATIENT
Start: 2023-07-23 | End: 2023-07-24

## 2023-07-23 RX ORDER — FENTANYL CITRATE 50 UG/ML
INJECTION, SOLUTION INTRAMUSCULAR; INTRAVENOUS AS NEEDED
Status: DISCONTINUED | OUTPATIENT
Start: 2023-07-23 | End: 2023-07-23

## 2023-07-23 RX ORDER — HYDROMORPHONE HCL/PF 1 MG/ML
SYRINGE (ML) INJECTION AS NEEDED
Status: DISCONTINUED | OUTPATIENT
Start: 2023-07-23 | End: 2023-07-23

## 2023-07-23 RX ORDER — CEFAZOLIN SODIUM 1 G/50ML
1000 SOLUTION INTRAVENOUS EVERY 8 HOURS
Status: COMPLETED | OUTPATIENT
Start: 2023-07-23 | End: 2023-07-25

## 2023-07-23 RX ORDER — ALBUMIN, HUMAN INJ 5% 5 %
SOLUTION INTRAVENOUS CONTINUOUS PRN
Status: DISCONTINUED | OUTPATIENT
Start: 2023-07-23 | End: 2023-07-23

## 2023-07-23 RX ORDER — NEOSTIGMINE METHYLSULFATE 1 MG/ML
INJECTION INTRAVENOUS AS NEEDED
Status: DISCONTINUED | OUTPATIENT
Start: 2023-07-23 | End: 2023-07-23

## 2023-07-23 RX ORDER — ONDANSETRON 2 MG/ML
INJECTION INTRAMUSCULAR; INTRAVENOUS AS NEEDED
Status: DISCONTINUED | OUTPATIENT
Start: 2023-07-23 | End: 2023-07-23

## 2023-07-23 RX ORDER — VANCOMYCIN HYDROCHLORIDE 1 G/20ML
INJECTION, POWDER, LYOPHILIZED, FOR SOLUTION INTRAVENOUS AS NEEDED
Status: DISCONTINUED | OUTPATIENT
Start: 2023-07-23 | End: 2023-07-23 | Stop reason: HOSPADM

## 2023-07-23 RX ORDER — GLYCOPYRROLATE 0.2 MG/ML
INJECTION INTRAMUSCULAR; INTRAVENOUS AS NEEDED
Status: DISCONTINUED | OUTPATIENT
Start: 2023-07-23 | End: 2023-07-23

## 2023-07-23 RX ORDER — LIDOCAINE HCL/PF 100 MG/5ML
SYRINGE (ML) INJECTION AS NEEDED
Status: DISCONTINUED | OUTPATIENT
Start: 2023-07-23 | End: 2023-07-23

## 2023-07-23 RX ORDER — ROCURONIUM BROMIDE 10 MG/ML
INJECTION, SOLUTION INTRAVENOUS AS NEEDED
Status: DISCONTINUED | OUTPATIENT
Start: 2023-07-23 | End: 2023-07-23

## 2023-07-23 RX ORDER — CEFTRIAXONE 1 G/50ML
1000 INJECTION, SOLUTION INTRAVENOUS EVERY 24 HOURS
Status: DISCONTINUED | OUTPATIENT
Start: 2023-07-24 | End: 2023-07-26 | Stop reason: ALTCHOICE

## 2023-07-23 RX ORDER — HYDROMORPHONE HCL/PF 1 MG/ML
0.4 SYRINGE (ML) INJECTION
Status: DISCONTINUED | OUTPATIENT
Start: 2023-07-23 | End: 2023-07-23 | Stop reason: HOSPADM

## 2023-07-23 RX ORDER — PROPOFOL 10 MG/ML
INJECTION, EMULSION INTRAVENOUS AS NEEDED
Status: DISCONTINUED | OUTPATIENT
Start: 2023-07-23 | End: 2023-07-23

## 2023-07-23 RX ORDER — FENTANYL CITRATE/PF 50 MCG/ML
25 SYRINGE (ML) INJECTION
Status: DISCONTINUED | OUTPATIENT
Start: 2023-07-23 | End: 2023-07-23 | Stop reason: HOSPADM

## 2023-07-23 RX ORDER — SODIUM CHLORIDE 9 MG/ML
75 INJECTION, SOLUTION INTRAVENOUS CONTINUOUS
Status: DISCONTINUED | OUTPATIENT
Start: 2023-07-23 | End: 2023-07-28

## 2023-07-23 RX ORDER — DEXAMETHASONE SODIUM PHOSPHATE 10 MG/ML
INJECTION, SOLUTION INTRAMUSCULAR; INTRAVENOUS AS NEEDED
Status: DISCONTINUED | OUTPATIENT
Start: 2023-07-23 | End: 2023-07-23

## 2023-07-23 RX ORDER — ONDANSETRON 2 MG/ML
4 INJECTION INTRAMUSCULAR; INTRAVENOUS ONCE AS NEEDED
Status: DISCONTINUED | OUTPATIENT
Start: 2023-07-23 | End: 2023-07-23 | Stop reason: HOSPADM

## 2023-07-23 RX ORDER — TRANEXAMIC ACID 10 MG/ML
INJECTION, SOLUTION INTRAVENOUS AS NEEDED
Status: DISCONTINUED | OUTPATIENT
Start: 2023-07-23 | End: 2023-07-23

## 2023-07-23 RX ORDER — SODIUM CHLORIDE, SODIUM LACTATE, POTASSIUM CHLORIDE, CALCIUM CHLORIDE 600; 310; 30; 20 MG/100ML; MG/100ML; MG/100ML; MG/100ML
INJECTION, SOLUTION INTRAVENOUS CONTINUOUS PRN
Status: DISCONTINUED | OUTPATIENT
Start: 2023-07-23 | End: 2023-07-23

## 2023-07-23 RX ADMIN — HYDROMORPHONE HYDROCHLORIDE 0.5 MG: 1 INJECTION, SOLUTION INTRAMUSCULAR; INTRAVENOUS; SUBCUTANEOUS at 09:59

## 2023-07-23 RX ADMIN — SODIUM CHLORIDE 75 ML/HR: 0.9 INJECTION, SOLUTION INTRAVENOUS at 11:55

## 2023-07-23 RX ADMIN — HYDROMORPHONE HYDROCHLORIDE 0.25 MG: 1 INJECTION, SOLUTION INTRAMUSCULAR; INTRAVENOUS; SUBCUTANEOUS at 10:38

## 2023-07-23 RX ADMIN — ROCURONIUM BROMIDE 50 MG: 10 INJECTION INTRAVENOUS at 08:50

## 2023-07-23 RX ADMIN — FENTANYL CITRATE 25 MCG: 50 INJECTION, SOLUTION INTRAMUSCULAR; INTRAVENOUS at 09:14

## 2023-07-23 RX ADMIN — DEXAMETHASONE SODIUM PHOSPHATE 10 MG: 10 INJECTION, SOLUTION INTRAMUSCULAR; INTRAVENOUS at 09:00

## 2023-07-23 RX ADMIN — ALBUMIN HUMAN: 0.05 INJECTION, SOLUTION INTRAVENOUS at 09:50

## 2023-07-23 RX ADMIN — ENOXAPARIN SODIUM 40 MG: 100 INJECTION SUBCUTANEOUS at 13:10

## 2023-07-23 RX ADMIN — GLYCOPYRROLATE 0.5 MG: 0.2 INJECTION, SOLUTION INTRAMUSCULAR; INTRAVENOUS at 10:37

## 2023-07-23 RX ADMIN — CEFAZOLIN SODIUM 1000 MG: 1 SOLUTION INTRAVENOUS at 16:31

## 2023-07-23 RX ADMIN — LIDOCAINE HYDROCHLORIDE 100 MG: 20 INJECTION INTRAVENOUS at 08:50

## 2023-07-23 RX ADMIN — SODIUM CHLORIDE, SODIUM LACTATE, POTASSIUM CHLORIDE, AND CALCIUM CHLORIDE: .6; .31; .03; .02 INJECTION, SOLUTION INTRAVENOUS at 10:24

## 2023-07-23 RX ADMIN — CEFAZOLIN SODIUM 1000 MG: 1 SOLUTION INTRAVENOUS at 08:47

## 2023-07-23 RX ADMIN — HYDROMORPHONE HYDROCHLORIDE 0.25 MG: 1 INJECTION, SOLUTION INTRAMUSCULAR; INTRAVENOUS; SUBCUTANEOUS at 10:46

## 2023-07-23 RX ADMIN — NEOSTIGMINE METHYLSULFATE 3 MG: 1 INJECTION INTRAVENOUS at 10:37

## 2023-07-23 RX ADMIN — ONDANSETRON 4 MG: 2 INJECTION INTRAMUSCULAR; INTRAVENOUS at 09:55

## 2023-07-23 RX ADMIN — SODIUM CHLORIDE, SODIUM LACTATE, POTASSIUM CHLORIDE, AND CALCIUM CHLORIDE: .6; .31; .03; .02 INJECTION, SOLUTION INTRAVENOUS at 08:15

## 2023-07-23 RX ADMIN — TRANEXAMIC ACID 1000 MG: 10 INJECTION, SOLUTION INTRAVENOUS at 08:55

## 2023-07-23 RX ADMIN — FENTANYL CITRATE 50 MCG: 50 INJECTION, SOLUTION INTRAMUSCULAR; INTRAVENOUS at 09:30

## 2023-07-23 RX ADMIN — FENTANYL CITRATE 25 MCG: 50 INJECTION, SOLUTION INTRAMUSCULAR; INTRAVENOUS at 09:55

## 2023-07-23 RX ADMIN — PROPOFOL 120 MG: 10 INJECTION, EMULSION INTRAVENOUS at 08:50

## 2023-07-23 RX ADMIN — PHENYLEPHRINE HYDROCHLORIDE 30 MCG/MIN: 10 INJECTION, SOLUTION INTRAVENOUS at 08:56

## 2023-07-23 NOTE — ASSESSMENT & PLAN NOTE
· Only oriented to self on admission  · Sister reportedly called nursing staff that this is patient's baseline since surgery on 6/30  · PT, OT, and CM consulted  · Home medications: Risperdal 1 Mg 4 times daily and Remeron 30 mg at bedtime  · Risperidone was decreased to 1 mg twice daily  · Continue home medications

## 2023-07-23 NOTE — ANESTHESIA POSTPROCEDURE EVALUATION
Post-Op Assessment Note    CV Status:  Stable  Pain Score: 0    Pain management: adequate     Mental Status:  Alert, awake and sleepy   Hydration Status:  Euvolemic   PONV Controlled:  Controlled   Airway Patency:  Patent      Post Op Vitals Reviewed: Yes      Staff: CRNA         No notable events documented.     BP   144/71   Temp   100.0   Pulse  82   Resp   17   SpO2   99% 6LO2 mask

## 2023-07-23 NOTE — ASSESSMENT & PLAN NOTE
· Found on bedroom floor by sister  · CT head and CT C-spine negative for acute findings  · No signs of trauma otherwise on patient's body, no complaints of pain or tenderness on palpation  · PT, OT, and CM on board

## 2023-07-23 NOTE — OP NOTE
OPERATIVE REPORT  PATIENT NAME: Wallace Cole  : 1955  MRN: [id number]  Pt Location:  [facility] OR ROOM 02    Surgery Date: 2023    Surgeon(s) and Role:     * Sincere Lazar MD - Primary     * Nikita Torres PA-C - Assisting     Preop Diagnosis:  Closed displaced fracture of right femoral neck ([address]) [S72.001A]    Post-Op Diagnosis Codes:     * Closed displaced fracture of right femoral neck ([address]) [S72.001A]    Procedure(s):  Right - ARTHROPLASTY HIP TOTAL with removal of hardware    Specimens:  * No specimens in log *    Estimated Blood Loss:   Minimal    Drains:  * No LDAs found *    Anesthesia Type:   Choice     Operative Indications:  Closed displaced fracture of right femoral neck ([address]) [S72.001A]    Operative Findings:  Displaced right femoral neck fracture after removal of proximal TFN screw    Complications:   None    Procedure and Technique:  Removal of TFN from right femur and cemented hemiarthroplasty of right hip    After informed surgical consent of been obtained patient was brought to the operating room and administered general anesthesia. She was placed in the left lateral decubitus position with the right hip up and all cushions and restraints were placed. Right lower extremity was prepped and draped in normal sterile fashion. Going back through the old incision dissection was taken down through the skin and subcutaneous tissues to the level of the proximal femur. I tried to remove the screw with percutaneous technique however the screw was the old type of screw and the screwdriver would not fit into it. As result I had to extend the incision distally to identify the screw and then we realized that this was the old hexhead and was able to remove the screw at that point. The TFN was then removed from the proximal femur. The standard approach was undertaken down through the capsule to the level of the femoral neck. The femoral neck cut was made.   The femoral head was delivered from the acetabulum. The femur was sequentially broached up to a size 4 and the trials were placed and everything was extremely stable. After this trial was removed. The wound was well irrigated with normal saline. The cement restrictor was placed in the canal was pressurized with cement. The #4 stent was placed down the canal and was held in place until the cement hardened. Once again I tried the trials prior to securing the final implants which were impacted into place. Her hip was reduced. After this she can do full extension. She had no impingement with external rotation. She was stable in the position of sleep. At 90 degrees of flexion and full abduction she was stable to 70 degrees of internal rotation. The powdered vancomycin was placed in the wound. The capsule was repaired with #1 Vicryl. The iliotibial band was secured with #1 strata fix and the skin was closed with 2-0 Vicryl and staples. Sterile dressings were applied. Patient was then awakened from general anesthesia and transferred to recovery stable condition having tolerated the procedure well. I was present for the entire procedure., A qualified resident physician was not available. and A physician assistant was required during the procedure for retraction, tissue handling, dissection and suturing.     Patient Disposition:  PACU               SIGNATURE: Anant Landry MD  DATE: July 23, 2023  TIME: 10:07 AM

## 2023-07-23 NOTE — ANESTHESIA PREPROCEDURE EVALUATION
Procedure:  ARTHROPLASTY HIP TOTAL with removal of hardware (Right: Hip)    Relevant Problems   ANESTHESIA (within normal limits)      ENDO   (+) Acquired hypothyroidism      HEMATOLOGY   (+) Anemia      MUSCULOSKELETAL   (+) Primary osteoarthritis of right hip      NEURO/PSYCH   (+) Anxiety   (+) Depression   (+) Moderate dementia (HCC)      Nervous and Auditory   (+) Parkinsonism (HCC)      Musculoskeletal and Integument   (+) Closed displaced fracture of right femoral neck (HCC)      Other   (+) Ambulatory dysfunction     Labs:   Results from last 7 days   Lab Units 07/23/23 0312 07/22/23 0529 07/21/23 0451 07/20/23 0424 07/19/23  1855   WBC Thousand/uL 7.54 6.91 6.00 9.04 9.34   HEMOGLOBIN g/dL 10.4* 9.9* 9.1* 9.6* 10.8*   HEMATOCRIT % 33.3* 31.6* 29.1* 30.9* 35.0   PLATELETS Thousands/uL 372 352 290 344 425*   NEUTROS PCT % 72 73 68  --  83*   MONOS PCT % 9 9 10  --  7   EOS PCT % 4 4 4  --  1     Results from last 7 days   Lab Units 07/23/23 0312 07/22/23 0529 07/21/23 0451 07/20/23 0424 07/19/23  1855   SODIUM mmol/L 136 138 140 135 137   POTASSIUM mmol/L 4.0 4.0 3.4* 3.6 3.8   CHLORIDE mmol/L 102 105 107 100 97   CO2 mmol/L 27 28 29 25 28   ANION GAP mmol/L 7 5 4 10 12   BUN mg/dL 18 9 14 16 16   CREATININE mg/dL 0.90 0.70 0.80 0.76 0.82   EGFR ml/min/1.73sq m 65 88 75 80 73   CALCIUM mg/dL 9.3 9.4 8.6 9.1 9.9   GLUCOSE RANDOM mg/dL 104 94 86 86 96   ALT U/L  --   --  10  --  12   AST U/L  --   --  18  --  29   ALK PHOS U/L  --   --  87  --  123*   ALBUMIN g/dL  --   --  3.0*  --  4.2   TOTAL BILIRUBIN mg/dL  --   --  0.30  --  0.48     Results from last 7 days   Lab Units 07/21/23  0451   MAGNESIUM mg/dL 2.0      Physical Exam    Airway    Mallampati score:  I  TM Distance: >3 FB  Neck ROM: full     Dental   No notable dental hx     Cardiovascular  Cardiovascular exam normal    Pulmonary  Pulmonary exam normal     Other Findings        Anesthesia Plan  ASA Score- 4     Anesthesia Type- general with ASA Monitors. Additional Monitors:   Airway Plan: ETT and LMA. Comment: I discussed risks (reviewed with patient on the anesthesia consent form), benefits and alternatives of monitored sedation including the possibility under sedation to have recall or mild discomfort. .       Plan Factors-    Chart reviewed. Existing labs reviewed. Patient summary reviewed. Induction- intravenous. Postoperative Plan- Plan for postoperative opioid use. Informed Consent- Anesthetic plan and risks discussed with patient. I personally reviewed this patient with the CRNA. Discussed and agreed on the Anesthesia Plan with the CRNA. Leland Echols

## 2023-07-23 NOTE — ASSESSMENT & PLAN NOTE
· S/p removal of orthopedic screw on right on 6/30/23 by ortho, Dr. Sydney Ortega   · Pain on Percocet at home  · Oxy 2.5/5 as needed ordered while inpatient  · Complaining of right hip pain.   · See above

## 2023-07-23 NOTE — PLAN OF CARE
Problem: Potential for Falls  Goal: Patient will remain free of falls  Description: INTERVENTIONS:  - Educate patient/family on patient safety including physical limitations  - Instruct patient to call for assistance with activity   - Consult OT/PT to assist with strengthening/mobility   - Keep Call bell within reach  - Keep bed low and locked with side rails adjusted as appropriate  - Keep care items and personal belongings within reach  - Initiate and maintain comfort rounds  - Make Fall Risk Sign visible to staff  - Apply yellow socks and bracelet for high fall risk patients  - Consider moving patient to room near nurses station  Outcome: Progressing     Problem: MOBILITY - ADULT  Goal: Maintain or return to baseline ADL function  Description: INTERVENTIONS:  -  Assess patient's ability to carry out ADLs; assess patient's baseline for ADL function and identify physical deficits which impact ability to perform ADLs (bathing, care of mouth/teeth, toileting, grooming, dressing, etc.)  - Assess/evaluate cause of self-care deficits   - Assess range of motion  - Assess patient's mobility; develop plan if impaired  - Assess patient's need for assistive devices and provide as appropriate  - Encourage maximum independence but intervene and supervise when necessary  - Involve family in performance of ADLs  - Assess for home care needs following discharge   - Consider OT consult to assist with ADL evaluation and planning for discharge  - Provide patient education as appropriate  Outcome: Progressing     Problem: INFECTION - ADULT  Goal: Absence or prevention of progression during hospitalization  Description: INTERVENTIONS:  - Assess and monitor for signs and symptoms of infection  - Monitor lab/diagnostic results  - Monitor all insertion sites, i.e. indwelling lines, tubes, and drains  - Monitor endotracheal if appropriate and nasal secretions for changes in amount and color  - [place] appropriate cooling/warming therapies per order  - Administer medications as ordered  - Instruct and encourage patient and family to use good hand hygiene technique  - Identify and instruct in appropriate isolation precautions for identified infection/condition  Outcome: Progressing     Problem: Knowledge Deficit  Goal: Patient/family/caregiver demonstrates understanding of disease process, treatment plan, medications, and discharge instructions  Description: Complete learning assessment and assess knowledge base.   Interventions:  - Provide teaching at level of understanding  - Provide teaching via preferred learning methods  Outcome: Progressing

## 2023-07-23 NOTE — PROGRESS NOTES
[address] [facility]  Progress Note  Name: Susannah Gomez  MRN: [id number]  Unit/Bed#: -01 I Date of Admission: 7/19/2023   Date of Service: 7/23/2023 I Hospital Day: 4    Assessment/Plan   Moderate protein-calorie malnutrition ([address])  Assessment & Plan  Malnutrition Findings:   Adult Malnutrition type: Chronic illness  Adult Degree of Malnutrition: Malnutrition of moderate degree  Malnutrition Characteristics: Fat loss, Muscle loss                  360 Statement: Pt presents with moderate protein calorie malnutrution as evidenced by prominent clavicle bone and sunken orbitals. Treat with Pureed Nectar Thick Liquids and Magic Cup BID. BMI Findings: Body mass index is 18.67 kg/m². Patient has moderate protein calorie malnutrition in setting of chronic illness as evidenced by prominent clavicle bone, sunken orbitals  Nutrition consult and recommendations    Elevated CK  Assessment & Plan  · Total  on admission  · Patient was laying on the floor prolonged.   · No renal dysfunction    · Resolved with IV fluids    Fall  Assessment & Plan  · Found on bedroom floor by sister  · CT head and CT C-spine negative for acute findings  · No signs of trauma otherwise on patient's body, no complaints of pain or tenderness on palpation  · PT, OT, and CM on board    No other household member able to render care  Assessment & Plan  · Found on bedroom floor by sister at 54 on 7/19, sister unable to get her up so sister went to sleep  · Sister woke at 63 and called EMS at that time  · CT head and CT C-spine negative for acute findings  · Patient without tenderness or signs of trauma throughout remainder of body  · PT, OT, and CM consults for placement  · She will require rehab placement at discharge    Anemia  Assessment & Plan  · Hemoglobin 10.8 on admission  · Baseline hemoglobin appears to be 9-10  · Trend CBC daily    Closed displaced fracture of right femoral neck (HCC)  Assessment & Plan  Patient with right hip femoral neck fracture with retained hardware  Patient had followed up on July 13 with Dr. Mireya Toth and was recommended to follow-up with Dr. Karina Aguiar scheduled for surgery as outpatient  Orthopedic consult appreciated  Patient is scheduled to go to the OR today  Is n.p.o. for surgery  Anticoagulation is on hold  Discussed with patient and sister  Patient is at intermediate risk for the procedure  Pain management as needed  Encourage incentive spirometer use  PT/OT after surgery    Painful orthopaedic hardware [facility])  Assessment & Plan  · S/p removal of orthopedic screw on right on 6/30/23 by ortho, Dr. Mireya Toth   · Pain on Percocet at home  · Oxy 2.5/5 as needed ordered while inpatient  · Complaining of right hip pain. · See above    Ambulatory dysfunction  Assessment & Plan  Ambulatory dysfunction POA in setting of dementia evidenced by inability to stand after a fall. CT is negative for any acute findings  Patient was noticed to have gaze preference  MRI brain as above  See above    Moderate dementia [facility])  Assessment & Plan  · Only oriented to self on admission  · Sister reportedly called nursing staff that this is patient's baseline since surgery on 6/30  · PT, OT, and CM consulted  · Home medications: Risperdal 1 Mg 4 times daily and Remeron 30 mg at bedtime  · Risperidone was decreased to 1 mg twice daily  · Continue home medications    Depression  Assessment & Plan  · Continue home Zoloft 50 Mg daily and Wellbutrin 150 mg twice daily    Acquired hypothyroidism  Assessment & Plan  · Continue home Synthroid 50 mcg daily    * Encephalopathy  Assessment & Plan  Patient presented with acute encephalopathy POA  Neurology consult appreciated  CT head negative for acute abnormality  Patient has slight gaze preference to the right  Stool for C. difficile and culture are negative  MRI brain- "1. No acute infarction, edema, or mass effect. 2.  Moderate chronic microangiopathic ischemic changes.   3.  NeuroQuant analysis was performed: Lower limits of normal hippocampal volume and enlarged inferior lateral and overall ventricular system, suggestive of global ex-vacuo dilatation.  The additional finding of an abnormal Hippocampal Occupancy Score, (HOC), is concerning for a mesial temporal lobe focused Neurodegenerative process.  Recommend reevaluation with Neuroquant imaging in 6 months. MRI consistent with Alzheimer's dementia. Neurology on board           Labs & Imaging: I have personally reviewed pertinent reports. VTE Prophylaxis: in place. Code Status:   Level 1 - Full Code    Patient Centered Rounds: I have performed bedside rounds with nursing staff today. Discussions with Specialists or Other Care Team Provider: Orthopedics    Education and Discussions with Family / Patient: Attempted to call Sister on phone    Current Length of Stay: 4 day(s)    Current Patient Status: Inpatient   Certification Statement: The patient will continue to require additional inpatient hospital stay due to see my assessment and plan. Subjective:   Patient is seen and examined at bedside. No new complaints. Afebrile  All other ROS are negative. Objective:    Vitals: Blood pressure 146/80, pulse 82, temperature 98.4 °F (36.9 °C), temperature source Temporal, resp. rate 14, height 5' 8" (1.727 m), weight 55.7 kg (122 lb 12.7 oz), SpO2 98 %. ,Body mass index is 18.67 kg/m². SPO2 RA Rest    Flowsheet Row ED to Hosp-Admission (Current) from 7/19/2023 in [facility] Med [facility]urg [facility]   SpO2 98 %   SpO2 Activity At Rest   O2 Device None (Room air)   O2 Flow Rate --        I&O:     Intake/Output Summary (Last 24 hours) at 7/23/2023 0746  Last data filed at 7/23/2023 0343  Gross per 24 hour   Intake 480 ml   Output 1100 ml   Net -620 ml       Physical Exam:    General- Alert, lying comfortably in bed. Not in any acute distress.   Neck- Supple, No JVD  CVS- regular, S1 and S2 normal  Chest- Bilateral Air entry, No rhochi, crackles or wheezing present. Abdomen- soft, nontender, not distended, no guarding or rigidity, BS+  Extremities-  No pedal edema, No calf tenderness  Right hip pain  CNS-   Alert, awake and orientedx2. No focal deficits present.     Invasive Devices     Peripheral Intravenous Line  Duration           Peripheral IV 07/20/23 Distal;Right;Ventral (anterior) Forearm 3 days                      Social History  reviewed  Family History   Problem Relation Age of Onset   • Hypertension Mother    • Parkinsonism Mother 80        memory issues    • Depression Mother    • Melanoma Father    • No Known Problems Maternal Grandmother    • No Known Problems Maternal Grandfather    • No Known Problems Paternal Grandmother    • Heart attack Paternal Grandfather    • Depression Other    • Skin cancer Sister    • Breast cancer Maternal Aunt         over age 48   • No Known Problems Paternal Aunt    • No Known Problems Sister     reviewed    Meds:  Current Facility-Administered Medications   Medication Dose Route Frequency Provider Last Rate Last Admin   • acetaminophen (TYLENOL) tablet 650 mg  650 mg Oral Q6H PRN Irving Fowler PA-C   650 mg at 07/21/23 0039   • aluminum-magnesium hydroxide-simethicone (MAALOX) oral suspension 30 mL  30 mL Oral Q6H PRN Irving oFwler PA-C       • buPROPion [facility]) 12 hr tablet 150 mg  150 mg Oral BID Irving Fowler PA-C   150 mg at 07/22/23 1658   • ceFAZolin (ANCEF) IVPB (premix in dextrose) 1,000 mg 50 mL  1,000 mg Intravenous On Call To OR Ai Castrejon PA-C       • cefTRIAXone (ROCEPHIN) IVPB (premix in dextrose) 1,000 mg 50 mL  1,000 mg Intravenous Q24H Ash Diaz  mL/hr at 07/22/23 0838 1,000 mg at 07/22/23 1104   • cholecalciferol (VITAMIN D3) tablet 5,000 Units  5,000 Units Oral Daily Irving Fowler PA-C   5,000 Units at 07/22/23 4080   • levothyroxine tablet 50 mcg  50 mcg Oral Early Morning Irving Fowler PA-C   50 mcg at 07/22/23 0525   • mirtazapine (REMERON) tablet 30 mg  30 mg Oral HS Margarito Salazar PA-C   30 mg at 07/22/23 2152   • nystatin (MYCOSTATIN) oral suspension 500,000 Units  500,000 Units Swish & Swallow 4x Daily Diana Simmons MD   500,000 Units at 07/22/23 2152   • ondansetron (ZOFRAN) injection 4 mg  4 mg Intravenous Q6H PRN Margarito Salazar PA-C       • oxyCODONE (ROXICODONE) split tablet 2.5 mg  2.5 mg Oral Q6H PRN Margarito Salazar PA-C        Or   • oxyCODONE (ROXICODONE) IR tablet 5 mg  5 mg Oral Q6H PRN Margarito Salazar PA-C       • risperiDONE (RisperDAL) tablet 1 mg  1 mg Oral BID Diana Simmons MD   1 mg at 07/22/23 1658   • senna (SENOKOT) tablet 8.6 mg  1 tablet Oral HS PRN Margarito Salazar PA-C       • sertraline (ZOLOFT) tablet 50 mg  50 mg Oral Daily With Breakfast Margarito Salazar PA-C   50 mg at 07/22/23 9467      Medications Prior to Admission   Medication   • buPROPion (WELLBUTRIN SR) 150 mg 12 hr tablet   • Calcium-Magnesium-Vitamin D (CALCIUM 500 PO)   • Cholecalciferol (VITAMIN D3) 5000 units CAPS   • levothyroxine 50 mcg tablet   • mirtazapine (REMERON) 30 mg tablet   • Multiple Vitamins-Minerals (CENTRUM SILVER 50+WOMEN PO)   • oxyCODONE-acetaminophen (Percocet) 5-325 mg per tablet   • risperiDONE (RisperDAL) 1 mg tablet   • sertraline (ZOLOFT) 50 mg tablet       Labs:  Results from last 7 days   Lab Units 07/23/23  0312 07/22/23  0529 07/21/23  0451   WBC Thousand/uL 7.54 6.91 6.00   HEMOGLOBIN g/dL 10.4* 9.9* 9.1*   HEMATOCRIT % 33.3* 31.6* 29.1*   PLATELETS Thousands/uL 372 352 290   NEUTROS PCT % 72 73 68   LYMPHS PCT % 15 14 17   MONOS PCT % 9 9 10   EOS PCT % 4 4 4     Results from last 7 days   Lab Units 07/23/23  0312 07/22/23  0529 07/21/23  0451 07/20/23  0424 07/19/23  1855   POTASSIUM mmol/L 4.0 4.0 3.4*   < > 3.8   CHLORIDE mmol/L 102 105 107   < > 97   CO2 mmol/L 27 28 29   < > 28   BUN mg/dL 18 9 14   < > 16   CREATININE mg/dL 0.90 0.70 0.80   < > 0.82 CALCIUM mg/dL 9.3 9.4 8.6   < > 9.9   ALK PHOS U/L  --   --  87  --  123*   ALT U/L  --   --  10  --  12   AST U/L  --   --  18  --  29    < > = values in this interval not displayed. Lab Results   Component Value Date    CKTOTAL 128 07/21/2023    CKTOTAL 328 (H) 07/20/2023    CKTOTAL 381 (H) 07/19/2023         Lab Results   Component Value Date    BLOODCX No Growth After 5 Days. 06/16/2019    BLOODCX No Growth After 5 Days. 06/16/2019         Imaging:  No results found for this or any previous visit. Results for orders placed during the hospital encounter of 06/16/19    XR chest 2 views    Narrative  CHEST    INDICATION:   Right upper quadrant pain. Possible infiltrate abdominal CT.    COMPARISON:  CT abdomen and pelvis dated the same    EXAM PERFORMED/VIEWS:  XR CHEST PA & LATERAL      FINDINGS:    No pneumothorax is seen. Some patchy opacities are seen in the right lower lung field with associated mild hypoaeration and blunting of the right costophrenic sulcus, suspect small pleural effusion/atelectasis. Superimposed infection could be  considered in the appropriate clinical setting. Some linear atelectasis/infection is seen in the right upper lung field. Lungs otherwise grossly clear. The heart is top normal in size. The cardiac and mediastinal contours otherwise appear unremarkable. Metallic plate and screw fixation of the right humeral head and proximal humerus noted. Visualized bones otherwise appear intact. Impression  Suspected small right pleural effusion/atelectasis. Patchy opacities in the right lower lung field and linear opacities in the right upper lung field could represent superimposed infection in the appropriate clinical setting. Correlation with the  patient's symptoms and laboratory values recommended. Other findings as above.       Workstation performed: [id number]      Last 24 Hours Medication List:   Current Facility-Administered Medications   Medication Dose Route Frequency Provider Last Rate   • acetaminophen  650 mg Oral Q6H PRN ADRIANNE MuñozC     • aluminum-magnesium hydroxide-simethicone  30 mL Oral Q6H PRN Merdis Andrea PA-C     • buPROPion  150 mg Oral BID Merdis Andrea, PA-C     • cefazolin  1,000 mg Intravenous On Call To OR Adela Ulloa PA-C     • cefTRIAXone  1,000 mg Intravenous Q24H Mykel Banuelos MD 1,000 mg (07/22/23 0517)   • cholecalciferol  5,000 Units Oral Daily Mersheeba Mendez PACorettaC     • levothyroxine  50 mcg Oral Early Morning ADRIANNE MuñozC     • mirtazapine  30 mg Oral HS ADRIANNE MuñozC     • nystatin  500,000 Units Swish & Swallow 4x Daily Mykel Banuelos MD     • ondansetron  4 mg Intravenous Q6H PRN VIK Muñoz-C     • oxyCODONE  2.5 mg Oral Q6H PRN ADRIANNE MuñozC      Or   • oxyCODONE  5 mg Oral Q6H PRN Hong Mendez PA-C     • risperiDONE  1 mg Oral BID Mykel Banuelos MD     • senna  1 tablet Oral HS PRN Hong Mendez PA-C     • sertraline  50 mg Oral Daily With Breakfast Hong Mendez PA-C          Today, Patient Was Seen By: Mykel Banuelos MD    ** Please Note: Dictation voice to text software may have been used in the creation of this document.  **

## 2023-07-23 NOTE — ASSESSMENT & PLAN NOTE
Patient with right hip femoral neck fracture with retained hardware  Patient had followed up on July 13 with Dr. Lieutenant Luque and was recommended to follow-up with Dr. Fabian Altman scheduled for surgery as outpatient  Orthopedic consult appreciated  Patient is scheduled to go to the OR today  Is n.p.o. for surgery  Anticoagulation is on hold  Discussed with patient and sister  Patient is at intermediate risk for the procedure  Pain management as needed  Encourage incentive spirometer use  PT/OT after surgery

## 2023-07-24 ENCOUNTER — APPOINTMENT (INPATIENT)
Dept: CT IMAGING | Facility: HOSPITAL | Age: 68
DRG: 521 | End: 2023-07-24
Attending: STUDENT IN AN ORGANIZED HEALTH CARE EDUCATION/TRAINING PROGRAM
Payer: MEDICARE

## 2023-07-24 ENCOUNTER — APPOINTMENT (INPATIENT)
Dept: RADIOLOGY | Facility: HOSPITAL | Age: 68
DRG: 521 | End: 2023-07-24
Payer: MEDICARE

## 2023-07-24 ENCOUNTER — APPOINTMENT (INPATIENT)
Dept: CT IMAGING | Facility: HOSPITAL | Age: 68
DRG: 521 | End: 2023-07-24
Payer: MEDICARE

## 2023-07-24 ENCOUNTER — APPOINTMENT (INPATIENT)
Dept: MRI IMAGING | Facility: HOSPITAL | Age: 68
DRG: 521 | End: 2023-07-24
Payer: MEDICARE

## 2023-07-24 PROBLEM — R74.8 ELEVATED CK: Status: RESOLVED | Noted: 2023-07-20 | Resolved: 2023-07-24

## 2023-07-24 PROBLEM — I60.9 SUBARACHNOID HEMORRHAGE (HCC): Status: ACTIVE | Noted: 2023-07-24

## 2023-07-24 LAB
ABO GROUP BLD: NORMAL
ALBUMIN SERPL BCP-MCNC: 3.3 G/DL (ref 3.5–5)
ALP SERPL-CCNC: 80 U/L (ref 34–104)
ALT SERPL W P-5'-P-CCNC: 12 U/L (ref 7–52)
AMORPH URATE CRY URNS QL MICRO: ABNORMAL
ANION GAP SERPL CALCULATED.3IONS-SCNC: 4 MMOL/L
APTT PPP: 28 SECONDS (ref 23–37)
AST SERPL W P-5'-P-CCNC: 23 U/L (ref 13–39)
BACTERIA UR QL AUTO: ABNORMAL /HPF
BASOPHILS # BLD AUTO: 0.02 THOUSANDS/ÂΜL (ref 0–0.1)
BASOPHILS NFR BLD AUTO: 0 % (ref 0–1)
BILIRUB SERPL-MCNC: 0.39 MG/DL (ref 0.2–1)
BILIRUB UR QL STRIP: NEGATIVE
BUN SERPL-MCNC: 14 MG/DL (ref 5–25)
CALCIUM ALBUM COR SERPL-MCNC: 9.4 MG/DL (ref 8.3–10.1)
CALCIUM SERPL-MCNC: 8.8 MG/DL (ref 8.4–10.2)
CARDIAC TROPONIN I PNL SERPL HS: 8 NG/L
CHLORIDE SERPL-SCNC: 103 MMOL/L (ref 96–108)
CLARITY UR: ABNORMAL
CO2 SERPL-SCNC: 28 MMOL/L (ref 21–32)
COLOR UR: YELLOW
CREAT SERPL-MCNC: 0.83 MG/DL (ref 0.6–1.3)
EOSINOPHIL # BLD AUTO: 0.1 THOUSAND/ÂΜL (ref 0–0.61)
EOSINOPHIL NFR BLD AUTO: 1 % (ref 0–6)
ERYTHROCYTE [DISTWIDTH] IN BLOOD BY AUTOMATED COUNT: 12.7 % (ref 11.6–15.1)
GFR SERPL CREATININE-BSD FRML MDRD: 72 ML/MIN/1.73SQ M
GLUCOSE SERPL-MCNC: 111 MG/DL (ref 65–140)
GLUCOSE SERPL-MCNC: 123 MG/DL (ref 65–140)
GLUCOSE UR STRIP-MCNC: NEGATIVE MG/DL
HCT VFR BLD AUTO: 26.1 % (ref 34.8–46.1)
HCT VFR BLD AUTO: 26.3 % (ref 34.8–46.1)
HGB BLD-MCNC: 8.3 G/DL (ref 11.5–15.4)
HGB BLD-MCNC: 8.4 G/DL (ref 11.5–15.4)
HGB UR QL STRIP.AUTO: NEGATIVE
IMM GRANULOCYTES # BLD AUTO: 0.04 THOUSAND/UL (ref 0–0.2)
IMM GRANULOCYTES NFR BLD AUTO: 1 % (ref 0–2)
INR PPP: 1.03 (ref 0.84–1.19)
KETONES UR STRIP-MCNC: NEGATIVE MG/DL
LACTATE SERPL-SCNC: 1 MMOL/L (ref 0.5–2)
LEUKOCYTE ESTERASE UR QL STRIP: ABNORMAL
LYMPHOCYTES # BLD AUTO: 0.8 THOUSANDS/ÂΜL (ref 0.6–4.47)
LYMPHOCYTES NFR BLD AUTO: 10 % (ref 14–44)
MAGNESIUM SERPL-MCNC: 1.7 MG/DL (ref 1.9–2.7)
MCH RBC QN AUTO: 29.5 PG (ref 26.8–34.3)
MCHC RBC AUTO-ENTMCNC: 31.8 G/DL (ref 31.4–37.4)
MCV RBC AUTO: 93 FL (ref 82–98)
MONOCYTES # BLD AUTO: 0.71 THOUSAND/ÂΜL (ref 0.17–1.22)
MONOCYTES NFR BLD AUTO: 9 % (ref 4–12)
NEUTROPHILS # BLD AUTO: 6.44 THOUSANDS/ÂΜL (ref 1.85–7.62)
NEUTS SEG NFR BLD AUTO: 79 % (ref 43–75)
NITRITE UR QL STRIP: NEGATIVE
NON-SQ EPI CELLS URNS QL MICRO: ABNORMAL /HPF
NRBC BLD AUTO-RTO: 0 /100 WBCS
PH UR STRIP.AUTO: 7 [PH]
PLATELET # BLD AUTO: 296 THOUSANDS/UL (ref 149–390)
PMV BLD AUTO: 8.5 FL (ref 8.9–12.7)
POTASSIUM SERPL-SCNC: 3.9 MMOL/L (ref 3.5–5.3)
PROCALCITONIN SERPL-MCNC: 0.11 NG/ML
PROT SERPL-MCNC: 6.1 G/DL (ref 6.4–8.4)
PROT UR STRIP-MCNC: NEGATIVE MG/DL
PROTHROMBIN TIME: 14.3 SECONDS (ref 11.6–14.5)
RBC # BLD AUTO: 2.81 MILLION/UL (ref 3.81–5.12)
RBC #/AREA URNS AUTO: ABNORMAL /HPF
RH BLD: POSITIVE
SODIUM SERPL-SCNC: 135 MMOL/L (ref 135–147)
SP GR UR STRIP.AUTO: 1.01 (ref 1–1.03)
UROBILINOGEN UR STRIP-ACNC: <2 MG/DL
WBC # BLD AUTO: 8.11 THOUSAND/UL (ref 4.31–10.16)
WBC #/AREA URNS AUTO: ABNORMAL /HPF

## 2023-07-24 PROCEDURE — 99024 POSTOP FOLLOW-UP VISIT: CPT

## 2023-07-24 PROCEDURE — 84484 ASSAY OF TROPONIN QUANT: CPT | Performed by: STUDENT IN AN ORGANIZED HEALTH CARE EDUCATION/TRAINING PROGRAM

## 2023-07-24 PROCEDURE — 82948 REAGENT STRIP/BLOOD GLUCOSE: CPT

## 2023-07-24 PROCEDURE — 70551 MRI BRAIN STEM W/O DYE: CPT

## 2023-07-24 PROCEDURE — 85018 HEMOGLOBIN: CPT | Performed by: STUDENT IN AN ORGANIZED HEALTH CARE EDUCATION/TRAINING PROGRAM

## 2023-07-24 PROCEDURE — 71045 X-RAY EXAM CHEST 1 VIEW: CPT

## 2023-07-24 PROCEDURE — G1004 CDSM NDSC: HCPCS

## 2023-07-24 PROCEDURE — NC001 PR NO CHARGE: Performed by: NEUROLOGICAL SURGERY

## 2023-07-24 PROCEDURE — 87040 BLOOD CULTURE FOR BACTERIA: CPT | Performed by: PHYSICIAN ASSISTANT

## 2023-07-24 PROCEDURE — 70450 CT HEAD/BRAIN W/O DYE: CPT

## 2023-07-24 PROCEDURE — 83735 ASSAY OF MAGNESIUM: CPT | Performed by: ORTHOPAEDIC SURGERY

## 2023-07-24 PROCEDURE — 70496 CT ANGIOGRAPHY HEAD: CPT

## 2023-07-24 PROCEDURE — 85014 HEMATOCRIT: CPT | Performed by: STUDENT IN AN ORGANIZED HEALTH CARE EDUCATION/TRAINING PROGRAM

## 2023-07-24 PROCEDURE — 83605 ASSAY OF LACTIC ACID: CPT | Performed by: STUDENT IN AN ORGANIZED HEALTH CARE EDUCATION/TRAINING PROGRAM

## 2023-07-24 PROCEDURE — 85610 PROTHROMBIN TIME: CPT | Performed by: NEUROLOGICAL SURGERY

## 2023-07-24 PROCEDURE — 70498 CT ANGIOGRAPHY NECK: CPT

## 2023-07-24 PROCEDURE — 85730 THROMBOPLASTIN TIME PARTIAL: CPT | Performed by: NEUROLOGICAL SURGERY

## 2023-07-24 PROCEDURE — 84145 PROCALCITONIN (PCT): CPT | Performed by: STUDENT IN AN ORGANIZED HEALTH CARE EDUCATION/TRAINING PROGRAM

## 2023-07-24 PROCEDURE — 80053 COMPREHEN METABOLIC PANEL: CPT | Performed by: ORTHOPAEDIC SURGERY

## 2023-07-24 PROCEDURE — 99232 SBSQ HOSP IP/OBS MODERATE 35: CPT | Performed by: PSYCHIATRY & NEUROLOGY

## 2023-07-24 PROCEDURE — 81001 URINALYSIS AUTO W/SCOPE: CPT | Performed by: STUDENT IN AN ORGANIZED HEALTH CARE EDUCATION/TRAINING PROGRAM

## 2023-07-24 PROCEDURE — 99233 SBSQ HOSP IP/OBS HIGH 50: CPT | Performed by: STUDENT IN AN ORGANIZED HEALTH CARE EDUCATION/TRAINING PROGRAM

## 2023-07-24 PROCEDURE — 85025 COMPLETE CBC W/AUTO DIFF WBC: CPT | Performed by: ORTHOPAEDIC SURGERY

## 2023-07-24 RX ORDER — AMLODIPINE BESYLATE 5 MG/1
5 TABLET ORAL DAILY
Status: DISCONTINUED | OUTPATIENT
Start: 2023-07-25 | End: 2023-07-29 | Stop reason: HOSPADM

## 2023-07-24 RX ORDER — MAGNESIUM SULFATE HEPTAHYDRATE 40 MG/ML
2 INJECTION, SOLUTION INTRAVENOUS ONCE
Status: COMPLETED | OUTPATIENT
Start: 2023-07-24 | End: 2023-07-25

## 2023-07-24 RX ADMIN — CEFAZOLIN SODIUM 1000 MG: 1 SOLUTION INTRAVENOUS at 00:39

## 2023-07-24 RX ADMIN — SERTRALINE 50 MG: 50 TABLET, FILM COATED ORAL at 07:28

## 2023-07-24 RX ADMIN — MAGNESIUM SULFATE HEPTAHYDRATE 2 G: 2 INJECTION, SOLUTION INTRAVENOUS at 11:07

## 2023-07-24 RX ADMIN — CEFAZOLIN SODIUM 1000 MG: 1 SOLUTION INTRAVENOUS at 10:02

## 2023-07-24 RX ADMIN — CEFTRIAXONE 1000 MG: 1 INJECTION, SOLUTION INTRAVENOUS at 17:04

## 2023-07-24 RX ADMIN — LEVOTHYROXINE SODIUM 50 MCG: 50 TABLET ORAL at 05:21

## 2023-07-24 RX ADMIN — ENOXAPARIN SODIUM 40 MG: 100 INJECTION SUBCUTANEOUS at 10:04

## 2023-07-24 RX ADMIN — IOHEXOL 85 ML: 350 INJECTION, SOLUTION INTRAVENOUS at 16:07

## 2023-07-24 NOTE — ASSESSMENT & PLAN NOTE
Patient presented with acute encephalopathy POA  Neurology consult appreciated  CT head negative for acute abnormality  Patient has slight gaze preference to the right  Stool for C. difficile and culture are negative  MRI brain- "1. No acute infarction, edema, or mass effect. 2.  Moderate chronic microangiopathic ischemic changes. 3.  NeuroQuant analysis was performed: Lower limits of normal hippocampal volume and enlarged inferior lateral and overall ventricular system, suggestive of global ex-vacuo dilatation.  The additional finding of an abnormal Hippocampal Occupancy Score, (HOC), is concerning for a mesial temporal lobe focused Neurodegenerative process.  Recommend reevaluation with Neuroquant imaging in 6 months. MRI consistent with Alzheimer's dementia. Neurology on board  7/24 verbally not responding can not follow commands, pupil reflex intact, awake   CT head showing small volume subarachnoid hemorrhage, neuro following, CTA head and neck showing AVM.   Neurosurgery s/o  7/24 MRI showing stable hge  EEG pending  Resumed lovenox dvt prophylaxis   GI consulted for NGT placement

## 2023-07-24 NOTE — ASSESSMENT & PLAN NOTE
Patient with right hip femoral neck fracture with retained hardware  Patient had followed up on July 13 with Dr. Aristeo Cantrell and was recommended to follow-up with Dr. Onofre Anand scheduled for surgery as outpatient  Orthopedic consult appreciated  POD 2 7/23 Right - ARTHROPLASTY HIP TOTAL with removal of hardware  Discussed with patient and sister  Patient is at intermediate risk for the procedure  Pain management as needed  Encourage incentive spirometer use  PT/OT after surgery    Follow-up with Ortho in 2 weeks discharged with aspirin 81 mg twice daily for 6 weeks

## 2023-07-24 NOTE — ASSESSMENT & PLAN NOTE
· S/p removal of orthopedic screw on right on 6/30/23 by Dr. Aristeo bradshaw   · Pain on Percocet at home  · Oxy 2.5/5 as needed ordered while inpatient  · Complaining of right hip pain.   · See above

## 2023-07-24 NOTE — PROGRESS NOTES
Progress Note - Neurology   Rowan El 69 y.o. female [id number]  Unit/Bed#: /-01    Assessment/Plan:    * Encephalopathy  Assessment & Plan  69 y.o. female with dementia, recent orthopaedic surgery, depression and hypothyroidism who presented to the ED on 7/19/23 for evaluation of increased somnolence and overall weakness since her surgery on 6/30/23. Neurology was asked to reevaluate patient on 7/24/2023 due to poor responsiveness following right hip arthroplasty on 7/23/2023. Imaging  - [facility] 7/19/23:  "No acute intracranial abnormality. "  - MRI Neuroquant 7/21/23:  "1.  No acute infarction, edema, or mass effect. 2.  Moderate chronic microangiopathic ischemic changes. 3.  NeuroQuant analysis was performed: Lower limits of normal hippocampal volume and enlarged inferior lateral and overall ventricular system, suggestive of global ex-vacuo dilatation. The additional finding of an abnormal Hippocampal Occupancy Score, ([address]), is concerning for a mesial temporal lobe focused Neurodegenerative process. Recommend reevaluation with Neuroquant imaging in 6 months."  - [facility] 7/24/23:  "New sulcal hyperdensity in the right posterior parietal lobe suspicious for small volume subarachnoid hemorrhage new since the prior CT of July 19, 2023. Review of the recent MRI from July 21, 2023 demonstrates hemosiderin deposition in sulci slightly more anterior to the current finding. The possibility of recurrent subarachnoid hemorrhage in this location should be considered. "  - CTA H/N wwo contrast 7/24/23:  "Unchanged acute trace subarachnoid hemorrhage in right posterior parietal region. Stable mild ventriculomegaly out of proportion to degree of cerebral atrophy. Question normal pressure hydrocephalus. Question small arteriovenous fistula in right anteromedial orbital region. Recommend consultation with the [facility], a division of [address] [place] [address] for Neuroscience at [phone].  Negative CTA head and neck for large vessel occlusion, dissection, aneurysm, or high-grade stenosis. Additional chronic/incidental findings as detailed above."     Concern for post-anesthesia encephalopathy versus infectious process with post-op fever. Patient with new subarachnoid hemorrhage, unlikely cause for patient's poor wakefulness. Plan:  · Repeat CTH at 0000 7/25/23  · Repeat MRI brain per neurosurgery  · Hold Lovenox due to subarachnoid hemorrhage  · UA pending   · LP with IR pending   · CSF studies ordered: WBC, RBC, protein, glucose, gram stain, VDRL, ME panel, cytology, cytometry, Lyme, culture, AFB culture   · Blood cultures x 2 pending   · Routine EEG pending  · Avoid narcotics if possible, use Acetaminophen  · Conservative management of delirium - minimize noise, maintain day/night cycle, provide frequent redirection, avoid restraints if possible, etc.    Subarachnoid hemorrhage ([address])  Assessment & Plan  - See above for plan      Recommendations for outpatient neurological follow up have yet to be determined. **History and physical examination obtained by attending neurologist. AP in room as witness to history and physical examination obtained and acted solely as a scribe for attending neurologist. This AP did not provide any decision making for this encounter. **      Subjective:   Primary team reached out to neurology provider due to poor wakefulness following right hip arthroplasty on 7/23/2023. Pt unable to provide history due to encephalopathy/ decreased wakefulness.          Past Medical History:   Diagnosis Date   • Anxiety    • Depression    • Memory loss    • Thyroid activity decreased      Past Surgical History:   Procedure Laterality Date   • BREAST BIOPSY      years ago- Benign   • JOINT REPLACEMENT Left    • NV HEMIARTHROPLASTY HIP PARTIAL Right 7/23/2023    Procedure: HEMIARTHROPLASTY HIP TOTAL with removal of hardware;  Surgeon: Chuy Recinos MD;  Location:  MAIN OR;  Service: Orthopedics   • MI OPTX FEM SHFT FX W/INSJ IMED IMPLT W/WO SCREW Right 5/9/2019    Procedure: INSERTION NAIL IM FEMUR ANTEGRADE (TROCHANTERIC), RIGHT;  Surgeon: Sandie Austin MD;  Location: [facility] MAIN OR;  Service: Orthopedics   • MI REMOVAL IMPLANT DEEP Right 6/30/2023    Procedure: REMOVAL HARDWARE TFN DYNAMIC SCREW;  Surgeon: Sandie Austin MD;  Location: [facility];  Service: Orthopedics     Family History   Problem Relation Age of Onset   • Hypertension Mother    • Parkinsonism Mother 80        memory issues    • Depression Mother    • Melanoma Father    • No Known Problems Maternal Grandmother    • No Known Problems Maternal Grandfather    • No Known Problems Paternal Grandmother    • Heart attack Paternal Grandfather    • Depression Other    • Skin cancer Sister    • Breast cancer Maternal Aunt         over age 48   • No Known Problems Paternal Aunt    • No Known Problems Sister      Social History     Socioeconomic History   • Marital status: Single     Spouse name: None   • Number of children: None   • Years of education: None   • Highest education level: None   Occupational History   • None   Tobacco Use   • Smoking status: Never   • Smokeless tobacco: Never   Vaping Use   • Vaping Use: Never used   Substance and Sexual Activity   • Alcohol use: Not Currently   • Drug use: No   • Sexual activity: None   Other Topics Concern   • None   Social History Narrative   • None     Social Determinants of Health     Financial Resource Strain: Not on file   Food Insecurity: No Food Insecurity (7/20/2023)    Hunger Vital Sign    • Worried About Running Out of Food in the Last Year: Never true    • Ran Out of Food in the Last Year: Never true   Transportation Needs: No Transportation Needs (7/20/2023)    PRAPARE - Transportation    • Lack of Transportation (Medical): No    • Lack of Transportation (Non-Medical):  No   Physical Activity: Not on file   Stress: Not on file   Social Connections: Not on file   Intimate Partner Violence: Not on file   Housing Stability: Low Risk  (7/20/2023)    Housing Stability Vital Sign    • Unable to Pay for Housing in the Last Year: No    • Number of Places Lived in the Last Year: 1    • Unstable Housing in the Last Year: No     E-Cigarette/Vaping   • E-Cigarette Use Never User      E-Cigarette/Vaping Substances         Medications:   All current active meds have been reviewed and current meds:  Scheduled Meds:  Current Facility-Administered Medications   Medication Dose Route Frequency Provider Last Rate   • acetaminophen  650 mg Oral Q6H PRN Domingo Stevens MD     • aluminum-magnesium hydroxide-simethicone  30 mL Oral Q6H PRN Domingo Stevens MD     • buPROPion  150 mg Oral BID Domingo Stevens MD     • cefTRIAXone  1,000 mg Intravenous Q24H Mendel Hone, MD 1,000 mg (07/24/23 1704)   • cholecalciferol  5,000 Units Oral Daily Domingo Stevens MD     • levothyroxine  50 mcg Oral Early Morning Domingo Stevens MD     • mirtazapine  30 mg Oral HS Domingo Stevens MD     • nystatin  500,000 Units Swish & Swallow 4x Daily Domingo Stevens MD     • ondansetron  4 mg Intravenous Q6H PRN Domingo Stevens MD     • oxyCODONE  2.5 mg Oral Q6H PRN Domingo Stevens MD      Or   • oxyCODONE  5 mg Oral Q6H PRN Domingo Stevens MD     • risperiDONE  1 mg Oral BID Domingo Stevens MD     • senna  1 tablet Oral HS PRN Domingo Stevens MD     • sertraline  50 mg Oral Daily With Breakfast Domingo Stevens MD     • sodium chloride  75 mL/hr Intravenous Continuous Domingo Stevens MD 75 mL/hr (07/23/23 1155)     Continuous Infusions:sodium chloride, 75 mL/hr, Last Rate: 75 mL/hr (07/23/23 1155)      PRN Meds:.•  acetaminophen  •  aluminum-magnesium hydroxide-simethicone  •  ondansetron  •  oxyCODONE **OR** oxyCODONE  •  senna       ROS:   Review of Systems   Unable to perform ROS: Mental status change             Vitals:   /77 (BP Location: Left arm)   Pulse 88   Temp 99 °F (37.2 °C) (Temporal)   Resp (!) 24   Ht 5' 8" (1.727 m)   Wt 55.7 kg (122 lb 12.7 oz)   SpO2 97%   BMI 18.67 kg/m²     Physical Exam:   Physical Exam  Vitals and nursing note reviewed. Constitutional:       Comments: Responds to painful stimuli (sternal rub)   HENT:      Mouth/Throat:      Mouth: Mucous membranes are dry. Cardiovascular:      Rate and Rhythm: Normal rate. Pulmonary:      Effort: Pulmonary effort is normal.   Neurological:      Coordination: Finger-nose-finger test: Unable to test due to patient unable to follow commands. Heel-to-shin test: unAble to test as patient unable to follow commands. Psychiatric:      Comments: Patient uncooperative with exam due to poor wakefulness       Neurologic Exam     Mental Status   Follows commands: does not follow commands    Attention: decreased. Concentration: decreased. Speech: (Patient does not produce any speech during exam)  Level of consciousness: drowsy ,  responsive to painful stimuli (responds to sternal rub )  - Unable to assess orientation or language due to comatose patient     Cranial Nerves     CN III, IV, VI   Conjugate gaze: present  -Cranial nerves II through XII attempted to be tested but unable to test due to comatose patient     Motor Exam   Right arm tone: increased and cogwheel rigidity  Left arm tone: increased and cogwheel rigidity  Right leg tone: increased  Left leg tone: unable to test due to R hip arthroplasty - B/L hand  4-5/5  - some spontaneous movement of RUE on exam     (+)neck rigidity      Gait, Coordination, and Reflexes     Gait  Gait: (Deferred for patient's safety)    Coordination   Finger-nose-finger test: Unable to test due to patient unable to follow commands. Heel-to-shin test: unAble to test as patient unable to follow commands. Labs: I have personally reviewed pertinent reports.    Recent Results (from the past 24 hour(s))   CBC and differential    Collection Time: 07/24/23  5:22 AM   Result Value Ref Range    WBC 8.11 4.31 - 10.16 Thousand/uL    RBC 2.81 (L) 3.81 - 5.12 Million/uL    Hemoglobin 8.3 (L) 11.5 - 15.4 g/dL    Hematocrit 26.1 (L) 34.8 - 46.1 %    MCV 93 82 - 98 fL    MCH 29.5 26.8 - 34.3 pg    MCHC 31.8 31.4 - 37.4 g/dL    RDW 12.7 11.6 - 15.1 %    MPV 8.5 (L) 8.9 - 12.7 fL    Platelets 756 310 - 199 Thousands/uL    nRBC 0 /100 WBCs    Neutrophils Relative 79 (H) 43 - 75 %    Immat GRANS % 1 0 - 2 %    Lymphocytes Relative 10 (L) 14 - 44 %    Monocytes Relative 9 4 - 12 %    Eosinophils Relative 1 0 - 6 %    Basophils Relative 0 0 - 1 %    Neutrophils Absolute 6.44 1.85 - 7.62 Thousands/µL    Immature Grans Absolute 0.04 0.00 - 0.20 Thousand/uL    Lymphocytes Absolute 0.80 0.60 - 4.47 Thousands/µL    Monocytes Absolute 0.71 0.17 - 1.22 Thousand/µL    Eosinophils Absolute 0.10 0.00 - 0.61 Thousand/µL    Basophils Absolute 0.02 0.00 - 0.10 Thousands/µL   Comprehensive metabolic panel    Collection Time: 07/24/23  5:22 AM   Result Value Ref Range    Sodium 135 135 - 147 mmol/L    Potassium 3.9 3.5 - 5.3 mmol/L    Chloride 103 96 - 108 mmol/L    CO2 28 21 - 32 mmol/L    ANION GAP 4 mmol/L    BUN 14 5 - 25 mg/dL    Creatinine 0.83 0.60 - 1.30 mg/dL    Glucose 111 65 - 140 mg/dL    Calcium 8.8 8.4 - 10.2 mg/dL    Corrected Calcium 9.4 8.3 - 10.1 mg/dL    AST 23 13 - 39 U/L    ALT 12 7 - 52 U/L    Alkaline Phosphatase 80 34 - 104 U/L    Total Protein 6.1 (L) 6.4 - 8.4 g/dL    Albumin 3.3 (L) 3.5 - 5.0 g/dL    Total Bilirubin 0.39 0.20 - 1.00 mg/dL    eGFR 72 ml/min/1.73sq m   Magnesium    Collection Time: 07/24/23  5:22 AM   Result Value Ref Range    Magnesium 1.7 (L) 1.9 - 2.7 mg/dL   Fingerstick Glucose (POCT)    Collection Time: 07/24/23 10:36 AM   Result Value Ref Range    POC Glucose 123 65 - 140 mg/dl   ABORh Recheck - Contact Blood Bank Prior to Collection    Collection Time: 07/24/23 10:48 AM   Result Value Ref Range    ABO Grouping O     Rh Factor Positive    Hemoglobin and hematocrit, blood    Collection Time: 07/24/23 10:48 AM   Result Value Ref Range    Hemoglobin 8.4 (L) 11.5 - 15.4 g/dL    Hematocrit 26.3 (L) 34.8 - 46.1 %   HS Troponin 0hr (reflex protocol)    Collection Time: 07/24/23 10:48 AM   Result Value Ref Range    hs TnI 0hr 8 "Refer to ACS Flowchart"- see link ng/L   Procalcitonin    Collection Time: 07/24/23 10:48 AM   Result Value Ref Range    Procalcitonin 0.11 <=0.25 ng/ml   Lactic acid, plasma (w/reflex if result > 2.0)    Collection Time: 07/24/23 10:48 AM   Result Value Ref Range    LACTIC ACID 1.0 0.5 - 2.0 mmol/L       Imaging: I have personally reviewed pertinent imaging in PACS, including CTA head and neck with and without contrast 7/24/2023, CT head without contrast 7/24/2023, MRI brain neuro quant without contrast 7/21/2023, CT head without contrast 7/19/2023,  and I have personally reviewed PACS reports. EKG, Pathology, and Other Studies: I have personally reviewed pertinent reports. Counseling / Coordination of Care  Attending neurologist  spent a total time of 35 minutes on 07/24/23 in caring for this patient including Diagnostic results, Prognosis, Risks and benefits of tx options, Instructions for management, Patient and family education, Importance of tx compliance, Risk factor reductions, Impressions, Counseling / Coordination of care, Documenting in the medical record, Reviewing / ordering tests, medicine, procedures  , Obtaining or reviewing history   and Communicating with other healthcare professionals . This note was completed in part utilizing [address] [place] [address]. Grammatical errors, random word insertions, spelling mistakes, and incomplete sentences may be an occasional consequence of this system secondary to software limitations, ambient noise, and hardware issues. If you have any questions or concerns about the content, text, or information contained within the body of this dictation, please contact the provider for clarification.

## 2023-07-24 NOTE — ASSESSMENT & PLAN NOTE
Patient with right hip femoral neck fracture with retained hardware  Patient had followed up on July 13 with Dr. Sang Cortes and was recommended to follow-up with Dr. Ronald Suarez scheduled for surgery as outpatient  Orthopedic consult appreciated  POD 1 7/23 Right - ARTHROPLASTY HIP TOTAL with removal of hardware  Discussed with patient and sister  Patient is at intermediate risk for the procedure  Pain management as needed  Encourage incentive spirometer use  PT/OT after surgery

## 2023-07-24 NOTE — TELEMEDICINE
Neurosurgery e-Consult (IPC)     Mark Anthony Knox 69 y.o. female MRN: [id number]  Unit/Bed#: -01 Encounter: [id number]    Contacted by primary provider Anastasiya Kearns   Reason for consult: possible AVF    Patient is a 57-year-old female with h/o depression, dementia, hypothyroidism who initially presented after fall s/p R hip arthroplasty on 7/23 then AMS this morning. Available past medical history, social history, surgical history, medication list, drug allergies and review of systems were reviewed. /77 (BP Location: Left arm)   Pulse 88   Temp 99 °F (37.2 °C) (Temporal)   Resp (!) 24   Ht 5' 8" (1.727 m)   Wt 55.7 kg (122 lb 12.7 oz)   SpO2 97%   BMI 18.67 kg/m²      I personally reviewed all relevant imaging:    MRI Neuroquant 7/21/23:  Reduced volume and global cerebral atrophy (particularly in mesial temporal and hippocampal regions) a/w secondary mild ventriculomegaly without significant transependymal edema. [address] 7/24/23:  Small linear hyperdensity in right posterior parietal sulcus, possibly small volume of focal SAH (new compared to last [address] on 7/19/23). CTA head and neck wwo contrast 7/24/23:  Stable acute trace subarachnoid hemorrhage in right posterior parietal region, stable mild ventriculomegaly, possible small arteriovenous fistula in right anteromedial orbital region, no LVO or critical stenosis. Assessment and Recommendations    1. SBP < 140  2. Repeat MRI pending   3. If unable to obtain MRI in next 24 hours, please repeat CTH without contrast for interval comparison  4. Mild ventriculomegaly is likely due to global cerebral atrophy, as evidenced by recent Neuroquant MRI, unlikely normal pressure hydrocephalus (which is a clinical not radiographic diagnosis); no indication for further work-up (or intervention) for ventriculomegaly given no significant transependymal edema  5.  AMS work-up (including EEG and LP) per Neurology; AMS is highly unlikely due to small focal SAH and small AVF  6. No urgent or emergent neurosurgical intervention anticipated at this time    All questions and concerns were addressed. Provider is in agreement with the course of action. 31+ minutes, >50% of the total time devoted to medical consultative verbal/EMR discussion between providers. Written report will be generated in the EMR. Armando Hunter M.D.   Neurosurgeon On Call

## 2023-07-24 NOTE — ASSESSMENT & PLAN NOTE
Patient presented with acute encephalopathy POA  Neurology consult appreciated  CT head negative for acute abnormality  Patient has slight gaze preference to the right  Stool for C. difficile and culture are negative  MRI brain- "1. No acute infarction, edema, or mass effect. 2.  Moderate chronic microangiopathic ischemic changes. 3.  NeuroQuant analysis was performed: Lower limits of normal hippocampal volume and enlarged inferior lateral and overall ventricular system, suggestive of global ex-vacuo dilatation.  The additional finding of an abnormal Hippocampal Occupancy Score, (HOC), is concerning for a mesial temporal lobe focused Neurodegenerative process.  Recommend reevaluation with Neuroquant imaging in 6 months. MRI consistent with Alzheimer's dementia.    Neurology on board  7/24 verbally not responding can not follow commands, pupil reflex intact, awake   CT head, CXR, UA, troponin, repeat H&H

## 2023-07-24 NOTE — PROGRESS NOTES
Progress Note - Orthopedics   Chetan Wade 69 y.o. female MRN: [id number]  Unit/Bed#: -01      Assessment:  69 y. o.female s/p Right removal of TFN from femur and cemented hemiarthroplasty with Dr. Dory Davies on 7/23 for Displaced right femoral neck fracture after removal of proximal TFN screw. POD1      Plan:  · WBAT RLE w/ posterior hip precautions  · Yesterday Hgb was 10.4 . Continue to monitor H&H and vitals. · PT/OT  · Pain control per primary  · DVT ppx - lovenox in house. ASA BID x 6 weeks on discharge  · Follow up with Dory Davies in 2 weeks  · Dispo: Ortho will follow      Subjective:    69 y. o.female Seen and examined at bedside. Patient is demented and non-communicative at baseline. Unable to obtain subjective portion of exam.  Per chart, No acute events or complaints overnight.     Labs:  0   Lab Value Date/Time    HCT 33.3 (L) 07/23/2023 0312    HCT 31.6 (L) 07/22/2023 0529    HCT 29.1 (L) 07/21/2023 0451    HGB 10.4 (L) 07/23/2023 0312    HGB 9.9 (L) 07/22/2023 0529    HGB 9.1 (L) 07/21/2023 0451    INR 1.14 06/16/2019 0035    WBC 7.54 07/23/2023 0312    WBC 6.91 07/22/2023 0529    WBC 6.00 07/21/2023 0451       Meds:    Current Facility-Administered Medications:   •  acetaminophen (TYLENOL) tablet 650 mg, 650 mg, Oral, Q6H PRN, Lisa Clark MD, 650 mg at 07/21/23 0039  •  aluminum-magnesium hydroxide-simethicone (MAALOX) oral suspension 30 mL, 30 mL, Oral, Q6H PRN, Lisa Clark MD  •  buPROPion [facility]) 12 hr tablet 150 mg, 150 mg, Oral, BID, Lisa Clark MD, 150 mg at 07/22/23 1658  •  ceFAZolin (ANCEF) IVPB (premix in dextrose) 1,000 mg 50 mL, 1,000 mg, Intravenous, Q8H, Lisa Clark MD, Last Rate: 100 mL/hr at 07/23/23 1631, 1,000 mg at 07/23/23 1631  •  [START ON 7/24/2023] cefTRIAXone (ROCEPHIN) IVPB (premix in dextrose) 1,000 mg 50 mL, 1,000 mg, Intravenous, Q24H, Flora Levi MD  •  cholecalciferol (VITAMIN D3) tablet 5,000 Units, 5,000 Units, Oral, Daily, Lisa Clark MD, 5,000 Units at 07/22/23 4752  •  enoxaparin (LOVENOX) subcutaneous injection 40 mg, 40 mg, Subcutaneous, Q24H [address], Joshua Red MD, 40 mg at 07/23/23 1310  •  levothyroxine tablet 50 mcg, 50 mcg, Oral, Early Morning, Juan J Hurst MD, 50 mcg at 07/22/23 5618  •  mirtazapine (REMERON) tablet 30 mg, 30 mg, Oral, HS, Juan J Hurst MD, 30 mg at 07/22/23 2152  •  nystatin (MYCOSTATIN) oral suspension 500,000 Units, 500,000 Units, Swish & Swallow, 4x Daily, Juan J Hurst MD, 500,000 Units at 07/22/23 2152  •  ondansetron (ZOFRAN) injection 4 mg, 4 mg, Intravenous, Q6H PRN, Juan J Hurst MD  •  oxyCODONE (ROXICODONE) split tablet 2.5 mg, 2.5 mg, Oral, Q6H PRN **OR** oxyCODONE (ROXICODONE) IR tablet 5 mg, 5 mg, Oral, Q6H PRN, Juan J Hurst MD  •  risperiDONE (RisperDAL) tablet 1 mg, 1 mg, Oral, BID, Juan J Hurst MD, 1 mg at 07/22/23 1658  •  senna (SENOKOT) tablet 8.6 mg, 1 tablet, Oral, HS PRN, Juan J Hurst MD  •  sertraline (ZOLOFT) tablet 50 mg, 50 mg, Oral, Daily With Breakfast, Juan J Hurst MD, 50 mg at 07/22/23 1887  •  sodium chloride 0.9 % infusion, 75 mL/hr, Intravenous, Continuous, Juan J Hurst MD, Last Rate: 75 mL/hr at 07/23/23 1155, 75 mL/hr at 07/23/23 1155    Blood Culture:   Lab Results   Component Value Date    BLOODCX No Growth After 5 Days. 06/16/2019       Wound Culture:   No results found for: "WOUNDCULT"    Ins and Outs:  I/O last 24 hours: In: 1250 [I.V.:1000; IV Piggyback:250]  Out: 1300 [Urine:1300]          Physical:  Vitals:    07/23/23 2046   BP: 125/56   Pulse: 86   Resp:    Temp: 98.4 °F (36.9 °C)   SpO2: 99%     Musculoskeletal: right Lower Extremity  · Skin -  No erythema or ecchymosis. Compartments soft and compressible.    · Dressing - c/d/I w/ mepilex in place  · TTP at incision site  · Sensation intact to saphenous, sural, tibial, superficial peroneal nerve, and deep peroneal  · Motor intact to +FHL/EHL, +ankle dorsi/plantar flexion  · 2+ DP pulse, symmetric bilaterally  · Digits warm and well perfused  · Capillary refill < 2 seconds      Gina Alarcon PA-C

## 2023-07-24 NOTE — ASSESSMENT & PLAN NOTE
- Etiology unclear/spontaneous  - Unlikely to be contributing to AMS  - Repeat CTH in 2-3 weeks following discharge

## 2023-07-24 NOTE — PROGRESS NOTES
[facility]  Progress Note  Name: Tiffanie Lemon  MRN: [id number]  Unit/Bed#: -01 I Date of Admission: 7/19/2023   Date of Service: 7/24/2023 I Hospital Day: 5    Assessment/Plan   * Encephalopathy  Assessment & Plan  Patient presented with acute encephalopathy POA  Neurology consult appreciated  CT head negative for acute abnormality  Patient has slight gaze preference to the right  Stool for C. difficile and culture are negative  MRI brain- "1. No acute infarction, edema, or mass effect. 2.  Moderate chronic microangiopathic ischemic changes. 3.  NeuroQuant analysis was performed: Lower limits of normal hippocampal volume and enlarged inferior lateral and overall ventricular system, suggestive of global ex-vacuo dilatation.  The additional finding of an abnormal Hippocampal Occupancy Score, (HOC), is concerning for a mesial temporal lobe focused Neurodegenerative process.  Recommend reevaluation with Neuroquant imaging in 6 months. MRI consistent with Alzheimer's dementia.    Neurology on board  7/24 verbally not responding can not follow commands, pupil reflex intact, awake   CT head, CXR, UA, troponin, repeat H&H      Fall  Assessment & Plan  · Found on bedroom floor by sister  · CT head and CT C-spine negative for acute findings  · No signs of trauma otherwise on patient's body, no complaints of pain or tenderness on palpation  · PT, OT, and CM on board    Closed displaced fracture of right femoral neck [facility])  Assessment & Plan  Patient with right hip femoral neck fracture with retained hardware  Patient had followed up on July 13 with Dr. Lieutenant Luque and was recommended to follow-up with Dr. Fabian Altman scheduled for surgery as outpatient  Orthopedic consult appreciated  POD 1 7/23 Right - ARTHROPLASTY HIP TOTAL with removal of hardware  Anticoagulation is on hold  Discussed with patient and sister  Patient is at intermediate risk for the procedure  Pain management as needed  Encourage incentive spirometer use  PT/OT after surgery    Moderate protein-calorie malnutrition (HCC)  Assessment & Plan  Malnutrition Findings:   Adult Malnutrition type: Chronic illness  Adult Degree of Malnutrition: Malnutrition of moderate degree  Malnutrition Characteristics: Fat loss, Muscle loss                  360 Statement: Pt presents with moderate protein calorie malnutrution as evidenced by prominent clavicle bone and sunken orbitals. Treat with Pureed Nectar Thick Liquids and Magic Cup BID. BMI Findings: Body mass index is 18.67 kg/m². Patient has moderate protein calorie malnutrition in setting of chronic illness as evidenced by prominent clavicle bone, sunken orbitals  Nutrition consult and recommendations    No other household member able to render care  Assessment & Plan  · Found on bedroom floor by sister at 54 on 7/19, sister unable to get her up so sister went to sleep  · Sister woke at 63 and called EMS at that time  · CT head and CT C-spine negative for acute findings  · Patient without tenderness or signs of trauma throughout remainder of body  · PT, OT, and CM consults for placement  · She will require rehab placement at discharge    Anemia  Assessment & Plan  · Hemoglobin 10.8 on admission  · Baseline hemoglobin appears to be 9-10  · Trend CBC daily    Painful orthopaedic hardware [facility])  Assessment & Plan  · S/p removal of orthopedic screw on right on 6/30/23 by ortho, Dr. Carlos Wheatley   · Pain on Percocet at home  · Oxy 2.5/5 as needed ordered while inpatient  · Complaining of right hip pain. · See above    Ambulatory dysfunction  Assessment & Plan  Ambulatory dysfunction POA in setting of dementia evidenced by inability to stand after a fall.   CT is negative for any acute findings  Patient was noticed to have gaze preference  MRI brain as above  See above    Moderate dementia [facility])  Assessment & Plan  · Only oriented to self on admission  · Sister reportedly called nursing staff that this is patient's baseline since surgery on 6/30  · PT, OT, and CM consulted  · Home medications: Risperdal 1 Mg 4 times daily and Remeron 30 mg at bedtime  · Risperidone was decreased to 1 mg twice daily  · Continue home medications    Depression  Assessment & Plan  · Continue home Zoloft 50 Mg daily and Wellbutrin 150 mg twice daily    Acquired hypothyroidism  Assessment & Plan  · Continue home Synthroid 50 mcg daily    Elevated CK-resolved as of 7/24/2023  Assessment & Plan  ·   · Resolved            VTE Pharmacologic Prophylaxis: VTE Score: 4 Moderate Risk (Score 3-4) - Pharmacological DVT Prophylaxis Ordered: enoxaparin (Lovenox). Patient Centered Rounds: I performed bedside rounds with nursing staff today. Discussions with Specialists or Other Care Team Provider: ortho, CM, PT, OT    Education and Discussions with Family / Patient: Updated  (sister) via phone. Total Time Spent on Date of Encounter in care of patient: 45 minutes This time was spent on one or more of the following: performing physical exam; counseling and coordination of care; obtaining or reviewing history; documenting in the medical record; reviewing/ordering tests, medications or procedures; communicating with other healthcare professionals and discussing with patient's family/caregivers. Current Length of Stay: 5 day(s)  Current Patient Status: Inpatient   Certification Statement: The patient will continue to require additional inpatient hospital stay due to AMS  Discharge Plan: Anticipate discharge in >72 hrs to rehab facility. Code Status: Level 1 - Full Code    Subjective:   Randa Plaza was seen and examined at bedside. No acute events overnight. Initially this morning patient was sleeping. Was called to the bedside due to patient not being verbally responsive is a weak however does not follow commands or respond. Patient has good pupillary reflex and closes her eyes when light is shined.   Patient retracts from pain.  Will order CT head chest x-ray UA get new set of vital signs. Order troponins and H&H    Objective:     Vitals:   Temp (24hrs), Av.7 °F (37.1 °C), Min:97.1 °F (36.2 °C), Max:100 °F (37.8 °C)    Temp:  [97.1 °F (36.2 °C)-100 °F (37.8 °C)] 99.2 °F (37.3 °C)  HR:  [64-94] 91  Resp:  [12-16] 14  BP: (111-144)/(50-86) 138/58  SpO2:  [94 %-100 %] 98 %  Body mass index is 18.67 kg/m². Input and Output Summary (last 24 hours): Intake/Output Summary (Last 24 hours) at 2023 1014  Last data filed at 2023 0039  Gross per 24 hour   Intake 1000 ml   Output 1300 ml   Net -300 ml       Physical Exam:   Physical Exam  Vitals and nursing note reviewed. Constitutional:       General: She is not in acute distress. Appearance: She is normal weight. She is not ill-appearing. Comments: awake   HENT:      Head: Normocephalic and atraumatic. Cardiovascular:      Rate and Rhythm: Normal rate and regular rhythm. Pulses: Normal pulses. Heart sounds: Normal heart sounds. Pulmonary:      Effort: Pulmonary effort is normal.      Breath sounds: Normal breath sounds. Abdominal:      General: Abdomen is flat. Bowel sounds are normal.      Palpations: Abdomen is soft. Musculoskeletal:      Right lower leg: No edema. Left lower leg: No edema. Skin:     General: Skin is warm. Neurological:      General: No focal deficit present. Mental Status: She is disoriented.           Additional Data:     Labs:  Results from last 7 days   Lab Units 23  0522   WBC Thousand/uL 8.11   HEMOGLOBIN g/dL 8.3*   HEMATOCRIT % 26.1*   PLATELETS Thousands/uL 296   NEUTROS PCT % 79*   LYMPHS PCT % 10*   MONOS PCT % 9   EOS PCT % 1     Results from last 7 days   Lab Units 23  0522   SODIUM mmol/L 135   POTASSIUM mmol/L 3.9   CHLORIDE mmol/L 103   CO2 mmol/L 28   BUN mg/dL 14   CREATININE mg/dL 0.83   ANION GAP mmol/L 4   CALCIUM mg/dL 8.8   ALBUMIN g/dL 3.3*   TOTAL BILIRUBIN mg/dL 0.39   ALK PHOS U/L 80   ALT U/L 12   AST U/L 23   GLUCOSE RANDOM mg/dL 111         Results from last 7 days   Lab Units 07/23/23  0732   POC GLUCOSE mg/dl 100               Lines/Drains:  Invasive Devices     Peripheral Intravenous Line  Duration           Peripheral IV 07/23/23 Right Wrist 1 day                      Imaging: No pertinent imaging reviewed. Recent Cultures (last 7 days):   Results from last 7 days   Lab Units 07/20/23  1615   C DIFF TOXIN B BY PCR  Negative       Last 24 Hours Medication List:   Current Facility-Administered Medications   Medication Dose Route Frequency Provider Last Rate   • acetaminophen  650 mg Oral Q6H PRN Greg Murray MD     • aluminum-magnesium hydroxide-simethicone  30 mL Oral Q6H PRN Greg Murray MD     • buPROPion  150 mg Oral BID Greg Murray MD     • cefazolin  1,000 mg Intravenous Q8H Greg Murray MD 1,000 mg (07/24/23 1002)   • cefTRIAXone  1,000 mg Intravenous Q24H Matt Sykes MD     • cholecalciferol  5,000 Units Oral Daily Greg Murray MD     • enoxaparin  40 mg Subcutaneous Q24H [address] Matt Sykes MD     • levothyroxine  50 mcg Oral Early Morning Greg Murray MD     • magnesium sulfate  2 g Intravenous Once Devorah Tong MD     • mirtazapine  30 mg Oral HS Greg Murray MD     • nystatin  500,000 Units Swish & Swallow 4x Daily Greg Murray MD     • ondansetron  4 mg Intravenous Q6H PRN Greg Murray MD     • oxyCODONE  2.5 mg Oral Q6H PRN Greg Murray MD      Or   • oxyCODONE  5 mg Oral Q6H PRN Greg Murray MD     • risperiDONE  1 mg Oral BID Greg Murray MD     • senna  1 tablet Oral HS PRN Greg Murray MD     • sertraline  50 mg Oral Daily With Breakfast Greg Murray MD     • sodium chloride  75 mL/hr Intravenous Continuous Greg Murray MD 75 mL/hr (07/23/23 1155)        Today, Patient Was Seen By: Devorah Tong MD    **Please Note: This note may have been constructed using a voice recognition system. **

## 2023-07-24 NOTE — ASSESSMENT & PLAN NOTE
· S/p removal of orthopedic screw on right on 6/30/23 by ortho, Dr. Toña Yang   · Pain on Percocet at home  · Oxy 2.5/5 as needed ordered while inpatient  · Complaining of right hip pain.   · See above

## 2023-07-24 NOTE — QUICK NOTE
CTH showing possible subarachnoid hemorrhage.     Neuro re-consulted-> awaiting further recommendations    TYSON'd Aysah Rossi

## 2023-07-25 ENCOUNTER — APPOINTMENT (INPATIENT)
Dept: NEUROLOGY | Facility: HOSPITAL | Age: 68
DRG: 521 | End: 2023-07-25
Payer: MEDICARE

## 2023-07-25 ENCOUNTER — APPOINTMENT (INPATIENT)
Dept: RADIOLOGY | Facility: HOSPITAL | Age: 68
DRG: 521 | End: 2023-07-25
Payer: MEDICARE

## 2023-07-25 LAB
ALBUMIN SERPL BCP-MCNC: 3.3 G/DL (ref 3.5–5)
ALP SERPL-CCNC: 86 U/L (ref 34–104)
ALT SERPL W P-5'-P-CCNC: 11 U/L (ref 7–52)
ANION GAP SERPL CALCULATED.3IONS-SCNC: 8 MMOL/L
AST SERPL W P-5'-P-CCNC: 20 U/L (ref 13–39)
BILIRUB SERPL-MCNC: 0.44 MG/DL (ref 0.2–1)
BUN SERPL-MCNC: 13 MG/DL (ref 5–25)
CALCIUM ALBUM COR SERPL-MCNC: 9.4 MG/DL (ref 8.3–10.1)
CALCIUM SERPL-MCNC: 8.8 MG/DL (ref 8.4–10.2)
CHLORIDE SERPL-SCNC: 99 MMOL/L (ref 96–108)
CO2 SERPL-SCNC: 27 MMOL/L (ref 21–32)
CREAT SERPL-MCNC: 0.75 MG/DL (ref 0.6–1.3)
ERYTHROCYTE [DISTWIDTH] IN BLOOD BY AUTOMATED COUNT: 12.7 % (ref 11.6–15.1)
GFR SERPL CREATININE-BSD FRML MDRD: 81 ML/MIN/1.73SQ M
GLUCOSE SERPL-MCNC: 113 MG/DL (ref 65–140)
HCT VFR BLD AUTO: 26.7 % (ref 34.8–46.1)
HGB BLD-MCNC: 8.4 G/DL (ref 11.5–15.4)
MAGNESIUM SERPL-MCNC: 2.2 MG/DL (ref 1.9–2.7)
MCH RBC QN AUTO: 29.6 PG (ref 26.8–34.3)
MCHC RBC AUTO-ENTMCNC: 31.5 G/DL (ref 31.4–37.4)
MCV RBC AUTO: 94 FL (ref 82–98)
PLATELET # BLD AUTO: 314 THOUSANDS/UL (ref 149–390)
PMV BLD AUTO: 9 FL (ref 8.9–12.7)
POTASSIUM SERPL-SCNC: 4.2 MMOL/L (ref 3.5–5.3)
PROT SERPL-MCNC: 6.5 G/DL (ref 6.4–8.4)
RBC # BLD AUTO: 2.84 MILLION/UL (ref 3.81–5.12)
SODIUM SERPL-SCNC: 134 MMOL/L (ref 135–147)
WBC # BLD AUTO: 10.06 THOUSAND/UL (ref 4.31–10.16)

## 2023-07-25 PROCEDURE — 74018 RADEX ABDOMEN 1 VIEW: CPT

## 2023-07-25 PROCEDURE — 99024 POSTOP FOLLOW-UP VISIT: CPT | Performed by: PHYSICIAN ASSISTANT

## 2023-07-25 PROCEDURE — 92526 ORAL FUNCTION THERAPY: CPT

## 2023-07-25 PROCEDURE — 95816 EEG AWAKE AND DROWSY: CPT

## 2023-07-25 PROCEDURE — 95816 EEG AWAKE AND DROWSY: CPT | Performed by: PSYCHIATRY & NEUROLOGY

## 2023-07-25 PROCEDURE — 85027 COMPLETE CBC AUTOMATED: CPT | Performed by: STUDENT IN AN ORGANIZED HEALTH CARE EDUCATION/TRAINING PROGRAM

## 2023-07-25 PROCEDURE — 99449 NTRPROF PH1/NTRNET/EHR 31/>: CPT | Performed by: PHYSICIAN ASSISTANT

## 2023-07-25 PROCEDURE — 83735 ASSAY OF MAGNESIUM: CPT | Performed by: STUDENT IN AN ORGANIZED HEALTH CARE EDUCATION/TRAINING PROGRAM

## 2023-07-25 PROCEDURE — 99233 SBSQ HOSP IP/OBS HIGH 50: CPT | Performed by: PSYCHIATRY & NEUROLOGY

## 2023-07-25 PROCEDURE — 80053 COMPREHEN METABOLIC PANEL: CPT | Performed by: STUDENT IN AN ORGANIZED HEALTH CARE EDUCATION/TRAINING PROGRAM

## 2023-07-25 PROCEDURE — 99221 1ST HOSP IP/OBS SF/LOW 40: CPT | Performed by: INTERNAL MEDICINE

## 2023-07-25 PROCEDURE — 99233 SBSQ HOSP IP/OBS HIGH 50: CPT | Performed by: STUDENT IN AN ORGANIZED HEALTH CARE EDUCATION/TRAINING PROGRAM

## 2023-07-25 RX ORDER — ACETAMINOPHEN 650 MG/1
325 SUPPOSITORY RECTAL EVERY 4 HOURS PRN
Status: DISCONTINUED | OUTPATIENT
Start: 2023-07-25 | End: 2023-07-26

## 2023-07-25 RX ORDER — ASPIRIN 300 MG/1
300 SUPPOSITORY RECTAL DAILY
Status: DISCONTINUED | OUTPATIENT
Start: 2023-07-25 | End: 2023-07-25

## 2023-07-25 RX ORDER — ENOXAPARIN SODIUM 100 MG/ML
40 INJECTION SUBCUTANEOUS
Status: DISCONTINUED | OUTPATIENT
Start: 2023-07-27 | End: 2023-07-25

## 2023-07-25 RX ADMIN — ACETAMINOPHEN 325 MG: 650 SUPPOSITORY RECTAL at 14:54

## 2023-07-25 RX ADMIN — CEFTRIAXONE 1000 MG: 1 INJECTION, SOLUTION INTRAVENOUS at 17:12

## 2023-07-25 NOTE — SPEECH THERAPY NOTE
Speech Language/Pathology    SLP approached pt for skilled dysphagia treatment. POC was to trial upgraded PO (snack trials) for possible level 3 lunch tray. Pt in bed with eyes closed, VSS, as SLP entered the room. Clinician provided gentle alerting strategies, however, pt unable to fully alert for safe PO intake. SLP to f/u as time/schedule allows.

## 2023-07-25 NOTE — CONSULTS
e-Consult ([facility])     Inpatient consult to Neurosurgery  Consult performed by: Curtis Hardin PA-C  Consult ordered by: Preeti Billy MD          Contacted by ANDREW. Mat Rivas 69 y.o. female MRN: [id number]  Unit/Bed#: -[facility] Encounter: [id number]    Reason for Consult    Per provider report, patient with PMH significant for dementia, depression, right MALVIN 7/23/23 presents with altered mental status and fall. She was found down by her sister for an extended period of time. Imaging was obtained and negative for acute injury initially. However, repeat imaging on 7/24 was significant for right parietal SAH as well as possible right orbital AVF. Available past medical history,social history, surgical history, medication list, drug allergies and review of systems were reviewed. /63 (BP Location: Left arm)   Pulse 89   Temp 99.1 °F (37.3 °C) (Temporal)   Resp 16   Ht 5' 8" (1.727 m)   Wt 55.7 kg (122 lb 12.7 oz)   SpO2 100%   BMI 18.67 kg/m²      Clinical exam per provider report, encephalopathic. Not following commands, minimally responsive. Imaging personally reviewed. CT head w/o, 7/24/23: New sulcal hyperdensity in the right posterior parietal lobe suspicious for small volume subarachnoid hemorrhage new since the prior CT of July 19, 2023    CTA head and neck w/wo, 7/24/23: Unchanged acute trace subarachnoid hemorrhage in right posterior parietal region. Stable mild ventriculomegaly out of proportion to degree of cerebral atrophy. Question normal pressure hydrocephalus. Question small arteriovenous fistula in right anteromedial orbital region    MRI brain w/o, 7/24/23: Stable punctate focus of acute hemorrhage in the right posterior parietal region. More laterally located foci of stable, likely subacute to chronic subarachnoid hemorrhage in the right inferior parietal and superior temporal region. No mass effect or   vasogenic edema    Assessment and Recommendations    1.  Continue to monitor neurological exam  2. Repeat CT head in am for stability  3. SBP < 160  4. Neurology following for AMS work up, appreciate recommendations  5. Reviewed imaging personally and with attending. Again, small SAH and AVF are unlikely the cause of her AMS. Low suspicion for NPH given extensive cerebral atrophy. 6. Hold AC/AP medications as well as pharm ppx until after repeat CT head is reviewed  7. Call with questions or concerns. 8. No transfer is indicated at this time from a NSG standpoint. All questions answered. Provider is in agreement with the course of action. 11-20 minutes, >50% of the total time devoted to medical consultative verbal/EMR discussion between providers. Written report will be generated in the EMR.

## 2023-07-25 NOTE — PLAN OF CARE
Problem: INFECTION - ADULT  Goal: Absence or prevention of progression during hospitalization  Description: INTERVENTIONS:  - Assess and monitor for signs and symptoms of infection  - Monitor lab/diagnostic results  - Monitor all insertion sites, i.e. indwelling lines, tubes, and drains  - Monitor endotracheal if appropriate and nasal secretions for changes in amount and color  - [place] appropriate cooling/warming therapies per order  - Administer medications as ordered  - Instruct and encourage patient and family to use good hand hygiene technique  - Identify and instruct in appropriate isolation precautions for identified infection/condition  Outcome: Progressing

## 2023-07-25 NOTE — PHYSICAL THERAPY NOTE
PHYSICAL THERAPY CANCELLATION  NOTE      Patient Name: Tiffany Mccracken  BLBGYPetersonW Date: 7/25/2023 07/25/23 8149   Note Type   Note type Cancelled Session   Cancel Reasons Medical status   Additional Comments Spoke to Dr. Jaydon Barraza, [address] [place] [address]. Pt is not presently appropriate for PT intervention due to pt w/ significantly altered mental status, minimally responsive.  Will hold for today and f/u as appropriate         Koko Khalil, PT, DPT

## 2023-07-25 NOTE — OCCUPATIONAL THERAPY NOTE
Occupational Therapy Cx Note     Patient Name: Pascual Pritchard  Today's Date: 7/25/2023  Problem List  Principal Problem:    Encephalopathy  Active Problems:    Acquired hypothyroidism    Depression    Moderate dementia ([address])    Ambulatory dysfunction    Painful orthopaedic hardware [facility])    Closed displaced fracture of right femoral neck (HCC)    Anemia    No other household member able to render care    Fall    Moderate protein-calorie malnutrition ([address])    Subarachnoid hemorrhage ([address])            07/25/23 1615   OT Last Visit   OT Visit Date 07/25/23   Note Type   Note type Cancelled Session   Additional Comments Attempted to see pt for OT treatment session, however upon discussion with Dr. Tito Neal and RN Jono Gr, pt not medically appropriate at this time as pt is poorly responsive. Will hold and follow as able and appropriate.            Kenyon Jacobs OTR/L

## 2023-07-25 NOTE — PROGRESS NOTES
Progress Note - Neurology   Zhou Ferro 69 y.o. female [id number]  Unit/Bed#: /-01    Assessment/Plan:  * Encephalopathy  Assessment & Plan  69 y.o. female with dementia, recent orthopaedic surgery, depression and hypothyroidism who presented to the ED on 7/19/23 for evaluation of increased somnolence and overall weakness since her hip surgery/removal of pin on 6/30/23. Patient's somnolence improved and then worsened and thus Neurology was asked to reevaluate patient on 7/24/2023 due to poor responsiveness following right hip arthroplasty on 7/23/2023. Neurosurgery consulted and noting that "AMS is highly unlikely due to small focal SAH and small AVF."    7/24: Patient described as somnolent, unresponsive and "rigid on neurological exam with increased rigidity appreciated in upper and lower extremities as well as her muscles of the neck"    7/25: Patient with normal tone. Opens eyes to voice. No command following spontaneously moves head/neck, BUEs and LLE. When examiner elevates arms, patient symmetrically lowers them. Patient also localizing to light noxious stimuli in all extremities. Imaging  - [facility] 7/19/23:  "No acute intracranial abnormality. "  - MRI Neuroquant 7/21/23:  "1.  No acute infarction, edema, or mass effect. 2.  Moderate chronic microangiopathic ischemic changes. 3.  NeuroQuant analysis was performed: Lower limits of normal hippocampal volume and enlarged inferior lateral and overall ventricular system, suggestive of global ex-vacuo dilatation. The additional finding of an abnormal Hippocampal Occupancy Score, ([address]), is concerning for a mesial temporal lobe focused Neurodegenerative process. Recommend reevaluation with Neuroquant imaging in 6 months."  - [facility] 7/24/23:  "New sulcal hyperdensity in the right posterior parietal lobe suspicious for small volume subarachnoid hemorrhage new since the prior CT of July 19, 2023.  Review of the recent MRI from July 21, 2023 demonstrates hemosiderin deposition in sulci slightly more anterior to the current finding. The possibility of recurrent subarachnoid hemorrhage in this location should be considered. "  - CTA H/N wwo contrast 7/24/23:  "Unchanged acute trace subarachnoid hemorrhage in right posterior parietal region. Stable mild ventriculomegaly out of proportion to degree of cerebral atrophy. Question normal pressure hydrocephalus. Question small arteriovenous fistula in right anteromedial orbital region. Recommend consultation with the [facility], a division of [address] [place] [address] Neuroscience at [phone]. Negative CTA head and neck for large vessel occlusion, dissection, aneurysm, or high-grade stenosis. Additional chronic/incidental findings as detailed above."  - 7/24/23 MRI brain wo: Stable punctate focus of acute hemorrhage in the right posterior parietal region. More laterally located foci of stable, likely subacute to chronic subarachnoid hemorrhage in the right inferior parietal and superior temporal region. No mass effect or vasogenic edema. No evidence of acute infarct.   - 7/24/23 EEG: "Consistent with moderate cerebral dysfunction. No electrographic seizures, interictal epileptiform discharges (seizure tendency) or focal slowing were seen."    Patient's exam slowly improving, now opening eyes to voice, demonstrating normal tone with spontaneous movements of head/neck BUEs and LLE. Continue to suspect toxic metabolic encephalopthy in the setting of recent anesthesia, and suboptimal nutrtion superimposed on chronic dementia versus infectious process with the latter less likely. Patient with new spontaneous small subarachnoid hemorrhage. Agree with Neurosurgery that this is unlikely the cause for patient's poor wakefulness. Recommend continued evaluation of nutrition/PO intake. If patient remains somnolent and is not safe for PO, consider NG tube feeds.  Hopefully, her arousal will continue to improve and this will not be necessary. Plan:  · UA with small leuks, 2-4 WBCs and moderate bacteria - Obtain urine culture - management as per primary team  · Ceftriaxone on board  · Ok to restart Lovenox as SAH stable. · Ortho recommending Aspirin 81 mg BID x 6 weeks on discharge  · Ok to cancel LP  · Blood cultures x 2 pending   · Avoid narcotics if possible, use Acetaminophen  · OT/PT/SLP  · Swallow evaluation. · Conservative management of delirium - minimize noise, maintain day/night cycle, provide frequent redirection, avoid restraints if possible, etc.  · Evaluation and management of metabolic and toxic derangements as per primary team    Subarachnoid hemorrhage ([address])  Assessment & Plan  - See above for plan    Closed displaced fracture of right femoral neck (HCC)  Assessment & Plan  · S/p "Right removal of TFN from femur and cemented hemiarthroplasty with Dr. Rica Ayala on 7/23 for Displaced right femoral neck fracture after removal of proximal TFN screw."  · Minimize narcotics if possible  · Consider Tylenol UT if unable to swallow or via NG if one is placed      Recommendations for outpatient neurological follow up have yet to be determined. Subjective:   Patient non verbal on my initial evaluation. Patient slightly more alert, looking at family on serial exam, with more spontaneous movement.     Past Medical History:   Diagnosis Date   • Anxiety    • Depression    • Memory loss    • Thyroid activity decreased      Past Surgical History:   Procedure Laterality Date   • BREAST BIOPSY      years ago- Benign   • JOINT REPLACEMENT Left    • UT HEMIARTHROPLASTY HIP PARTIAL Right 7/23/2023    Procedure: HEMIARTHROPLASTY HIP TOTAL with removal of hardware;  Surgeon: Anant Landry MD;  Location:  MAIN OR;  Service: Orthopedics   • UT OPTX FEM SHFT FX W/INSJ IMED IMPLT W/WO SCREW Right 5/9/2019    Procedure: INSERTION NAIL IM FEMUR ANTEGRADE (TROCHANTERIC), RIGHT;  Surgeon: Armin Mccabe MD;  Location:  MAIN OR;  Service: Orthopedics   • DC REMOVAL IMPLANT DEEP Right 6/30/2023    Procedure: REMOVAL HARDWARE TFN DYNAMIC SCREW;  Surgeon: Susie Hunter MD;  Location:  MAIN OR;  Service: Orthopedics     Family History   Problem Relation Age of Onset   • Hypertension Mother    • Parkinsonism Mother 80        memory issues    • Depression Mother    • Melanoma Father    • No Known Problems Maternal Grandmother    • No Known Problems Maternal Grandfather    • No Known Problems Paternal Grandmother    • Heart attack Paternal Grandfather    • Depression Other    • Skin cancer Sister    • Breast cancer Maternal Aunt         over age 48   • No Known Problems Paternal Aunt    • No Known Problems Sister      Social History     Socioeconomic History   • Marital status: Single     Spouse name: None   • Number of children: None   • Years of education: None   • Highest education level: None   Occupational History   • None   Tobacco Use   • Smoking status: Never   • Smokeless tobacco: Never   Vaping Use   • Vaping Use: Never used   Substance and Sexual Activity   • Alcohol use: Not Currently   • Drug use: No   • Sexual activity: None   Other Topics Concern   • None   Social History Narrative   • None     Social Determinants of Health     Financial Resource Strain: Not on file   Food Insecurity: No Food Insecurity (7/20/2023)    Hunger Vital Sign    • Worried About Running Out of Food in the Last Year: Never true    • Ran Out of Food in the Last Year: Never true   Transportation Needs: No Transportation Needs (7/20/2023)    PRAPARE - Transportation    • Lack of Transportation (Medical): No    • Lack of Transportation (Non-Medical):  No   Physical Activity: Not on file   Stress: Not on file   Social Connections: Not on file   Intimate Partner Violence: Not on file   Housing Stability: Low Risk  (7/20/2023)    Housing Stability Vital Sign    • Unable to Pay for Housing in the Last Year: No    • Number of Places Lived in the Last Year: 1    • Unstable Housing in the Last Year: No       Medications: Reviewed in detail by me. ROS: Review of Systems   Unable to perform ROS: Patient nonverbal        Vitals: /72 (BP Location: Left arm)   Pulse 89   Temp 98.6 °F (37 °C) (Tympanic)   Resp 16   Ht 5' 8" (1.727 m)   Wt 55.7 kg (122 lb 12.7 oz)   SpO2 100%   BMI 18.67 kg/m²     Physical Exam:   Physical Exam  Constitutional:       General: She is not in acute distress. Appearance: She is ill-appearing (chronically). She is not diaphoretic. Comments: Elderly female supine in bed, appearing older than stated age   HENT:      Head: Normocephalic and atraumatic. Pulmonary:      Effort: Pulmonary effort is normal. No respiratory distress. Musculoskeletal:      Cervical back: Neck supple. Skin:     General: Skin is warm and dry. Neurologic Exam     Mental Status   Patient somnolent. Opens eyes to voice on initial exam, and keeps eyes open after stimulation to look at family for 30+ seconds on serial exam. No verbalizations aside from "ow" with light noxious stimuli. No command following. Cranial Nerves     Conjugate gaze bilaterally, BTT bilaterally. Face is symmetric. Motor Exam   Muscle bulk: decreased  Overall muscle tone: normal  Examiner passively elevates bilateral upper extremities antigravity and patient symmetrically lowers them on initial exam, on serial exam she is able to maintain antigravity for 5-10 seconds. Spontaneous movement of bilateral upper extremities antigravity as well as left lower extremity antigravity. Patient wiggles bilateral toes L>R with light noxious stimuli. Sensory Exam     Localizes to light noxious stimuli as well as grimaces and says "ow" in all extremities though diminished in right lower extremity distally. Labs: Reviewed in detail by me. Imaging: I have personally reviewed pertinent imaging and PACS reports. VTE Prophylaxis: SCDs.  Lovenox on hold    Total time spent today 35 minutes. Greater than 50% of total time was spent with the patient and / or family counseling and / or coordination of care. A description of the counseling / coordination of care: speaking with nursing and family about patient's recent imaging findings and suspected etiology of symptoms, as well as subtle improvements.

## 2023-07-25 NOTE — PROGRESS NOTES
[address] [facility]  Progress Note  Name: Tarah Esquivel  MRN: [id number]  Unit/Bed#: -01 I Date of Admission: 7/19/2023   Date of Service: 7/25/2023 I Hospital Day: 6    Assessment/Plan   * Encephalopathy  Assessment & Plan  Patient presented with acute encephalopathy POA  Neurology consult appreciated  CT head negative for acute abnormality  Patient has slight gaze preference to the right  Stool for C. difficile and culture are negative  MRI brain- "1. No acute infarction, edema, or mass effect. 2.  Moderate chronic microangiopathic ischemic changes. 3.  NeuroQuant analysis was performed: Lower limits of normal hippocampal volume and enlarged inferior lateral and overall ventricular system, suggestive of global ex-vacuo dilatation.  The additional finding of an abnormal Hippocampal Occupancy Score, (HOC), is concerning for a mesial temporal lobe focused Neurodegenerative process.  Recommend reevaluation with Neuroquant imaging in 6 months. MRI consistent with Alzheimer's dementia. Neurology on board  7/24 verbally not responding can not follow commands, pupil reflex intact, awake   CT head showing small volume subarachnoid hemorrhage, neuro reconsulted, CTA head and neck showing AVM.   Neurosurgery consulted recommendations appreciated  EEG pending      Fall  Assessment & Plan  · Found on bedroom floor by sister  · CT head and CT C-spine negative for acute findings  · No signs of trauma otherwise on patient's body, no complaints of pain or tenderness on palpation  · PT, OT, and CM on board    Closed displaced fracture of right femoral neck [facility])  Assessment & Plan  Patient with right hip femoral neck fracture with retained hardware  Patient had followed up on July 13 with Dr. Narendra Zapata and was recommended to follow-up with Dr. Aristeo Shankar scheduled for surgery as outpatient  Orthopedic consult appreciated  POD 2 7/23 Right - ARTHROPLASTY HIP TOTAL with removal of hardware  Discussed with patient and sister  Patient is at intermediate risk for the procedure  Pain management as needed  Encourage incentive spirometer use  PT/OT after surgery    Subarachnoid hemorrhage ([address])  Assessment & Plan  See mgx above    Moderate protein-calorie malnutrition ([address])  Assessment & Plan  Malnutrition Findings:   Adult Malnutrition type: Chronic illness  Adult Degree of Malnutrition: Malnutrition of moderate degree  Malnutrition Characteristics: Fat loss, Muscle loss                  360 Statement: Pt presents with moderate protein calorie malnutrution as evidenced by prominent clavicle bone and sunken orbitals. Treat with Pureed Nectar Thick Liquids and Magic Cup BID. BMI Findings: Body mass index is 18.67 kg/m². Patient has moderate protein calorie malnutrition in setting of chronic illness as evidenced by prominent clavicle bone, sunken orbitals  Nutrition consult and recommendations    No other household member able to render care  Assessment & Plan  · Found on bedroom floor by sister at 54 on 7/19, sister unable to get her up so sister went to sleep  · Sister woke at 63 and called EMS at that time  · CT head and CT C-spine negative for acute findings  · Patient without tenderness or signs of trauma throughout remainder of body  · PT, OT, and CM consults for placement  · She will require rehab placement at discharge    Anemia  Assessment & Plan  · Hemoglobin 10.8 on admission  · Baseline hemoglobin appears to be 9-10  · Trend CBC daily    Painful orthopaedic hardware [facility])  Assessment & Plan  · S/p removal of orthopedic screw on right on 6/30/23 by ortho, Dr. Randy Rushing   · Pain on Percocet at home  · Oxy 2.5/5 as needed ordered while inpatient  · Complaining of right hip pain. · See above    Ambulatory dysfunction  Assessment & Plan  Ambulatory dysfunction POA in setting of dementia evidenced by inability to stand after a fall.   CT is negative for any acute findings  Patient was noticed to have gaze preference  MRI brain as above  See above    Moderate dementia ([address])  Assessment & Plan  · Only oriented to self on admission  · Sister reportedly called nursing staff that this is patient's baseline since surgery on 6/30  · PT, OT, and CM consulted  · Home medications: Risperdal 1 Mg 4 times daily and Remeron 30 mg at bedtime  · Risperidone was decreased to 1 mg twice daily  · Continue home medications    Depression  Assessment & Plan  · Continue home Zoloft 50 Mg daily and Wellbutrin 150 mg twice daily    Acquired hypothyroidism  Assessment & Plan  · Continue home Synthroid 50 mcg daily    Elevated CK-resolved as of 7/24/2023  Assessment & Plan  ·   · Resolved            VTE Pharmacologic Prophylaxis: VTE Score: 4 Moderate Risk (Score 3-4) - Pharmacological DVT Prophylaxis Ordered: enoxaparin (Lovenox). Patient Centered Rounds: I performed bedside rounds with nursing staff today. Discussions with Specialists or Other Care Team Provider: Neuro Ortho GI Case management PT OT nutrition ST    Education and Discussions with Family / Patient: Attempted to update  (sister) via phone. Unable to contact. Total Time Spent on Date of Encounter in care of patient: 35 minutes This time was spent on one or more of the following: performing physical exam; counseling and coordination of care; obtaining or reviewing history; documenting in the medical record; reviewing/ordering tests, medications or procedures; communicating with other healthcare professionals and discussing with patient's family/caregivers. Current Length of Stay: 6 day(s)  Current Patient Status: Inpatient   Certification Statement: The patient will continue to require additional inpatient hospital stay due to AMS  Discharge Plan: Anticipate discharge in >72 hrs to rehab facility. Code Status: Level 1 - Full Code    Subjective:   Max Perea was seen and examined at bedside. No acute events overnight.   Patient is similar to yesterday however possibly slightly improved as she is able to track a little bit more and is less rigid. Objective:     Vitals:   Temp (24hrs), Av.1 °F (37.3 °C), Min:98.8 °F (37.1 °C), Max:99.5 °F (37.5 °C)    Temp:  [98.8 °F (37.1 °C)-99.5 °F (37.5 °C)] 99 °F (37.2 °C)  HR:  [86-91] 89  Resp:  [14-24] 16  BP: (118-160)/(58-77) 118/63  SpO2:  [97 %-100 %] 100 %  Body mass index is 18.67 kg/m². Input and Output Summary (last 24 hours): Intake/Output Summary (Last 24 hours) at 2023 0654  Last data filed at 2023 0558  Gross per 24 hour   Intake --   Output 1400 ml   Net -1400 ml       Physical Exam:   Physical Exam   Vitals and nursing note reviewed. Constitutional:       General: She is not in acute distress. Appearance: She is normal weight. She is not ill-appearing. Comments: awake   HENT:      Head: Normocephalic and atraumatic. Cardiovascular:      Rate and Rhythm: Normal rate and regular rhythm. Pulses: Normal pulses. Heart sounds: Normal heart sounds. Pulmonary:      Effort: Pulmonary effort is normal.      Breath sounds: Normal breath sounds. Abdominal:      General: Abdomen is flat. Bowel sounds are normal.      Palpations: Abdomen is soft. Musculoskeletal:      Right lower leg: No edema. Left lower leg: No edema. Skin:     General: Skin is warm. Neurological:      General: No focal deficit present. Mental Status: She is disoriented. not following commands, less rigid today, not tracking, retracting to pain    Additional Data:     Labs:  Results from last 7 days   Lab Units 23  0200 23  1048 23  0522   WBC Thousand/uL 10.06  --  8.11   HEMOGLOBIN g/dL 8.4*   < > 8.3*   HEMATOCRIT % 26.7*   < > 26.1*   PLATELETS Thousands/uL 314  --  296   NEUTROS PCT %  --   --  79*   LYMPHS PCT %  --   --  10*   MONOS PCT %  --   --  9   EOS PCT %  --   --  1    < > = values in this interval not displayed.      Results from last 7 days   Lab Units 07/25/23  0200   SODIUM mmol/L 134*   POTASSIUM mmol/L 4.2   CHLORIDE mmol/L 99   CO2 mmol/L 27   BUN mg/dL 13   CREATININE mg/dL 0.75   ANION GAP mmol/L 8   CALCIUM mg/dL 8.8   ALBUMIN g/dL 3.3*   TOTAL BILIRUBIN mg/dL 0.44   ALK PHOS U/L 86   ALT U/L 11   AST U/L 20   GLUCOSE RANDOM mg/dL 113     Results from last 7 days   Lab Units 07/24/23  1916   INR  1.03     Results from last 7 days   Lab Units 07/24/23  1036 07/23/23  0732   POC GLUCOSE mg/dl 123 100         Results from last 7 days   Lab Units 07/24/23  1048   LACTIC ACID mmol/L 1.0   PROCALCITONIN ng/ml 0.11       Lines/Drains:  Invasive Devices     Peripheral Intravenous Line  Duration           Peripheral IV 07/23/23 Right Wrist 1 day    Peripheral IV 07/24/23 Dorsal (posterior); Left Forearm <1 day                      Imaging: Reviewed radiology reports from this admission including: CT head and MRI brain    Recent Cultures (last 7 days):   Results from last 7 days   Lab Units 07/24/23  1705 07/20/23  1615   BLOOD CULTURE  Received in Microbiology Lab. Culture in Progress. Received in Microbiology Lab. Culture in Progress.   --    C DIFF TOXIN B BY PCR   --  Negative       Last 24 Hours Medication List:   Current Facility-Administered Medications   Medication Dose Route Frequency Provider Last Rate   • acetaminophen  650 mg Oral Q6H PRN Lisa Clark MD     • aluminum-magnesium hydroxide-simethicone  30 mL Oral Q6H PRN Lisa Clark MD     • amLODIPine  5 mg Oral Daily Carlos Pozo MD     • buPROPion  150 mg Oral BID Lisa Clark MD     • cefTRIAXone  1,000 mg Intravenous Q24H Flora Levi MD 1,000 mg (07/24/23 1704)   • cholecalciferol  5,000 Units Oral Daily Lisa Clark MD     • levothyroxine  50 mcg Oral Early Morning Lisa Clark MD     • mirtazapine  30 mg Oral HS Lisa Clark MD     • nystatin  500,000 Units Swish & Swallow 4x Daily Lisa Clark MD     • ondansetron  4 mg Intravenous Q6H PRN Lisa Clark MD     • oxyCODONE 2.5 mg Oral Q6H PRN Maribel Recinos MD      Or   • oxyCODONE  5 mg Oral Q6H PRN Maribel Recinos MD     • risperiDONE  1 mg Oral BID Maribel Recinos MD     • senna  1 tablet Oral HS PRN Maribel Recinos MD     • sertraline  50 mg Oral Daily With Breakfast Maribel Recinos MD     • sodium chloride  75 mL/hr Intravenous Continuous Maribel Recinos MD 75 mL/hr (07/23/23 1155)        Today, Patient Was Seen By: Sonya Delgado MD    **Please Note: This note may have been constructed using a voice recognition system. **

## 2023-07-25 NOTE — ASSESSMENT & PLAN NOTE
· S/p "Right removal of TFN from femur and cemented hemiarthroplasty with Dr. Rica Ayala on 7/23 for Displaced right femoral neck fracture after removal of proximal TFN screw."  · Minimize narcotics if possible  · Consider Tylenol [place] if unable to swallow or via NG if one is placed

## 2023-07-25 NOTE — NUTRITION
07/25/23 1605   Recommendations/Interventions   Summary MD consult for TF recs. SLIM: 7/24 verbally not responding can not follow commands, pupil reflex intact, awake. Poor wakefulness following right hip arthroplasty on 7/23/2023. ST recs 7/25/23 NPO. GI consult pending for NGT placement. Pt admitted with poor po intake and malnutrition. Ate 100% of meals on 7/22/23, NPO for surgery and then poor po intake since. Interventions/Recommendations Tube feeding recs provided   Recommendations to Provider IF safe for NGT placement and TF administration, can use Jevity 1.2 for TF. Goal TF Jevity 1.2 @ 50 ml/hr. 125 ml water flush q 6 hrs. Provides 1440 kcal, 67 gm protein and 1468 ml total free fluids. Start TF at Jevity 1.2 @ 20 ml /hr increase 10 ml q 8 hrs untiil at goal rate.

## 2023-07-25 NOTE — CONSULTS
Consultation - [address] [place] [address] Gastroenterology     Amla Rosa Trejo 69 y.o. female MRN: [id number]  Unit/Bed#: -01 Encounter: [id number]    Inpatient consult to gastroenterology  Consult performed by: Ashley Argueta DO  Consult ordered by: Gita Gutierrez MD          ASSESSMENT and PLAN     1. Protein calorie malnutrition  Evaluated by speech therapy, not appropriate for p.o. intake due encephalopathy. Dobbhoff tube placed via the left nare without difficulty  KUB ordered to confirm intragastric placement. Once confirmed radiographically, okay to remove guidewire and start meds/tube feeds    ADDENDUM 448pm -called by RN, patient already pulled NG tube  I replaced it via the left nare. Plan KUB  Keep NGT adequately taped. Hope to avoid restraints      Chief Complaint   Patient presents with   • Weakness - Generalized     Per EMS pt was on bedroom floor since 0200. Per EMS pt lives with sister. Per EMS pt sister knew pt was on floor and could not get her up. Per EMS pt sister dedcided to leave pt on floor and to try to get her up in AM. EMS states pt sister went to bed after 0200 and woke at 1700 and could not get sister off floor. Sister called EMS at that time       Physician Requesting Consult: Gita Gutierrez MD    HPI  Alma Rosa Trejo is a 69y.o. year old female who is brought to the emergency department July 19 after a fall. She underwent right hip arthroplasty July 23. She was reported to have lethargy since surgery June 30. She has been seen by neurology and imaging shows signs of neurodegenerative process. There is also suspected metabolic encephalopathy due to recent sedation and pain medication. The patient is not able to provide any meaningful history. She was seen by speech therapy and was deemed to be inappropriate for p.o. trials due to mental status.     Historical Information   Past Medical History:   Diagnosis Date   • Anxiety    • Depression    • Memory loss    • Thyroid activity decreased      Past Surgical History:   Procedure Laterality Date   • BREAST BIOPSY      years ago- Benign   • JOINT REPLACEMENT Left    • MD HEMIARTHROPLASTY HIP PARTIAL Right 7/23/2023    Procedure: HEMIARTHROPLASTY HIP TOTAL with removal of hardware;  Surgeon: Benitez Lopez MD;  Location: [facility];  Service: Orthopedics   • MD OPTX FEM SHFT FX W/INSJ IMED IMPLT W/WO SCREW Right 5/9/2019    Procedure: INSERTION NAIL IM FEMUR ANTEGRADE (TROCHANTERIC), RIGHT;  Surgeon: Nisha Terrell MD;  Location: [facility];  Service: Orthopedics   • MD REMOVAL IMPLANT DEEP Right 6/30/2023    Procedure: REMOVAL HARDWARE TFN DYNAMIC SCREW;  Surgeon: Nisha Terrell MD;  Location: [facility];  Service: Orthopedics     Social History   Social History     Substance and Sexual Activity   Alcohol Use Not Currently     Social History     Substance and Sexual Activity   Drug Use No     Social History     Tobacco Use   Smoking Status Never   Smokeless Tobacco Never     Family History   Problem Relation Age of Onset   • Hypertension Mother    • Parkinsonism Mother 80        memory issues    • Depression Mother    • Melanoma Father    • No Known Problems Maternal Grandmother    • No Known Problems Maternal Grandfather    • No Known Problems Paternal Grandmother    • Heart attack Paternal Grandfather    • Depression Other    • Skin cancer Sister    • Breast cancer Maternal Aunt         over age 48   • No Known Problems Paternal Aunt    • No Known Problems Sister        Meds/Allergies     Current Facility-Administered Medications   Medication Dose Route Frequency   • acetaminophen (TYLENOL) rectal suppository 325 mg  325 mg Rectal Q4H PRN   • aluminum-magnesium hydroxide-simethicone (MAALOX) oral suspension 30 mL  30 mL Oral Q6H PRN   • amLODIPine (NORVASC) tablet 5 mg  5 mg Oral Daily   • buPROPion (WELLBUTRIN SR) 12 hr tablet 150 mg  150 mg Oral BID   • cefTRIAXone (ROCEPHIN) IVPB (premix in dextrose) 1,000 mg 50 mL  1,000 mg Intravenous Q24H   • levothyroxine tablet 50 mcg  50 mcg Oral Early Morning   • mirtazapine (REMERON) tablet 30 mg  30 mg Oral HS   • nystatin (MYCOSTATIN) oral suspension 500,000 Units  500,000 Units Swish & Swallow 4x Daily   • ondansetron (ZOFRAN) injection 4 mg  4 mg Intravenous Q6H PRN   • oxyCODONE (ROXICODONE) split tablet 2.5 mg  2.5 mg Oral Q6H PRN   • risperiDONE (RisperDAL) tablet 1 mg  1 mg Oral BID   • senna (SENOKOT) tablet 8.6 mg  1 tablet Oral HS PRN   • sertraline (ZOLOFT) tablet 50 mg  50 mg Oral Daily With Breakfast   • sodium chloride 0.9 % infusion  75 mL/hr Intravenous Continuous     Medications Prior to Admission   Medication   • buPROPion (WELLBUTRIN SR) 150 mg 12 hr tablet   • Calcium-Magnesium-Vitamin D (CALCIUM 500 PO)   • Cholecalciferol (VITAMIN D3) 5000 units CAPS   • levothyroxine 50 mcg tablet   • mirtazapine (REMERON) 30 mg tablet   • Multiple Vitamins-Minerals (CENTRUM SILVER 50+WOMEN PO)   • oxyCODONE-acetaminophen (Percocet) 5-325 mg per tablet   • risperiDONE (RisperDAL) 1 mg tablet   • sertraline (ZOLOFT) 50 mg tablet       No Known Allergies    PHYSICAL EXAM    Blood pressure 154/72, pulse 89, temperature 98.6 °F (37 °C), temperature source Tympanic, resp. rate 16, height 5' 8" (1.727 m), weight 55.7 kg (122 lb 12.7 oz), SpO2 100 %. Body mass index is 18.67 kg/m². General Appearance: Lethargic  Eyes: Anicteric, PERRLA, EOMI  ENT:  Normocephalic, atraumatic, normal mucosa. Neck:  Supple, symmetrical, trachea midline  Resp:  Clear to auscultation bilaterally; no rales, rhonchi or wheezing; respirations unlabored   CV:  S1 S2, Regular rate and rhythm; no murmur, rub, or gallop. GI:  Soft, non-tender, non-distended; normal bowel sounds; no masses, no organomegaly   Rectal: Deferred  Musculoskeletal: No cyanosis, clubbing or edema. Normal ROM.   Skin:  No jaundice, rashes, or lesions   Heme/Lymph: No palpable cervical lymphadenopathy  Psych: Normal affect, good eye contact  Neuro: Lethargic    Lab Results   Component Value Date    CALCIUM 8.8 07/25/2023    K 4.2 07/25/2023    CO2 27 07/25/2023    CL 99 07/25/2023    BUN 13 07/25/2023    CREATININE 0.75 07/25/2023     Lab Results   Component Value Date    WBC 10.06 07/25/2023    HGB 8.4 (L) 07/25/2023    HCT 26.7 (L) 07/25/2023    MCV 94 07/25/2023     07/25/2023     Lab Results   Component Value Date    ALT 11 07/25/2023    AST 20 07/25/2023    ALKPHOS 86 07/25/2023     No results found for: "AMYLASE"  Lab Results   Component Value Date    LIPASE 161 06/16/2019     No results found for: "IRON", "TIBC", "FERRITIN"  Lab Results   Component Value Date    INR 1.03 07/24/2023       Imaging Studies: I have personally reviewed pertinent reports. EKG, Pathology, and Other Studies: I have personally reviewed pertinent reports.       REVIEW OF SYSTEMS:     could not be obtained-nonverbal

## 2023-07-25 NOTE — SPEECH THERAPY NOTE
Speech Language/Pathology    Patient Name: Colton Rodriguez    Today's Date: 7/25/2023     Problem List  Principal Problem:    Encephalopathy  Active Problems:    Acquired hypothyroidism    Depression    Moderate dementia ([address])    Ambulatory dysfunction    Painful orthopaedic hardware ([address])    Closed displaced fracture of right femoral neck (HCC)    Anemia    No other household member able to render care    Fall    Moderate protein-calorie malnutrition ([address])    Subarachnoid hemorrhage ([address])         Past Medical History  Past Medical History:   Diagnosis Date   • Anxiety    • Depression    • Memory loss    • Thyroid activity decreased        Past Surgical History  Past Surgical History:   Procedure Laterality Date   • BREAST BIOPSY      years ago- Benign   • JOINT REPLACEMENT Left    • DE HEMIARTHROPLASTY HIP PARTIAL Right 7/23/2023    Procedure: HEMIARTHROPLASTY HIP TOTAL with removal of hardware;  Surgeon: Greg Murray MD;  Location:  MAIN OR;  Service: Orthopedics   • DE OPTX FEM SHFT FX W/INSJ IMED IMPLT W/WO SCREW Right 5/9/2019    Procedure: INSERTION NAIL IM FEMUR ANTEGRADE (TROCHANTERIC), RIGHT;  Surgeon: Brandon Ferris MD;  Location:  MAIN OR;  Service: Orthopedics   • DE REMOVAL IMPLANT DEEP Right 6/30/2023    Procedure: REMOVAL HARDWARE TFN DYNAMIC SCREW;  Surgeon: Brandon Ferris MD;  Location:  MAIN OR;  Service: Orthopedics         Subjective:  Pt in bed with eyes partially opened with family present at bedside. Pt currently on room air. Objective:  Pt with minimal response to external stimuli, did not follow any commands. Clinician provided pt with visual (cup/spoon) stimuli as well as tactile input (cold metal spoon/straw) without attempt to retrieve bolus despite cues/encouragement. Assessment:  Pt not appropriate for PO intake at this time secondary to cognitive status.        Plan/Recommendations:  NPO & meds non orally secondary to cognitive status  ST to continue to follow; TT ST should status improve prior to ST f/u

## 2023-07-25 NOTE — NURSING NOTE
During morning assessment, patient responded to pain only. Eyes opened to speech only. Contacted SLIM who assessed. Neurology also conducted assessment. NG tube ordered for nutrition. Family visited and updated with plan.

## 2023-07-26 PROBLEM — F02.80 ALZHEIMER'S DEMENTIA (HCC): Status: ACTIVE | Noted: 2023-02-01

## 2023-07-26 PROBLEM — G30.9 ALZHEIMER'S DEMENTIA (HCC): Status: ACTIVE | Noted: 2023-02-01

## 2023-07-26 LAB
T4 FREE SERPL-MCNC: 1.69 NG/DL (ref 0.61–1.12)
TSH SERPL DL<=0.05 MIU/L-ACNC: 0.34 UIU/ML (ref 0.45–4.5)

## 2023-07-26 PROCEDURE — 92526 ORAL FUNCTION THERAPY: CPT

## 2023-07-26 PROCEDURE — 99233 SBSQ HOSP IP/OBS HIGH 50: CPT | Performed by: STUDENT IN AN ORGANIZED HEALTH CARE EDUCATION/TRAINING PROGRAM

## 2023-07-26 PROCEDURE — 99231 SBSQ HOSP IP/OBS SF/LOW 25: CPT | Performed by: INTERNAL MEDICINE

## 2023-07-26 PROCEDURE — 99232 SBSQ HOSP IP/OBS MODERATE 35: CPT | Performed by: PSYCHIATRY & NEUROLOGY

## 2023-07-26 PROCEDURE — 84443 ASSAY THYROID STIM HORMONE: CPT | Performed by: PHYSICIAN ASSISTANT

## 2023-07-26 PROCEDURE — 99024 POSTOP FOLLOW-UP VISIT: CPT | Performed by: PHYSICIAN ASSISTANT

## 2023-07-26 PROCEDURE — 84439 ASSAY OF FREE THYROXINE: CPT | Performed by: PHYSICIAN ASSISTANT

## 2023-07-26 RX ORDER — ACETAMINOPHEN 650 MG/1
325 SUPPOSITORY RECTAL EVERY 8 HOURS
Status: DISCONTINUED | OUTPATIENT
Start: 2023-07-26 | End: 2023-07-26

## 2023-07-26 RX ORDER — ACETAMINOPHEN 325 MG/1
650 TABLET ORAL EVERY 6 HOURS PRN
Status: DISCONTINUED | OUTPATIENT
Start: 2023-07-26 | End: 2023-07-29 | Stop reason: HOSPADM

## 2023-07-26 RX ORDER — ENOXAPARIN SODIUM 100 MG/ML
40 INJECTION SUBCUTANEOUS
Status: DISCONTINUED | OUTPATIENT
Start: 2023-07-26 | End: 2023-07-29 | Stop reason: HOSPADM

## 2023-07-26 RX ADMIN — ACETAMINOPHEN 325 MG: 650 SUPPOSITORY RECTAL at 12:06

## 2023-07-26 RX ADMIN — RISPERIDONE 1 MG: 1 TABLET ORAL at 18:35

## 2023-07-26 RX ADMIN — SODIUM CHLORIDE 75 ML/HR: 0.9 INJECTION, SOLUTION INTRAVENOUS at 09:14

## 2023-07-26 RX ADMIN — NYSTATIN 500000 UNITS: 100000 SUSPENSION ORAL at 20:51

## 2023-07-26 RX ADMIN — NYSTATIN 500000 UNITS: 100000 SUSPENSION ORAL at 18:34

## 2023-07-26 RX ADMIN — MIRTAZAPINE 30 MG: 15 TABLET, FILM COATED ORAL at 20:51

## 2023-07-26 RX ADMIN — NYSTATIN 500000 UNITS: 100000 SUSPENSION ORAL at 13:53

## 2023-07-26 RX ADMIN — BUPROPION HYDROCHLORIDE 150 MG: 150 TABLET, FILM COATED, EXTENDED RELEASE ORAL at 18:38

## 2023-07-26 RX ADMIN — ACETAMINOPHEN 650 MG: 325 TABLET, FILM COATED ORAL at 20:51

## 2023-07-26 RX ADMIN — ENOXAPARIN SODIUM 40 MG: 100 INJECTION SUBCUTANEOUS at 09:19

## 2023-07-26 NOTE — PLAN OF CARE
Problem: Potential for Falls  Goal: Patient will remain free of falls  Description: INTERVENTIONS:  - Educate patient/family on patient safety including physical limitations  - Instruct patient to call for assistance with activity   - Consult OT/PT to assist with strengthening/mobility   - Keep Call bell within reach  - Keep bed low and locked with side rails adjusted as appropriate  - Keep care items and personal belongings within reach  - Initiate and maintain comfort rounds  - Make Fall Risk Sign visible to staff  - Offer Toileting every 2 Hours, in advance of need  - Initiate/Maintain  bed alarm  - Obtain necessary fall risk management equipment:   - Apply yellow socks and bracelet for high fall risk patients  - Consider moving patient to room near nurses station  Outcome: Progressing     Problem: MOBILITY - ADULT  Goal: Maintain or return to baseline ADL function  Description: INTERVENTIONS:  -  Assess patient's ability to carry out ADLs; assess patient's baseline for ADL function and identify physical deficits which impact ability to perform ADLs (bathing, care of mouth/teeth, toileting, grooming, dressing, etc.)  - Assess/evaluate cause of self-care deficits   - Assess range of motion  - Assess patient's mobility; develop plan if impaired  - Assess patient's need for assistive devices and provide as appropriate  - Encourage maximum independence but intervene and supervise when necessary  - Involve family in performance of ADLs  - Assess for home care needs following discharge   - Consider OT consult to assist with ADL evaluation and planning for discharge  - Provide patient education as appropriate  Outcome: Progressing

## 2023-07-26 NOTE — PROGRESS NOTES
Progress Note - Neurology   [facility] 69 [facility] [id number]  Unit/Bed#: /-01    Addendum: Serial exam, patient even further improved, saying hello and stating that she feels better. Family reports that she was able to eat mashed potatoes this afternoon. Recommendations as detailed below. Assessment/Plan:  * Encephalopathy  Assessment & Plan  69 y.o. female with dementia, recent orthopaedic surgery, depression and hypothyroidism who presented to the ED on 7/19/23 for evaluation of increased somnolence and overall weakness since her hip surgery/removal of pin on 6/30/23. Narcotics were held and patient's somnolence slowly improved back to baseline. On 7/23 patient taken back to the OR for right hemiarthroplasty due to fracture of the right femoral neck. On 7/24 Neurology reconsulted due to worsened mentation. Imaging revealed small spontaneous SAH which was not thought to be the cause of her AMS. Over the last several days, patient's encephalopathy has slowly continued to improve as detailed below:    · 7/24: Patient described as somnolent, unresponsive and "rigid on neurological exam with increased rigidity appreciated in upper and lower extremities as well as her muscles of the neck"  · 7/25: Patient with normal tone. Opens eyes to voice. No command following spontaneously moves head/neck, BUEs and LLE. When examiner elevates arms, patient symmetrically lowers them. Patient also localizing to light noxious stimuli in all extremities. · 7/26: Patient opens eyes to name and responds "Hi."  Follows axial and appendicular commands. Purposeful movements. Normal tone. Non-lateralizing exam. Falls back to sleep without stimulation. Imaging  - [facility] 7/19/23:  "No acute intracranial abnormality. "  - MRI Neuroquant 7/21/23:  "1.  No acute infarction, edema, or mass effect. 2.  Moderate chronic microangiopathic ischemic changes.  3.  NeuroQuant analysis was performed: Lower limits of normal hippocampal volume and enlarged inferior lateral and overall ventricular system, suggestive of global ex-vacuo dilatation. The additional finding of an abnormal Hippocampal Occupancy Score, ([address]), is concerning for a mesial temporal lobe focused Neurodegenerative process. Recommend reevaluation with Neuroquant imaging in 6 months."  - [facility] 7/24/23:  "New sulcal hyperdensity in the right posterior parietal lobe suspicious for small volume subarachnoid hemorrhage new since the prior CT of July 19, 2023. Review of the recent MRI from July 21, 2023 demonstrates hemosiderin deposition in sulci slightly more anterior to the current finding. The possibility of recurrent subarachnoid hemorrhage in this location should be considered. "  - CTA H/N wwo contrast 7/24/23:  "Unchanged acute trace subarachnoid hemorrhage in right posterior parietal region. Stable mild ventriculomegaly out of proportion to degree of cerebral atrophy. Question normal pressure hydrocephalus. Question small arteriovenous fistula in right anteromedial orbital region. Recommend consultation with the [facility], a division of [address] [place] [address] for Neuroscience at [phone]. Negative CTA head and neck for large vessel occlusion, dissection, aneurysm, or high-grade stenosis. Additional chronic/incidental findings as detailed above."  - 7/24/23 MRI brain wo: Stable punctate focus of acute hemorrhage in the right posterior parietal region. More laterally located foci of stable, likely subacute to chronic subarachnoid hemorrhage in the right inferior parietal and superior temporal region. No mass effect or vasogenic edema. No evidence of acute infarct.   - 7/24/23 EEG: "Consistent with moderate cerebral dysfunction. No electrographic seizures, interictal epileptiform discharges (seizure tendency) or focal slowing were seen. "  - Blood cultures NGTD x >24hrs  - TSH 0.337, Free T4 pending  - UA with small leuks, 2-4 WBCs and moderate bacteria - Obtain urine culture     Patient's exam continues to improve, demonstrating normal tone, now responding "Hi" to examiner, following axial and appendicular commands and demonstrating purposeful movements with a non-lateralizing exam.    Etiology due to toxic metabolic encephalopthy in the setting of recent anesthesia, and suboptimal nutrition superimposed on chronic dementia. No evidence of infectious process. Would expect patient to have similar course re: altered arousal and mentation any time she undergoes anesthesia or requires narcotics (as has occurred now on the last three occasions). Patient with small spontaneous SAH noted on imaging from 7/24. Etiology unclear. Not the etiology for patient's decreased arousal.    Recommend continued evaluation of nutrition/swallowing/PO intake. As arousal improves would expect patient's ability to safely eat to also improve. Plan:  · Serial neuro exams  · Repeat CTH in 2- 3 weeks following discharge - this has been ordered   · Ceftriaxone on board for possible UTI - management as per primary team  · Ok to restart Lovenox as SAH stable. · Ortho recommending Aspirin 81 mg BID x 6 weeks on discharge  · Cancel LP as patient's exam improving  · Free T4 pending  · Patient has not been receiving home Risperdal due to NPO/Poor arousal  · Avoid narcotics if possible, use Acetaminophen for analgesia  · OT/PT/SLP  · Swallow evaluation when patient's arousal improved  · S/p NGT placement for tubes feeds since no PO since 7/21; however patient removed x2.  As patient's arousal and mentation improving would hold off on replacing and encourage PO as able/safe   · Conservative management of delirium - minimize noise, maintain day/night cycle, provide frequent redirection, avoid restraints if possible, etc.  · Evaluation and management of metabolic and toxic derangements as per primary team    Subarachnoid hemorrhage [facility])  Assessment & Plan  - Etiology unclear/spontaneous  - Unlikely to be contributing to AMS  - Repeat CTH in 2-3 weeks following discharge    Closed displaced fracture of right femoral neck (HCC)  Assessment & Plan  · S/p "Right removal of TFN from femur and cemented hemiarthroplasty with Dr. Awa Powers on 7/23 for Displaced right femoral neck fracture after removal of proximal TFN screw."  · Minimize narcotics if possible  · Consider Tylenol IL if unable to swallow or via NG if one is placed    Alzheimer's dementia [facility])  Assessment & Plan  · Patient with known dementia with family reporting symptoms beginning approximately 5 years ago. · Consistent with Alzheimer's dementia due to low hippocampal occupancy score noted on MRI Neuro Quant this admission      Huwilner Fuller will need follow up in in 4-6 weeks with neurovascular attending or advance practitioner. She will require a CT head without contrast within 2-3 weeks. Subjective:   Patient remains afebrile and grossly normotensive. O2 sat this morniing is 100% on room air. Na 134, LA 1, Procal 0.11, Hgb stable at 8.4 s/p hip surgery. Patient pulled out NGT x 2  Patient awakes easily to her name and follows commands today. Continues to improve. No sign of pain at this time.      Past Medical History:   Diagnosis Date   • Anxiety    • Depression    • Memory loss    • Thyroid activity decreased      Past Surgical History:   Procedure Laterality Date   • BREAST BIOPSY      years ago- Benign   • JOINT REPLACEMENT Left    • [place] HEMIARTHROPLASTY HIP PARTIAL Right 7/23/2023    Procedure: HEMIARTHROPLASTY HIP TOTAL with removal of hardware;  Surgeon: Martha Wilkins MD;  Location:  MAIN OR;  Service: Orthopedics   • [place] OPTX FEM SHFT FX W/INSJ IMED IMPLT W/WO SCREW Right 5/9/2019    Procedure: INSERTION NAIL IM FEMUR ANTEGRADE (TROCHANTERIC), RIGHT;  Surgeon: Harris Yusuf MD;  Location:  MAIN OR;  Service: Orthopedics   • [place] REMOVAL IMPLANT DEEP Right 6/30/2023    Procedure: REMOVAL HARDWARE TFN DYNAMIC SCREW;  Surgeon: Harris Yusuf MD;  Location:  MAIN OR;  Service: Orthopedics     Family History   Problem Relation Age of Onset   • Hypertension Mother    • Parkinsonism Mother 80        memory issues    • Depression Mother    • Melanoma Father    • No Known Problems Maternal Grandmother    • No Known Problems Maternal Grandfather    • No Known Problems Paternal Grandmother    • Heart attack Paternal Grandfather    • Depression Other    • Skin cancer Sister    • Breast cancer Maternal Aunt         over age 48   • No Known Problems Paternal Aunt    • No Known Problems Sister      Social History     Socioeconomic History   • Marital status: Single     Spouse name: None   • Number of children: None   • Years of education: None   • Highest education level: None   Occupational History   • None   Tobacco Use   • Smoking status: Never   • Smokeless tobacco: Never   Vaping Use   • Vaping Use: Never used   Substance and Sexual Activity   • Alcohol use: Not Currently   • Drug use: No   • Sexual activity: None   Other Topics Concern   • None   Social History Narrative   • None     Social Determinants of Health     Financial Resource Strain: Not on file   Food Insecurity: No Food Insecurity (7/20/2023)    Hunger Vital Sign    • Worried About Running Out of Food in the Last Year: Never true    • Ran Out of Food in the Last Year: Never true   Transportation Needs: No Transportation Needs (7/20/2023)    PRAPARE - Transportation    • Lack of Transportation (Medical): No    • Lack of Transportation (Non-Medical): No   Physical Activity: Not on file   Stress: Not on file   Social Connections: Not on file   Intimate Partner Violence: Not on file   Housing Stability: Low Risk  (7/20/2023)    Housing Stability Vital Sign    • Unable to Pay for Housing in the Last Year: No    • Number of Places Lived in the Last Year: 1    • Unstable Housing in the Last Year: No       Medications: Reviewed in detail by me.     ROS: Review of Systems   Unable to perform ROS: Patient nonverbal        Vitals: /66 (BP Location: Left arm)   Pulse 84   Temp 98.6 °F (37 °C) (Temporal)   Resp 16   Ht 5' 8" (1.727 m)   Wt 55.7 kg (122 lb 12.7 oz)   SpO2 100%   BMI 18.67 kg/m²     Physical Exam:   Physical Exam  Constitutional:       General: She is not in acute distress. Appearance: She is ill-appearing (chronically). She is not toxic-appearing or diaphoretic. Comments: Frail elderly female who appears older than stated age, seated supported upright in bed asleep on arrival   HENT:      Head: Normocephalic and atraumatic. Mouth/Throat:      Mouth: Mucous membranes are moist.   Pulmonary:      Effort: No respiratory distress. Musculoskeletal:      Cervical back: Normal range of motion and neck supple. Neurologic Exam     Mental Status   Sleeping on arrival. Awakes fairly easily to her name, but quickly falls back to sleep if not stimulated. States "Hi" in response to examiner, Does not speak any other words. Unable to choose her correct name when given two choices. Follows one step commands of sticking out tongue, holding BUEs antigravity then lowering, squeezing hands, bending LLE and wiggling bilateral toes. Cranial Nerves     Conjugate gaze. Will look left and right at examiner. Face is grossly symmetric. Motor Exam   Muscle bulk: decreased  Overall muscle tone: normal  BUEs: purposeful BUE movements to wipe mouth and fix gown at the shoulder. Can elevate BUEs antigravity x 10 seconds without drift and lowers on command. BLEs: Can bend LLE on command. Wiggles bilateral toes on command. Deferred proximal RLE movement due to recent surgery. Sensory Exam     Localizes to light noxious stimuli in all extremities, slightly less on RLE     Gait, Coordination, and Reflexes   DTRs: R equivocal, L ?upgoing       Labs: Reviewed in detail by me. Imaging: I have personally reviewed pertinent imaging and PACS reports.      VTE Prophylaxis: Enoxaparin (Lovenox)    Total time spent today 25 minutes. Greater than 50% of total time was spent with the patient and / or family counseling and / or coordination of care.  A description of the counseling / coordination of care: speaking with aids and SLIM attending discussing improved neuro exam, recommendations re: repeat swallow eval and overall plan of care

## 2023-07-26 NOTE — PROGRESS NOTES
[address]  Progress Note  Name: Lynda Tran  MRN: [id number]  Unit/Bed#: -01 I Date of Admission: 7/19/2023   Date of Service: 7/26/2023 I Hospital Day: 7    Assessment/Plan   * Encephalopathy  Assessment & Plan  Patient presented with acute encephalopathy POA  Neurology consult appreciated  CT head negative for acute abnormality  Patient has slight gaze preference to the right  Stool for C. difficile and culture are negative  MRI brain- "1. No acute infarction, edema, or mass effect. 2.  Moderate chronic microangiopathic ischemic changes. 3.  NeuroQuant analysis was performed: Lower limits of normal hippocampal volume and enlarged inferior lateral and overall ventricular system, suggestive of global ex-vacuo dilatation.  The additional finding of an abnormal Hippocampal Occupancy Score, (HOC), is concerning for a mesial temporal lobe focused Neurodegenerative process.  Recommend reevaluation with Neuroquant imaging in 6 months. MRI consistent with Alzheimer's dementia. Neurology on board  7/24 verbally not responding can not follow commands, pupil reflex intact, awake   CT head showing small volume subarachnoid hemorrhage, neuro following, CTA head and neck showing AVM.   Neurosurgery s/o  7/24 MRI showing stable hge EEG showing no seizure   Resumed lovenox dvt prophylaxis   GI following  Mentation improving, hold off on restraints and NGT placement as pt seems to eat well when awake and alert      Fall  Assessment & Plan  · Found on bedroom floor by sister  · CT head and CT C-spine negative for acute findings  · No signs of trauma otherwise on patient's body, no complaints of pain or tenderness on palpation  · PT, OT, and CM on board    Closed displaced fracture of right femoral neck [facility])  Assessment & Plan  Patient with right hip femoral neck fracture with retained hardware  Patient had followed up on July 13 with Dr. Jayna Martin and was recommended to follow-up with  Kassandra Al scheduled for surgery as outpatient  Orthopedic consult appreciated  POD 3 7/23 Right - ARTHROPLASTY HIP TOTAL with removal of hardware  Discussed with patient and sister  Patient is at intermediate risk for the procedure  Pain management as needed  Encourage incentive spirometer use  PT/OT after surgery    Follow-up with Ortho in 2 weeks discharged with aspirin 81 mg twice daily for 6 weeks    Subarachnoid hemorrhage ([address])  Assessment & Plan  See mgx above    Moderate protein-calorie malnutrition ([address])  Assessment & Plan  Malnutrition Findings:   Adult Malnutrition type: Chronic illness  Adult Degree of Malnutrition: Malnutrition of moderate degree  Malnutrition Characteristics: Fat loss, Muscle loss                  360 Statement: Pt presents with moderate protein calorie malnutrution as evidenced by prominent clavicle bone and sunken orbitals. Treat with Pureed Nectar Thick Liquids and Magic Cup BID. BMI Findings: Body mass index is 18.67 kg/m².    Patient has moderate protein calorie malnutrition in setting of chronic illness as evidenced by prominent clavicle bone, sunken orbitals  Nutrition consult and recommendations    No other household member able to render care  Assessment & Plan  · Found on bedroom floor by sister at 54 on 7/19, sister unable to get her up so sister went to sleep  · Sister woke at 63 and called EMS at that time  · CT head and CT C-spine negative for acute findings  · Patient without tenderness or signs of trauma throughout remainder of body  · PT, OT, and CM consults for placement  · She will require rehab placement at discharge    Anemia  Assessment & Plan  · Hemoglobin 10.8 on admission  · Baseline hemoglobin appears to be 9-10  · Trend CBC daily    Painful orthopaedic hardware [facility])  Assessment & Plan  · S/p removal of orthopedic screw on right on 6/30/23 by ortho, Dr. Toña Yang   · Pain on Percocet at home  · Oxy 2.5/5 as needed ordered while inpatient  · Complaining of right hip pain. · See above    Ambulatory dysfunction  Assessment & Plan  Ambulatory dysfunction POA in setting of dementia evidenced by inability to stand after a fall. CT is negative for any acute findings  Patient was noticed to have gaze preference  MRI brain as above  See above    Alzheimer's dementia [facility])  Assessment & Plan  · Only oriented to self on admission  · Sister reportedly called nursing staff that this is patient's baseline since surgery on 6/30  · PT, OT, and CM consulted  · Home medications: Risperdal 1 Mg 4 times daily and Remeron 30 mg at bedtime  · Risperidone was decreased to 1 mg twice daily  · Continue home medications    Depression  Assessment & Plan  · Continue home Zoloft 50 Mg daily and Wellbutrin 150 mg twice daily    Acquired hypothyroidism  Assessment & Plan  · Continue home Synthroid 50 mcg daily    Elevated CK-resolved as of 7/24/2023  Assessment & Plan  ·   · Resolved            VTE Pharmacologic Prophylaxis: VTE Score: 4 Moderate Risk (Score 3-4) - Pharmacological DVT Prophylaxis Ordered: enoxaparin (Lovenox). Patient Centered Rounds: I performed bedside rounds with nursing staff today. Discussions with Specialists or Other Care Team Provider: IR, Neuro, GI, ortho, CM, PT, OT    Education and Discussions with Family / Patient: Updated  (sister) via phone. Total Time Spent on Date of Encounter in care of patient: 35 minutes This time was spent on one or more of the following: performing physical exam; counseling and coordination of care; obtaining or reviewing history; documenting in the medical record; reviewing/ordering tests, medications or procedures; communicating with other healthcare professionals and discussing with patient's family/caregivers.     Current Length of Stay: 7 day(s)  Current Patient Status: Inpatient   Certification Statement: The patient will continue to require additional inpatient hospital stay due to AMS  Discharge Plan: Anticipate discharge in >72 hrs to rehab facility. Code Status: Level 1 - Full Code    Subjective:   Meng Shepherd was seen and examined at bedside. No acute events overnight. Patient had removed her NG tube twice. Patient is improving. Was able to follow commands with speech to allow for puréed diet with thin liquids however alertness is intermittent. Mentation and encephalopathy gradually improving    Objective:     Vitals:   Temp (24hrs), Av.4 °F (36.9 °C), Min:98.1 °F (36.7 °C), Max:98.6 °F (37 °C)    Temp:  [98.1 °F (36.7 °C)-98.6 °F (37 °C)] 98.6 °F (37 °C)  HR:  [77-84] 84  Resp:  [16-18] 16  BP: (144-154)/(66-73) 153/66  SpO2:  [98 %-100 %] 100 %  Body mass index is 18.67 kg/m². Input and Output Summary (last 24 hours): Intake/Output Summary (Last 24 hours) at 2023 1035  Last data filed at 2023 0914  Gross per 24 hour   Intake 1000 ml   Output 250 ml   Net 750 ml       Physical Exam:   Physical Exam   Vitals and nursing note reviewed. Constitutional:       General: She is not in acute distress.     Appearance: She is normal weight. She is not ill-appearing.      Comments: awake intermittent alertness and ability to follow commands   HENT:      Head: Normocephalic and atraumatic. Cardiovascular:      Rate and Rhythm: Normal rate and regular rhythm.      Pulses: Normal pulses.      Heart sounds: Normal heart sounds. Pulmonary:      Effort: Pulmonary effort is normal.      Breath sounds: Normal breath sounds. Abdominal:      General: Abdomen is flat. Bowel sounds are normal.      Palpations: Abdomen is soft. Musculoskeletal:      Right lower leg: No edema.      Left lower leg: No edema. Skin:     General: Skin is warm. Neurological:      General: No focal deficit present.      Mental Status: She is disoriented.     Additional Data:     Labs:  Results from last 7 days   Lab Units 23  0200 23  1048 23  0522   WBC Thousand/uL 10.06  --  8.11   HEMOGLOBIN g/dL 8.4*   < > 8.3*   HEMATOCRIT % 26.7*   < > 26.1*   PLATELETS Thousands/uL 314  --  296   NEUTROS PCT %  --   --  79*   LYMPHS PCT %  --   --  10*   MONOS PCT %  --   --  9   EOS PCT %  --   --  1    < > = values in this interval not displayed. Results from last 7 days   Lab Units 07/25/23  0200   SODIUM mmol/L 134*   POTASSIUM mmol/L 4.2   CHLORIDE mmol/L 99   CO2 mmol/L 27   BUN mg/dL 13   CREATININE mg/dL 0.75   ANION GAP mmol/L 8   CALCIUM mg/dL 8.8   ALBUMIN g/dL 3.3*   TOTAL BILIRUBIN mg/dL 0.44   ALK PHOS U/L 86   ALT U/L 11   AST U/L 20   GLUCOSE RANDOM mg/dL 113     Results from last 7 days   Lab Units 07/24/23  1916   INR  1.03     Results from last 7 days   Lab Units 07/24/23  1036 07/23/23  0732   POC GLUCOSE mg/dl 123 100         Results from last 7 days   Lab Units 07/24/23  1048   LACTIC ACID mmol/L 1.0   PROCALCITONIN ng/ml 0.11       Lines/Drains:  Invasive Devices     Peripheral Intravenous Line  Duration           Peripheral IV 07/23/23 Right Wrist 3 days    Peripheral IV 07/24/23 Dorsal (posterior); Left Forearm 1 day          Drain  Duration           NG/OG/Enteral Tube Nasogastric 8 Fr Left nare <1 day                      Imaging: No pertinent imaging reviewed. Recent Cultures (last 7 days):   Results from last 7 days   Lab Units 07/24/23  1705 07/20/23  1615   BLOOD CULTURE  No Growth at 24 hrs. No Growth at 24 hrs.   --    C DIFF TOXIN B BY PCR   --  Negative       Last 24 Hours Medication List:   Current Facility-Administered Medications   Medication Dose Route Frequency Provider Last Rate   • acetaminophen  325 mg Rectal Q4H PRN Denise Bonilla MD     • aluminum-magnesium hydroxide-simethicone  30 mL Oral Q6H PRN Nora Santos MD     • amLODIPine  5 mg Oral Daily Denise Bonilla MD     • buPROPion  150 mg Oral BID Nora Santos MD     • cefTRIAXone  1,000 mg Intravenous Q24H Shawna Glynn MD 1,000 mg (07/25/23 1712)   • enoxaparin  40 mg Subcutaneous Q24H Jaimie Fowler MD • levothyroxine  50 mcg Oral Early Morning My Fay MD     • mirtazapine  30 mg Oral HS My Fay MD     • nystatin  500,000 Units Swish & Swallow 4x Daily My Fay MD     • ondansetron  4 mg Intravenous Q6H PRN My Fay MD     • oxyCODONE  2.5 mg Oral Q6H PRN My Fay MD     • risperiDONE  1 mg Oral BID My Fay MD     • senna  1 tablet Oral HS PRN My Fay MD     • sertraline  50 mg Oral Daily With Breakfast My Fay MD     • sodium chloride  75 mL/hr Intravenous Continuous My Fay MD 75 mL/hr (07/26/23 0914)        Today, Patient Was Seen By: Sissy Garay MD    **Please Note: This note may have been constructed using a voice recognition system. **

## 2023-07-26 NOTE — ASSESSMENT & PLAN NOTE
Patient presented with acute encephalopathy POA  Neurology consult appreciated  CT head negative for acute abnormality  Patient has slight gaze preference to the right  Stool for C. difficile and culture are negative  MRI brain- "1. No acute infarction, edema, or mass effect. 2.  Moderate chronic microangiopathic ischemic changes. 3.  NeuroQuant analysis was performed: Lower limits of normal hippocampal volume and enlarged inferior lateral and overall ventricular system, suggestive of global ex-vacuo dilatation.  The additional finding of an abnormal Hippocampal Occupancy Score, (HOC), is concerning for a mesial temporal lobe focused Neurodegenerative process.  Recommend reevaluation with Neuroquant imaging in 6 months. MRI consistent with Alzheimer's dementia. Neurology on board  7/24 verbally not responding can not follow commands, pupil reflex intact, awake   CT head showing small volume subarachnoid hemorrhage, neuro following, CTA head and neck showing AVM.   Neurosurgery s/o  7/24 MRI showing stable hge EEG showing no seizure   Resumed lovenox dvt prophylaxis   GI following  Mentation improving  ST to re-evaluate-> continue modified diet

## 2023-07-26 NOTE — ASSESSMENT & PLAN NOTE
· S/p removal of orthopedic screw on right on 6/30/23 by ortho, Dr. Joel Case   · Pain on Percocet at home  · Oxy 2.5/5 as needed ordered while inpatient  · Complaining of right hip pain.   · See above

## 2023-07-26 NOTE — PLAN OF CARE
Problem: Potential for Falls  Goal: Patient will remain free of falls  Description: INTERVENTIONS:  - Educate patient/family on patient safety including physical limitations  - Instruct patient to call for assistance with activity   - Consult OT/PT to assist with strengthening/mobility   - Keep Call bell within reach  - Keep bed low and locked with side rails adjusted as appropriate  - Keep care items and personal belongings within reach  - Initiate and maintain comfort rounds  - Make Fall Risk Sign visible to staff  - Offer Toileting every 2  Problem: DISCHARGE PLANNING  Goal: Discharge to home or other facility with appropriate resources  Description: INTERVENTIONS:  - Identify barriers to discharge w/patient and caregiver  - Arrange for needed discharge resources and transportation as appropriate  - Identify discharge learning needs (meds, wound care, etc.)  - Arrange for interpretive services to assist at discharge as needed  - Refer to Case Management Department for coordinating discharge planning if the patient needs post-hospital services based on physician/advanced practitioner order or complex needs related to functional status, cognitive ability, or social support system  Outcome: Progressing     Problem: Knowledge Deficit  Goal: Patient/family/caregiver demonstrates understanding of disease process, treatment plan, medications, and discharge instructions  Description: Complete learning assessment and assess knowledge base.   Interventions:  - Provide teaching at level of understanding  - Provide teaching via preferred learning methods  Outcome: Progressing   Hours, in advance of need  - Initiate/Maintain alarm  - Obtain necessary fall risk management equipment:   - Apply yellow socks and bracelet for high fall risk patients  - Consider moving patient to room near nurses station  Outcome: Progressing

## 2023-07-26 NOTE — PROGRESS NOTES
Progress Note - Orthopedics   Jacques Licona 69 y.o. female MRN: [id number]  Unit/Bed#: UB NEURODIAG EEG      Assessment:  69 y. o.female s/p Right removal of TFN from femur and cemented hemiarthroplasty with Dr. Venkatesh King on 7/23 for Displaced right femoral neck fracture after removal of proximal TFN screw. POD3      Plan:  · WBAT RLE w/ posterior hip precautions  · Hbg 8.4 yesterday. Continue to monitor H&H and vitals. · PT/OT  · Pain control per primary  · DVT ppx recs per SLIM on lovenox, but had 24 hour hold per neurology. ASA 81 mg BID x 6 weeks on discharge  · Follow up with Dr. Venkatesh King in 2 weeks  · Dispo: Orthopedically stable for d/c      Subjective:    69 y. o.female Seen and examined at bedside. Patient is demented and non-communicative at baseline. Unable to obtain subjective portion of exam.  Lying comfortably in bed. No known issues overnight.     Labs:  0   Lab Value Date/Time    HCT 26.7 (L) 07/25/2023 0200    HCT 26.3 (L) 07/24/2023 1048    HCT 26.1 (L) 07/24/2023 0522    HGB 8.4 (L) 07/25/2023 0200    HGB 8.4 (L) 07/24/2023 1048    HGB 8.3 (L) 07/24/2023 0522    INR 1.03 07/24/2023 1916    WBC 10.06 07/25/2023 0200    WBC 8.11 07/24/2023 0522    WBC 7.54 07/23/2023 0312       Meds:    Current Facility-Administered Medications:   •  acetaminophen (TYLENOL) rectal suppository 325 mg, 325 mg, Rectal, Q4H PRN, Billie Shipman MD, 325 mg at 07/25/23 1454  •  aluminum-magnesium hydroxide-simethicone (MAALOX) oral suspension 30 mL, 30 mL, Oral, Q6H PRN, Dioni Saha MD  •  amLODIPine (NORVASC) tablet 5 mg, 5 mg, Oral, Daily, Billie Shipman MD  •  buPROPion (WELLBUTRIN SR) 12 hr tablet 150 mg, 150 mg, Oral, BID, Dioni Saha MD, 150 mg at 07/22/23 1658  •  cefTRIAXone (ROCEPHIN) IVPB (premix in dextrose) 1,000 mg 50 mL, 1,000 mg, Intravenous, Q24H, Dori Haskins MD, Last Rate: 100 mL/hr at 07/25/23 1712, 1,000 mg at 07/25/23 1712  •  enoxaparin (LOVENOX) subcutaneous injection 40 mg, 40 mg, Subcutaneous, Q24H [address], Devorah Tong MD, 40 mg at 07/26/23 4132  •  levothyroxine tablet 50 mcg, 50 mcg, Oral, Early Morning, Greg Murray MD, 50 mcg at 07/24/23 1623  •  mirtazapine (REMERON) tablet 30 mg, 30 mg, Oral, HS, Greg Murray MD, 30 mg at 07/22/23 2152  •  nystatin (MYCOSTATIN) oral suspension 500,000 Units, 500,000 Units, Swish & Swallow, 4x Daily, Greg Murray MD, 500,000 Units at 07/22/23 2152  •  ondansetron (ZOFRAN) injection 4 mg, 4 mg, Intravenous, Q6H PRN, Greg Murray MD  •  oxyCODONE (ROXICODONE) split tablet 2.5 mg, 2.5 mg, Oral, Q6H PRN **OR** [DISCONTINUED] oxyCODONE (ROXICODONE) IR tablet 5 mg, 5 mg, Oral, Q6H PRN, Greg Murray MD  •  risperiDONE (RisperDAL) tablet 1 mg, 1 mg, Oral, BID, Greg Murray MD, 1 mg at 07/22/23 1658  •  senna (SENOKOT) tablet 8.6 mg, 1 tablet, Oral, HS PRN, Greg Murray MD  •  sertraline (ZOLOFT) tablet 50 mg, 50 mg, Oral, Daily With Breakfast, Greg Murray MD, 50 mg at 07/24/23 7623  •  sodium chloride 0.9 % infusion, 75 mL/hr, Intravenous, Continuous, Greg Murray MD, Last Rate: 75 mL/hr at 07/26/23 0914, 75 mL/hr at 07/26/23 0914    Blood Culture:   Lab Results   Component Value Date    BLOODCX No Growth at 24 hrs. 07/24/2023    BLOODCX No Growth at 24 hrs. 07/24/2023       Wound Culture:   No results found for: "WOUNDCULT"    Ins and Outs:  I/O last 24 hours: In: 1000 [I.V.:1000]  Out: 250 [Urine:250]          Physical:  Vitals:    07/26/23 0728   BP: 153/66   Pulse: 84   Resp: 16   Temp: 98.6 °F (37 °C)   SpO2: 100%     Musculoskeletal: right Lower Extremity  · Skin -  No erythema or ecchymosis. Compartments soft and compressible.    · Dressing - c/d/I w/ mepilex in place  · TTP at incision site  · Sensation intact to saphenous, sural, tibial, superficial peroneal nerve, and deep peroneal  · Motor intact to +FHL/EHL, +ankle dorsi/plantar flexion  · 2+ DP pulse, symmetric bilaterally  · Digits warm and well perfused  · Capillary refill < 2 sherry Rebolledo PA-C

## 2023-07-26 NOTE — PROGRESS NOTES
Progress Note - Kallie Owens 69 y.o. female MRN: [id number]    Unit/Bed#: -01 Encounter: [id number]    Assessment and Plan:    68-year-old female with history of Alzheimer's dementia brought to the ED after a fall, status post right hip arthroplasty 7/23, now with persistent encephalopathy. GI consulted after patient deemed inappropriate for PO intake. 1. Protein calorie malnutrition: Patient was evaluated by speech therapy and initially deemed inappropriate for p.o. intake due to encephalopathy. Patient has unfortunately pulled out her Dobbhoff tube twice. She was reevaluated by speech therapy today and cleared for oral diet.    -Patient has been cleared for dysphagia level 1 puréed diet with thin liquids  -We can replace Dobbhoff tube if restraints are ordered, if necessary  -Appreciate neurology evaluation  -Continue IV fluids for hydration    Subjective:     Patient seen and examined at bedside. She is teary. She follows some commands but does not answer questions. Objective:     Vitals: Blood pressure 153/66, pulse 84, temperature 98.6 °F (37 °C), temperature source Temporal, resp. rate 16, height 5' 8" (1.727 m), weight 55.7 kg (122 lb 12.7 oz), SpO2 100 %. ,Body mass index is 18.67 kg/m². Intake/Output Summary (Last 24 hours) at 7/26/2023 1150  Last data filed at 7/26/2023 1059  Gross per 24 hour   Intake 1000 ml   Output 450 ml   Net 550 ml       Physical Exam:     General Appearance: Alert female, nonverbal, appears older than stated age  Lungs: Clear to auscultation bilaterally  Heart: Regular rate and rhythm  Abdomen: Soft, non-tender, non-distended; bowel sounds normal  Extremities: No cyanosis, edema    Invasive Devices     Peripheral Intravenous Line  Duration           Peripheral IV 07/23/23 Right Wrist 3 days    Peripheral IV 07/24/23 Dorsal (posterior); Left Forearm 1 day          Drain  Duration           NG/OG/Enteral Tube Nasogastric 8 Fr Left nare <1 day                Lab Results:  Results from last 7 days   Lab Units 07/25/23  0200 07/24/23  1048 07/24/23  0522   WBC Thousand/uL 10.06  --  8.11   HEMOGLOBIN g/dL 8.4*   < > 8.3*   HEMATOCRIT % 26.7*   < > 26.1*   PLATELETS Thousands/uL 314  --  296   NEUTROS PCT %  --   --  79*   LYMPHS PCT %  --   --  10*   MONOS PCT %  --   --  9   EOS PCT %  --   --  1    < > = values in this interval not displayed. Results from last 7 days   Lab Units 07/25/23  0200   POTASSIUM mmol/L 4.2   CHLORIDE mmol/L 99   CO2 mmol/L 27   BUN mg/dL 13   CREATININE mg/dL 0.75   CALCIUM mg/dL 8.8   ALK PHOS U/L 86   ALT U/L 11   AST U/L 20     Results from last 7 days   Lab Units 07/24/23  1916   INR  1.03           Imaging Studies: I have personally reviewed pertinent imaging studies. MRI brain wo contrast    Result Date: 7/25/2023  Impression: 1. Stable punctate focus of acute hemorrhage in the right posterior parietal region. More laterally located foci of stable, likely subacute to chronic subarachnoid hemorrhage in the right inferior parietal and superior temporal region. No mass effect or vasogenic edema. 2. No evidence of acute infarct. Workstation performed: [id number]     CTA head and neck w wo contrast    Result Date: 7/24/2023  Impression: Unchanged acute trace subarachnoid hemorrhage in right posterior parietal region. Stable mild ventriculomegaly out of proportion to degree of cerebral atrophy. Question normal pressure hydrocephalus. Question small arteriovenous fistula in right anteromedial orbital region. Recommend consultation with the [facility], a division of [facility] for [facility] at [phone]. Negative CTA head and neck for large vessel occlusion, dissection, aneurysm, or high-grade stenosis. Additional chronic/incidental findings as detailed above. The study was marked in [facility] for immediate notification.  Workstation performed: [id number]     CT head wo contrast    Result Date: 7/24/2023  Impression: New sulcal hyperdensity in the right posterior parietal lobe suspicious for small volume subarachnoid hemorrhage new since the prior CT of July 19, 2023. Review of the recent MRI from July 21, 2023 demonstrates hemosiderin deposition in sulci slightly more anterior to the current finding. The possibility of recurrent subarachnoid hemorrhage in this location should be considered. The study was marked in [facility] for immediate notification. Workstation performed: [id number]     XR chest portable    Result Date: 7/24/2023  Impression: No acute cardiopulmonary disease. Workstation performed: [id number]     XR pelvis ap only 1 or 2 vw    Result Date: 7/23/2023  Impression: Status post right hip arthroplasty with no acute findings. Workstation performed: [id number]     XR hip/pelv 2-3 vws right if performed    Result Date: 7/22/2023  Impression: Again demonstration of femoral neck fracture with varus angulation status post removal of dynamic hip screw. Some lucency along the fracture line may represent mild interval displacement versus resorptive change. Trochanteric nail remains in place. Workstation performed: [id number]     MRI brain NeuroQuant wo contrast    Result Date: 7/21/2023  Impression: 1. No acute infarction, edema, or mass effect. 2.  Moderate chronic microangiopathic ischemic changes. 3.  NeuroQuant analysis was performed: Lower limits of normal hippocampal volume and enlarged inferior lateral and overall ventricular system, suggestive of global ex-vacuo dilatation. The additional finding of an abnormal Hippocampal Occupancy Score, ([address]), is concerning for a mesial temporal lobe focused Neurodegenerative process. Recommend reevaluation with Neuroquant imaging in 6 months. Workstation performed: [id number]     CT spine cervical without contrast    Result Date: 7/19/2023  Impression: No cervical spine fracture or traumatic malalignment.  Workstation performed: [id number]     CT head wo contrast    Result Date: 7/19/2023  Impression: No acute intracranial abnormality.  Workstation performed: [id number]

## 2023-07-26 NOTE — ASSESSMENT & PLAN NOTE
Patient with right hip femoral neck fracture with retained hardware  Patient had followed up on July 13 with Dr. Aristeo Cantrell and was recommended to follow-up with Dr. Onofre Anand scheduled for surgery as outpatient  Orthopedic consult appreciated  POD 3 7/23 Right - ARTHROPLASTY HIP TOTAL with removal of hardware  Discussed with patient and sister  Patient is at intermediate risk for the procedure  Pain management as needed  Encourage incentive spirometer use  PT/OT after surgery    Follow-up with Ortho in 2 weeks discharged with aspirin 81 mg twice daily for 6 weeks

## 2023-07-26 NOTE — SPEECH THERAPY NOTE
Speech Language/Pathology    Speech/Language Pathology Progress Note    Patient Name: Candida Do's Date: 7/26/2023     Problem List  Principal Problem:    Encephalopathy  Active Problems:    Acquired hypothyroidism    Depression    Alzheimer's dementia [facility])    Ambulatory dysfunction    Painful orthopaedic hardware ([address])    Closed displaced fracture of right femoral neck (HCC)    Anemia    No other household member able to render care    Fall    Moderate protein-calorie malnutrition ([address])    Subarachnoid hemorrhage ([address])       Past Medical History  Past Medical History:   Diagnosis Date   • Anxiety    • Depression    • Memory loss    • Thyroid activity decreased         Past Surgical History  Past Surgical History:   Procedure Laterality Date   • BREAST BIOPSY      years ago- Benign   • JOINT REPLACEMENT Left    • [place] HEMIARTHROPLASTY HIP PARTIAL Right 7/23/2023    Procedure: HEMIARTHROPLASTY HIP TOTAL with removal of hardware;  Surgeon: Mely Sheth MD;  Location:  MAIN OR;  Service: Orthopedics   • [place] OPTX FEM SHFT FX W/INSJ IMED IMPLT W/WO SCREW Right 5/9/2019    Procedure: INSERTION NAIL IM FEMUR ANTEGRADE (TROCHANTERIC), RIGHT;  Surgeon: Sandie Austin MD;  Location:  MAIN OR;  Service: Orthopedics   • DE REMOVAL IMPLANT DEEP Right 6/30/2023    Procedure: REMOVAL HARDWARE TFN DYNAMIC SCREW;  Surgeon: Sandie Austin MD;  Location: [facility] MAIN OR;  Service: Orthopedics         Subjective:  Pt seen for dysphagia tx. Pt was sleeping but awakened fairly easily. Min verbalizations. Objective:  Reviewed chart, discussed with nurse. Plan is for NGT today if pt unable to take meds meds orally. Pt was positioned fully upright in bed. She did not follow any commands. Oral care attempted but pt would not open her mouth. She was trialed with ice chips, thin water via spoon and straw, and applesauce (2 oz). She drank single and consecutive sips of water. Pt able to draw liquid from straw without difficulty.  She took the applesauce in full teaspoons and at times just from the tip of the spoon. Bolus manipulation and transfer appeared fairly prompt. Observed hyolaryngeal elevation. There were no overt s/s of aspiration. Pt did need intermittent stimulation to remain awake. Discussed recommendations for potential diet and PO meds with nurse; she is messaging physician. Updated sign in room. Assessment:  Pt more alert today, with stimulation, and able to take PO. There were no overt s/s of aspiration with items trialed, including puree and water via straw. Waxing and waning LAUREL may effect ability to take in enough PO. But pt appears safe for PO if fully alert. Plan/Recommendations:  Recommend pureed diet and thin liquids. Feed only when fully alert, small bites/sips. Aspiration precautions. Crush meds. Full feed. ST to see for dysphagia tx.

## 2023-07-26 NOTE — ASSESSMENT & PLAN NOTE
Patient with right hip femoral neck fracture with retained hardware  Patient had followed up on July 13 with Dr. Alexandra Floyd and was recommended to follow-up with Dr. Dima Jiang scheduled for surgery as outpatient  Orthopedic consult appreciated  POD 3 7/23 Right - ARTHROPLASTY HIP TOTAL with removal of hardware  Discussed with patient and sister  Patient is at intermediate risk for the procedure  Pain management as needed  Encourage incentive spirometer use  PT/OT after surgery    Follow-up with Ortho in 2 weeks discharged with aspirin 81 mg twice daily for 6 weeks

## 2023-07-26 NOTE — ASSESSMENT & PLAN NOTE
· Patient with known dementia with family reporting symptoms beginning approximately 5 years ago.    · Consistent with Alzheimer's dementia due to low hippocampal occupancy score noted on MRI Neuro Quant this admission

## 2023-07-26 NOTE — ASSESSMENT & PLAN NOTE
Patient presented with acute encephalopathy POA  Neurology consult appreciated  CT head negative for acute abnormality  Patient has slight gaze preference to the right  Stool for C. difficile and culture are negative  MRI brain- "1. No acute infarction, edema, or mass effect. 2.  Moderate chronic microangiopathic ischemic changes. 3.  NeuroQuant analysis was performed: Lower limits of normal hippocampal volume and enlarged inferior lateral and overall ventricular system, suggestive of global ex-vacuo dilatation.  The additional finding of an abnormal Hippocampal Occupancy Score, (HOC), is concerning for a mesial temporal lobe focused Neurodegenerative process.  Recommend reevaluation with Neuroquant imaging in 6 months. MRI consistent with Alzheimer's dementia. Neurology on board  7/24 verbally not responding can not follow commands, pupil reflex intact, awake   CT head showing small volume subarachnoid hemorrhage, neuro following, CTA head and neck showing AVM.   Neurosurgery s/o  7/24 MRI showing stable hge EEG showing no seizure   Resumed lovenox dvt prophylaxis   GI following  Mentation improving, hold off on restraints and NGT placement as pt seems to eat well when awake and alert

## 2023-07-27 ENCOUNTER — APPOINTMENT (INPATIENT)
Dept: CT IMAGING | Facility: HOSPITAL | Age: 68
DRG: 521 | End: 2023-07-27
Payer: MEDICARE

## 2023-07-27 LAB
ANION GAP SERPL CALCULATED.3IONS-SCNC: 7 MMOL/L
BUN SERPL-MCNC: 9 MG/DL (ref 5–25)
CALCIUM SERPL-MCNC: 8.5 MG/DL (ref 8.4–10.2)
CHLORIDE SERPL-SCNC: 107 MMOL/L (ref 96–108)
CO2 SERPL-SCNC: 23 MMOL/L (ref 21–32)
CREAT SERPL-MCNC: 0.65 MG/DL (ref 0.6–1.3)
ERYTHROCYTE [DISTWIDTH] IN BLOOD BY AUTOMATED COUNT: 12.5 % (ref 11.6–15.1)
GFR SERPL CREATININE-BSD FRML MDRD: 90 ML/MIN/1.73SQ M
GLUCOSE SERPL-MCNC: 94 MG/DL (ref 65–140)
HCT VFR BLD AUTO: 25.2 % (ref 34.8–46.1)
HGB BLD-MCNC: 7.7 G/DL (ref 11.5–15.4)
MCH RBC QN AUTO: 29.3 PG (ref 26.8–34.3)
MCHC RBC AUTO-ENTMCNC: 30.6 G/DL (ref 31.4–37.4)
MCV RBC AUTO: 96 FL (ref 82–98)
PLATELET # BLD AUTO: 331 THOUSANDS/UL (ref 149–390)
PMV BLD AUTO: 8.8 FL (ref 8.9–12.7)
POTASSIUM SERPL-SCNC: 3.3 MMOL/L (ref 3.5–5.3)
RBC # BLD AUTO: 2.63 MILLION/UL (ref 3.81–5.12)
SODIUM SERPL-SCNC: 137 MMOL/L (ref 135–147)
WBC # BLD AUTO: 6.65 THOUSAND/UL (ref 4.31–10.16)

## 2023-07-27 PROCEDURE — 85027 COMPLETE CBC AUTOMATED: CPT | Performed by: STUDENT IN AN ORGANIZED HEALTH CARE EDUCATION/TRAINING PROGRAM

## 2023-07-27 PROCEDURE — 80048 BASIC METABOLIC PNL TOTAL CA: CPT | Performed by: STUDENT IN AN ORGANIZED HEALTH CARE EDUCATION/TRAINING PROGRAM

## 2023-07-27 PROCEDURE — 99233 SBSQ HOSP IP/OBS HIGH 50: CPT | Performed by: STUDENT IN AN ORGANIZED HEALTH CARE EDUCATION/TRAINING PROGRAM

## 2023-07-27 PROCEDURE — G1004 CDSM NDSC: HCPCS

## 2023-07-27 PROCEDURE — 70450 CT HEAD/BRAIN W/O DYE: CPT

## 2023-07-27 PROCEDURE — 92526 ORAL FUNCTION THERAPY: CPT

## 2023-07-27 RX ORDER — LANOLIN ALCOHOL/MO/W.PET/CERES
6 CREAM (GRAM) TOPICAL
Status: DISCONTINUED | OUTPATIENT
Start: 2023-07-27 | End: 2023-07-29 | Stop reason: HOSPADM

## 2023-07-27 RX ORDER — POTASSIUM CHLORIDE 14.9 MG/ML
20 INJECTION INTRAVENOUS 3 TIMES DAILY
Status: COMPLETED | OUTPATIENT
Start: 2023-07-27 | End: 2023-07-28

## 2023-07-27 RX ADMIN — BUPROPION HYDROCHLORIDE 150 MG: 150 TABLET, FILM COATED, EXTENDED RELEASE ORAL at 17:14

## 2023-07-27 RX ADMIN — SERTRALINE 50 MG: 50 TABLET, FILM COATED ORAL at 09:53

## 2023-07-27 RX ADMIN — NYSTATIN 500000 UNITS: 100000 SUSPENSION ORAL at 09:53

## 2023-07-27 RX ADMIN — NYSTATIN 500000 UNITS: 100000 SUSPENSION ORAL at 17:12

## 2023-07-27 RX ADMIN — RISPERIDONE 1 MG: 1 TABLET ORAL at 09:53

## 2023-07-27 RX ADMIN — BUPROPION HYDROCHLORIDE 150 MG: 150 TABLET, FILM COATED, EXTENDED RELEASE ORAL at 09:55

## 2023-07-27 RX ADMIN — AMLODIPINE BESYLATE 5 MG: 5 TABLET ORAL at 09:53

## 2023-07-27 RX ADMIN — LEVOTHYROXINE SODIUM 50 MCG: 50 TABLET ORAL at 05:07

## 2023-07-27 RX ADMIN — NYSTATIN 500000 UNITS: 100000 SUSPENSION ORAL at 13:47

## 2023-07-27 RX ADMIN — POTASSIUM CHLORIDE 20 MEQ: 14.9 INJECTION, SOLUTION INTRAVENOUS at 22:00

## 2023-07-27 RX ADMIN — POTASSIUM CHLORIDE 20 MEQ: 14.9 INJECTION, SOLUTION INTRAVENOUS at 17:12

## 2023-07-27 RX ADMIN — POTASSIUM CHLORIDE 20 MEQ: 14.9 INJECTION, SOLUTION INTRAVENOUS at 09:53

## 2023-07-27 RX ADMIN — RISPERIDONE 1 MG: 1 TABLET ORAL at 17:12

## 2023-07-27 RX ADMIN — ENOXAPARIN SODIUM 40 MG: 100 INJECTION SUBCUTANEOUS at 09:53

## 2023-07-27 NOTE — QUICK NOTE
Attempted to call Sister Richie Neri could not leave voicemail as mailbox was full    Jessenia Romero

## 2023-07-27 NOTE — ASSESSMENT & PLAN NOTE
Patient presented with acute encephalopathy POA  Neurology consult appreciated  CT head negative for acute abnormality  Patient has slight gaze preference to the right  Stool for C. difficile and culture are negative  MRI brain- "1. No acute infarction, edema, or mass effect. 2.  Moderate chronic microangiopathic ischemic changes. 3.  NeuroQuant analysis was performed: Lower limits of normal hippocampal volume and enlarged inferior lateral and overall ventricular system, suggestive of global ex-vacuo dilatation.  The additional finding of an abnormal Hippocampal Occupancy Score, (HOC), is concerning for a mesial temporal lobe focused Neurodegenerative process.  Recommend reevaluation with Neuroquant imaging in 6 months. MRI consistent with Alzheimer's dementia. Neurology on board  7/24 verbally not responding can not follow commands, pupil reflex intact, awake   CT head showing small volume subarachnoid hemorrhage, neuro following, CTA head and neck showing AVM.   Neurosurgery s/o  7/24 MRI showing stable hge EEG showing no seizure resumed lovenox   GI following  Mentation improving  ST to re-evaluate-> continue modified diet pureed thin liquid

## 2023-07-27 NOTE — SPEECH THERAPY NOTE
Speech Language/Pathology Treatment Note    Patient Name: Tyler Pritchett    Today's Date: 7/27/2023     Problem List  Principal Problem:    Encephalopathy  Active Problems:    Acquired hypothyroidism    Depression    Alzheimer's dementia [facility])    Ambulatory dysfunction    Painful orthopaedic hardware ([address])    Closed displaced fracture of right femoral neck (HCC)    Anemia    No other household member able to render care    Fall    Moderate protein-calorie malnutrition ([address])    Subarachnoid hemorrhage ([address])         Past Medical History  Past Medical History:   Diagnosis Date   • Anxiety    • Depression    • Memory loss    • Thyroid activity decreased        Past Surgical History  Past Surgical History:   Procedure Laterality Date   • BREAST BIOPSY      years ago- Benign   • JOINT REPLACEMENT Left    • [place] HEMIARTHROPLASTY HIP PARTIAL Right 7/23/2023    Procedure: HEMIARTHROPLASTY HIP TOTAL with removal of hardware;  Surgeon: Karla Scott MD;  Location: [facility] MAIN OR;  Service: Orthopedics   • [place] OPTX FEM SHFT FX W/INSJ IMED IMPLT W/WO SCREW Right 5/9/2019    Procedure: INSERTION NAIL IM FEMUR ANTEGRADE (TROCHANTERIC), RIGHT;  Surgeon: Jean-Paul Menard MD;  Location: [facility] MAIN OR;  Service: Orthopedics   • [place] REMOVAL IMPLANT DEEP Right 6/30/2023    Procedure: REMOVAL HARDWARE TFN DYNAMIC SCREW;  Surgeon: Jean-Paul Menard MD;  Location: [facility] MAIN OR;  Service: Orthopedics         Subjective:  Pt in bed with PCA feeding patient breakfast as SLP entered the room. Room Air. FLACC 0. Objective:  Clinician slowly introduced positional and environmental modifications during tx session. These included repositioning, adding music, and adding light; oral/pharyngeal skills and level of alertness remained unchanged. Eyes remained semi open through meal.     Pt able to adequately retrieve bolus from fork and straw and inconsistently able to retrieve full bolus from spoon due to decreased mouth opening.  Pt with noted oral manipulation and mastication of pureed bananas, oatmeal, and puree eggs which were sticky, cohesive bolus. Bolus transfer appeared timely, no pocketing. No changes choking, coughing, or changes in respiratory status were noted during meal.     Voice clear at end of tx session/meal as pt responded to salutation with "bye" and "see you soon". Assessment:  Pt consumed single and consecutive sips of thin liquids via straw, puree, textured puree, and sticky/firm puree without overt s/s of aspiration. Pt benefited from verbal cues to open/take sip as well as tactile cue (touch spoon to bottom lip) in order to retrieve PO.        Plan/Recommendations:  Continue current diet: puree, thin liquids, medications crushed in puree as able  Feed only when alert, small bites/sips, cue as needed  ST to continue to follow and trial advanced textures as able/appropriate

## 2023-07-27 NOTE — PROGRESS NOTES
[facility]  Progress Note  Name: Fanta Monzon  MRN: [id number]  Unit/Bed#: -01 I Date of Admission: 7/19/2023   Date of Service: 7/27/2023 I Hospital Day: 8    Assessment/Plan   * Encephalopathy  Assessment & Plan  Patient presented with acute encephalopathy POA  Neurology consult appreciated  CT head negative for acute abnormality  Patient has slight gaze preference to the right  Stool for C. difficile and culture are negative  MRI brain- "1. No acute infarction, edema, or mass effect. 2.  Moderate chronic microangiopathic ischemic changes. 3.  NeuroQuant analysis was performed: Lower limits of normal hippocampal volume and enlarged inferior lateral and overall ventricular system, suggestive of global ex-vacuo dilatation.  The additional finding of an abnormal Hippocampal Occupancy Score, (HOC), is concerning for a mesial temporal lobe focused Neurodegenerative process.  Recommend reevaluation with Neuroquant imaging in 6 months. MRI consistent with Alzheimer's dementia. Neurology on board  7/24 verbally not responding can not follow commands, pupil reflex intact, awake   CT head showing small volume subarachnoid hemorrhage, neuro following, CTA head and neck showing AVM.   Neurosurgery s/o  7/24 MRI showing stable hge EEG showing no seizure   Resumed lovenox dvt prophylaxis   GI following  Mentation improving  ST to re-evaluate-> continue modified diet      Fall  Assessment & Plan  · Found on bedroom floor by sister  · CT head and CT C-spine negative for acute findings  · No signs of trauma otherwise on patient's body, no complaints of pain or tenderness on palpation  · PT, OT, and CM on board    Closed displaced fracture of right femoral neck [facility])  Assessment & Plan  Patient with right hip femoral neck fracture with retained hardware  Patient had followed up on July 13 with Dr. Kate Marroquin and was recommended to follow-up with Dr. Nina Edwards scheduled for surgery as outpatient  Orthopedic consult appreciated  POD 3 7/23 Right - ARTHROPLASTY HIP TOTAL with removal of hardware  Discussed with patient and sister  Patient is at intermediate risk for the procedure  Pain management as needed  Encourage incentive spirometer use  PT/OT after surgery    Follow-up with Ortho in 2 weeks discharged with aspirin 81 mg twice daily for 6 weeks    Subarachnoid hemorrhage ([address])  Assessment & Plan  See mgx above    Moderate protein-calorie malnutrition ([address])  Assessment & Plan  Malnutrition Findings:   Adult Malnutrition type: Chronic illness  Adult Degree of Malnutrition: Malnutrition of moderate degree  Malnutrition Characteristics: Fat loss, Muscle loss                  360 Statement: Pt presents with moderate protein calorie malnutrution as evidenced by prominent clavicle bone and sunken orbitals. Treat with Pureed Nectar Thick Liquids and Magic Cup BID. BMI Findings: Body mass index is 18.67 kg/m². Patient has moderate protein calorie malnutrition in setting of chronic illness as evidenced by prominent clavicle bone, sunken orbitals  Nutrition consult and recommendations    No other household member able to render care  Assessment & Plan  · Found on bedroom floor by sister at 54 on 7/19, sister unable to get her up so sister went to sleep  · Sister woke at 63 and called EMS at that time  · CT head and CT C-spine negative for acute findings  · Patient without tenderness or signs of trauma throughout remainder of body  · PT, OT, and CM consults for placement  · She will require rehab placement at discharge    Anemia  Assessment & Plan  · Hemoglobin 10.8 on admission  · Baseline hemoglobin appears to be 9-10  · Trend CBC daily    Painful orthopaedic hardware [facility])  Assessment & Plan  · S/p removal of orthopedic screw on right on 6/30/23 by ortho, Dr. Love Failing   · Pain on Percocet at home  · Oxy 2.5/5 as needed ordered while inpatient  · Complaining of right hip pain.   · See above    Ambulatory dysfunction  Assessment & Plan  Ambulatory dysfunction POA in setting of dementia evidenced by inability to stand after a fall. CT is negative for any acute findings  Patient was noticed to have gaze preference  MRI brain as above  See above    Alzheimer's dementia [facility])  Assessment & Plan  · Only oriented to self on admission  · Sister reportedly called nursing staff that this is patient's baseline since surgery on 6/30  · PT, OT, and CM consulted  · Home medications: Risperdal 1 Mg 4 times daily and Remeron 30 mg at bedtime  · Risperidone was decreased to 1 mg twice daily  · Continue home medications    Depression  Assessment & Plan  · Continue home Zoloft 50 Mg daily and Wellbutrin 150 mg twice daily    Acquired hypothyroidism  Assessment & Plan  · Continue home Synthroid 50 mcg daily    Elevated CK-resolved as of 7/24/2023  Assessment & Plan  ·   · Resolved            VTE Pharmacologic Prophylaxis: VTE Score: 4 Moderate Risk (Score 3-4) - Pharmacological DVT Prophylaxis Ordered: enoxaparin (Lovenox). Patient Centered Rounds: I performed bedside rounds with nursing staff today. Discussions with Specialists or Other Care Team Provider: Neuro, GI, ortho, CM, PT, OT    Education and Discussions with Family / Patient: Attempted to update  (sister) via phone. Unable to contact. Total Time Spent on Date of Encounter in care of patient: 35 minutes This time was spent on one or more of the following: performing physical exam; counseling and coordination of care; obtaining or reviewing history; documenting in the medical record; reviewing/ordering tests, medications or procedures; communicating with other healthcare professionals and discussing with patient's family/caregivers.     Current Length of Stay: 8 day(s)  Current Patient Status: Inpatient   Certification Statement: The patient will continue to require additional inpatient hospital stay due to AMS  Discharge Plan: Anticipate discharge in 48-72 hrs to rehab facility. Code Status: Level 1 - Full Code    Subjective:   Tabitha Zurita was seen and examined at bedside. No acute events overnight. Patient is a poor historian mentation slightly improved we will continue to monitor    Objective:     Vitals:   Temp (24hrs), Av.6 °F (37 °C), Min:98.1 °F (36.7 °C), Max:99.1 °F (37.3 °C)    Temp:  [98.1 °F (36.7 °C)-99.1 °F (37.3 °C)] 99.1 °F (37.3 °C)  HR:  [68-85] 68  Resp:  [16] 16  BP: (114-159)/(60-65) 114/60  SpO2:  [100 %] 100 %  Body mass index is 18.67 kg/m². Input and Output Summary (last 24 hours): Intake/Output Summary (Last 24 hours) at 2023 1015  Last data filed at 2023 1817  Gross per 24 hour   Intake --   Output 819 ml   Net -819 ml       Physical Exam:   Physical Exam   Vitals and nursing note reviewed. Constitutional:       General: She is not in acute distress.     Appearance: She is normal weight. She is not ill-appearing.      Comments: sleepy  HENT:      Head: Normocephalic and atraumatic. Cardiovascular:      Rate and Rhythm: Normal rate and regular rhythm.      Pulses: Normal pulses.      Heart sounds: Normal heart sounds. Pulmonary:      Effort: Pulmonary effort is normal.      Breath sounds: Normal breath sounds. Abdominal:      General: Abdomen is flat. Bowel sounds are normal.      Palpations: Abdomen is soft. Musculoskeletal:      Right lower leg: No edema.      Left lower leg: No edema. Skin:     General: Skin is warm. Neurological:      General: No focal deficit present.      Mental Status: She is disoriented.     Additional Data:     Labs:  Results from last 7 days   Lab Units 23  0134 23  1048 23  0522   WBC Thousand/uL 6.65   < > 8.11   HEMOGLOBIN g/dL 7.7*   < > 8.3*   HEMATOCRIT % 25.2*   < > 26.1*   PLATELETS Thousands/uL 331   < > 296   NEUTROS PCT %  --   --  79*   LYMPHS PCT %  --   --  10*   MONOS PCT %  --   --  9   EOS PCT %  --   --  1    < > = values in this interval not displayed. Results from last 7 days   Lab Units 07/27/23  0134 07/25/23  0200   SODIUM mmol/L 137 134*   POTASSIUM mmol/L 3.3* 4.2   CHLORIDE mmol/L 107 99   CO2 mmol/L 23 27   BUN mg/dL 9 13   CREATININE mg/dL 0.65 0.75   ANION GAP mmol/L 7 8   CALCIUM mg/dL 8.5 8.8   ALBUMIN g/dL  --  3.3*   TOTAL BILIRUBIN mg/dL  --  0.44   ALK PHOS U/L  --  86   ALT U/L  --  11   AST U/L  --  20   GLUCOSE RANDOM mg/dL 94 113     Results from last 7 days   Lab Units 07/24/23  1916   INR  1.03     Results from last 7 days   Lab Units 07/24/23  1036 07/23/23  0732   POC GLUCOSE mg/dl 123 100         Results from last 7 days   Lab Units 07/24/23  1048   LACTIC ACID mmol/L 1.0   PROCALCITONIN ng/ml 0.11       Lines/Drains:  Invasive Devices     Peripheral Intravenous Line  Duration           Peripheral IV 07/27/23 Right;Ventral (anterior) Forearm <1 day                      Imaging: No pertinent imaging reviewed. Recent Cultures (last 7 days):   Results from last 7 days   Lab Units 07/24/23  1705 07/20/23  1615   BLOOD CULTURE  No Growth at 48 hrs. No Growth at 48 hrs.   --    C DIFF TOXIN B BY PCR   --  Negative       Last 24 Hours Medication List:   Current Facility-Administered Medications   Medication Dose Route Frequency Provider Last Rate   • acetaminophen  650 mg Oral Q6H PRN Laci Li PA-C     • aluminum-magnesium hydroxide-simethicone  30 mL Oral Q6H PRN Domingo Stevens MD     • amLODIPine  5 mg Oral Daily Emilie Montero MD     • buPROPion  150 mg Oral BID Domingo Stevens MD     • enoxaparin  40 mg Subcutaneous Q24H Tad MD Galen     • levothyroxine  50 mcg Oral Early Morning Domingo Stevens MD     • melatonin  6 mg Oral HS Emilie Montero MD     • mirtazapine  30 mg Oral HS Domingo Stevens MD     • nystatin  500,000 Units Swish & Swallow 4x Daily Domingo Stevens MD     • ondansetron  4 mg Intravenous Q6H PRN Domingo Stevens MD     • oxyCODONE  2.5 mg Oral Q6H PRN [facility] [place] Sofie Pichardo MD     • potassium chloride  20 mEq Intravenous TID Claudeen Dubonnet, MD 20 mEq (07/27/23 4785)   • risperiDONE  1 mg Oral BID eRinaldo Garsia MD     • senna  1 tablet Oral HS PRN Reinaldo Garsia MD     • sertraline  50 mg Oral Daily With Breakfast Reinaldo Garsia MD     • sodium chloride  75 mL/hr Intravenous Continuous Reinaldo Garsia MD 75 mL/hr (07/26/23 0914)        Today, Patient Was Seen By: Claudeen Dubonnet, MD    **Please Note: This note may have been constructed using a voice recognition system. **

## 2023-07-27 NOTE — ASSESSMENT & PLAN NOTE
Patient with right hip femoral neck fracture with retained hardware  Patient had followed up on July 13 with Dr. Toña Yang and was recommended to follow-up with Dr. Kassandra Al scheduled for surgery as outpatient  Orthopedic consult appreciated  POD 3 7/23 Right - ARTHROPLASTY HIP TOTAL with removal of hardware  Discussed with patient and sister  Patient is at intermediate risk for the procedure  Pain management as needed  Encourage incentive spirometer use  PT/OT after surgery    Follow-up with Ortho in 2 weeks discharged with aspirin 81 mg twice daily for 6 weeks  Dispo to [facility]

## 2023-07-27 NOTE — PLAN OF CARE
Problem: Potential for Falls  Goal: Patient will remain free of falls  Description: INTERVENTIONS:  - Educate patient/family on patient safety including physical limitations  - Instruct patient to call for assistance with activity   - Consult OT/PT to assist with strengthening/mobility   - Keep Call bell within reach  - Keep bed low and locked with side rails adjusted as appropriate  - Keep care items and personal belongings within reach  - Initiate and maintain comfort rounds  - Make Fall Risk Sign visible to staff  - Offer Toileting every 2 Hours, in advance of need  - Initiate/Maintain   Problem: PAIN - ADULT  Goal: Verbalizes/displays adequate comfort level or baseline comfort level  Description: Interventions:  - Encourage patient to monitor pain and request assistance  - Assess pain using appropriate pain scale  - Administer analgesics based on type and severity of pain and evaluate response  - Implement non-pharmacological measures as appropriate and evaluate response  - Consider cultural and social influences on pain and pain management  - Notify physician/advanced practitioner if interventions unsuccessful or patient reports new pain  Outcome: Progressing     Problem: INFECTION - ADULT  Goal: Absence or prevention of progression during hospitalization  Description: INTERVENTIONS:  - Assess and monitor for signs and symptoms of infection  - Monitor lab/diagnostic results  - Monitor all insertion sites, i.e. indwelling lines, tubes, and drains  - Monitor endotracheal if appropriate and nasal secretions for changes in amount and color  - [place] appropriate cooling/warming therapies per order  - Administer medications as ordered  - Instruct and encourage patient and family to use good hand hygiene technique  - Identify and instruct in appropriate isolation precautions for identified infection/condition  Outcome: Progressing     Problem: DISCHARGE PLANNING  Goal: Discharge to home or other facility with appropriate resources  Description: INTERVENTIONS:  - Identify barriers to discharge w/patient and caregiver  - Arrange for needed discharge resources and transportation as appropriate  - Identify discharge learning needs (meds, wound care, etc.)  - Arrange for interpretive services to assist at discharge as needed  - Refer to Case Management Department for coordinating discharge planning if the patient needs post-hospital services based on physician/advanced practitioner order or complex needs related to functional status, cognitive ability, or social support system  Outcome: Progressing   alarm  - Obtain necessary fall risk management equipment:   - Apply yellow socks and bracelet for high fall risk patients  - Consider moving patient to room near nurses station  Outcome: Progressing

## 2023-07-27 NOTE — CASE MANAGEMENT
Case Management Progress Note    Patient name Klaus Andrade  Location [address] [facility] [place] [address]/-01 MRN [id number]  : 1955 Date 2023       LOS (days): 8  Geometric Mean LOS (GMLOS) (days): 5.80  Days to GMLOS:-1.9        OBJECTIVE:        Current admission status: Inpatient  Preferred Pharmacy:   [id number] [place] [address]  Phone: [phone] Fax: [phone]    Primary Care Provider: Ernesto Smart DO    Primary Insurance: MEDICARE  Secondary Insurance: COMMERCIAL MISCELLANEOUS    PROGRESS NOTE:    Per care coordination rounds, pt is not medically stable for dc. [facility] is continuing to follow. Update provided to [facility] via [employer].

## 2023-07-27 NOTE — ASSESSMENT & PLAN NOTE
· S/p removal of orthopedic screw on right on 6/30/23 by ortho, Dr. Phan Cosby   · Pain on Percocet at home  · Oxy 2.5/5 as needed ordered while inpatient  · Complaining of right hip pain.   · See above

## 2023-07-28 LAB
ALBUMIN SERPL BCP-MCNC: 3.2 G/DL (ref 3.5–5)
ALP SERPL-CCNC: 77 U/L (ref 34–104)
ALT SERPL W P-5'-P-CCNC: 14 U/L (ref 7–52)
ANION GAP SERPL CALCULATED.3IONS-SCNC: 4 MMOL/L
AST SERPL W P-5'-P-CCNC: 20 U/L (ref 13–39)
BILIRUB SERPL-MCNC: 0.41 MG/DL (ref 0.2–1)
BUN SERPL-MCNC: 8 MG/DL (ref 5–25)
CALCIUM ALBUM COR SERPL-MCNC: 9.3 MG/DL (ref 8.3–10.1)
CALCIUM SERPL-MCNC: 8.7 MG/DL (ref 8.4–10.2)
CHLORIDE SERPL-SCNC: 105 MMOL/L (ref 96–108)
CO2 SERPL-SCNC: 27 MMOL/L (ref 21–32)
CREAT SERPL-MCNC: 0.6 MG/DL (ref 0.6–1.3)
ERYTHROCYTE [DISTWIDTH] IN BLOOD BY AUTOMATED COUNT: 12.7 % (ref 11.6–15.1)
GFR SERPL CREATININE-BSD FRML MDRD: 93 ML/MIN/1.73SQ M
GLUCOSE SERPL-MCNC: 89 MG/DL (ref 65–140)
HCT VFR BLD AUTO: 23.2 % (ref 34.8–46.1)
HGB BLD-MCNC: 7.1 G/DL (ref 11.5–15.4)
MCH RBC QN AUTO: 29 PG (ref 26.8–34.3)
MCHC RBC AUTO-ENTMCNC: 30.6 G/DL (ref 31.4–37.4)
MCV RBC AUTO: 95 FL (ref 82–98)
PLATELET # BLD AUTO: 315 THOUSANDS/UL (ref 149–390)
PMV BLD AUTO: 9.2 FL (ref 8.9–12.7)
POTASSIUM SERPL-SCNC: 3.8 MMOL/L (ref 3.5–5.3)
PROT SERPL-MCNC: 6 G/DL (ref 6.4–8.4)
RBC # BLD AUTO: 2.45 MILLION/UL (ref 3.81–5.12)
SODIUM SERPL-SCNC: 136 MMOL/L (ref 135–147)
WBC # BLD AUTO: 5.1 THOUSAND/UL (ref 4.31–10.16)

## 2023-07-28 PROCEDURE — 97530 THERAPEUTIC ACTIVITIES: CPT

## 2023-07-28 PROCEDURE — 85027 COMPLETE CBC AUTOMATED: CPT | Performed by: STUDENT IN AN ORGANIZED HEALTH CARE EDUCATION/TRAINING PROGRAM

## 2023-07-28 PROCEDURE — 92526 ORAL FUNCTION THERAPY: CPT

## 2023-07-28 PROCEDURE — 80053 COMPREHEN METABOLIC PANEL: CPT | Performed by: STUDENT IN AN ORGANIZED HEALTH CARE EDUCATION/TRAINING PROGRAM

## 2023-07-28 RX ADMIN — BUPROPION HYDROCHLORIDE 150 MG: 150 TABLET, FILM COATED, EXTENDED RELEASE ORAL at 18:43

## 2023-07-28 RX ADMIN — RISPERIDONE 1 MG: 1 TABLET ORAL at 18:43

## 2023-07-28 RX ADMIN — SODIUM CHLORIDE 300 MG: 0.9 INJECTION, SOLUTION INTRAVENOUS at 15:35

## 2023-07-28 RX ADMIN — LEVOTHYROXINE SODIUM 50 MCG: 50 TABLET ORAL at 06:23

## 2023-07-28 RX ADMIN — Medication 6 MG: at 20:39

## 2023-07-28 RX ADMIN — MIRTAZAPINE 30 MG: 15 TABLET, FILM COATED ORAL at 20:39

## 2023-07-28 RX ADMIN — SODIUM CHLORIDE 75 ML/HR: 0.9 INJECTION, SOLUTION INTRAVENOUS at 12:17

## 2023-07-28 RX ADMIN — NYSTATIN 500000 UNITS: 100000 SUSPENSION ORAL at 18:45

## 2023-07-28 RX ADMIN — BUPROPION HYDROCHLORIDE 150 MG: 150 TABLET, FILM COATED, EXTENDED RELEASE ORAL at 09:53

## 2023-07-28 RX ADMIN — ENOXAPARIN SODIUM 40 MG: 100 INJECTION SUBCUTANEOUS at 09:53

## 2023-07-28 NOTE — PROGRESS NOTES
[address]  Progress Note  Name: Staci Conner  MRN: [id number]  Unit/Bed#: -01 I Date of Admission: 7/19/2023   Date of Service: 7/28/2023 I Hospital Day: 9    Assessment/Plan   * Encephalopathy  Assessment & Plan  Patient presented with acute encephalopathy POA  Neurology consult appreciated  CT head negative for acute abnormality  Patient has slight gaze preference to the right  Stool for C. difficile and culture are negative  MRI brain- "1. No acute infarction, edema, or mass effect. 2.  Moderate chronic microangiopathic ischemic changes. 3.  NeuroQuant analysis was performed: Lower limits of normal hippocampal volume and enlarged inferior lateral and overall ventricular system, suggestive of global ex-vacuo dilatation.  The additional finding of an abnormal Hippocampal Occupancy Score, (HOC), is concerning for a mesial temporal lobe focused Neurodegenerative process.  Recommend reevaluation with Neuroquant imaging in 6 months. MRI consistent with Alzheimer's dementia. Neurology on board  7/24 verbally not responding can not follow commands, pupil reflex intact, awake   CT head showing small volume subarachnoid hemorrhage, neuro following, CTA head and neck showing AVM.   Neurosurgery s/o  7/24 MRI showing stable hge EEG showing no seizure   Resumed lovenox dvt prophylaxis   GI following  Mentation improving  ST to re-evaluate-> continue modified diet      Fall  Assessment & Plan  · Found on bedroom floor by sister  · CT head and CT C-spine negative for acute findings  · No signs of trauma otherwise on patient's body, no complaints of pain or tenderness on palpation  · PT, OT, and CM on board    Closed displaced fracture of right femoral neck [facility])  Assessment & Plan  Patient with right hip femoral neck fracture with retained hardware  Patient had followed up on July 13 with Dr. Sweta Haywood and was recommended to follow-up with Dr. Jewels Stephens scheduled for surgery as outpatient  Orthopedic consult appreciated  POD 3 7/23 Right - ARTHROPLASTY HIP TOTAL with removal of hardware  Discussed with patient and sister  Patient is at intermediate risk for the procedure  Pain management as needed  Encourage incentive spirometer use  PT/OT after surgery    Follow-up with Ortho in 2 weeks discharged with aspirin 81 mg twice daily for 6 weeks    Subarachnoid hemorrhage ([address])  Assessment & Plan  See mgx above    Moderate protein-calorie malnutrition ([address])  Assessment & Plan  Malnutrition Findings:   Adult Malnutrition type: Chronic illness  Adult Degree of Malnutrition: Malnutrition of moderate degree  Malnutrition Characteristics: Fat loss, Muscle loss                  360 Statement: Pt presents with moderate protein calorie malnutrution as evidenced by prominent clavicle bone and sunken orbitals. Treat with Pureed Nectar Thick Liquids and Magic Cup BID. BMI Findings: Body mass index is 18.67 kg/m². Patient has moderate protein calorie malnutrition in setting of chronic illness as evidenced by prominent clavicle bone, sunken orbitals  Nutrition consult and recommendations    No other household member able to render care  Assessment & Plan  · Found on bedroom floor by sister at 54 on 7/19, sister unable to get her up so sister went to sleep  · Sister woke at 63 and called EMS at that time  · CT head and CT C-spine negative for acute findings  · Patient without tenderness or signs of trauma throughout remainder of body  · PT, OT, and CM consults for placement  · She will require rehab placement at discharge    Anemia  Assessment & Plan  · Hemoglobin 10.8 on admission  · Baseline hemoglobin appears to be 9-10  · Trend CBC daily    Painful orthopaedic hardware [facility])  Assessment & Plan  · S/p removal of orthopedic screw on right on 6/30/23 by ortho, Dr. Alexandra Floyd   · Pain on Percocet at home  · Oxy 2.5/5 as needed ordered while inpatient  · Complaining of right hip pain.   · See above    Ambulatory dysfunction  Assessment & Plan  Ambulatory dysfunction POA in setting of dementia evidenced by inability to stand after a fall. CT is negative for any acute findings  Patient was noticed to have gaze preference  MRI brain as above  See above    Alzheimer's dementia [facility])  Assessment & Plan  · Only oriented to self on admission  · Sister reportedly called nursing staff that this is patient's baseline since surgery on 6/30  · PT, OT, and CM consulted  · Home medications: Risperdal 1 Mg 4 times daily and Remeron 30 mg at bedtime  · Risperidone was decreased to 1 mg twice daily  · Continue home medications    Depression  Assessment & Plan  · Continue home Zoloft 50 Mg daily and Wellbutrin 150 mg twice daily    Acquired hypothyroidism  Assessment & Plan  · Continue home Synthroid 50 mcg daily    Elevated CK-resolved as of 7/24/2023  Assessment & Plan  ·   · Resolved              VTE Pharmacologic Prophylaxis: VTE Score: 4 Moderate Risk (Score 3-4) - Pharmacological DVT Prophylaxis Ordered: enoxaparin (Lovenox). Patient Centered Rounds: I performed bedside rounds with nursing staff today. Discussions with Specialists or Other Care Team Provider: Neuro, GI, ortho, CM, PT, OT    Education and Discussions with Family / Patient: Updated  (sister) via phone. Total Time Spent on Date of Encounter in care of patient: 35 minutes This time was spent on one or more of the following: performing physical exam; counseling and coordination of care; obtaining or reviewing history; documenting in the medical record; reviewing/ordering tests, medications or procedures; communicating with other healthcare professionals and discussing with patient's family/caregivers.     Current Length of Stay: 9 day(s)  Current Patient Status: Inpatient   Certification Statement: The patient will continue to require additional inpatient hospital stay due to AMS  Discharge Plan: Anticipate discharge tomorrow to rehab facility. Code Status: Level 1 - Full Code    Subjective:   José Manuel Emery was seen and examined at bedside. No acute events overnight. Patient is a poor historian however is awake and alert today is able to respond to her name and answer questions semicorrectly. Is able to semifollow commands today. Objective:     Vitals:   Temp (24hrs), Av.5 °F (36.9 °C), Min:97.9 °F (36.6 °C), Max:99.1 °F (37.3 °C)    Temp:  [97.9 °F (36.6 °C)-99.1 °F (37.3 °C)] 97.9 °F (36.6 °C)  HR:  [68-74] 74  Resp:  [16-17] 17  BP: (114-123)/(57-60) 119/57  SpO2:  [99 %-100 %] 100 %  Body mass index is 18.67 kg/m². Input and Output Summary (last 24 hours): Intake/Output Summary (Last 24 hours) at 2023 0706  Last data filed at 2023 0437  Gross per 24 hour   Intake --   Output 1150 ml   Net -1150 ml       Physical Exam:   Physical Exam   Vitals and nursing note reviewed. Constitutional:       General: She is not in acute distress.     Appearance: She is normal weight. She is not ill-appearing.      Comments: awoke to name being called, alert able to respond to some questions  HENT:      Head: Normocephalic and atraumatic. Cardiovascular:      Rate and Rhythm: Normal rate and regular rhythm.      Pulses: Normal pulses.      Heart sounds: Normal heart sounds. Pulmonary:      Effort: Pulmonary effort is normal.      Breath sounds: Normal breath sounds. Abdominal:      General: Abdomen is flat. Bowel sounds are normal.      Palpations: Abdomen is soft. Musculoskeletal:      Right lower leg: No edema.      Left lower leg: No edema. Skin:     General: Skin is warm. Neurological:      General: No focal deficit present.      Mental Status: She is disoriented.     Additional Data:     Labs:  Results from last 7 days   Lab Units 23  0436 23  1048 23  0522   WBC Thousand/uL 5.10   < > 8.11   HEMOGLOBIN g/dL 7.1*   < > 8.3*   HEMATOCRIT % 23.2*   < > 26.1*   PLATELETS Thousands/uL 315   < > 296 NEUTROS PCT %  --   --  79*   LYMPHS PCT %  --   --  10*   MONOS PCT %  --   --  9   EOS PCT %  --   --  1    < > = values in this interval not displayed. Results from last 7 days   Lab Units 07/28/23  0436   SODIUM mmol/L 136   POTASSIUM mmol/L 3.8   CHLORIDE mmol/L 105   CO2 mmol/L 27   BUN mg/dL 8   CREATININE mg/dL 0.60   ANION GAP mmol/L 4   CALCIUM mg/dL 8.7   ALBUMIN g/dL 3.2*   TOTAL BILIRUBIN mg/dL 0.41   ALK PHOS U/L 77   ALT U/L 14   AST U/L 20   GLUCOSE RANDOM mg/dL 89     Results from last 7 days   Lab Units 07/24/23  1916   INR  1.03     Results from last 7 days   Lab Units 07/24/23  1036 07/23/23  0732   POC GLUCOSE mg/dl 123 100         Results from last 7 days   Lab Units 07/24/23  1048   LACTIC ACID mmol/L 1.0   PROCALCITONIN ng/ml 0.11       Lines/Drains:  Invasive Devices     Peripheral Intravenous Line  Duration           Peripheral IV 07/27/23 Right;Ventral (anterior) Forearm 1 day                      Imaging: No pertinent imaging reviewed. Recent Cultures (last 7 days):   Results from last 7 days   Lab Units 07/24/23  1705   BLOOD CULTURE  No Growth at 72 hrs. No Growth at 72 hrs.        Last 24 Hours Medication List:   Current Facility-Administered Medications   Medication Dose Route Frequency Provider Last Rate   • acetaminophen  650 mg Oral Q6H PRN Jac Sever, PA-C     • aluminum-magnesium hydroxide-simethicone  30 mL Oral Q6H PRN Reinaldo Garsia MD     • amLODIPine  5 mg Oral Daily Claudeen Dubonnet, MD     • buPROPion  150 mg Oral BID Reinaldo Garsia MD     • enoxaparin  40 mg Subcutaneous Q24H Andria Gerard MD     • levothyroxine  50 mcg Oral Early Morning Reinaldo Garsia MD     • melatonin  6 mg Oral HS Claudeen Dubonnet, MD     • mirtazapine  30 mg Oral HS Reinaldo Garsia MD     • nystatin  500,000 Units Swish & Swallow 4x Daily Reinaldo Garsia MD     • ondansetron  4 mg Intravenous Q6H PRN Reinaldo Garsia MD     • oxyCODONE  2.5 mg Oral Q6H PRN Reinaldo Garsia MD     • risperiDONE  1 mg Oral BID Domingo Stevens MD     • senna  1 tablet Oral HS PRN Domingo Stevens MD     • sertraline  50 mg Oral Daily With Breakfast Domingo Stevens MD     • sodium chloride  75 mL/hr Intravenous Continuous Domingo Stevens MD 75 mL/hr (07/26/23 0914)        Today, Patient Was Seen By: Emilie Montero MD    **Please Note: This note may have been constructed using a voice recognition system. **

## 2023-07-28 NOTE — PLAN OF CARE
Problem: OCCUPATIONAL THERAPY ADULT  Goal: Performs self-care activities at highest level of function for planned discharge setting. See evaluation for individualized goals. Description:   Outcome: Progressing  Note: Limitation: Decreased ADL status, Decreased self-care trans, Decreased high-level ADLs, Decreased cognition, Decreased endurance  Prognosis: Fair  Assessment: Patient participated in Skilled OT session this date with interventions consisting of ADL re training with the use of correct body mechnaics,  bed mobility*, therapeutic exercise to: increase functional use of BUEs, increase BUE muscle strength  and  therapeutic activities to: increase activity tolerance . Patient agreeable to OT treatment session, upon arrival patient was found supine in bed. Treatment session as follows: Pt needing to be repositioned in order for feeding. Pt says "hi" upon introduction, but offers no other communication or direction following. Dx2 to reposition. PROM performed to B/L UEs. Total A for feeding. Patient continues to be functioning below baseline level, occupational performance remains limited secondary to factors listed above and increased risk for falls and injury. The patient's raw score on the AM-PAC Daily Activity inpatient short form is 6, low fx standardized score is 14.9  , less than 39.4. Patients at this level are likely to benefit from DC to post-acute rehabilitation services. From OT standpoint, recommendation at time of d/c would be level 2. Patient to benefit from continued Occupational Therapy treatment while in the hospital to address deficits as defined above and maximize level of functional independence with ADLs and functional mobility. . Pt left with PCA.

## 2023-07-28 NOTE — ASSESSMENT & PLAN NOTE
Patient presented with acute encephalopathy POA  Neurology consult appreciated  CT head negative for acute abnormality  Patient has slight gaze preference to the right  Stool for C. difficile and culture are negative  MRI brain- "1. No acute infarction, edema, or mass effect. 2.  Moderate chronic microangiopathic ischemic changes. 3.  NeuroQuant analysis was performed: Lower limits of normal hippocampal volume and enlarged inferior lateral and overall ventricular system, suggestive of global ex-vacuo dilatation.  The additional finding of an abnormal Hippocampal Occupancy Score, (HOC), is concerning for a mesial temporal lobe focused Neurodegenerative process.  Recommend reevaluation with Neuroquant imaging in 6 months. MRI consistent with Alzheimer's dementia. Neurology on board  7/24 verbally not responding can not follow commands, pupil reflex intact, awake   CT head showing small volume subarachnoid hemorrhage, neuro following, CTA head and neck showing AVM.   Neurosurgery s/o  7/24 MRI showing stable hge EEG showing no seizure resumed lovenox   GI following  Mentation improving  ST re-evaluation-> continue modified diet pureed thin liquid

## 2023-07-28 NOTE — PLAN OF CARE
Problem: Potential for Falls  Goal: Patient will remain free of falls  Description: INTERVENTIONS:  - Educate patient/family on patient safety including physical limitations  - Instruct patient to call for assistance with activity   - Consult OT/PT to assist with strengthening/mobility   - Keep Call bell within reach  - Keep bed low and locked with side rails adjusted as appropriate  - Keep care items and personal belongings within reach  - Initiate and maintain comfort rounds  - Make Fall Risk Sign visible to staff  - Offer Toileting every 2 Hours, in advance of need  - Initiate/Maintain alarm  - Obtain necessary fall risk management equipment:   - Apply yellow socks and bracelet for high fall risk patients  - Consider moving patient to room near nurses station  Outcome: Progressing     Problem: MOBILITY - ADULT  Goal: Maintain or return to baseline ADL function  Description: INTERVENTIONS:  -  Assess patient's ability to carry out ADLs; assess patient's baseline for ADL function and identify physical deficits which impact ability to perform ADLs (bathing, care of mouth/teeth, toileting, grooming, dressing, etc.)  - Assess/evaluate cause of self-care deficits   - Assess range of motion  - Assess patient's mobility; develop plan if impaired  - Assess patient's need for assistive devices and provide as appropriate  - Encourage maximum independence but intervene and supervise when necessary  - Involve family in performance of ADLs  - Assess for home care needs following discharge   - Consider OT consult to assist with ADL evaluation and planning for discharge  - Provide patient education as appropriate  Outcome: Progressing  Goal: Maintains/Returns to pre admission functional level  Description: INTERVENTIONS:  - Perform BMAT or MOVE assessment daily.   - Set and communicate daily mobility goal to care team and patient/family/caregiver.    - Collaborate with rehabilitation services on mobility goals if consulted  - Perform Range of Motion 3 times a day. - Reposition patient every 2 hours.   - Dangle patient 3 times a day  - Stand patient 3 times a day  - Ambulate patient 3 times a day  - Out of bed to chair 3 times a day   - Out of bed for meals 3 times a day  - Out of bed for toileting  - Record patient progress and toleration of activity level   Outcome: Progressing     Problem: Prexisting or High Potential for Compromised Skin Integrity  Goal: Skin integrity is maintained or improved  Description: INTERVENTIONS:  - Identify patients at risk for skin breakdown  - Assess and monitor skin integrity  - Assess and monitor nutrition and hydration status  - Monitor labs   - Assess for incontinence   - Turn and reposition patient  - Assist with mobility/ambulation  - Relieve pressure over bony prominences  - Avoid friction and shearing  - Provide appropriate hygiene as needed including keeping skin clean and dry  - Evaluate need for skin moisturizer/barrier cream  - Collaborate with interdisciplinary team   - Patient/family teaching  - Consider wound care consult   Outcome: Progressing     Problem: PAIN - ADULT  Goal: Verbalizes/displays adequate comfort level or baseline comfort level  Description: Interventions:  - Encourage patient to monitor pain and request assistance  - Assess pain using appropriate pain scale  - Administer analgesics based on type and severity of pain and evaluate response  - Implement non-pharmacological measures as appropriate and evaluate response  - Consider cultural and social influences on pain and pain management  - Notify physician/advanced practitioner if interventions unsuccessful or patient reports new pain  Outcome: Progressing     Problem: INFECTION - ADULT  Goal: Absence or prevention of progression during hospitalization  Description: INTERVENTIONS:  - Assess and monitor for signs and symptoms of infection  - Monitor lab/diagnostic results  - Monitor all insertion sites, i.e. indwelling lines, tubes, and drains  - Monitor endotracheal if appropriate and nasal secretions for changes in amount and color  - Tok appropriate cooling/warming therapies per order  - Administer medications as ordered  - Instruct and encourage patient and family to use good hand hygiene technique  - Identify and instruct in appropriate isolation precautions for identified infection/condition  Outcome: Progressing     Problem: DISCHARGE PLANNING  Goal: Discharge to home or other facility with appropriate resources  Description: INTERVENTIONS:  - Identify barriers to discharge w/patient and caregiver  - Arrange for needed discharge resources and transportation as appropriate  - Identify discharge learning needs (meds, wound care, etc.)  - Arrange for interpretive services to assist at discharge as needed  - Refer to Case Management Department for coordinating discharge planning if the patient needs post-hospital services based on physician/advanced practitioner order or complex needs related to functional status, cognitive ability, or social support system  Outcome: Progressing     Problem: Knowledge Deficit  Goal: Patient/family/caregiver demonstrates understanding of disease process, treatment plan, medications, and discharge instructions  Description: Complete learning assessment and assess knowledge base. Interventions:  - Provide teaching at level of understanding  - Provide teaching via preferred learning methods  Outcome: Progressing     Problem: Nutrition/Hydration-ADULT  Goal: Nutrient/Hydration intake appropriate for improving, restoring or maintaining nutritional needs  Description: Monitor and assess patient's nutrition/hydration status for malnutrition. Collaborate with interdisciplinary team and initiate plan and interventions as ordered. Monitor patient's weight and dietary intake as ordered or per policy. Utilize nutrition screening tool and intervene as necessary.  Determine patient's food preferences and provide high-protein, high-caloric foods as appropriate.      INTERVENTIONS:  - Monitor oral intake, urinary output, labs, and treatment plans  - Assess nutrition and hydration status and recommend course of action  - Evaluate amount of meals eaten  - Assist patient with eating if necessary   - Allow adequate time for meals  - Recommend/ encourage appropriate diets, oral nutritional supplements, and vitamin/mineral supplements  - Order, calculate, and assess calorie counts as needed  - Recommend, monitor, and adjust tube feedings and TPN/PPN based on assessed needs  - Assess need for intravenous fluids  - Provide specific nutrition/hydration education as appropriate  - Include patient/family/caregiver in decisions related to nutrition  Outcome: Progressing     Problem: SAFETY,RESTRAINT: NV/NON-SELF DESTRUCTIVE BEHAVIOR  Goal: Remains free of harm/injury (restraint for non violent/non self-detsructive behavior)  Description: INTERVENTIONS:  - Instruct patient/family regarding restraint use   - Assess and monitor physiologic and psychological status   - Provide interventions and comfort measures to meet assessed patient needs   - Identify and implement measures to help patient regain control  - Assess readiness for release of restraint   Outcome: Progressing  Goal: Returns to optimal restraint-free functioning  Description: INTERVENTIONS:  - Assess the patient's behavior and symptoms that indicate continued need for restraint  - Identify and implement measures to help patient regain control  - Assess readiness for release of restraint   Outcome: Progressing

## 2023-07-28 NOTE — SPEECH THERAPY NOTE
Speech Language/Pathology Treatment Note    Patient Name: Onofre Ochoa    Today's Date: 7/28/2023     Problem List  Principal Problem:    Encephalopathy  Active Problems:    Acquired hypothyroidism    Depression    Alzheimer's dementia [facility])    Ambulatory dysfunction    Painful orthopaedic hardware ([address])    Closed displaced fracture of right femoral neck (HCC)    Anemia    No other household member able to render care    Fall    Moderate protein-calorie malnutrition ([address])    Subarachnoid hemorrhage ([address])         Past Medical History  Past Medical History:   Diagnosis Date   • Anxiety    • Depression    • Memory loss    • Thyroid activity decreased        Past Surgical History  Past Surgical History:   Procedure Laterality Date   • BREAST BIOPSY      years ago- Benign   • JOINT REPLACEMENT Left    • MI HEMIARTHROPLASTY HIP PARTIAL Right 7/23/2023    Procedure: HEMIARTHROPLASTY HIP TOTAL with removal of hardware;  Surgeon: Gage Armendariz MD;  Location: [facility] MAIN OR;  Service: Orthopedics   • MI OPTX FEM SHFT FX W/INSJ IMED IMPLT W/WO SCREW Right 5/9/2019    Procedure: INSERTION NAIL IM FEMUR ANTEGRADE (TROCHANTERIC), RIGHT;  Surgeon: Amy Stauffer MD;  Location: [facility] MAIN OR;  Service: Orthopedics   • MI REMOVAL IMPLANT DEEP Right 6/30/2023    Procedure: REMOVAL HARDWARE TFN DYNAMIC SCREW;  Surgeon: Amy Stauffer MD;  Location: [facility];  Service: Orthopedics         Subjective:  Pt in bed, awake, and on room air as SLP entered the room. No complaints of pain from patient. RN attempting to administer medication. Objective:  Pt re-positioned for tx with assistance from clinician and RN. Pt answering basic questions targeting self and wants/needs with clinician. Pt retrieved bolus from spoon, cup, and straw, however, pt did not posteriorly transfer bolus and initiate swallow despite verbal cues to "swallow". Clinician suctioned water and applesauce out of pt's oral cavity.        Assessment:  Unable to fully assess swallow function as pt did not transfer bolus posteriorly and initiate swallow. All PO trials were suctioned out of her oral cavity with use of Yankauer. Per conversation with PCA, pt easily accepted breakfast tray of puree and thin liquids. No pocking/oral holding reported at breakfast from PCA.        Plan/Recommendations:  -Pt not appropriate for trials of upgraded solids this date  -Continue to feed when awake/alert, check for oral holding/pocketing, ensure oral cavity is clear prior to offering another sip/bite  -ST to continue to follow

## 2023-07-28 NOTE — CASE MANAGEMENT
Case Management Discharge Planning Note    Patient name Asad Pugh  Location [address] [facility] [place] 230/-01 MRN [id number]  : 1955 Date 2023       Current Admission Date: 2023  Current Admission Diagnosis:Encephalopathy   Patient Active Problem List    Diagnosis Date Noted   • Subarachnoid hemorrhage ([address]) 2023   • Anemia 2023   • No other household member able to render care 2023   • Fall 2023   • Moderate protein-calorie malnutrition ([address]) 2023   • Encephalopathy 2023   • Closed displaced fracture of right femoral neck ([address]) 2023   • Painful orthopaedic hardware ([address]) 2023   • Ambulatory dysfunction 2023   • Alzheimer's dementia ([address]) 2023   • Primary osteoarthritis of right hip 05/10/2022   • Closed fracture of right pelvis, initial encounter ([address]) 2022   • Greater trochanteric bursitis of right hip 2019   • Aftercare following surgery of the musculoskeletal system 2019   • Parkinsonism ([address]) 2018   • Acquired hypothyroidism 2018   • Depression 2018   • Anxiety 2018      LOS (days): 9  Geometric Mean LOS (GMLOS) (days): 5.80  Days to GMLOS:-2.9     OBJECTIVE:  Risk of Unplanned Readmission Score: 15.97         Current admission status: Inpatient   Preferred Pharmacy:   [address] [address]  Phone: [phone] Fax: [phone]    Primary Care Provider: Steph Khalil DO    Primary Insurance: MEDICARE  Secondary Insurance: COMMERCIAL MISCELLANEOUS    DISCHARGE DETAILS:     Pt should be ready for discharge to [facility] tomorrow per MD. Notified facility and they will have a bed. Transport requested via [employer]. Awaiting confirmed time.

## 2023-07-28 NOTE — CASE MANAGEMENT
Case Management Discharge Planning Note    Patient name Kedar Hobbs  Location [address] [facility] [place] 230/-01 MRN [id number]  : 1955 Date 2023       Current Admission Date: 2023  Current Admission Diagnosis:Encephalopathy   Patient Active Problem List    Diagnosis Date Noted   • Subarachnoid hemorrhage ([address]) 2023   • Anemia 2023   • No other household member able to render care 2023   • Fall 2023   • Moderate protein-calorie malnutrition ([address]) 2023   • Encephalopathy 2023   • Closed displaced fracture of right femoral neck ([address]) 2023   • Painful orthopaedic hardware ([address]) 2023   • Ambulatory dysfunction 2023   • Alzheimer's dementia ([address]) 2023   • Primary osteoarthritis of right hip 05/10/2022   • Closed fracture of right pelvis, initial encounter ([address]) 2022   • Greater trochanteric bursitis of right hip 2019   • Aftercare following surgery of the musculoskeletal system 2019   • Parkinsonism ([address]) 2018   • Acquired hypothyroidism 2018   • Depression 2018   • Anxiety 2018      LOS (days): 9  Geometric Mean LOS (GMLOS) (days): 5.80  Days to GMLOS:-2.9     OBJECTIVE:  Risk of Unplanned Readmission Score: 15.97         Current admission status: Inpatient   Preferred Pharmacy:   [address] [address]  Phone: [phone] Fax: [phone]    Primary Care Provider: Clemencia Gabrer DO    Primary Insurance: MEDICARE  Secondary Insurance: COMMERCIAL MISCELLANEOUS    DISCHARGE DETAILS:    Transport at Discharge : [facility]  Dispatcher Contacted: Yes  Number/Name of Dispatcher: [facility]TS  Transported by [employer] and Unit #): [id number]  ETA of Transport (Date): 23  ETA of Transport (Time): 1200     Transfer Mode: Stretcher        IMM Given (Date):: 23  IMM Given to[de-identified] Family   Donna--sister    Additional Comments: ROCHELLE was informed that pt will be stable for dc tomorrow, 7/29. Transport arranged with [facility] for a 12pm dc on 7/29. CM notified pt's sister Meghan Morgan, pt's bedside RN Yaz, and Dora at [facility] of dc time. Facility transfer form and CMN completed. CMN signed and placed in medical record bin.     Accepting Facility Name, [address] [place] [address] : [facility]  Receiving Facility/Agency Phone Number: [phone]  Facility/Agency Fax Number: [phone]

## 2023-07-28 NOTE — DISCHARGE SUMMARY
[address] [facility]  Discharge- Huel Six 1955, 69 y.o. female MRN: [id number]  Unit/Bed#: [facility] Encounter: [id number]  Primary Care Provider: Linda Streeter DO   Date and time admitted to hospital: 7/19/2023  6:12 PM    * Encephalopathy-resolved as of 7/29/2023  Assessment & Plan  Patient presented with acute encephalopathy POA  Neurology consult appreciated  CT head negative for acute abnormality  Patient has slight gaze preference to the right  Stool for C. difficile and culture are negative  MRI brain- "1. No acute infarction, edema, or mass effect. 2.  Moderate chronic microangiopathic ischemic changes. 3.  NeuroQuant analysis was performed: Lower limits of normal hippocampal volume and enlarged inferior lateral and overall ventricular system, suggestive of global ex-vacuo dilatation.  The additional finding of an abnormal Hippocampal Occupancy Score, (HOC), is concerning for a mesial temporal lobe focused Neurodegenerative process.  Recommend reevaluation with Neuroquant imaging in 6 months. MRI consistent with Alzheimer's dementia. Neurology on board  7/24 verbally not responding can not follow commands, pupil reflex intact, awake   CT head showing small volume subarachnoid hemorrhage, neuro following, CTA head and neck showing AVM.   Neurosurgery s/o  7/24 MRI showing stable hge EEG showing no seizure resumed lovenox   GI following  Mentation improving  ST re-evaluation-> continue modified diet pureed thin liquid      Fall  Assessment & Plan  · Found on bedroom floor by sister  · CT head and CT C-spine negative for acute findings  · No signs of trauma otherwise on patient's body, no complaints of pain or tenderness on palpation  · PT, OT, and CM on board    Closed displaced fracture of right femoral neck [facility])  Assessment & Plan  Patient with right hip femoral neck fracture with retained hardware  Patient had followed up on July 13 with Dr. Maurisio Lamb and was recommended to follow-up with Dr. Lopez Fitting scheduled for surgery as outpatient  Orthopedic consult appreciated  Post-op 7/23 Right - ARTHROPLASTY HIP TOTAL with removal of hardware  Discussed with patient and sister  Patient is at intermediate risk for the procedure  Pain management as needed  Encourage incentive spirometer use  PT/OT after surgery    Follow-up with Ortho in 2 weeks discharged with aspirin 81 mg twice daily for 6 weeks  Dispo to [facility]    Subarachnoid hemorrhage ([address])  Assessment & Plan  See mgx above    Moderate protein-calorie malnutrition ([address])  Assessment & Plan  Malnutrition Findings:   Adult Malnutrition type: Chronic illness  Adult Degree of Malnutrition: Malnutrition of moderate degree  Malnutrition Characteristics: Fat loss, Muscle loss                  360 Statement: Pt presents with moderate protein calorie malnutrution as evidenced by prominent clavicle bone and sunken orbitals. Treat with Pureed Nectar Thick Liquids and Magic Cup BID. BMI Findings: Body mass index is 18.67 kg/m².    Patient has moderate protein calorie malnutrition in setting of chronic illness as evidenced by prominent clavicle bone, sunken orbitals  Nutrition consult and recommendations    No other household member able to render care  Assessment & Plan  · Found on bedroom floor by sister at 54 on 7/19, sister unable to get her up so sister went to sleep  · Sister woke at 63 and called EMS at that time  · CT head and CT C-spine negative for acute findings  · Patient without tenderness or signs of trauma throughout remainder of body  · PT, OT, and CM consults for placement  · She will require rehab placement at discharge    Anemia  Assessment & Plan  · Hemoglobin 10.8 on admission  · Baseline hemoglobin appears to be 9-10  · Trend CBC daily    Painful orthopaedic hardware [facility])  Assessment & Plan  · S/p removal of orthopedic screw on right on 6/30/23 by ortho, Dr. Nicole Wall   · Pain on Percocet at home  · Oxy 2.5 as needed ordered while inpatient  · See above    Ambulatory dysfunction  Assessment & Plan  Ambulatory dysfunction POA in setting of dementia evidenced by inability to stand after a fall. CT is negative for any acute findings  Patient was noticed to have gaze preference  MRI brain as above  See above    Alzheimer's dementia [facility])  Assessment & Plan  · Only oriented to self on admission  · Sister reportedly called nursing staff that this is patient's baseline since surgery on 6/30  · PT, OT, and CM consulted  · Home medications: Risperdal 1 Mg 4 times daily and Remeron 30 mg at bedtime  · Risperidone was decreased to 1 mg twice daily  · Continue home medications    Depression  Assessment & Plan  · Continue home Zoloft 50 Mg daily and Wellbutrin 150 mg twice daily    Acquired hypothyroidism  Assessment & Plan  · Continue home Synthroid 50 mcg daily    Elevated CK-resolved as of 7/24/2023  Assessment & Plan  ·   · Resolved     Medical Problems     Resolved Problems  Date Reviewed: 7/23/2023          Resolved    Elevated CK 7/24/2023     Resolved by  Ana Cristina Sellers MD    * (Principal) Encephalopathy 7/29/2023     Resolved by  Ana Cristina Sellers MD        Discharging Physician / Practitioner: Ana Cristina Sellers MD  PCP: Bharath Bell DO  Admission Date:   Admission Orders (From admission, onward)     Ordered        07/19/23 2132  INPATIENT ADMISSION  Once                      Discharge Date: 07/29/23    Consultations During Hospital Stay:  · Neurology  · Ortho  · Neurosurgery  · Gastroenterology  · Case management  · PT  · OT  · Nutrition  · ST    Procedures Performed:   CT head wo contrast   Final Result      Stable small focus of right parietal subarachnoid hemorrhage. No new hemorrhage. Stable advanced chronic microangiopathy. Stable mild ventriculomegaly, correlate for NPH. Workstation performed: [id number]         XR abdomen 1 view kub   Final Result      Enteric tube with tip overlying the stomach. Workstation performed: [id number]         MRI brain wo contrast   Final Result         1. Stable punctate focus of acute hemorrhage in the right posterior parietal region. More laterally located foci of stable, likely subacute to chronic subarachnoid hemorrhage in the right inferior parietal and superior temporal region. No mass effect or    vasogenic edema. 2. No evidence of acute infarct. Workstation performed: [id number]         CTA head and neck w wo contrast   Final Result      Unchanged acute trace subarachnoid hemorrhage in right posterior parietal region. Stable mild ventriculomegaly out of proportion to degree of cerebral atrophy. Question normal pressure hydrocephalus. Question small arteriovenous fistula in right anteromedial orbital region. Recommend consultation with the [facility], a division of [address] [place] [address] Neuroscience at [phone]. Negative CTA head and neck for large vessel occlusion, dissection, aneurysm, or high-grade stenosis. Additional chronic/incidental findings as detailed above. The study was marked in [facility] for immediate notification. Workstation performed: [id number]         CT head wo contrast   Final Result      New sulcal hyperdensity in the right posterior parietal lobe suspicious for small volume subarachnoid hemorrhage new since the prior CT of July 19, 2023. Review of the recent MRI from July 21, 2023 demonstrates hemosiderin deposition in sulci slightly    more anterior to the current finding. The possibility of recurrent subarachnoid hemorrhage in this location should be considered. The study was marked in [facility] for immediate notification. Workstation performed: [id number]         XR chest portable   Final Result      No acute cardiopulmonary disease.                   Workstation performed: [id number]         XR pelvis ap only 1 or 2 vw   Final Result      Status post right hip arthroplasty with no acute findings. Workstation performed: [id number]         XR hip/pelv 2-3 vws right if performed   Final Result      Again demonstration of femoral neck fracture with varus angulation status post removal of dynamic hip screw. Some lucency along the fracture line may represent mild interval displacement versus resorptive change. Trochanteric nail remains in place. Workstation performed: [id number]         MRI brain NeuroQuant wo contrast   Final Result      1. No acute infarction, edema, or mass effect. 2.  Moderate chronic microangiopathic ischemic changes. 3.  NeuroQuant analysis was performed: Lower limits of normal hippocampal volume and enlarged inferior lateral and overall ventricular system, suggestive of global ex-vacuo dilatation. The additional finding of an abnormal Hippocampal Occupancy Score,    ([address]), is concerning for a mesial temporal lobe focused Neurodegenerative process. Recommend reevaluation with Neuroquant imaging in 6 months. Workstation performed: [id number]         CT head wo contrast   Final Result      No acute intracranial abnormality. Workstation performed: [id number]         CT spine cervical without contrast   Final Result      No cervical spine fracture or traumatic malalignment. Workstation performed: [id number]         CT head wo contrast    (Results Pending)     · 7/23: Right arthroplasty hip total with removal of hardware  · 7/24 EEG: This abnormal study was consistent with moderate cerebral dysfunction. No electrographic seizures, interictal epileptiform discharges (seizure tendency) or focal slowing were seen. No diagnostic clinical events were captured. Significant Findings / Test Results:   · See above    Incidental Findings:   · See above   · I reviewed the above mentioned incidental findings with the patient and/or family and they expressed understanding.     Test Results Pending at Discharge (will require follow up):   · none     Outpatient Tests Requested:  · None     Complications: Patient developed encephalopathy postop due to anesthesia    Reason for Admission: Encephalopathy    Hospital Course:   Zhou Ferro is a 69 y.o. female patient who originally presented to the hospital on 7/19/2023 due to a fall. Sister found patient on the floor and was altered. EMS was then called and brought to the ED. Imaging on admission was negative for acute pathology. PT OT were consulted. She had an elevated CK and IV fluids were initiated. Also on admission she was found to have acute encephalopathy neurology was consulted MRI was done and risperidone was decreased to 1 mg twice daily. MRI showed the results above. EEG showed dementia. And  was decreased patient's mentation gradually started to improve. On 7/23/2024 patient underwent right hip total arthroplasty with removal of hardware. Patient then developed encephalopathy again and work-up was done neurology was reconsulted. CT head showed possible subarachnoid hemorrhage and MRI was done. Neuro surgery was consulted. As the bleed had remained stable patient was able to return to Lovenox DVT prophylaxis. Mentation gradually started to improve. On day of discharge patient was almost back to baseline. She has otherwise been hemodynamically stable stable afebrile in no acute respiratory distress. She is to follow-up with her PCP and Ortho. She will continue risperidone 1 mg twice daily and is to continue aspirin for 42 days. She is to continue a puréed thin liquid diet. She will be going to [facility] for rehab. Please see above list of diagnoses and related plan for additional information. Condition at Discharge: stable    Discharge Day Visit / Exam:   Subjective: Karolyn Pham was seen and examined at bedside. No acute events overnight. Patient is a poor historian.   Is alert awake smiling following commands  Vitals: Blood Pressure: 131/60 (07/29/23 0728)  Pulse: 69 (07/29/23 0728)  Temperature: 98.2 °F (36.8 °C) (07/28/23 2037)  Temp Source: Temporal (07/28/23 2037)  Respirations: 16 (07/29/23 0728)  Height: 5' 8" (172.7 cm) (07/19/23 2206)  Weight - Scale: 55.7 kg (122 lb 12.7 oz) (07/19/23 2206)  SpO2: 100 % (07/29/23 0728)  Exam:   Physical Exam   Vitals and nursing note reviewed. Constitutional:       General: She is not in acute distress.     Appearance: She is normal weight. She is not ill-appearing.      Comments: awoke to name being called, alert able to respond to some questions  HENT:      Head: Normocephalic and atraumatic. Cardiovascular:      Rate and Rhythm: Normal rate and regular rhythm.      Pulses: Normal pulses.      Heart sounds: Normal heart sounds. Pulmonary:      Effort: Pulmonary effort is normal.      Breath sounds: Normal breath sounds. Abdominal:      General: Abdomen is flat. Bowel sounds are normal.      Palpations: Abdomen is soft. Musculoskeletal:      Right lower leg: No edema.      Left lower leg: No edema. Skin:     General: Skin is warm. Neurological:      General: No focal deficit present.      Mental Status: She is disoriented. Discussion with Family: Updated  (sister) via phone. Discharge instructions/Information to patient and family:   See after visit summary for information provided to patient and family. Provisions for Follow-Up Care:  See after visit summary for information related to follow-up care and any pertinent home health orders. Disposition:   Acute Rehab at [facility]    Planned Readmission: no     Discharge Statement:  I spent 35 minutes discharging the patient. This time was spent on the day of discharge. I had direct contact with the patient on the day of discharge.  Greater than 50% of the total time was spent examining patient, answering all patient questions, arranging and discussing plan of care with patient as well as directly providing post-discharge instructions. Additional time then spent on discharge activities. Discharge Medications:  See after visit summary for reconciled discharge medications provided to patient and/or family.       **Please Note: This note may have been constructed using a voice recognition system**

## 2023-07-28 NOTE — ASSESSMENT & PLAN NOTE
Patient with right hip femoral neck fracture with retained hardware  Patient had followed up on July 13 with Dr. Mary Jane Martin and was recommended to follow-up with Dr. Santoyo Ask scheduled for surgery as outpatient  Orthopedic consult appreciated  Post-op 7/23 Right - ARTHROPLASTY HIP TOTAL with removal of hardware  Discussed with patient and sister  Patient is at intermediate risk for the procedure  Pain management as needed  Encourage incentive spirometer use  PT/OT after surgery    Follow-up with Ortho in 2 weeks discharged with aspirin 81 mg twice daily for 6 weeks  Dispo to [facility]

## 2023-07-28 NOTE — OCCUPATIONAL THERAPY NOTE
Occupational Therapy Treatment Note    Patient Name: Renetta Navarrete  HRTYV'H Date: 7/28/2023 07/28/23 1215   OT Last Visit   OT Visit Date 07/28/23   Note Type   Note Type Treatment   Pain Assessment   Pain Assessment Tool FLACC   Pain Rating: FLACC (Rest) - Face 0   Pain Rating: FLACC (Rest) - Legs 0   Pain Rating: FLACC (Rest) - Activity 0   Pain Rating: FLACC (Rest) - Cry 0   Pain Rating: FLACC (Rest) - Consolability 0   Score: FLACC (Rest) 0   Pain Rating: FLACC (Activity) - Face 0   Pain Rating: FLACC (Activity) - Legs 0   Pain Rating: FLACC (Activity) - Activity 0   Pain Rating: FLACC (Activity) - Cry 0   Pain Rating: FLACC (Activity) - Consolability 0   Score: FLACC (Activity) 0   Restrictions/Precautions   Weight Bearing Precautions Per Order Yes   RLE Weight Bearing Per Order WBAT   Other Precautions Cognitive; Bed Alarm; Fall Risk   ADL   Eating Assistance 1  Total Assistance   Eating Comments Pt is currently requiring a full feed from nursing staff. Setup w PCA for lunch at end of session. Toileting Assistance  1  Total Assistance   Toileting Deficit Perineal hygiene  (& change pads/wick)   Bed Mobility   Additional Comments Dependent X2 to be repositioned in bed for feeding.    Therapeutic Exercise - ROM   UE-ROM Yes   ROM- Right Upper Extremities   R Shoulder PROM   R Elbow PROM   R Hand PROM   ROM - Left Upper Extremities    L Shoulder PROM   L Elbow PROM   L Hand PROM   Cognition   Overall Cognitive Status Impaired   Arousal/Participation Lethargic   Attention Difficulty attending to directions   Orientation Level Unable to assess   Memory Unable to assess   Following Commands Unable to follow one step commands   Activity Tolerance   Activity Tolerance Patient limited by fatigue   Medical Staff Made Aware NIKITA Meier, PCA   Assessment   Assessment Patient participated in Skilled OT session this date with interventions consisting of ADL re training with the use of correct body mechnaics,  bed mobility*, therapeutic exercise to: increase functional use of BUEs, increase BUE muscle strength  and  therapeutic activities to: increase activity tolerance . Patient agreeable to OT treatment session, upon arrival patient was found supine in bed. Treatment session as follows: Pt needing to be repositioned in order for feeding. Pt says "hi" upon introduction, but offers no other communication or direction following. Dx2 to reposition. PROM performed to B/L UEs. Total A for feeding. Patient continues to be functioning below baseline level, occupational performance remains limited secondary to factors listed above and increased risk for falls and injury. The patient's raw score on the AM-PAC Daily Activity inpatient short form is 6, low fx standardized score is 14.9  , less than 39.4. Patients at this level are likely to benefit from DC to post-acute rehabilitation services. From OT standpoint, recommendation at time of d/c would be level 2. Patient to benefit from continued Occupational Therapy treatment while in the hospital to address deficits as defined above and maximize level of functional independence with ADLs and functional mobility. . Pt left with PCA.    Plan   OT Treatment Day 1   Recommendation   UB Rehab Discharge Recommendation (PT/OT) Level 2   AM-PAC Daily Activity Inpatient   Lower Body Dressing 1   Bathing 1   Toileting 1   Upper Body Dressing 1   Grooming 1   Eating 1   Daily Activity Raw Score 6   Turning Head Towards Sound 2   Follow Simple Instructions 1   Low Function Daily Activity Raw Score 9   Low Function Daily Activity Standardized Score  14.9   AM-PAC Applied Cognition Inpatient   Following a Speech/Presentation 1   Understanding Ordinary Conversation 1   Taking Medications 1   Remembering Where Things Are Placed or Put Away 1   Remembering List of 4-5 Errands 1   Taking Care of Complicated Tasks 1   Applied Cognition Raw Score 6   Applied Cognition Standardized Score 7.69 [employer], MS, OTR/L

## 2023-07-28 NOTE — PLAN OF CARE
Problem: Potential for Falls  Goal: Patient will remain free of falls  Description: INTERVENTIONS:  - Educate patient/family on patient safety including physical limitations  - Instruct patient to call for assistance with activity   - Consult OT/PT to assist with strengthening/mobility   - Keep Call bell within reach  - Keep bed low and locked with side rails adjusted as appropriate  - Keep care items and personal belongings within reach  - Initiate and maintain comfort rounds  - Make Fall Risk Sign visible to staff  - Offer Toileting every 2 Hours, in advance of need  - Initiate/Maintain bed alarm  - Obtain necessary fall risk management equipment: bed alarm, non- slip socks  - Apply yellow socks and bracelet for high fall risk patients  - Consider moving patient to room near nurses station  Outcome: Progressing     Problem: MOBILITY - ADULT  Goal: Maintain or return to baseline ADL function  Description: INTERVENTIONS:  -  Assess patient's ability to carry out ADLs; assess patient's baseline for ADL function and identify physical deficits which impact ability to perform ADLs (bathing, care of mouth/teeth, toileting, grooming, dressing, etc.)  - Assess/evaluate cause of self-care deficits   - Assess range of motion  - Assess patient's mobility; develop plan if impaired  - Assess patient's need for assistive devices and provide as appropriate  - Encourage maximum independence but intervene and supervise when necessary  - Involve family in performance of ADLs  - Assess for home care needs following discharge   - Consider OT consult to assist with ADL evaluation and planning for discharge  - Provide patient education as appropriate  Outcome: Progressing  Goal: Maintains/Returns to pre admission functional level  Description: INTERVENTIONS:  - Perform BMAT or MOVE assessment daily.   - Set and communicate daily mobility goal to care team and patient/family/caregiver.    - Collaborate with rehabilitation services on mobility goals if consulted  - Perform Range of Motion 3 times a day. - Reposition patient every 2 hours.   - Dangle patient 3 times a day  - Record patient progress and toleration of activity level   Outcome: Progressing     Problem: Prexisting or High Potential for Compromised Skin Integrity  Goal: Skin integrity is maintained or improved  Description: INTERVENTIONS:  - Identify patients at risk for skin breakdown  - Assess and monitor skin integrity  - Assess and monitor nutrition and hydration status  - Monitor labs   - Assess for incontinence   - Turn and reposition patient  - Assist with mobility/ambulation  - Relieve pressure over bony prominences  - Avoid friction and shearing  - Provide appropriate hygiene as needed including keeping skin clean and dry  - Evaluate need for skin moisturizer/barrier cream  - Collaborate with interdisciplinary team   - Patient/family teaching  - Consider wound care consult   Outcome: Progressing     Problem: PAIN - ADULT  Goal: Verbalizes/displays adequate comfort level or baseline comfort level  Description: Interventions:  - Encourage patient to monitor pain and request assistance  - Assess pain using appropriate pain scale  - Administer analgesics based on type and severity of pain and evaluate response  - Implement non-pharmacological measures as appropriate and evaluate response  - Consider cultural and social influences on pain and pain management  - Notify physician/advanced practitioner if interventions unsuccessful or patient reports new pain  Outcome: Progressing     Problem: INFECTION - ADULT  Goal: Absence or prevention of progression during hospitalization  Description: INTERVENTIONS:  - Assess and monitor for signs and symptoms of infection  - Monitor lab/diagnostic results  - Monitor all insertion sites, i.e. indwelling lines, tubes, and drains  - Monitor endotracheal if appropriate and nasal secretions for changes in amount and color  - [place] appropriate cooling/warming therapies per order  - Administer medications as ordered  - Instruct and encourage patient and family to use good hand hygiene technique  - Identify and instruct in appropriate isolation precautions for identified infection/condition  Outcome: Progressing     Problem: DISCHARGE PLANNING  Goal: Discharge to home or other facility with appropriate resources  Description: INTERVENTIONS:  - Identify barriers to discharge w/patient and caregiver  - Arrange for needed discharge resources and transportation as appropriate  - Identify discharge learning needs (meds, wound care, etc.)  - Arrange for interpretive services to assist at discharge as needed  - Refer to Case Management Department for coordinating discharge planning if the patient needs post-hospital services based on physician/advanced practitioner order or complex needs related to functional status, cognitive ability, or social support system  Outcome: Progressing     Problem: Knowledge Deficit  Goal: Patient/family/caregiver demonstrates understanding of disease process, treatment plan, medications, and discharge instructions  Description: Complete learning assessment and assess knowledge base. Interventions:  - Provide teaching at level of understanding  - Provide teaching via preferred learning methods  Outcome: Progressing     Problem: Nutrition/Hydration-ADULT  Goal: Nutrient/Hydration intake appropriate for improving, restoring or maintaining nutritional needs  Description: Monitor and assess patient's nutrition/hydration status for malnutrition. Collaborate with interdisciplinary team and initiate plan and interventions as ordered. Monitor patient's weight and dietary intake as ordered or per policy. Utilize nutrition screening tool and intervene as necessary. Determine patient's food preferences and provide high-protein, high-caloric foods as appropriate.      INTERVENTIONS:  - Monitor oral intake, urinary output, labs, and treatment plans  - Assess nutrition and hydration status and recommend course of action  - Evaluate amount of meals eaten  - Assist patient with eating if necessary   - Allow adequate time for meals  - Recommend/ encourage appropriate diets, oral nutritional supplements, and vitamin/mineral supplements  - Order, calculate, and assess calorie counts as needed  - Recommend, monitor, and adjust tube feedings and TPN/PPN based on assessed needs  - Assess need for intravenous fluids  - Provide specific nutrition/hydration education as appropriate  - Include patient/family/caregiver in decisions related to nutrition  Outcome: Progressing

## 2023-07-28 NOTE — ASSESSMENT & PLAN NOTE
· S/p removal of orthopedic screw on right on 6/30/23 by Dr. Aristeo bradshaw Litter   · Pain on Percocet at home  · Oxy 2.5 as needed ordered while inpatient  · See above

## 2023-07-29 VITALS
RESPIRATION RATE: 16 BRPM | DIASTOLIC BLOOD PRESSURE: 60 MMHG | OXYGEN SATURATION: 100 % | HEIGHT: 68 IN | WEIGHT: 122.8 LBS | TEMPERATURE: 98.2 F | HEART RATE: 69 BPM | SYSTOLIC BLOOD PRESSURE: 131 MMHG | BODY MASS INDEX: 18.61 KG/M2

## 2023-07-29 PROBLEM — G93.40 ENCEPHALOPATHY: Status: RESOLVED | Noted: 2023-07-20 | Resolved: 2023-07-29

## 2023-07-29 LAB
BACTERIA BLD CULT: NORMAL
BACTERIA BLD CULT: NORMAL
HCT VFR BLD AUTO: 24.9 % (ref 34.8–46.1)
HGB BLD-MCNC: 7.8 G/DL (ref 11.5–15.4)

## 2023-07-29 PROCEDURE — 85018 HEMOGLOBIN: CPT | Performed by: STUDENT IN AN ORGANIZED HEALTH CARE EDUCATION/TRAINING PROGRAM

## 2023-07-29 PROCEDURE — 99239 HOSP IP/OBS DSCHRG MGMT >30: CPT | Performed by: STUDENT IN AN ORGANIZED HEALTH CARE EDUCATION/TRAINING PROGRAM

## 2023-07-29 PROCEDURE — 85014 HEMATOCRIT: CPT | Performed by: STUDENT IN AN ORGANIZED HEALTH CARE EDUCATION/TRAINING PROGRAM

## 2023-07-29 RX ORDER — OXYCODONE HYDROCHLORIDE 5 MG/1
2.5 TABLET ORAL EVERY 6 HOURS PRN
Qty: 5 TABLET | Refills: 0 | Status: SHIPPED | OUTPATIENT
Start: 2023-07-29 | End: 2023-08-08

## 2023-07-29 RX ORDER — RISPERIDONE 1 MG/1
1 TABLET ORAL 2 TIMES DAILY
Qty: 60 TABLET | Refills: 0
Start: 2023-07-29 | End: 2023-08-11 | Stop reason: CLARIF

## 2023-07-29 RX ORDER — ASPIRIN 81 MG/1
81 TABLET, CHEWABLE ORAL DAILY
Qty: 42 TABLET | Refills: 0
Start: 2023-07-29 | End: 2023-08-11 | Stop reason: CLARIF

## 2023-07-29 RX ORDER — ACETAMINOPHEN 325 MG/1
650 TABLET ORAL EVERY 6 HOURS PRN
Qty: 30 TABLET | Refills: 0
Start: 2023-07-29 | End: 2023-08-11 | Stop reason: CLARIF

## 2023-07-29 RX ORDER — AMLODIPINE BESYLATE 5 MG/1
5 TABLET ORAL DAILY
Qty: 30 TABLET | Refills: 0
Start: 2023-07-30 | End: 2023-08-11 | Stop reason: CLARIF

## 2023-07-29 RX ORDER — LANOLIN ALCOHOL/MO/W.PET/CERES
6 CREAM (GRAM) TOPICAL
Qty: 30 TABLET | Refills: 0
Start: 2023-07-29 | End: 2023-08-11 | Stop reason: CLARIF

## 2023-07-29 RX ADMIN — AMLODIPINE BESYLATE 5 MG: 5 TABLET ORAL at 09:57

## 2023-07-29 RX ADMIN — ENOXAPARIN SODIUM 40 MG: 100 INJECTION SUBCUTANEOUS at 09:57

## 2023-07-29 RX ADMIN — SERTRALINE 50 MG: 50 TABLET, FILM COATED ORAL at 09:57

## 2023-07-29 RX ADMIN — RISPERIDONE 1 MG: 1 TABLET ORAL at 09:57

## 2023-07-29 RX ADMIN — LEVOTHYROXINE SODIUM 50 MCG: 50 TABLET ORAL at 06:34

## 2023-07-29 RX ADMIN — BUPROPION HYDROCHLORIDE 150 MG: 150 TABLET, FILM COATED, EXTENDED RELEASE ORAL at 09:57

## 2023-07-29 RX ADMIN — NYSTATIN 500000 UNITS: 100000 SUSPENSION ORAL at 09:56

## 2023-07-29 RX ADMIN — SODIUM CHLORIDE 300 MG: 0.9 INJECTION, SOLUTION INTRAVENOUS at 09:57

## 2023-07-29 NOTE — PLAN OF CARE
Problem: Potential for Falls  Goal: Patient will remain free of falls  Description: INTERVENTIONS:  - Educate patient/family on patient safety including physical limitations  - Instruct patient to call for assistance with activity   - Consult OT/PT to assist with strengthening/mobility   - Keep Call bell within reach  - Keep bed low and locked with side rails adjusted as appropriate  - Keep care items and personal belongings within reach  - Initiate and maintain comfort rounds  - Make Fall Risk Sign visible to staff  - Offer Toileting every 2 Hours, in advance of need  - Initiate/Maintain bed alarm  - Apply yellow socks and bracelet for high fall risk patients  - Consider moving patient to room near nurses station  7/29/2023 1231 by Chauncey Scott RN  Outcome: Adequate for Discharge  7/29/2023 1231 by Chauncey Scott, RN  Outcome: Adequate for Discharge

## 2023-07-29 NOTE — INCIDENTAL FINDINGS
The following findings require follow up:  Radiographic finding   Finding: 3.  NeuroQuant analysis was performed: Lower limits of normal hippocampal volume and enlarged inferior lateral and overall ventricular system, suggestive of global ex-vacuo dilatation.  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  Recommend reevaluation with Neuroquant imaging in 6 months.     Follow up required: yes    Follow up should be done within 6 month(s)    Please notify the following clinician to assist with the follow up:   Dr. Vera Olguin

## 2023-07-29 NOTE — NURSING NOTE
Iv iron infusion finished. Patient incontinent of urine. Placed in brief in anticipation for transportation at 1200. Iv removed. masimo removed. Report called to [facility]. I spoke with Ulises. Patient taken off unit by ems personnel.

## 2023-07-29 NOTE — PLAN OF CARE
Problem: Potential for Falls  Goal: Patient will remain free of falls  Description: INTERVENTIONS:  - Educate patient/family on patient safety including physical limitations  - Instruct patient to call for assistance with activity   - Consult OT/PT to assist with strengthening/mobility   - Keep Call bell within reach  - Keep bed low and locked with side rails adjusted as appropriate  - Keep care items and personal belongings within reach  - Initiate and maintain comfort rounds  - Make Fall Risk Sign visible to staff  - Offer Toileting every 2 Hours, in advance of need  - Initiate/Maintain alarm  - Obtain necessary fall risk management equipment:   - Apply yellow socks and bracelet for high fall risk patients  - Consider moving patient to room near nurses station  Outcome: Progressing     Problem: MOBILITY - ADULT  Goal: Maintain or return to baseline ADL function  Description: INTERVENTIONS:  -  Assess patient's ability to carry out ADLs; assess patient's baseline for ADL function and identify physical deficits which impact ability to perform ADLs (bathing, care of mouth/teeth, toileting, grooming, dressing, etc.)  - Assess/evaluate cause of self-care deficits   - Assess range of motion  - Assess patient's mobility; develop plan if impaired  - Assess patient's need for assistive devices and provide as appropriate  - Encourage maximum independence but intervene and supervise when necessary  - Involve family in performance of ADLs  - Assess for home care needs following discharge   - Consider OT consult to assist with ADL evaluation and planning for discharge  - Provide patient education as appropriate  Outcome: Progressing  Goal: Maintains/Returns to pre admission functional level  Description: INTERVENTIONS:  - Perform BMAT or MOVE assessment daily.   - Set and communicate daily mobility goal to care team and patient/family/caregiver.    - Collaborate with rehabilitation services on mobility goals if consulted  - Perform Range of Motion 3 times a day. - Reposition patient every 2 hours.   - Dangle patient 3 times a day  - Stand patient 3 times a day  - Ambulate patient 3 times a day  - Out of bed to chair 3 times a day   - Out of bed for meals 3 times a day  - Out of bed for toileting  - Record patient progress and toleration of activity level   Outcome: Progressing     Problem: Prexisting or High Potential for Compromised Skin Integrity  Goal: Skin integrity is maintained or improved  Description: INTERVENTIONS:  - Identify patients at risk for skin breakdown  - Assess and monitor skin integrity  - Assess and monitor nutrition and hydration status  - Monitor labs   - Assess for incontinence   - Turn and reposition patient  - Assist with mobility/ambulation  - Relieve pressure over bony prominences  - Avoid friction and shearing  - Provide appropriate hygiene as needed including keeping skin clean and dry  - Evaluate need for skin moisturizer/barrier cream  - Collaborate with interdisciplinary team   - Patient/family teaching  - Consider wound care consult   Outcome: Progressing     Problem: PAIN - ADULT  Goal: Verbalizes/displays adequate comfort level or baseline comfort level  Description: Interventions:  - Encourage patient to monitor pain and request assistance  - Assess pain using appropriate pain scale  - Administer analgesics based on type and severity of pain and evaluate response  - Implement non-pharmacological measures as appropriate and evaluate response  - Consider cultural and social influences on pain and pain management  - Notify physician/advanced practitioner if interventions unsuccessful or patient reports new pain  Outcome: Progressing     Problem: INFECTION - ADULT  Goal: Absence or prevention of progression during hospitalization  Description: INTERVENTIONS:  - Assess and monitor for signs and symptoms of infection  - Monitor lab/diagnostic results  - Monitor all insertion sites, i.e. indwelling lines, tubes, and drains  - Monitor endotracheal if appropriate and nasal secretions for changes in amount and color  - [place] appropriate cooling/warming therapies per order  - Administer medications as ordered  - Instruct and encourage patient and family to use good hand hygiene technique  - Identify and instruct in appropriate isolation precautions for identified infection/condition  Outcome: Progressing     Problem: DISCHARGE PLANNING  Goal: Discharge to home or other facility with appropriate resources  Description: INTERVENTIONS:  - Identify barriers to discharge w/patient and caregiver  - Arrange for needed discharge resources and transportation as appropriate  - Identify discharge learning needs (meds, wound care, etc.)  - Arrange for interpretive services to assist at discharge as needed  - Refer to Case Management Department for coordinating discharge planning if the patient needs post-hospital services based on physician/advanced practitioner order or complex needs related to functional status, cognitive ability, or social support system  Outcome: Progressing     Problem: Knowledge Deficit  Goal: Patient/family/caregiver demonstrates understanding of disease process, treatment plan, medications, and discharge instructions  Description: Complete learning assessment and assess knowledge base. Interventions:  - Provide teaching at level of understanding  - Provide teaching via preferred learning methods  Outcome: Progressing     Problem: Nutrition/Hydration-ADULT  Goal: Nutrient/Hydration intake appropriate for improving, restoring or maintaining nutritional needs  Description: Monitor and assess patient's nutrition/hydration status for malnutrition. Collaborate with interdisciplinary team and initiate plan and interventions as ordered. Monitor patient's weight and dietary intake as ordered or per policy. Utilize nutrition screening tool and intervene as necessary.  Determine patient's food preferences and provide high-protein, high-caloric foods as appropriate.      INTERVENTIONS:  - Monitor oral intake, urinary output, labs, and treatment plans  - Assess nutrition and hydration status and recommend course of action  - Evaluate amount of meals eaten  - Assist patient with eating if necessary   - Allow adequate time for meals  - Recommend/ encourage appropriate diets, oral nutritional supplements, and vitamin/mineral supplements  - Order, calculate, and assess calorie counts as needed  - Recommend, monitor, and adjust tube feedings and TPN/PPN based on assessed needs  - Assess need for intravenous fluids  - Provide specific nutrition/hydration education as appropriate  - Include patient/family/caregiver in decisions related to nutrition  Outcome: Progressing

## 2023-08-04 PROBLEM — R09.02 HYPOXIA: Status: ACTIVE | Noted: 2023-08-04

## 2023-08-04 PROBLEM — N17.9 ACUTE RENAL FAILURE (ARF) (HCC): Status: ACTIVE | Noted: 2023-08-04

## 2023-08-04 PROBLEM — I26.99 PULMONARY EMBOLISM (HCC): Status: ACTIVE | Noted: 2023-08-04

## 2023-08-04 PROBLEM — Z71.89 GOALS OF CARE, COUNSELING/DISCUSSION: Status: ACTIVE | Noted: 2023-08-04

## 2023-08-04 PROBLEM — R41.82 ALTERED MENTAL STATUS: Status: ACTIVE | Noted: 2023-08-04

## 2023-08-04 PROBLEM — E87.20 LACTIC ACIDOSIS: Status: ACTIVE | Noted: 2023-08-04

## 2023-08-04 NOTE — ED CARE HANDOFF
Emergency Department Sign Out Note        Sign out and transfer of care from Dr. Aj Syed. See Separate Emergency Department note. The patient, Jared Matamoros, was evaluated by the previous provider for unresponsive episode. Workup Completed:  Patient found to have PEs on CTA head/neck. CT c/a/p pending. Patient being evaluated by critical care team to determine disposition. ED Course / Workup Pending (followup): Patient deemed appropriate for med/surg tele. VTE heparin ordered. Stroke Assessment     Row Name 08/04/23 1333             NIH Stroke Scale    Interval Baseline      Level of Consciousness (1a.) 1      LOC Questions (1b.) 0      LOC Commands (1c.) 0      Best Gaze (2.) 0      Visual (3.) 0      Facial Palsy (4.) 3      Motor Arm, Left (5a.) 1      Motor Arm, Right (5b.) 1      Motor Leg, Left (6a.) 4      Motor Leg, Right (6b.) 4      Limb Ataxia (7.) 2      Sensory (8.) 2      Best Language (9.) 3      Dysarthria (10.) 2      Extinction and Inattention (11.) (Formerly Neglect) 2      Total 25                                       Procedures  MDM        Disposition  Final diagnoses:   Unresponsive episode   Acute hyperkalemia   Pulmonary embolism ([address])     Time reflects when diagnosis was documented in both MDM as applicable and the Disposition within this note     Time User Action Codes Description Comment    8/4/2023  1:29 PM Rafat Joseph Add [R41.89] Unresponsive episode     8/4/2023  2:42 PM Marmarek Westata Add [E87.5] Acute hyperkalemia     8/4/2023  3:57 PM Isaias Ferrera Add [R29.90] Stroke-like symptom     8/4/2023  4:59 PM Marmarek Joseph Add [I26.99] Pulmonary embolism [facility])       ED Disposition     ED Disposition   Admit    Condition   Stable    Date/Time   Fri Aug 4, 2023  5:48 PM    Comment   Case was discussed with [place] and the patient's admission status was agreed to be Admission Status: inpatient status to the service of Dr. Guy Moore .            Follow-up Information    None Patient's Medications   Discharge Prescriptions    NALOXONE (NARCAN) 2 MG/2ML INJECTION    Inject 2 mL (2 mg total) into a catheter in a vein once for 1 dose       Start Date: 8/4/2023  End Date: 8/4/2023       Order Dose: 2 mg       Quantity: 2 mL    Refills: 0     No discharge procedures on file.        ED Provider  Electronically Signed by     Diego House MD  08/04/23 8393

## 2023-08-04 NOTE — Clinical Note
Case was discussed with D[place] and the patient's admission status was agreed to be Admission Status: inpatient status to the service of Dr. Greg Holland .

## 2023-08-04 NOTE — QUICK NOTE
Received text from ER physician to evaluate need for step down due to PE per CTA head. Requested CT chest to evaluate PE better. Patient in MRI now due to initially being called a stroke alert. Patient is baseline dementia. Per ER attending, Dr. Brent Muller neurology stated MRI negative for stroke and can treat with anticoagulation for PE from a neuro standpoint. Will await CT chest results to determine appropriate level of care.

## 2023-08-04 NOTE — QUICK NOTE
Progress Note - Triage Asssessment   Kesha Minor 77 y.o. female MRN: [id number]    Time Called ( Time): 4489  Date Called: 08/04/23  Room#: ED 2  Person requesting evaluation: Dr. Maty Morales    Situation:    Has a history of dementia with a subarachnoid hemorrhage from an unknown etiology. She recently was admitted to a nursing facility with a last known normal.  This morning at 7 AM, she was found to have altered mental status. Stroke alert was called in the emergency room. CT head was negative. CTA was also negative but showed bilateral upper lobe PEs. Neurology confirmed that no stroke pathway was needed but ordered an MRI. MRI was negative and neurology said it was okay for anticoagulation from their standpoint. Patient has had normal hemodynamics with systolic blood pressures 943K. She has an FRANCISCO JAVIER, lactic 5.5 with no source of infection, and initial troponin of 778 that is downtrending to 663. CT chest was done with results pending. Discussion had with patient's sister at the bedside who is her POA. She confirmed that the patient is a DNR/DNI. She states at baseline, she is able to articulate some small words but her communication has deteriorated significantly over the last few months. Case discussed with ICU attending, Dr. Luis Powers was in agreement that the patient does not need stepdown 1/critical care. Interventions:   Admit to SLIM service         Triage Assessment:     Patient can be admitted to med-surg level of care    Recommendations discussed with Dr. Luis Powers, Dr. Maty Morales and Dr. Baldo Bull. Dr. Jonah Box from [facility] updated and will admit.

## 2023-08-04 NOTE — ASSESSMENT & PLAN NOTE
25-year-old female with dementia, prior SAH of unknown etiology, depression and hypothyroidism who presents as a stroke alert from her nursing facility for altered mental status and decreased verbal output. Unclear last known well, patient woke up at 7 AM for breakfast and appeared to not be as verbal as she typically is. She also appeared pale during lunchtime and EMS was called for further evaluation. BP on arrival 97/58. NIH 21. Patient was not a candidate for IV TNK secondary to unclear last known well. Work-up:  - CTH negative for acute intracranial abnormality, prior [facility] previously seen on imaging in July 2023 no longer seen  - CTA head/neck negative for significant stenosis, LVO or anuerysm, however showed acute PE in bilateral upper lobes. - STAT MRI brain wo unremarkable for acute stroke per attending neurologist, however final read pending  - Labs on admission significant for hyperkalemia (5.9), elevated creatinine (2.73), leukocytosis (11.55) and elevated initial troponin (788). Plan:  - No need for stroke pathway  - Recommend rule out infection/other etiologies for altered mental status  - Okay to treat PE with blood thinner (i.e heparin) from a neuro standpoint as neuroimaging is negative for acute ischemia  - No further neuroimaging needed at this time  - Medical management and supportive care per primary team. Correction of any metabolic or infectious disturbances. Plan discussed with Attending Neurologist, please see attestation for further input/recommendations.

## 2023-08-04 NOTE — ED PROVIDER NOTES
History  Chief Complaint   Patient presents with   • Altered Mental Status     Hx from paramedics brought in for unresponsive episode last nl this morning - now not speaking. They gave narcan as her records show she takes oxycodone. She became slightest bit more alert. They gave fluids because she was hypotensive SBP in 70s. . Prior to Admission Medications   Prescriptions Last Dose Informant Patient Reported? Taking? Calcium-Magnesium-Vitamin D (CALCIUM 500 PO)  Self Yes No   Sig: Take by mouth   Cholecalciferol (VITAMIN D3) 5000 units CAPS  Self Yes No   Sig: Take by mouth   Multiple Vitamins-Minerals (CENTRUM SILVER 50+WOMEN PO)  Self Yes No   Sig: Take 1 tablet by mouth daily   acetaminophen (TYLENOL) 325 mg tablet   No No   Sig: Take 2 tablets (650 mg total) by mouth every 6 (six) hours as needed for mild pain   amLODIPine (NORVASC) 5 mg tablet   No No   Sig: Take 1 tablet (5 mg total) by mouth daily Do not start before 2023.    aspirin 81 mg chewable tablet   No No   Sig: Chew 1 tablet (81 mg total) daily   buPROPion (WELLBUTRIN SR) 150 mg 12 hr tablet  Self Yes No   Si (two) times a day     levothyroxine 50 mcg tablet   Yes No   Sig: Take 50 mcg by mouth every morning   melatonin 3 mg   No No   Sig: Take 2 tablets (6 mg total) by mouth daily at bedtime   mirtazapine (REMERON) 30 mg tablet  Self Yes No   Si mg daily at bedtime     oxyCODONE (ROXICODONE) 5 immediate release tablet   No No   Sig: Take 0.5 tablets (2.5 mg total) by mouth every 6 (six) hours as needed for moderate pain for up to 10 days Max Daily Amount: 10 mg   risperiDONE (RisperDAL) 1 mg tablet   No No   Sig: Take 1 tablet (1 mg total) by mouth 2 (two) times a day   sertraline (ZOLOFT) 50 mg tablet  Self Yes No   Si mg daily with breakfast        Facility-Administered Medications: None       Past Medical History:   Diagnosis Date   • Anxiety    • Depression    • Memory loss    • Thyroid activity decreased        Past Surgical History:   Procedure Laterality Date   • BREAST BIOPSY      years ago- Benign   • JOINT REPLACEMENT Left    • NH HEMIARTHROPLASTY HIP PARTIAL Right 7/23/2023    Procedure: HEMIARTHROPLASTY HIP TOTAL with removal of hardware;  Surgeon: Chuy Recinos MD;  Location: [facility] MAIN OR;  Service: Orthopedics   • NH OPTX FEM SHFT FX W/INSJ IMED IMPLT W/WO SCREW Right 5/9/2019    Procedure: INSERTION NAIL IM FEMUR ANTEGRADE (TROCHANTERIC), RIGHT;  Surgeon: Susan Cotter MD;  Location:  MAIN OR;  Service: Orthopedics   • NH REMOVAL IMPLANT DEEP Right 6/30/2023    Procedure: REMOVAL HARDWARE TFN DYNAMIC SCREW;  Surgeon: Susan Cotter MD;  Location: [facility]IN OR;  Service: Orthopedics       Family History   Problem Relation Age of Onset   • Hypertension Mother    • Parkinsonism Mother 80        memory issues    • Depression Mother    • Melanoma Father    • No Known Problems Maternal Grandmother    • No Known Problems Maternal Grandfather    • No Known Problems Paternal Grandmother    • Heart attack Paternal Grandfather    • Depression Other    • Skin cancer Sister    • Breast cancer Maternal Aunt         over age 48   • No Known Problems Paternal Aunt    • No Known Problems Sister      I have reviewed and agree with the history as documented. E-Cigarette/Vaping   • E-Cigarette Use Never User      E-Cigarette/Vaping Substances     Social History     Tobacco Use   • Smoking status: Never   • Smokeless tobacco: Never   Vaping Use   • Vaping Use: Never used   Substance Use Topics   • Alcohol use: Not Currently   • Drug use: No       Review of Systems   Unable to perform ROS: Mental status change   Skin: Negative for rash. All other systems reviewed and are negative. Physical Exam  Physical Exam  Vitals and nursing note reviewed. Constitutional:       Appearance: She is ill-appearing. Comments: staring   HENT:      Head: Normocephalic and atraumatic.       Comments: Patient resists tongue depressor exam     Right Ear: External ear normal.      Left Ear: External ear normal.      Nose: Nose normal.   Eyes:      Extraocular Movements: Extraocular movements intact. Conjunctiva/sclera: Conjunctivae normal.      Pupils: Pupils are equal, round, and reactive to light. Cardiovascular:      Rate and Rhythm: Normal rate and regular rhythm. Heart sounds: Normal heart sounds. Pulmonary:      Effort: Pulmonary effort is normal. No respiratory distress. Breath sounds: Normal breath sounds. No wheezing. Abdominal:      General: Bowel sounds are normal. There is no distension. Palpations: Abdomen is soft. Tenderness: There is no abdominal tenderness. Musculoskeletal:         General: No deformity. Cervical back: Normal range of motion and neck supple. No spinous process tenderness. Skin:     General: Skin is warm and dry. Coloration: Skin is pale. Findings: No rash. Neurological:      General: No focal deficit present. Mental Status: She is alert. GCS: GCS eye subscore is 4. GCS verbal subscore is 3. GCS motor subscore is 6. Cranial Nerves: Facial asymmetry present.       Comments: Leans to right, slight facial droop noted    Generalized weakness - holds both arms up but then drift down, can't lift either leg   Psychiatric:         Mood and Affect: Mood normal.         Vital Signs  ED Triage Vitals [08/04/23 1323]   Temperature Pulse Respirations Blood Pressure SpO2   97.8 °F (36.6 °C) 60 18 97/58 94 %      Temp Source Heart Rate Source Patient Position - Orthostatic VS BP Location FiO2 (%)   Temporal Monitor Sitting Right arm --      Pain Score       --           Vitals:    08/04/23 1445 08/04/23 1500 08/04/23 1530 08/04/23 1630   BP: 100/56 105/55 102/54 111/56   Pulse: 90 85 81 84   Patient Position - Orthostatic VS: Sitting Sitting Sitting Sitting         Visual Acuity  Visual Acuity    Flowsheet Row Most Recent Value   L Pupil Size (mm) 4   R Pupil Size (mm) 4          ED Medications  Medications   naloxone (FOR EMS ONLY) (NARCAN) 2 MG/2ML injection 2 mg (has no administration in time range)   heparin (porcine) injection 4,400 Units (has no administration in time range)   heparin (porcine) 25,000 units in 0.45% NaCl 250 mL infusion (premix) (has no administration in time range)   heparin (porcine) injection 4,400 Units (has no administration in time range)   heparin (porcine) injection 2,200 Units (has no administration in time range)   sodium chloride 0.9 % bolus 1,000 mL (0 mL Intravenous Stopped 8/4/23 1542)   iohexol (OMNIPAQUE) 350 MG/ML injection (SINGLE-DOSE) 100 mL (85 mL Intravenous Given 8/4/23 1353)   sodium chloride 0.9 % bolus 1,000 mL (1,000 mL Intravenous New Bag 8/4/23 1534)       Diagnostic Studies  Results Reviewed     Procedure Component Value Units Date/Time    Lactic acid 2 Hours [[id number]]  (Abnormal) Collected: 08/04/23 1731    Lab Status: Final result Specimen: Blood from Arm, Left Updated: 08/04/23 1802     LACTIC ACID 4.2 mmol/L     Narrative:      Result may be elevated if tourniquet was used during collection.     HS Troponin I 4hr [[id number]]  (Abnormal) Collected: 08/04/23 1731    Lab Status: Final result Specimen: Blood from Arm, Left Updated: 08/04/23 1801     hs TnI 4hr 700 ng/L      Delta 4hr hsTnI -88 ng/L     Basic metabolic panel [[id number]]  (Abnormal) Collected: 08/04/23 1731    Lab Status: Final result Specimen: Blood from Arm, Left Updated: 08/04/23 1754     Sodium 136 mmol/L      Potassium 5.9 mmol/L      Chloride 103 mmol/L      CO2 22 mmol/L      ANION GAP 11 mmol/L      BUN 32 mg/dL      Creatinine 2.55 mg/dL      Glucose 156 mg/dL      Calcium 8.8 mg/dL      eGFR 18 ml/min/1.73sq m     Narrative:      [place] guidelines for Chronic Kidney Disease (CKD):   •  Stage 1 with normal or high GFR (GFR > 90 mL/min/1.73 square meters)  •  Stage 2 Mild CKD (GFR = 60-89 mL/min/1.73 square meters)  •  Stage 3A Moderate CKD (GFR = 45-59 mL/min/1.73 square meters)  •  Stage 3B Moderate CKD (GFR = 30-44 mL/min/1.73 square meters)  •  Stage 4 Severe CKD (GFR = 15-29 mL/min/1.73 square meters)  •  Stage 5 End Stage CKD (GFR <15 mL/min/1.73 square meters)  Note: GFR calculation is accurate only with a steady state creatinine    B-Type Natriuretic Peptide(BNP) [[id number]]  (Abnormal) Collected: 08/04/23 1335    Lab Status: Final result Specimen: Blood from Arm, Left Updated: 08/04/23 1751      pg/mL     TSH, 3rd generation with Free T4 reflex [[id number]]     Lab Status: No result Specimen: Blood     FLU/RSV/COVID - if FLU/RSV clinically relevant [[id number]]  (Normal) Collected: 08/04/23 1435    Lab Status: Final result Specimen: Nares from Nose Updated: 08/04/23 1736     SARS-CoV-2 Negative     INFLUENZA A PCR Negative     INFLUENZA B PCR Negative     RSV PCR Negative    Narrative:      FOR PEDIATRIC PATIENTS - copy/paste COVID Guidelines URL to browser: [email or web address]    SARS-CoV-2 assay is a Nucleic Acid Amplification assay intended for the  qualitative detection of nucleic acid from SARS-CoV-2 in nasopharyngeal  swabs. Results are for the presumptive identification of SARS-CoV-2 RNA. Positive results are indicative of infection with SARS-CoV-2, the virus  causing COVID-19, but do not rule out bacterial infection or co-infection  with other viruses. Laboratories within the [facility] and its  territories are required to report all positive results to the appropriate  public health authorities. Negative results do not preclude SARS-CoV-2  infection and should not be used as the sole basis for treatment or other  patient management decisions. Negative results must be combined with  clinical observations, patient history, and epidemiological information. This test has not been FDA cleared or approved.     This test has been authorized by FDA under an Emergency Use Authorization  (EUA). This test is only authorized for the duration of time the  declaration that circumstances exist justifying the authorization of the  emergency use of an in vitro diagnostic tests for detection of SARS-CoV-2  virus and/or diagnosis of COVID-19 infection under section 564(b)(1) of  the Act, 21 U. S.C. 458MOV-9(G)(2), unless the authorization is terminated  or revoked sooner. The test has been validated but independent review by FDA  and CLIA is pending. Test performed using [employer]: This RT-PCR assay targets N2,  a region unique to SARS-CoV-2. A conserved region in the E-gene was chosen  for pan-Sarbecovirus detection which includes SARS-CoV-2. According to CMS-2020-01-R, this platform meets the definition of high-throughput technology. UA w Reflex to Microscopic w Reflex to Culture [[id number]]  (Abnormal) Collected: 08/04/23 1650    Lab Status: Final result Specimen: Urine, Indwelling Reynoso Catheter Updated: 08/04/23 1726     Color, UA Yellow     Clarity, UA Clear     Specific Gravity, UA 1.015     pH, UA 7.0     Leukocytes, UA Negative     Nitrite, UA Negative     Protein, UA Trace mg/dl      Glucose, UA 30 (3/100%) mg/dl      Ketones, UA Negative mg/dl      Urobilinogen, UA <2.0 mg/dl      Bilirubin, UA Negative     Occult Blood, UA Negative    Urine Microscopic [[id number]] Collected: 08/04/23 1650    Lab Status: In process Specimen: Urine, Indwelling Reynoso Catheter Updated: 08/04/23 1720    D-dimer, quantitative [[id number]]  (Abnormal) Collected: 08/04/23 1335    Lab Status: Final result Specimen: Blood from Arm, Left Updated: 08/04/23 1653     D-Dimer, Quant 13.64 ug/ml FEU     Narrative:       In the evaluation for possible pulmonary embolism, in the appropriate (Well's Score of 4 or less) patient, the age adjusted d-dimer cutoff for this patient can be calculated as:    Age x 0.01 (in ug/mL) for Age-adjusted D-dimer exclusion threshold for a patient over 50 years. HS Troponin I 2hr [[id number]]  (Abnormal) Collected: 08/04/23 1551    Lab Status: Final result Specimen: Blood from Arm, Left Updated: 08/04/23 1621     hs TnI 2hr 663 ng/L      Delta 2hr hsTnI -125 ng/L     Procalcitonin [[id number]]  (Abnormal) Collected: 08/04/23 1440    Lab Status: Final result Specimen: Blood from Arm, Left Updated: 08/04/23 1523     Procalcitonin 0.57 ng/ml     Lactic acid, plasma (w/reflex if result > 2.0) [[id number]]  (Abnormal) Collected: 08/04/23 1440    Lab Status: Final result Specimen: Blood from Arm, Left Updated: 08/04/23 1519     LACTIC ACID 5.5 mmol/L     Narrative:      Result may be elevated if tourniquet was used during collection. Blood culture #1 [[id number]] Collected: 08/04/23 1440    Lab Status: In process Specimen: Blood from Arm, Left Updated: 08/04/23 1444    Blood culture #2 [[id number]] Collected: 08/04/23 1440    Lab Status:  In process Specimen: Blood from Arm, Left Updated: 08/04/23 1444    POCT Blood Gas (CG8+) [[id number]]  (Abnormal) Collected: 08/04/23 1429    Lab Status: Final result Specimen: Arterial Updated: 08/04/23 1432     pH, Art i-STAT 7.449     pCO2, Art i-STAT 27.4 mm HG      pO2, ART i-STAT 117.0 mm HG      BE, i-STAT -4 mmol/L      HCO3, Art i-STAT 19.0 mmol/L      CO2, i-STAT 20 mmol/L      O2 Sat, i-STAT 99 %      SODIUM, I-STAT 133 mmol/l      Potassium, i-STAT 5.3 mmol/L      Calcium, Ionized i-STAT 1.12 mmol/L      Hct, i-STAT 22 %      Hgb, i-STAT 7.5 g/dl      Glucose, i-STAT 131 mg/dl      POC FIO2 44 L      Specimen Type ARTERIAL     SITE Right Radial     JUANCHO TEST Positive Allens Test    HS Troponin 0hr (reflex protocol) [[id number]]  (Abnormal) Collected: 08/04/23 1335    Lab Status: Final result Specimen: Blood from Arm, Left Updated: 08/04/23 1405     hs TnI 0hr 788 ng/L     Basic metabolic panel [[id number]]  (Abnormal) Collected: 08/04/23 1335    Lab Status: Final result Specimen: Blood from Arm, Left Updated: 08/04/23 1359     Sodium 135 mmol/L      Potassium 5.9 mmol/L      Chloride 99 mmol/L      CO2 21 mmol/L      ANION GAP 15 mmol/L      BUN 30 mg/dL      Creatinine 2.73 mg/dL      Glucose 156 mg/dL      Calcium 9.4 mg/dL      eGFR 16 ml/min/1.73sq m     Narrative:      [facility] guidelines for Chronic Kidney Disease (CKD):   •  Stage 1 with normal or high GFR (GFR > 90 mL/min/1.73 square meters)  •  Stage 2 Mild CKD (GFR = 60-89 mL/min/1.73 square meters)  •  Stage 3A Moderate CKD (GFR = 45-59 mL/min/1.73 square meters)  •  Stage 3B Moderate CKD (GFR = 30-44 mL/min/1.73 square meters)  •  Stage 4 Severe CKD (GFR = 15-29 mL/min/1.73 square meters)  •  Stage 5 End Stage CKD (GFR <15 mL/min/1.73 square meters)  Note: GFR calculation is accurate only with a steady state creatinine    Protime-INR [[id number]]  (Normal) Collected: 08/04/23 1335    Lab Status: Final result Specimen: Blood from Arm, Left Updated: 08/04/23 1352     Protime 14.5 seconds      INR 1.06    APTT [[id number]]  (Normal) Collected: 08/04/23 1335    Lab Status: Final result Specimen: Blood from Arm, Left Updated: 08/04/23 1352     PTT 25 seconds     CBC and Platelet [[id number]]  (Abnormal) Collected: 08/04/23 1335    Lab Status: Final result Specimen: Blood from Arm, Left Updated: 08/04/23 1339     WBC 11.55 Thousand/uL      RBC 2.88 Million/uL      Hemoglobin 8.5 g/dL      Hematocrit 27.9 %      MCV 97 fL      MCH 29.5 pg      MCHC 30.5 g/dL      RDW 14.6 %      Platelets 205 Thousands/uL      MPV 8.8 fL     Fingerstick Glucose (POCT) [[id number]]  (Abnormal) Collected: 08/04/23 1330    Lab Status: Final result Updated: 08/04/23 1332     POC Glucose 174 mg/dl                  CT chest abdomen pelvis wo contrast   Final Result by Gabby Quintero MD (08/04 1759)      Gallbladder wall thickening and questionable trace pericholecystic fluid. No calcified gallstones are seen but cannot exclude acute cholecystitis.  This can be further evaluated with ultrasound. Retained contrast seen within both kidneys from the prior CT study. Correlation for underlying renal insufficiency advised. Multifocal areas of decreased cortical enhancement seen within the right kidney. Differential considerations would include    multifocal infarcts or pyelonephritis. Clinical correlation advised. Thoracolumbar compression fractures as above. Workstation performed: [id number]         MRI brain wo contrast   Final Result by Mirela Beltran MD (08/04 1640)         1. No MR evidence of acute ischemia. 2. Similar white matter disease advanced chronic microvascular disease in addition to similar ventriculomegaly      Workstation performed: [id number]         XR chest portable - 1 view   Final Result by Tamiko Delacruz MD (08/04 1504)      No acute cardiopulmonary disease. Workstation performed: [id number]         CT stroke alert brain   Final Result by TERE Yanez MD (08/04 1416)   No acute intracranial pathology. Small focus of subarachnoid hemorrhage is no longer seen. Stable chronic microangiopathy. Stable ventricular prominence, correlate for NPH. Findings were directly discussed with Rebekah Breen  at 2:00 p.m. Workstation performed: [id number]         CTA stroke alert (head/neck)   Final Result by TERE Yanez MD (08/04 1413)      No significant stenosis of the cervical carotid or vertebral arteries. No significant intracranial stenosis, large vessel occlusion or aneurysm. New incompletely imaged acute pulmonary emboli in the bilateral upper lobes. Findings were directly discussed with Rebekah Breen  at 2:00 p.m.                            Workstation performed: [id number]                    Procedures  ECG 12 Lead Documentation Only    Date/Time: 8/4/2023 5:10 PM    Performed by: Alicia Calvo DO  Authorized by: Alicia Calvo DO    Indications / Diagnosis:  Unresponsive  ECG reviewed by me, the ED Provider: yes    Patient location:  ED  Previous ECG:     Previous ECG:  Compared to current    Comparison ECG info:  2019    Similarity:  No change  Interpretation:     Interpretation: normal    Rate:     ECG rate:  90    ECG rate assessment: normal    Rhythm:     Rhythm: sinus rhythm    Ectopy:     Ectopy: none    QRS:     QRS axis:  Normal    QRS intervals:  Normal  Conduction:     Conduction: normal    ST segments:     ST segments:  Normal  T waves:     T waves: normal    Comments: This EKG was interpreted by me. ED Course  ED Course as of 08/04/23 1810   Fri Aug 04, 2023   1652 Now following commands. Neuro reviewed mri - no stroke, ok for heparin if needed for PE. Reviewed with critical care AP Singh regarding if patient is candidate for heparin - she will eval patient in ER.   1654 Elevated lactate probably from dehydration, stress reaction from PE. Imaging and urine pending. 1742 Sign out to MARTHA Holley - unresponsive episode improving with iv fluids - multifactorial, PE, elevated troponin, acute renal insufficiency with hyperkalemia. Critical care reviewing to assist with best tx for PE and appropriate level of care. spoke with caretaker at nursing home -last nl 830 this morning, found less responsive around noon. Normally conversant but disoriented. Normally doesn't ambulate since recent hip fracture surgery last month. Sister in room states patient is DNR DNI. frequent reassessment in ER - improved cognitively with iv fluids - follows commands. Hyperkalemia noted but no EKG abnl - will hold on calcium/ insulin/ dextros/ bicarb/ or albuterol - will recheck bmp  After iv fluids. Contacted critical care AP Singh - she is reviewing with attending in regards to tx of pulmonary embolism identified on ct neck - ct chest abd pelvis pending.          Stroke Assessment     Row Name 08/04/23 1333             NIH Stroke Scale    Interval Baseline      Level of Consciousness (1a.) 1      LOC Questions (1b.) 0      LOC Commands (1c.) 0      Best Gaze (2.) 0      Visual (3.) 0      Facial Palsy (4.) 3      Motor Arm, Left (5a.) 1      Motor Arm, Right (5b.) 1      Motor Leg, Left (6a.) 4      Motor Leg, Right (6b.) 4      Limb Ataxia (7.) 2      Sensory (8.) 2      Best Language (9.) 3      Dysarthria (10.) 2      Extinction and Inattention (11.) (Formerly Neglect) 2      Total 25                                          Medical Decision Making  Differential includes acute stroke, side effect of medications, exacerbation of Parkinson's, uremia/ dehydration, lactic acidosis, hyperkalemia, less likely infection, does not appear to be seizure activity. Possible coronary syndrome, arrhythmia. Amount and/or Complexity of Data Reviewed  Labs: ordered. Radiology: ordered. Risk  Prescription drug management. Decision regarding hospitalization.           Disposition  Final diagnoses:   Unresponsive episode   Acute hyperkalemia   Pulmonary embolism ([address])     Time reflects when diagnosis was documented in both MDM as applicable and the Disposition within this note     Time User Action Codes Description Comment    8/4/2023  1:29 PM Henrry Rao Add [R41.89] Unresponsive episode     8/4/2023  2:42 PM Henrry Rao Add [E87.5] Acute hyperkalemia     8/4/2023  3:57 PM Fawn Lancaster Add [R29.90] Stroke-like symptom     8/4/2023  4:59 PM Sukhdev Hsu [I26.99] Pulmonary embolism [facility])       ED Disposition     ED Disposition   Admit    Condition   Stable    Date/Time   Fri Aug 4, 2023  6:08 PM    Comment   Level of Care: Level 2 Stepdown / HOT [14]           Follow-up Information    None         Patient's Medications   Discharge Prescriptions    NALOXONE (NARCAN) 2 MG/2ML INJECTION    Inject 2 mL (2 mg total) into a catheter in a vein once for 1 dose       Start Date: 8/4/2023  End Date: 8/4/2023       Order Dose: 2 mg       Quantity: 2 mL    Refills: 0       No discharge procedures on file.     PDMP Review       Value Time User    PDMP Reviewed  Yes 7/29/2023 10:57 AM Brain Urbina MD          ED Provider  Electronically Signed by           Prem Cox DO  08/04/23 1152

## 2023-08-04 NOTE — H&P
[address] [facility]  H&P  Name: Chata Andrews 77 y.o. female I MRN: [id number]  Unit/Bed#: ED 02 I Date of Admission: 8/4/2023   Date of Service: 8/4/2023 I Hospital Day: 0          Assessment:    Principal Problem:    Altered mental status  Active Problems:    Parkinsonism ([address])    Acquired hypothyroidism    Depression    Subarachnoid hemorrhage (HCC)    Lactic acidosis    Hypoxia    Acute renal failure (ARF) ([facility])    Goals of care, counseling/discussion    Pulmonary embolism ([address])      Plan:    Admit to stepdown level of care  Patient is minimally responsive  Neurology evaluated patient and felt presentation secondary to metabolic abnormalities and PE  MRI was negative for stroke  Continue supplemental oxygen  Per neurology acceptable to anticoagulate with heparin despite history of subarachnoid hemorrhage. Not a candidate for CTA Chest d/t renal failure  Non contrast CT C/A/P pending. Continue IV fluids  Consult nephrology  Treat hyperkalemia with insulin, D50, calcium gluconate  Repeat BMP tonight    Suspected non-MI troponin elevation secondary to acute pulmonary embolism  Consult cardiology  If stable, check echo on Monday    Case will benefit from palliative care input if patient is able to survive until Monday    Monitor lactic acidosis which is related to hypoperfusion    Discussed at length with the patient's sister  She would not want aggressive procedures such as thrombectomy in light of patient's poor baseline functional status, history of dementia  Patient has evidence of multiorgan failure, related to the patient's sister the patient unlikely to survive hospitalization    Chief Complaint   Patient presents with   • Altered Mental Status        HPI:  Chata Andrews is a 77 y.o. female who presents with acute change in mental status. History from the patient's sister as well as discussion with ICU team and the ER. Patient cannot provide history.   Patient's sister says she was found unresponsive earlier this morning around 7 AM.  Baseline is patient is conversant. She was admitted as a stroke alert but not found to have stroke she was found to have significant metabolic abnormalities as well as CTA showed upper lobe pulmonary embolisms.       Historical Information   Past Medical History:   Diagnosis Date   • Anxiety    • Depression    • Memory loss    • Thyroid activity decreased      Past Surgical History:   Procedure Laterality Date   • BREAST BIOPSY      years ago- Benign   • JOINT REPLACEMENT Left    • IA HEMIARTHROPLASTY HIP PARTIAL Right 7/23/2023    Procedure: HEMIARTHROPLASTY HIP TOTAL with removal of hardware;  Surgeon: Sincere Lazar MD;  Location: [facility] MAIN OR;  Service: Orthopedics   • IA OPTX FEM SHFT FX W/INSJ IMED IMPLT W/WO SCREW Right 5/9/2019    Procedure: INSERTION NAIL IM FEMUR ANTEGRADE (TROCHANTERIC), RIGHT;  Surgeon: Leticia Cash MD;  Location:  MAIN OR;  Service: Orthopedics   • IA REMOVAL IMPLANT DEEP Right 6/30/2023    Procedure: REMOVAL HARDWARE TFN DYNAMIC SCREW;  Surgeon: Leticia Csah MD;  Location:  MAIN OR;  Service: Orthopedics     Social History   Social History     Substance and Sexual Activity   Alcohol Use Not Currently     Social History     Substance and Sexual Activity   Drug Use No     Social History     Tobacco Use   Smoking Status Never   Smokeless Tobacco Never     Family History   Problem Relation Age of Onset   • Hypertension Mother    • Parkinsonism Mother 80        memory issues    • Depression Mother    • Melanoma Father    • No Known Problems Maternal Grandmother    • No Known Problems Maternal Grandfather    • No Known Problems Paternal Grandmother    • Heart attack Paternal Grandfather    • Depression Other    • Skin cancer Sister    • Breast cancer Maternal Aunt         over age 48   • No Known Problems Paternal Aunt    • No Known Problems Sister        Meds/Allergies   No Known Allergies    Meds:    Current Facility-Administered Medications:   •  heparin (porcine) 25,000 units in 0.45% NaCl 250 mL infusion (premix), 3-30 Units/kg/hr (Order-Specific), Intravenous, Titrated, Sary Guzman MD  •  heparin (porcine) injection 2,200 Units, 2,200 Units, Intravenous, Q6H PRN, Sary Guzman MD  •  heparin (porcine) injection 4,400 Units, 4,400 Units, Intravenous, Once, Sary Guzman MD  •  heparin (porcine) injection 4,400 Units, 4,400 Units, Intravenous, Q6H PRN, Sary Guzman MD  •  naloxone (FOR EMS ONLY) [facility]) 2 MG/2ML injection 2 mg, 1 each, Does not apply, Once, Triage Protocol Emergency, MD    Current Outpatient Medications:   •  naloxone (NARCAN) 2 MG/2ML injection, Inject 2 mL (2 mg total) into a catheter in a vein once for 1 dose, Disp: 2 mL, Rfl: 0  •  acetaminophen (TYLENOL) 325 mg tablet, Take 2 tablets (650 mg total) by mouth every 6 (six) hours as needed for mild pain, Disp: 30 tablet, Rfl: 0  •  amLODIPine (NORVASC) 5 mg tablet, Take 1 tablet (5 mg total) by mouth daily Do not start before July 30, 2023., Disp: 30 tablet, Rfl: 0  •  aspirin 81 mg chewable tablet, Chew 1 tablet (81 mg total) daily, Disp: 42 tablet, Rfl: 0  •  buPROPion (WELLBUTRIN SR) 150 mg 12 hr tablet, 2 (two) times a day  , Disp: , Rfl:   •  Calcium-Magnesium-Vitamin D (CALCIUM 500 PO), Take by mouth, Disp: , Rfl:   •  Cholecalciferol (VITAMIN D3) 5000 units CAPS, Take by mouth, Disp: , Rfl:   •  levothyroxine 50 mcg tablet, Take 50 mcg by mouth every morning, Disp: , Rfl:   •  melatonin 3 mg, Take 2 tablets (6 mg total) by mouth daily at bedtime, Disp: 30 tablet, Rfl: 0  •  mirtazapine (REMERON) 30 mg tablet, 30 mg daily at bedtime  , Disp: , Rfl:   •  Multiple Vitamins-Minerals (CENTRUM SILVER 50+WOMEN PO), Take 1 tablet by mouth daily, Disp: , Rfl:   •  oxyCODONE (ROXICODONE) 5 immediate release tablet, Take 0.5 tablets (2.5 mg total) by mouth every 6 (six) hours as needed for moderate pain for up to 10 days Max Daily Amount: 10 mg, Disp: 5 tablet, Rfl: 0  •  risperiDONE (RisperDAL) 1 mg tablet, Take 1 tablet (1 mg total) by mouth 2 (two) times a day, Disp: 60 tablet, Rfl: 0  •  sertraline (ZOLOFT) 50 mg tablet, 50 mg daily with breakfast  , Disp: , Rfl:     (Not in a hospital admission)        Review of Systems:    A complete and comprehensive 14 point organ system review was performed and all other systems are negative other than stated above in the HPI    Current Vitals:   Blood Pressure: 111/56 (08/04/23 1630)  Pulse: 84 (08/04/23 1630)  Temperature: 97.8 °F (36.6 °C) (08/04/23 1330)  Temp Source: Temporal (08/04/23 1330)  Respirations: 17 (08/04/23 1630)  SpO2: 98 % (08/04/23 1630)  SPO2 RA Rest    Flowsheet [facility] ED from 8/4/2023 in  [address] [facility]   SpO2 98 %   SpO2 Activity At Rest   O2 Device Nasal cannula   O2 Flow Rate --          Intake/Output Summary (Last 24 hours) at 8/4/2023 1817  Last data filed at 8/4/2023 1542  Gross per 24 hour   Intake 1000 ml   Output 200 ml   Net 800 ml     There is no height or weight on file to calculate BMI. Physical Exam:     General: minimally responsive, cool extremities, on o2, elderly frail appearing female. HEENT: atraumatic, PERRLA, moist mucosa, normal pharynx, normal tonsils and adenoids, normal tongue, no fluid in sinuses  Neck: Trachea midline, no carotid bruit, no masses  Respiratory: normal chest wall expansion, CTA B, no r/r/w, no rubs  Cardiovascular: RRR, no m/r/g, Normal S1 and S2  Abdomen: Soft, non-tender, non-distended, normal bowel sounds in all quadrants, no hepatosplenomegaly, no tympany  Rectal: deferred  Musculoskeletal: normal ROM in upper and lower extremities  Integumentary: warm, dry, and pink, with no rash, purpura, or petechia  Heme/Lymph: no lymphadenopathy, no bruises  Neurological: groans or says yes with great stimulation, otherwise lethargic.        Lab Results:   CBC:   Lab Results   Component Value Date WBC 11.55 (H) 08/04/2023    HGB 7.5 (L) 08/04/2023    HCT 22 (L) 08/04/2023    MCV 97 08/04/2023     08/04/2023    RBC 2.88 (L) 08/04/2023    MCH 29.5 08/04/2023    MCHC 30.5 (L) 08/04/2023    RDW 14.6 08/04/2023    MPV 8.8 (L) 08/04/2023    NRBC 0 07/24/2023     CMP:  Lab Results   Component Value Date     08/04/2023     06/28/2023    CO2 22 08/04/2023    CO2 20 (L) 08/04/2023    CO2 29 06/28/2023    BUN 32 (H) 08/04/2023    BUN 23 06/28/2023    CREATININE 2.55 (H) 08/04/2023    GLUCOSE 131 08/04/2023    CALCIUM 8.8 08/04/2023    CALCIUM 9.6 06/28/2023    AST 20 07/28/2023    ALT 14 07/28/2023    ALKPHOS 77 07/28/2023    EGFR 18 08/04/2023     Lab Results   Component Value Date    CKTOTAL 128 07/21/2023     Coagulation:   Lab Results   Component Value Date    INR 1.06 08/04/2023    Urinalysis:  Lab Results   Component Value Date    COLORU Yellow 08/04/2023    CLARITYU Clear 08/04/2023    SPECGRAV 1.015 08/04/2023    PHUR 7.0 08/04/2023    LEUKOCYTESUR Negative 08/04/2023    NITRITE Negative 08/04/2023    GLUCOSEU 30 (3/100%) (A) 08/04/2023    KETONESU Negative 08/04/2023    BILIRUBINUR Negative 08/04/2023    BLOODU Negative 08/04/2023      Amylase: No results found for: "AMYLASE"  Lipase:   Lab Results   Component Value Date    LIPASE 161 06/16/2019        Imaging: CT chest abdomen pelvis wo contrast    Result Date: 8/4/2023  Narrative: CT CHEST, ABDOMEN AND PELVIS WITHOUT IV CONTRAST INDICATION:   altered. COMPARISON: Prior CT scan of the pelvis dated 12/30/2021 and prior CT scan of the abdomen pelvis dated 6/16/2019. TECHNIQUE: CT examination of the chest, abdomen and pelvis was performed without intravenous contrast. Multiplanar 2D reformatted images were created from the source data.  This examination, like all CT scans performed in the [facility], was performed utilizing techniques to minimize radiation dose exposure, including the use of iterative reconstruction and automated exposure control. Radiation dose length product (DLP) for this visit:  601.41 mGy-cm Enteric contrast was not administered. FINDINGS: CHEST LUNGS: Minimal dependent atelectasis in the visualized lower lobes. There is a bleb in the right lower lobe posteromedially. No focal airspace consolidation. No suspicious nodules or masses. No obvious endobronchial lesion. PLEURA:  Unremarkable. HEART/GREAT VESSELS: Heart is unremarkable for patient's age. No thoracic aortic aneurysm. Scattered coronary artery calcifications. Scattered aortic calcifications. No pericardial effusion. MEDIASTINUM AND JOSE: Small mediastinal lymph nodes are seen but these are not enlarged by CT size criteria. No significant hilar or axillary lymphadenopathy. CHEST WALL AND LOWER NECK:  Unremarkable. ABDOMEN LIVER/BILIARY TREE:  Unremarkable. GALLBLADDER: There appears to be prominent gallbladder wall thickening and questionable trace pericholecystic fluid. No calcified gallstones are seen but cannot exclude acute cholecystitis. This can be further evaluated with ultrasound. SPLEEN:  Unremarkable. PANCREAS:  Unremarkable. ADRENAL GLANDS:  Unremarkable. KIDNEYS/URETERS: There is retained contrast seen within both kidneys from the prior CT study. There is some excreted contrast also seen. Correlation for any underlying renal insufficiency advised. There is scarring seen in the right kidney. Multifocal areas of decreased cortical enhancement seen within the right kidney. Differential considerations would include multifocal infarcts or pyelonephritis. Clinical correlation advised. No hydronephrosis. STOMACH AND BOWEL: There is no bowel obstruction. . No focal inflammatory process. APPENDIX: A normal appendix was visualized. ABDOMINOPELVIC CAVITY:  No ascites. No pneumoperitoneum. No lymphadenopathy. VESSELS: The abdominal aorta is calcified. No retroperitoneal hematoma. PELVIS REPRODUCTIVE ORGANS:  Unremarkable for patient's age.  URINARY BLADDER: Contrast seen within the bladder. There is a Reynoso catheter in place ABDOMINAL WALL/INGUINAL REGIONS:  Unremarkable. OSSEOUS STRUCTURES: Stable mild compression fracture deformity of L4. There is a moderate compression fracture deformity of T12 that is age-indeterminate. There is also stable minimal chronic compression fracture of the superior endplate of L1. There are  cannulated lag screws transfixing the left femoral neck. There is a right hip arthroplasty. Impression: Gallbladder wall thickening and questionable trace pericholecystic fluid. No calcified gallstones are seen but cannot exclude acute cholecystitis. This can be further evaluated with ultrasound. Retained contrast seen within both kidneys from the prior CT study. Correlation for underlying renal insufficiency advised. Multifocal areas of decreased cortical enhancement seen within the right kidney. Differential considerations would include multifocal infarcts or pyelonephritis. Clinical correlation advised. Thoracolumbar compression fractures as above. Workstation performed: [id number]     MRI brain wo contrast    Result Date: 8/4/2023  Narrative: MRI BRAIN WITHOUT CONTRAST INDICATION: R29.90: Unspecified symptoms and signs involving the nervous system. COMPARISON:   7/24/2023 TECHNIQUE:  Multiplanar, multisequence imaging of the brain was performed. IMAGE QUALITY:  Diagnostic. FINDINGS: BRAIN PARENCHYMA:  There is no discrete mass, mass effect or midline shift. Right parietal subarachnoid hemosiderin staining likely old as was seen on the prior exam.  There is no evidence of acute infarction and diffusion imaging is unremarkable. Stable  periventricular and subcortical T2/FLAIR hyperintense foci, partially confluent, consistent with advanced microangiopathic disease. White matter lesions also present in the midbrain and alexi.  VENTRICLES: Ventriculomegaly similar to prior SELLA AND PITUITARY GLAND:  Normal. ORBITS:  Normal. PARANASAL SINUSES:  Normal. VASCULATURE:  Evaluation of the major intracranial vasculature demonstrates appropriate flow voids. CALVARIUM AND SKULL BASE:  Normal. EXTRACRANIAL SOFT TISSUES:  Normal.     Impression: 1. No MR evidence of acute ischemia. 2. Similar white matter disease advanced chronic microvascular disease in addition to similar ventriculomegaly Workstation performed: [id number]     XR chest portable - 1 view    Result Date: 8/4/2023  Narrative: CHEST INDICATION:   altered mental status. COMPARISON: Compared with 7/24/2023 EXAM PERFORMED/VIEWS:  XR CHEST PORTABLE FINDINGS: Cardiomediastinal silhouette appears unremarkable. The lungs are clear. No pneumothorax or pleural effusion. Osseous structures appear within normal limits for patient age. Right proximal humerus hardware unchanged     Impression: No acute cardiopulmonary disease. Workstation performed: [id number]     CT stroke alert brain    Result Date: 8/4/2023  Narrative: CT BRAIN - STROKE ALERT PROTOCOL INDICATION:   Stroke Alert. COMPARISON: CT dated 7/27/2023 and MRI dated 7/24/2023. TECHNIQUE:  CT examination of the brain was performed. In addition to axial images, coronal reformatted images were created and submitted for interpretation. Radiation dose length product (DLP) for this visit:  888. 67 mGy-cm . This examination, like all CT scans performed in the [facility], was performed utilizing techniques to minimize radiation dose exposure, including the use of iterative  reconstruction and automated exposure control. IMAGE QUALITY:  Diagnostic. FINDINGS: PARENCHYMA: No acute infarct, hemorrhage, mass or mass effect. Small focus of right parietal subarachnoid hemorrhage is no longer appreciated. Stable diminished attenuation in the periventricular and subcortical white matter due to advanced chronic microangiopathy.  VENTRICLES AND EXTRA-AXIAL SPACES: Stable mild ventricular prominence out of proportion to degree of cortical volume loss, correlate for NPH. VISUALIZED ORBITS: Normal visualized orbits. PARANASAL SINUSES: Normal visualized paranasal sinuses. CALVARIUM AND EXTRACRANIAL SOFT TISSUES:   Normal.     Impression: No acute intracranial pathology. Small focus of subarachnoid hemorrhage is no longer seen. Stable chronic microangiopathy. Stable ventricular prominence, correlate for NPH. Findings were directly discussed with Angel Nunn  at 2:00 p.m. Workstation performed: [id number]     CTA stroke alert (head/neck)    Result Date: 8/4/2023  Narrative: CTA NECK AND BRAIN WITH CONTRAST INDICATION: Stroke Alert COMPARISON:   CTA dated 7/24/2023. TECHNIQUE:   Post contrast imaging was performed after administration of iodinated contrast through the neck and brain. Post contrast axial 0.625 mm images timed to opacify the arterial system. 3D rendering was performed on an independent workstation. MIP reconstructions performed. Coronal reconstructions were performed of the noncontrast portion of the brain. Radiation dose length product (DLP) for this visit:  320.49 mGy-cm . This examination, like all CT scans performed in the [facility], was performed utilizing techniques to minimize radiation dose exposure, including the use of iterative  reconstruction and automated exposure control. IV Contrast:  85 mL of iohexol (OMNIPAQUE) IMAGE QUALITY:   Diagnostic FINDINGS: CERVICAL VASCULATURE AORTIC ARCH AND GREAT VESSELS: Mild atherosclerotic disease of the arch, proximal great vessels and visualized subclavian vessels. No significant stenosis. RIGHT VERTEBRAL ARTERY CERVICAL SEGMENT:  Normal origin. The vessel is normal in caliber throughout the neck. LEFT VERTEBRAL ARTERY CERVICAL SEGMENT: Dominant. Normal origin. The vessel is normal in caliber throughout the neck. RIGHT EXTRACRANIAL CAROTID SEGMENT:  Normal caliber common carotid artery. Normal bifurcation and cervical internal carotid artery. No stenosis or dissection.  LEFT EXTRACRANIAL CAROTID SEGMENT: Minimal calcified plaque at the bifurcation. No significant stenosis or dissection. NASCET criteria was used to determine the degree of internal carotid artery diameter stenosis. INTRACRANIAL VASCULATURE INTERNAL CAROTID ARTERIES: Minimal calcified plaque in the left ICA. No significant stenosis. ANTERIOR CIRCULATION:  Symmetric A1 segments and anterior cerebral arteries with normal enhancement. Normal anterior communicating artery. MIDDLE CEREBRAL ARTERY CIRCULATION:  M1 segment and middle cerebral artery branches demonstrate normal enhancement bilaterally. DISTAL VERTEBRAL ARTERIES: Right vertebral artery is hypoplastic. Normal distal vertebral arteries. Left posterior inferior cerebellar artery and right AICA/PICA origins are normal. BASILAR ARTERY:  Basilar artery is normal in caliber. Normal superior cerebellar arteries. POSTERIOR CEREBRAL ARTERIES: Right posterior cerebral artery arises from the basilar tip. Fetal-type left PCA. No significant stenosis. Right P-comm is hypoplastic. VENOUS STRUCTURES:  Normal. NON VASCULAR ANATOMY BONY STRUCTURES:  No acute osseous abnormality. SOFT TISSUES OF THE NECK:  Normal. THORACIC INLET: New partially imaged acute pulmonary emboli in the upper lobes. Impression: No significant stenosis of the cervical carotid or vertebral arteries. No significant intracranial stenosis, large vessel occlusion or aneurysm. New incompletely imaged acute pulmonary emboli in the bilateral upper lobes. Findings were directly discussed with Vivian Ascencio  at 2:00 p.m. Workstation performed: [id number]     XR abdomen 1 view kub    Result Date: 7/27/2023  Narrative: ABDOMEN INDICATION:   Confirm Dobbhoff tube, portable exam. COMPARISON: CT abdomen/pelvis 7/16/2019. VIEWS:  AP supine. The left flank and the pelvis are excluded from the field-of-view. Images: 2 FINDINGS: Weighted enteric tube courses inferior to the diaphragm, tip overlying the gastric body.  There is a nonobstructive bowel gas pattern. No discernible free air on this supine study. Upright or left lateral decubitus imaging is more sensitive to detect subtle free air in the appropriate setting. No pathologic calcifications or soft tissue masses. Visualized lung bases are clear. Visualized osseous structures are unremarkable for the patient's age. Impression: Enteric tube with tip overlying the stomach. Workstation performed: [id number]     CT head wo contrast    Result Date: 7/27/2023  Narrative: CT BRAIN - WITHOUT CONTRAST INDICATION:   Subarachnoid hemorrhage [facility]), follow up f/u [facility]. COMPARISON: CT and MRI dated 7/24/2023. TECHNIQUE:  CT examination of the brain was performed. Multiplanar 2D reformatted images were created from the source data. Radiation dose length product (DLP) for this visit:  1144.19 mGy-cm . This examination, like all CT scans performed in the [facility], was performed utilizing techniques to minimize radiation dose exposure, including the use of iterative reconstruction and automated exposure control. IMAGE QUALITY:  Diagnostic. FINDINGS: PARENCHYMA: Stable small focus of right posterior parietal subarachnoid hemorrhage. No new hemorrhage. No mass effect, shift or herniation. Stable diminished attenuation in the periventricular and subcortical white matter as well as the alexi likely due to advanced chronic microangiopathy. VENTRICLES AND EXTRA-AXIAL SPACES: Stable mild ventricular enlargement out of proportion to degree of cortical volume loss, correlate for NPH. VISUALIZED ORBITS: Normal visualized orbits. PARANASAL SINUSES: Normal visualized paranasal sinuses. CALVARIUM AND EXTRACRANIAL SOFT TISSUES:  Normal.     Impression: Stable small focus of right parietal subarachnoid hemorrhage. No new hemorrhage. Stable advanced chronic microangiopathy. Stable mild ventriculomegaly, correlate for NPH.  Workstation performed: [id number]     EEG Routine and awake    Result Date: 2023  Narrative: Table formatting from the original result was not included. Routine EEG Patient Name:  Klaus Andrade  MRN: [id number] :  1955 File #: [id number] Age: 69 y.o. Ordering Provider: Skylar Gomes PA-C  Study Start-End: 23 13:17-13:47             Study type: Routine EEG ICD 10 diagnosis: Encephalopathy, unspecified G93.40 ------------------------------------------------- Patient History: Klaus Andrade is a 69 y.o. female with a PMH of dementia and hypothyroidism, found down. EEG ordered to evaluate for seizure-like activity. Relevant medications include: No AEDs Sedation: No Intubated: No Paralyzed: No ------------------------------------------------- 32 channel digital recording with electrodes placed according to the International 10-20 system with continuous video, ECG, EOG and the possible addition of T1/T2 electrodes. This study was monitored by a monitoring technologist.  The physician interpreting the study had access to the data throughout the recording. The recording was technically satisfactory. ------------------------------------------------- Description: Background: The background organization was disorganized. The background lacked organization or clearly defined anterior-posterior and frequency gradients. There was no discernible posterior dominant rhythm. The predominant awake background activity was generalized irregular theta range activity (5-7Hz) intermixed with runs of genearlized periodic discharges with triphasic morphology (2Hz). Sleep: During clinical quiescence GPDs resolved and there were often diffuse low amplitude 5-9 Hz activities. There were no clear features of stage II sleep. Reactivity: The background was reactive to external stimuli (GPDs emerged with verbal and tactile stimulation). Photic stimulation did not provoke photic driving. Hyperventilation was deferred.  Interictal abnormalities: None Rhythmic/Periodic patterns: None Seizures: None Events: No push buttons were activated. Other findings: Samples of the single channel ECG demonstrated regular rhythm ------------------------------------------------- EEG impression: This was an abnormal routine EEG recording due to: 1. Theta range activity 2. Runs of generalized periodic discharges with triphasic morphology (triphasic waves). Clinical Interpretation: This abnormal study was consistent with moderate cerebral dysfunction. No electrographic seizures, interictal epileptiform discharges (seizure tendency) or focal slowing were seen. No diagnostic clinical events were captured. Joe Zurita MD Clinical Neurophysiology Fellow, PGY-5 [id number] [facility] Neurology department Kimberlee Seip, MD  [facility] Diplomate, [facility]'     MRI brain wo contrast    Result Date: 7/25/2023  Narrative: MRI BRAIN WITHOUT CONTRAST INDICATION: Altered mental status. COMPARISON:   7/21/2023. TECHNIQUE:  Multiplanar, multisequence imaging of the brain was performed. IMAGE QUALITY:  Diagnostic. FINDINGS: BRAIN PARENCHYMA: Stable periventricular and subcortical T2/FLAIR hyperintense foci, partially confluent, consistent with advanced microangiopathic disease. White matter lesions also present in the midbrain and alexi. No restricted diffusion to indicate an acute infarct. Stable hemosiderin in right parietal sulci consistent with chronic subarachnoid hemorrhage. Single focus of susceptibility artifact and corresponding diffusion hyperintensity in the right posterior parietal region, medial to the area of more chronic hemorrhage, corresponds to focus of acute subarachnoid hemorrhage on the head CT. VENTRICLES: Stable enlargement of the ventricles and sulci consistent with chronic volume loss. No hydrocephalus or extra-axial collection. SELLA AND PITUITARY GLAND: Limited evaluation.  ORBITS:  Normal. PARANASAL SINUSES:  Normal. VASCULATURE: Evaluation of the major intracranial vasculature demonstrates appropriate flow voids. CALVARIUM AND SKULL BASE: No osseous lesion. EXTRACRANIAL SOFT TISSUES: No soft tissue mass. Impression: 1. Stable punctate focus of acute hemorrhage in the right posterior parietal region. More laterally located foci of stable, likely subacute to chronic subarachnoid hemorrhage in the right inferior parietal and superior temporal region. No mass effect or vasogenic edema. 2. No evidence of acute infarct. Workstation performed: [id number]     CTA head and neck w wo contrast    Result Date: 7/24/2023  Narrative: CTA NECK AND BRAIN WITH AND WITHOUT CONTRAST INDICATION: ?SAH COMPARISON:   CT head without contrast 7/24/2023. MRI brain neuroquant without contrast 7/21/2023. TECHNIQUE:  Routine CT imaging of the Brain without contrast.  Post contrast imaging was performed after administration of iodinated contrast through the neck and brain. Post contrast axial 0.625 mm images timed to opacify the arterial system. 3D rendering was performed on an independent workstation. MIP reconstructions performed. Coronal reconstructions were performed of the noncontrast portion of the brain. Radiation dose length product (DLP) for this visit:  1643.11 mGy-cm . This examination, like all CT scans performed in the [facility], was performed utilizing techniques to minimize radiation dose exposure, including the use of iterative reconstruction and automated exposure control. IV Contrast:  85 mL of iohexol (OMNIPAQUE) IMAGE QUALITY:   Diagnostic FINDINGS: NONCONTRAST BRAIN PARENCHYMA AND EXTRA-AXIAL SPACES: Unchanged acute trace subarachnoid hemorrhage in right posterior parietal region (2:26). Decreased attenuation is noted in periventricular and subcortical white matter demonstrating an appearance that is statistically most likely to represent advanced microangiopathic change. No CT signs of acute infarction.   No intracranial mass, mass effect or midline shift. No acute parenchymal hemorrhage. VENTRICLES : Stable mild ventriculomegaly out of proportion to degree of cerebral atrophy. No acute intraventricular or extra-axial hemorrhage. VISUALIZED ORBITS: Normal visualized orbits. PARANASAL SINUSES: Normal visualized paranasal sinuses. CERVICAL VASCULATURE AORTIC ARCH AND GREAT VESSELS:  Mild calcified atherosclerotic disease of the arch, proximal great vessels and visualized subclavian vessels. No significant stenosis. RIGHT VERTEBRAL ARTERY CERVICAL SEGMENT:  Normal origin. The vessel is mildly hypoplastic in caliber throughout the neck. LEFT VERTEBRAL ARTERY CERVICAL SEGMENT:  Normal origin. The vessel is normal and dominant in caliber throughout the neck. RIGHT EXTRACRANIAL CAROTID SEGMENT:  Normal caliber common carotid artery. Normal bifurcation and cervical internal carotid artery. No stenosis or dissection. LEFT EXTRACRANIAL CAROTID SEGMENT:  Normal caliber common carotid artery. Mild calcified atherosclerotic disease in carotid bifurcation. Normal cervical internal carotid artery. No stenosis or dissection. NASCET criteria was used to determine the degree of internal carotid artery diameter stenosis. INTRACRANIAL VASCULATURE Question small arteriovenous fistula in right anteromedial orbital region ([id number]). INTERNAL CAROTID ARTERIES:  Normal enhancement of the intracranial portions of the internal carotid arteries. Minimal calcified atheroslerotic disease in left ICA cavernous segment. Normal ophthalmic artery origins. Normal ICA terminus. ANTERIOR CIRCULATION:  Symmetric A1 segments and anterior cerebral arteries with normal enhancement. Normal anterior communicating artery. MIDDLE CEREBRAL ARTERY CIRCULATION:  M1 segment and middle cerebral artery branches demonstrate normal enhancement bilaterally. DISTAL VERTEBRAL ARTERIES:  Patent mildly hypoplastic right and dominant left distal vertebral arteries.  Left posterior inferior cerebellar artery origin is normal. Right AICA-PICA, normal variant. Normal vertebral basilar junction. BASILAR ARTERY:  Basilar artery is normal in caliber. Normal superior cerebellar arteries. POSTERIOR CEREBRAL ARTERIES: Both posterior cerebral arteries arises from the basilar tip. Both arteries demonstrate normal enhancement. Normal left posterior communicating artery. Absent right posterior communicating artery, normal variant. VENOUS STRUCTURES:  Normal. NON VASCULAR ANATOMY BONY STRUCTURES:  No acute osseous abnormality. Biparietal osteodystrophy. Mild multilevel degenerative changes cervical spine. Diffuse osteopenia. SOFT TISSUES OF THE NECK:  Unremarkable. THORACIC INLET:  Normal.     Impression: Unchanged acute trace subarachnoid hemorrhage in right posterior parietal region. Stable mild ventriculomegaly out of proportion to degree of cerebral atrophy. Question normal pressure hydrocephalus. Question small arteriovenous fistula in right anteromedial orbital region. Recommend consultation with the [facility], a division of [facility] at [phone]. Negative CTA head and neck for large vessel occlusion, dissection, aneurysm, or high-grade stenosis. Additional chronic/incidental findings as detailed above. The study was marked in [facility] for immediate notification. Workstation performed: [id number]     CT head wo contrast    Result Date: 7/24/2023  Narrative: CT BRAIN - WITHOUT CONTRAST INDICATION:   Delirium ams. COMPARISON: CT brain July 19, 2023 TECHNIQUE:  CT examination of the brain was performed. Multiplanar 2D reformatted images were created from the source data. Radiation dose length product (DLP) for this visit:  1144.29 mGy-cm .   This examination, like all CT scans performed in the [facility], was performed utilizing techniques to minimize radiation dose exposure, including the use of iterative reconstruction and automated exposure control. IMAGE QUALITY:  Diagnostic. FINDINGS: PARENCHYMA: New sulcal hyperdensity in the right posterior parietal lobe is suspicious for sulcal subarachnoid hemorrhage. This is not seen even in retrospect on the recent CT dated July 19, 2023. There is sulcal susceptibility artifact on the interval MRI from July 21, 2023 however the MRI finding appears slightly more anterior when compared to the current CT finding. Low-density periventricular white matter compatible with moderate to severe chronic microangiopathic changes. VENTRICLES AND EXTRA-AXIAL SPACES:  Normal for the patient's age. VISUALIZED ORBITS: Normal visualized orbits. PARANASAL SINUSES: Normal visualized paranasal sinuses. CALVARIUM AND EXTRACRANIAL SOFT TISSUES:  Normal.     Impression: New sulcal hyperdensity in the right posterior parietal lobe suspicious for small volume subarachnoid hemorrhage new since the prior CT of July 19, 2023. Review of the recent MRI from July 21, 2023 demonstrates hemosiderin deposition in sulci slightly more anterior to the current finding. The possibility of recurrent subarachnoid hemorrhage in this location should be considered. The study was marked in [facility] for immediate notification. Workstation performed: [id number]     XR chest portable    Result Date: 7/24/2023  Narrative: CHEST INDICATION:   acute change in mental status. COMPARISON: 6/16/2019 EXAM PERFORMED/VIEWS:  XR CHEST PORTABLE FINDINGS: Patient is rotated. Right apex partially obscured by the patient's chin. Cardiomediastinal silhouette appears unremarkable. The lungs are clear. No pneumothorax or pleural effusion. Partially imaged orthopedic hardware in the proximal right humerus. Visualized osseous structures appear otherwise unremarkable for the patient's age. Impression: No acute cardiopulmonary disease.  Workstation performed: [id number]     XR pelvis ap only 1 or 2 vw    Result Date: 7/23/2023  Narrative: PELVIS INDICATION:   s/p hemiarthroplasty. COMPARISON: July 22, 2023. VIEWS:  XR PELVIS AP ONLY 1 OR 2 VW Images: 2 FINDINGS: No acute fracture or hip dislocation. There has been a total right hip replacement since the comparison study. Tunneling defects from prior hardware. 2 orthopedic screws again noted in left hip. Old healed fracture deformity right inferior pubic ramus. No lytic or blastic osseous lesion. Soft tissues are unremarkable. The visualized lumbar spine is unremarkable. Impression: Status post right hip arthroplasty with no acute findings. Workstation performed: [id number]     XR hip/pelv 2-3 vws right if performed    Result Date: 7/22/2023  Narrative: RIGHT HIP INDICATION:   pain. COMPARISON: 7/13/2023, 6/30/2020 VIEWS:  XR HIP/PELV 2-3 VWS RIGHT W PELVIS IF PERFORMED FINDINGS: Again demonstration of femoral neck fracture with varus angulation status post removal of dynamic hip screw. Some lucency along the fracture line may represent mild interval displacement versus resorptive change. Trochanteric nail remains in place. Stable appearance of screw fixation of left proximal femur. Moderate right hip osteoarthritis is seen. No lytic or blastic osseous lesion. Soft tissues are unremarkable. The visualized lumbar spine is unremarkable. Impression: Again demonstration of femoral neck fracture with varus angulation status post removal of dynamic hip screw. Some lucency along the fracture line may represent mild interval displacement versus resorptive change. Trochanteric nail remains in place. Workstation performed: [id number]     MRI brain NeuroQuant wo contrast    Result Date: 7/21/2023  Narrative: MRI BRAIN WITHOUT CONTRAST, NeuroQuant IMAGING INDICATION: encephalopathy superimposed on dementia; evaluate for stroke . COMPARISON:   CT head 7/19/2023 TECHNIQUE:  Multiplanar, multisequence imaging of the brain was performed.  Sagittal 3D volumetric imaging processed by [address] creating General Morphology and Age Related Atrophy reports. IMAGE QUALITY:  Diagnostic. FINDINGS: BRAIN PARENCHYMA:  There is no discrete mass, mass effect or midline shift. Brainstem and cerebellum demonstrate normal signal. There is no intracranial hemorrhage. There is no evidence of acute infarction and diffusion imaging is unremarkable. Small scattered hyperintensities on T2/FLAIR imaging are noted in the periventricular and subcortical white matter demonstrating an appearance that is statistically most likely to represent moderate microangiopathic change. Similar chronic microvascular changes are noted within the central alexi. QUANTITATIVE: Exam Quality: Adequate for volumetric analysis. Hippocampus Hippocampal Occupancy Score (HOC):                   0.50 Percentile for similar age:                              1 Total hippocampal volume (cc):                           5.59 Percentile for similar age:                              12 Entorhinal cortex (cc)                                            5.02 Percentile for similar age:                                   76 Superior Lateral ventricular volume (cc):             98.39 Percentile for similar age:                             80 Inferior lateral ventricular volume (cc):                    5.63 Percentile for similar age:                                80 Quantitative conclusions: Hippocampal Volume:                       Lower limits of normal (between 5th through 25th percentile) Entorhinal Volume:                            Normal volume Superior Lateral Ventricle Volume:     High Volume Inferior Lateral Ventricle Volume:       High Volume Concordance between qualitative and quantitative hippocampal volume assessment: Concordant. Change in brain volumes: No previous volumetric study for comparison Mean hippocampal volume loss among normal elderly: 0.7% per year, (-0.3 to 1.7;  Berny 2008; also Sunny 2010).  VENTRICLES:  Enlargement of ventricles and extra-axial CSF spaces, out of proportion to the patient's age most consistent with cerebral and cerebellar atrophy. SELLA AND PITUITARY GLAND:  Normal. ORBITS:  Normal. PARANASAL SINUSES:  Normal. VASCULATURE:  Evaluation of the major intracranial vasculature demonstrates appropriate flow voids. CALVARIUM AND SKULL BASE:  Normal. EXTRACRANIAL SOFT TISSUES:  Normal.     Impression: 1. No acute infarction, edema, or mass effect. 2.  Moderate chronic microangiopathic ischemic changes. 3.  NeuroQuant analysis was performed: Lower limits of normal hippocampal volume and enlarged inferior lateral and overall ventricular system, suggestive of global ex-vacuo dilatation. The additional finding of an abnormal Hippocampal Occupancy Score, ([address]), is concerning for a mesial temporal lobe focused Neurodegenerative process. Recommend reevaluation with Neuroquant imaging in 6 months. Workstation performed: [id number]     XR pelvis ap only 1 or 2 vw    Result Date: 7/21/2023  Narrative: PELVIS INDICATION:   Z47.89: Encounter for other orthopedic aftercare. COMPARISON: 5/10/2022 VIEWS:  XR PELVIS AP ONLY 1 OR 2 VW FINDINGS: Interval removal of the right femoral neck screw with varus collapse of the femoral neck fracture. The right intramedullary nail and distal interlocking screw remain in place. Partially-threaded screws again noted in the left femoral neck. Severe right hip osteoarthritis. No lytic or blastic osseous lesion. Soft tissues are unremarkable. Degenerative changes in the visualized lower lumbar spine. Impression: Interval removal of the right femoral neck screw with varus collapse of the right femoral neck fracture. Severe right hip osteoarthritis. Workstation performed: [id number]     CT spine cervical without contrast    Result Date: 7/19/2023  Narrative: CT CERVICAL SPINE - WITHOUT CONTRAST INDICATION:   Neck trauma (Age >= 65y) fall possible headstrike. COMPARISON:  None.  TECHNIQUE:  CT examination of the cervical spine was performed without intravenous contrast.  Contiguous axial images were obtained. Multiplanar 2D reformatted images were created from the source data. Radiation dose length product (DLP) for this visit:  1124.1 mGy-cm . This examination, like all CT scans performed in the [facility], was performed utilizing techniques to minimize radiation dose exposure, including the use of iterative  reconstruction and automated exposure control. IMAGE QUALITY:  Diagnostic. FINDINGS: ALIGNMENT:  Normal alignment of the cervical spine. No subluxation. VERTEBRAE:  No fracture. DEGENERATIVE CHANGES:  Mild multilevel cervical degenerative changes are noted without critical central canal stenosis. PREVERTEBRAL AND PARASPINAL SOFT TISSUES: Unremarkable THORACIC INLET:  Normal.     Impression: No cervical spine fracture or traumatic malalignment. Workstation performed: [id number]     CT head wo contrast    Result Date: 7/19/2023  Narrative: CT BRAIN - WITHOUT CONTRAST INDICATION:   Head trauma, moderate-severe fall possible headstrike. COMPARISON:  5/10/2019. TECHNIQUE:  CT examination of the brain was performed. Multiplanar 2D reformatted images were created from the source data. Radiation dose length product (DLP) for this visit:  871.75 mGy-cm . This examination, like all CT scans performed in the [facility], was performed utilizing techniques to minimize radiation dose exposure, including the use of iterative  reconstruction and automated exposure control. IMAGE QUALITY:  Diagnostic. FINDINGS: PARENCHYMA: Decreased attenuation is noted in periventricular and subcortical white matter demonstrating an appearance that is statistically most likely to represent moderate microangiopathic change; this appearance is similar when compared to most recent prior examination. No CT signs of acute infarction. No intracranial mass, mass effect or midline shift. No acute parenchymal hemorrhage.  VENTRICLES AND EXTRA-AXIAL SPACES:  Normal for the patient's age. VISUALIZED ORBITS: Normal visualized orbits. PARANASAL SINUSES: Normal visualized paranasal sinuses. CALVARIUM AND EXTRACRANIAL SOFT TISSUES:  Normal.     Impression: No acute intracranial abnormality. Workstation performed: [id number]     EKG, Pathology, and Other Studies: I have personally reviewed the results. VTE   Prophylaxis: In place    Code Status: Prior    Anticipated Length of Stay:  Patient will be admitted on an Inpatient basis with an anticipated length of stay of  greater 2 midnights. Counseling / Coordination of Care  Total floor / unit time spent today 45 minutes. Greater than 50% of total time was spent with the patient and / or family counseling and / or coordination of care.      "This note has been constructed using a voice recognition system"      Katerina Tillman MD  8/4/2023, 6:17 PM

## 2023-08-04 NOTE — CONSULTS
Consultation - Stroke   Mat Rivas 77 y.o. female MRN: [id number]  Unit/Bed#: ED 02 Encounter: [id number]      Assessment/Plan     Altered mental status  Assessment & Plan  68-year-old female with dementia, prior SAH of unknown etiology, depression and hypothyroidism who presents as a stroke alert from her nursing facility for altered mental status and decreased verbal output. Unclear last known well, patient woke up at 7 AM for breakfast and appeared to not be as verbal as she typically is. She also appeared pale during lunchtime and EMS was called for further evaluation. BP on arrival 97/58. NIH 21. Patient was not a candidate for IV TNK secondary to unclear last known well. Work-up:  - CTH negative for acute intracranial abnormality, prior [facility] previously seen on imaging in July 2023 no longer seen  - CTA head/neck negative for significant stenosis, LVO or anuerysm, however showed acute PE in bilateral upper lobes. - STAT MRI brain wo unremarkable for acute stroke per attending neurologist, however final read pending  - Labs on admission significant for hyperkalemia (5.9), elevated creatinine (2.73), leukocytosis (11.55) and elevated initial troponin (788). Plan:  - No need for stroke pathway  - Recommend rule out infection/other etiologies for altered mental status  - Okay to treat PE with blood thinner (i.e heparin) from a neuro standpoint as neuroimaging is negative for acute ischemia  - No further neuroimaging needed at this time  - Medical management and supportive care per primary team. Correction of any metabolic or infectious disturbances. Plan discussed with Attending Neurologist, please see attestation for further input/recommendations. Thrombolytic Decision: Patient not a candidate. Unclear time of onset outside appropriate time window. Recommendations for outpatient neurological follow up have yet to be determined.     History of Present Illness     Reason for Consult / Principal Problem: Stroke alert  Hx and PE limited by: Patient with known dementia, history obtained per chart review and per my discussion with staff at [facility]  Patient last known well: Unclear at this time, sometime this morning per nursing staff at facility  Stroke alert called: 1330  Neurology time of arrival: 1332  HPI: Rowan El is a 77 y. o.female with dementia, depression and hypothyroidism who presents as a stroke alert for altered mental status from her nursing facility. I spoke with her nursing staff, Rodrigo Garcia, at [facility] who reported that patient got up at 7 AM for breakfast this morning and appeared to not be as verbal as she typically is. She was able to take her meds crushed. She did not eat breakfast and refused to do physical therapy which staff noted was unusual for her. During lunchtime, staff noted she became more pale than normal and less verbal so EMS was called. BP on arrival 97/58. NIH 21. Labs significant for hyperkalemia (5.9), elevated creatinine (2.73), leukocytosis (11.55) and elevated initial troponin (788). CT head negative for acute intracranial abnormality, prior [facility] previously seen on imaging in July 2023 no longer seen. CTA head/neck negative for significant stenosis, LVO or anuerysm, however showed acute PE in bilateral upper lobes. Stat MRI brain obtained, final read pending however negative for acute stroke per attending neurologist. Patient was not a candidate for IV TNK secondary to unclear last known well. Per chart review, patient was last evaluated by [facility] inpatient neurology in July 2023 for increased somnolence and overall weakness since orthopedic surgery. During this admission, patient had multiple neuroimaging completed, initial CT head unremarkable for anything acute.   However follow-up CT head on 7/24 was obtained due to change in neuro's exam and it showed likely subacute to chronic subarachnoid hemorrhage and right inferior parietal and superior temporal region. However this was unlikely to be contributing to her AMS at that time. EEG was obtained due to persistent encephalopathy which was negative for seizures or epileptiform discharges. Etiology was likely secondary to TME in the setting of recent anesthesia etiology was likely secondary to TME in the setting of recent anesthesia and suboptimal nutrition superimposed on chronic dementia. There is no evidence of infectious process during this time. Patient was then placed on aspirin 81 mg per orthopedic team for 6 weeks on discharge. See prior neuro note in July 2023 for further information.     Consult to Neurology  Consult performed by: SAVANNAH Pérez  Consult ordered by: Isabella Kothari DO          Review of Systems   Unable to perform ROS: Dementia       Historical Information   Past Medical History:   Diagnosis Date   • Anxiety    • Depression    • Memory loss    • Thyroid activity decreased      Past Surgical History:   Procedure Laterality Date   • BREAST BIOPSY      years ago- Benign   • JOINT REPLACEMENT Left    • [place] HEMIARTHROPLASTY HIP PARTIAL Right 7/23/2023    Procedure: HEMIARTHROPLASTY HIP TOTAL with removal of hardware;  Surgeon: Nora Santos MD;  Location: [facility] MAIN OR;  Service: Orthopedics   • [place] OPTX FEM SHFT FX W/INSJ IMED IMPLT W/WO SCREW Right 5/9/2019    Procedure: INSERTION NAIL IM FEMUR ANTEGRADE (TROCHANTERIC), RIGHT;  Surgeon: Noy Pitts MD;  Location: [facility] MAIN OR;  Service: Orthopedics   • [place] REMOVAL IMPLANT DEEP Right 6/30/2023    Procedure: REMOVAL HARDWARE TFN DYNAMIC SCREW;  Surgeon: Noy Pitts MD;  Location: [facility]IN OR;  Service: Orthopedics     Social History   Social History     Substance and Sexual Activity   Alcohol Use Not Currently     Social History     Substance and Sexual Activity   Drug Use No     E-Cigarette/Vaping   • E-Cigarette Use Never User      E-Cigarette/Vaping Substances     Social History     Tobacco Use Smoking Status Never   Smokeless Tobacco Never     Family History:   Family History   Problem Relation Age of Onset   • Hypertension Mother    • Parkinsonism Mother 80        memory issues    • Depression Mother    • Melanoma Father    • No Known Problems Maternal Grandmother    • No Known Problems Maternal Grandfather    • No Known Problems Paternal Grandmother    • Heart attack Paternal Grandfather    • Depression Other    • Skin cancer Sister    • Breast cancer Maternal Aunt         over age 48   • No Known Problems Paternal Aunt    • No Known Problems Sister        Review of previous medical records was completed. Meds/Allergies   all current active meds have been reviewed, current meds:   Current Facility-Administered Medications   Medication Dose Route Frequency   • naloxone (FOR EMS ONLY) (NARCAN) 2 MG/2ML injection 2 mg  1 each Does not apply Once   • sodium chloride 0.9 % bolus 1,000 mL  1,000 mL Intravenous Once    and PTA meds:   Prior to Admission Medications   Prescriptions Last Dose Informant Patient Reported? Taking? Calcium-Magnesium-Vitamin D (CALCIUM 500 PO)  Self Yes No   Sig: Take by mouth   Cholecalciferol (VITAMIN D3) 5000 units CAPS  Self Yes No   Sig: Take by mouth   Multiple Vitamins-Minerals (CENTRUM SILVER 50+WOMEN PO)  Self Yes No   Sig: Take 1 tablet by mouth daily   acetaminophen (TYLENOL) 325 mg tablet   No No   Sig: Take 2 tablets (650 mg total) by mouth every 6 (six) hours as needed for mild pain   amLODIPine (NORVASC) 5 mg tablet   No No   Sig: Take 1 tablet (5 mg total) by mouth daily Do not start before 2023.    aspirin 81 mg chewable tablet   No No   Sig: Chew 1 tablet (81 mg total) daily   buPROPion (WELLBUTRIN SR) 150 mg 12 hr tablet  Self Yes No   Si (two) times a day     levothyroxine 50 mcg tablet   Yes No   Sig: Take 50 mcg by mouth every morning   melatonin 3 mg   No No   Sig: Take 2 tablets (6 mg total) by mouth daily at bedtime   mirtazapine (REMERON) 30 mg tablet  Self Yes No   Si mg daily at bedtime     oxyCODONE (ROXICODONE) 5 immediate release tablet   No No   Sig: Take 0.5 tablets (2.5 mg total) by mouth every 6 (six) hours as needed for moderate pain for up to 10 days Max Daily Amount: 10 mg   risperiDONE (RisperDAL) 1 mg tablet   No No   Sig: Take 1 tablet (1 mg total) by mouth 2 (two) times a day   sertraline (ZOLOFT) 50 mg tablet  Self Yes No   Si mg daily with breakfast        Facility-Administered Medications: None       No Known Allergies    Objective   Vitals:Blood pressure 100/56, pulse 90, temperature 97.8 °F (36.6 °C), temperature source Temporal, resp. rate 18, SpO2 99 %. ,There is no height or weight on file to calculate BMI. Intake/Output Summary (Last 24 hours) at 2023 1618  Last data filed at 2023 1542  Gross per 24 hour   Intake 1000 ml   Output 200 ml   Net 800 ml       Invasive Devices: Invasive Devices     Peripheral Intravenous Line  Duration           Peripheral IV 23 Left Antecubital <1 day          Drain  Duration           External Urinary Catheter 7 days    Urethral Catheter 16 Fr. <1 day              Performed by Attending Neurologist, AP present at bedside   Physical Exam  Vitals reviewed. Constitutional:       General: She is not in acute distress. Appearance: She is normal weight. Comments: Somnolent, groans to verbal/tactile stimulation however does not open eyes   HENT:      Head: Normocephalic and atraumatic. Right Ear: External ear normal.      Left Ear: External ear normal.      Nose: Nose normal.      Mouth/Throat:      Comments: Difficult to assess, patient does not open mouth or stick out tongue  Eyes:      General:         Right eye: No discharge. Left eye: No discharge. Conjunctiva/sclera: Conjunctivae normal.   Cardiovascular:      Rate and Rhythm: Normal rate. Pulmonary:      Effort: Pulmonary effort is normal. No respiratory distress.       Comments: NC in place  Skin:     General: Skin is warm and dry. Coloration: Skin is pale. Skin is not jaundiced. Neurological:      Deep Tendon Reflexes:      Reflex Scores:       Patellar reflexes are 3+ on the right side and 3+ on the left side. Comments: See below   Psychiatric:      Comments: Unable to assess secondary to MS       Neurologic Exam     Mental Status     Patient slightly arouses to tactile/noxious stimulation at beginning of neuro exam, became more alert towards the end of exam. Did not attempt to speak, unable to assess for dysarthria. Does not follow commands, repeat phrases, answer orientation or name objects. Cranial Nerves     PERLL. Conjugate gaze present, patient seen tracking examiner around room. Does not follow commands for VF testing. Facial features appear symmetric at rest, does not attempt to smile. Does not attempt to stick out tongue. Limited CN assessment secondary to MS. Motor Exam   Patient seen spontaneously moving BUE antigravity, however right appears weaker than left. Sensory Exam     Equal withdrawal in bilateral upper extremities and left lower extremity. Decreased withdrawal noted on right lower extremity. Gait, Coordination, and Reflexes     Reflexes   Reflexes 2+ except as noted. Right patellar: 3+  Left patellar: 3+      Performed by Attending NeurologistMAGY present at bedside   NIHSS:  1a.Level of Consciousness: 2 = Not alert, requires repeated stimulation to attend   1b. LOC Questions: 2 = Answers neither correctly   1c. LOC Commands: 2 = Obeys neither correctly   2. Best Gaze: 0 = Normal   3. Visual: 0 = No visual field loss   4. Facial Palsy: 0=Normal symmetric movement   5a. Motor Right Arm: 3=No effort against gravity, limb falls   5b. Motor Left Arm: 2=Some effort against gravity, limb cannot get to or maintain (if cured) 90 (or 45) degrees, drifts down to bed, but has some effort against gravity   6a.  Motor Right Leg: 3=No effort against gravity, limb falls   6b. Motor Left Le=Some effort against gravity, limb cannot get to or maintain (if cured) 90 (or 45) degrees, drifts down to bed, but has some effort against gravity   7. Limb Ataxia:  Untestable    8. Sensory: 0=Normal; no sensory loss   9. Best Language:  3=Mute, global aphasia; no usable speech or auditory comprehension   10. Dysarthria: 2=Severe; patient speech is so slurred as to be unintelligible in the absence of or our of proportion to any dysphagia, or is mute/anarthric   11. Extinction and Inattention (formerly Neglect): 0=No abnormality   Total Score: 21     Time NIHSS was completed: After CT imaging    Modified [place] Score:  4 (Moderately severe disability; unable to walk and attend to bodily needs without assistance) wheelchair-bound at baseline    Lab Results:   I have personally reviewed pertinent reports. , CBC:   Results from last 7 days   Lab Units 23  1429 23  1335 23  0557   WBC Thousand/uL  --  11.55*  --    RBC Million/uL  --  2.88*  --    HEMOGLOBIN g/dL  --  8.5* 7.8*   I STAT HEMOGLOBIN g/dl 7.5*  --   --    HEMATOCRIT %  --  27.9* 24.9*   HEMATOCRIT, ISTAT % 22*  --   --    MCV fL  --  97  --    PLATELETS Thousands/uL  --  321  --    , BMP/CMP:   Results from last 7 days   Lab Units 23  1429 23  1335   SODIUM mmol/L  --  135   POTASSIUM mmol/L  --  5.9*   CHLORIDE mmol/L  --  99   CO2 mmol/L  --  21   CO2, I-STAT mmol/L 20*  --    BUN mg/dL  --  30*   CREATININE mg/dL  --  2.73*   GLUCOSE, ISTAT mg/dl 131  --    CALCIUM mg/dL  --  9.4   EGFR ml/min/1.73sq m  --  16   , Coagulation:   Results from last 7 days   Lab Units 23  1335   INR  1.06     Imaging Studies: I have personally reviewed pertinent reports. and I have personally reviewed pertinent films in PACS CT head, CTA head/neck, prior neuroimaging completed in 2023  EKG, Pathology, and Other Studies: I have personally reviewed pertinent reports.    and I have personally reviewed pertinent films in PACS  VTE Prophylaxis: Patient currently in ED    Code Status: Prior

## 2023-08-05 PROBLEM — R77.8 ELEVATED TROPONIN: Status: ACTIVE | Noted: 2023-08-05

## 2023-08-05 PROBLEM — R79.89 ELEVATED TROPONIN: Status: ACTIVE | Noted: 2023-01-01

## 2023-08-05 PROBLEM — R74.01 TRANSAMINITIS: Status: ACTIVE | Noted: 2023-08-05

## 2023-08-05 NOTE — ASSESSMENT & PLAN NOTE
· Non-MI troponin elevation secondary to PE  · Cardiology was consulted  · We will follow-up echo if still within goals of care by tomorrow

## 2023-08-05 NOTE — PLAN OF CARE
Problem: Potential for Falls  Goal: Patient will remain free of falls  Description: INTERVENTIONS:  - Educate patient/family on patient safety including physical limitations  - Instruct patient to call for assistance with activity   - Consult OT/PT to assist with strengthening/mobility   - Keep Call bell within reach  - Keep bed low and locked with side rails adjusted as appropriate  - Keep care items and personal belongings within reach  - Initiate and maintain comfort rounds  - Make Fall Risk Sign visible to staff  - Apply yellow socks and bracelet for high fall risk patients  - Consider moving patient to room near nurses station  Outcome: Progressing     Problem: MOBILITY - ADULT  Goal: Maintain or return to baseline ADL function  Description: INTERVENTIONS:  -  Assess patient's ability to carry out ADLs; assess patient's baseline for ADL function and identify physical deficits which impact ability to perform ADLs (bathing, care of mouth/teeth, toileting, grooming, dressing, etc.)  - Assess/evaluate cause of self-care deficits   - Assess range of motion  - Assess patient's mobility; develop plan if impaired  - Assess patient's need for assistive devices and provide as appropriate  - Encourage maximum independence but intervene and supervise when necessary  - Involve family in performance of ADLs  - Assess for home care needs following discharge   - Consider OT consult to assist with ADL evaluation and planning for discharge  - Provide patient education as appropriate  Outcome: Progressing  Goal: Maintains/Returns to pre admission functional level  Description: INTERVENTIONS:  - Perform BMAT or MOVE assessment daily.   - Set and communicate daily mobility goal to care team and patient/family/caregiver.    - Collaborate with rehabilitation services on mobility goals if consulted  - Out of bed for toileting  - Record patient progress and toleration of activity level   Outcome: Progressing     Problem: PAIN - ADULT  Goal: Verbalizes/displays adequate comfort level or baseline comfort level  Description: Interventions:  - Encourage patient to monitor pain and request assistance  - Assess pain using appropriate pain scale  - Administer analgesics based on type and severity of pain and evaluate response  - Implement non-pharmacological measures as appropriate and evaluate response  - Consider cultural and social influences on pain and pain management  - Notify physician/advanced practitioner if interventions unsuccessful or patient reports new pain  Outcome: Progressing     Problem: INFECTION - ADULT  Goal: Absence or prevention of progression during hospitalization  Description: INTERVENTIONS:  - Assess and monitor for signs and symptoms of infection  - Monitor lab/diagnostic results  - Monitor all insertion sites, i.e. indwelling lines, tubes, and drains  - Monitor endotracheal if appropriate and nasal secretions for changes in amount and color  - [place] appropriate cooling/warming therapies per order  - Administer medications as ordered  - Instruct and encourage patient and family to use good hand hygiene technique  - Identify and instruct in appropriate isolation precautions for identified infection/condition  Outcome: Progressing  Goal: Absence of fever/infection during neutropenic period  Description: INTERVENTIONS:  - Monitor WBC    Outcome: Progressing     Problem: SAFETY ADULT  Goal: Patient will remain free of falls  Description: INTERVENTIONS:  - Educate patient/family on patient safety including physical limitations  - Instruct patient to call for assistance with activity   - Consult OT/PT to assist with strengthening/mobility   - Keep Call bell within reach  - Keep bed low and locked with side rails adjusted as appropriate  - Keep care items and personal belongings within reach  - Initiate and maintain comfort rounds  - Make Fall Risk Sign visible to staff  - Apply yellow socks and bracelet for high fall risk patients  - Consider moving patient to room near nurses station  Outcome: Progressing  Goal: Maintain or return to baseline ADL function  Description: INTERVENTIONS:  -  Assess patient's ability to carry out ADLs; assess patient's baseline for ADL function and identify physical deficits which impact ability to perform ADLs (bathing, care of mouth/teeth, toileting, grooming, dressing, etc.)  - Assess/evaluate cause of self-care deficits   - Assess range of motion  - Assess patient's mobility; develop plan if impaired  - Assess patient's need for assistive devices and provide as appropriate  - Encourage maximum independence but intervene and supervise when necessary  - Involve family in performance of ADLs  - Assess for home care needs following discharge   - Consider OT consult to assist with ADL evaluation and planning for discharge  - Provide patient education as appropriate  Outcome: Progressing  Goal: Maintains/Returns to pre admission functional level  Description: INTERVENTIONS:  - Perform BMAT or MOVE assessment daily.   - Set and communicate daily mobility goal to care team and patient/family/caregiver.    - Collaborate with rehabilitation services on mobility goals if consulted  - Out of bed for toileting  - Record patient progress and toleration of activity level   Outcome: Progressing     Problem: DISCHARGE PLANNING  Goal: Discharge to home or other facility with appropriate resources  Description: INTERVENTIONS:  - Identify barriers to discharge w/patient and caregiver  - Arrange for needed discharge resources and transportation as appropriate  - Identify discharge learning needs (meds, wound care, etc.)  - Arrange for interpretive services to assist at discharge as needed  - Refer to Case Management Department for coordinating discharge planning if the patient needs post-hospital services based on physician/advanced practitioner order or complex needs related to functional status, cognitive ability, or social support system  Outcome: Progressing     Problem: Knowledge Deficit  Goal: Patient/family/caregiver demonstrates understanding of disease process, treatment plan, medications, and discharge instructions  Description: Complete learning assessment and assess knowledge base. Interventions:  - Provide teaching at level of understanding  - Provide teaching via preferred learning methods  Outcome: Progressing     Problem: NEUROSENSORY - ADULT  Goal: Achieves stable or improved neurological status  Description: INTERVENTIONS  - Monitor and report changes in neurological status  - Monitor vital signs such as temperature, blood pressure, glucose, and any other labs ordered   - Initiate measures to prevent increased intracranial pressure  - Monitor for seizure activity and implement precautions if appropriate      Outcome: Progressing  Goal: Achieves maximal functionality and self care  Description: INTERVENTIONS  - Monitor swallowing and airway patency with patient fatigue and changes in neurological status  - Encourage and assist patient to increase activity and self care.    - Encourage visually impaired, hearing impaired and aphasic patients to use assistive/communication devices  Outcome: Progressing     Problem: CARDIOVASCULAR - ADULT  Goal: Maintains optimal cardiac output and hemodynamic stability  Description: INTERVENTIONS:  - Monitor I/O, vital signs and rhythm  - Monitor for S/S and trends of decreased cardiac output  - Administer and titrate ordered vasoactive medications to optimize hemodynamic stability  - Assess quality of pulses, skin color and temperature  - Assess for signs of decreased coronary artery perfusion  - Instruct patient to report change in severity of symptoms  Outcome: Progressing     Problem: RESPIRATORY - ADULT  Goal: Achieves optimal ventilation and oxygenation  Description: INTERVENTIONS:  - Assess for changes in respiratory status  - Assess for changes in mentation and behavior  - Position to facilitate oxygenation and minimize respiratory effort  - Oxygen administered by appropriate delivery if ordered  - Initiate smoking cessation education as indicated  - Encourage broncho-pulmonary hygiene including cough, deep breathe, Incentive Spirometry  - Assess the need for suctioning and aspirate as needed  - Assess and instruct to report SOB or any respiratory difficulty  - Respiratory Therapy support as indicated  Outcome: Progressing     Problem: GENITOURINARY - ADULT  Goal: Maintains or returns to baseline urinary function  Description: INTERVENTIONS:  - Assess urinary function  - Encourage oral fluids to ensure adequate hydration if ordered  - Administer IV fluids as ordered to ensure adequate hydration  - Administer ordered medications as needed  - Offer frequent toileting  - Follow urinary retention protocol if ordered  Outcome: Progressing  Goal: Absence of urinary retention  Description: INTERVENTIONS:  - Assess patient’s ability to void and empty bladder  - Monitor I/O  - Bladder scan as needed  - Discuss with physician/AP medications to alleviate retention as needed  - Discuss catheterization for long term situations as appropriate  Outcome: Progressing  Goal: Urinary catheter remains patent  Description: INTERVENTIONS:  - Assess patency of urinary catheter  - If patient has a chronic cummins, consider changing catheter if non-functioning  - Follow guidelines for intermittent irrigation of non-functioning urinary catheter  Outcome: Progressing     Problem: METABOLIC, FLUID AND ELECTROLYTES - ADULT  Goal: Electrolytes maintained within normal limits  Description: INTERVENTIONS:  - Monitor labs and assess patient for signs and symptoms of electrolyte imbalances  - Administer electrolyte replacement as ordered  - Monitor response to electrolyte replacements, including repeat lab results as appropriate  - Instruct patient on fluid and nutrition as appropriate  Outcome: Progressing  Goal: Fluid balance maintained  Description: INTERVENTIONS:  - Monitor labs   - Monitor I/O and WT  - Instruct patient on fluid and nutrition as appropriate  - Assess for signs & symptoms of volume excess or deficit  Outcome: Progressing     Problem: SKIN/TISSUE INTEGRITY - ADULT  Goal: Skin Integrity remains intact(Skin Breakdown Prevention)  Description: Assess:  -Inspect skin when repositioning, toileting, and assisting with ADLS  -Assess extremities for adequate circulation and sensation     Bed Management:  -Have minimal linens on bed & keep smooth, unwrinkled  -Change linens as needed when moist or perspiring    Toileting:  -Offer bedside commode    Activity:  -Encourage activity and walks on unit  -Encourage or provide ROM exercises   -Use appropriate equipment to lift or move patient in bed    Skin Care:  -Avoid use of baby powder, tape, friction and shearing, hot water or constrictive clothing  -Do not massage red bony areas    Outcome: Progressing     Problem: MUSCULOSKELETAL - ADULT  Goal: Maintain or return mobility to safest level of function  Description: INTERVENTIONS:  - Assess patient's ability to carry out ADLs; assess patient's baseline for ADL function and identify physical deficits which impact ability to perform ADLs (bathing, care of mouth/teeth, toileting, grooming, dressing, etc.)  - Assess/evaluate cause of self-care deficits   - Assess range of motion  - Assess patient's mobility  - Assess patient's need for assistive devices and provide as appropriate  - Encourage maximum independence but intervene and supervise when necessary  - Involve family in performance of ADLs  - Assess for home care needs following discharge   - Consider OT consult to assist with ADL evaluation and planning for discharge  - Provide patient education as appropriate  Outcome: Progressing

## 2023-08-05 NOTE — ASSESSMENT & PLAN NOTE
· Patient currently requiring 3 L nasal cannula, brief escalation to 6 L during the day but has since been weaned back down to 3 L  · Secondary to acute PE  · Continue heparin drip

## 2023-08-05 NOTE — QUICK NOTE
Spoke with patient's sister at bedside regarding new findings of likely acute beth in setting of elevated LFT's and the overall picture of the pt being a poor operative candidate. I did offer to speak with IR to consider per beth however I discussed how I felt she would overall still have a poor prognosis. I also spoke with Neurology prior to this who felt she was still at risk of recurrent SAH if placed on hep gtt and advised discussion with family regarding these risks which I also relayed to pt's sister. Kayce Levi stated that she "did not want to put her through anything else that would make her uncomfortable" and stated that comfort care seemed appropriate but wanted to discuss with her sister before making any final decisions. Will continue current medical management at this time and await final decisions regarding level of care.

## 2023-08-05 NOTE — PROCEDURES
POC Cardiac US    Date/Time: 8/4/2023 11:24 PM    Performed by: SAVANNAH Dodd  Authorized by: Cristy Bryant, [address] [place] [address]    Patient location:  ICU  Other Assisting Provider: No    Procedure details:     Exam Type:  Diagnostic    Indications: hypotension and suspected volume depletion      Assessment / Evaluation for: cardiac function, pericardial effusion, intravascular volume status and right heart strain (suspected pulmonary embolism)      Exam Type: initial exam      Image quality: limited diagnostic      Image availability:  Not saved  Patient Details:     Cardiac Rhythm:  Regular    Mechanical ventilation: No    Cardiac findings:     Echo technique: limited 2D and M-mode      Views obtained: parasternal long axis, parasternal short axis and apical      Pericardial effusion: absent      Tamponade physiology: absent      Wall motion: hyperdynamic      LV systolic function: hyperdynamic      RV dilation: mild      RV dilation comment:  Difficult to visualize RV even in apical view  Pulmonary findings:     B-lines: no B-lines present    IVC findings:     IVC Size: indeterminate    Interpretation:     Fluid Status:  Hypovolemic     Appears hypovolemic in setting of significantly hyperdynamic LV. Unable to adequately visualize RV even in apical view despite obtaining several windows.  Will follow formal ECHO    SAVANNAH Dodd

## 2023-08-05 NOTE — ASSESSMENT & PLAN NOTE
· CTA stroke protocol noted new incompletely imaged acute PE in bilateral upper lobes.   · Patient cannot receive CT PE study given acute renal failure  · Heparin drip was initiated yesterday, after discussion with family we will continue for the time being  · Respiratory status is somewhat tenuous as patient is tachypneic today and briefly required increase in nasal cannula requirement to 6 L however has since been weaned back down to 3 L nasal cannula  · Monitor O2 requirement  · Follow-up echo

## 2023-08-05 NOTE — ASSESSMENT & PLAN NOTE
· Baseline creatinine 0.6-0.9, admission creatinine 2.73, then received IV contrast for CTA stroke alert  · Started on IV fluids with gradual improvement of creatinine  · CTAP shows decreased cortical enhancement of the right kidney which could be multiple infarcts versus pyelo-  · UA bland but is currently on IV Zosyn  · Continue IV heparin  · Nephrology consulted  · Advise continued IV fluids and serial labs  · A.m. BMP  · Continue medical management of hyperkalemia  · Reynoso was placed in the ED, given that patient is critically ill will maintain Reynoso until goals of care discussions are finalized

## 2023-08-05 NOTE — ASSESSMENT & PLAN NOTE
· Patient does have advanced Alzheimer's and was recently noted to have decreased arousal and altered mentation on admission in July  · CTA stroke protocol shows no large vessel occlusion, aneurysm or stenosis. No evidence of hemorrhage.   · MRI negative for acute stroke, advanced chronic microvascular disease and ventriculomegaly  · Neuro following, in light of acute PE they are advising cautious decision making regarding heparin drip as she is at risk of subarachnoid hemorrhage given recent [facility]  · We will continue to monitor neuro exam  · Appears this is all likely toxic metabolic encephalopathy in setting of acute PE   · Arousable but nonverbal presently

## 2023-08-05 NOTE — CONSULTS
Consultation - Cardiology   Jacques Licona 77 y.o. female MRN: [id number]  Unit/Bed#: -01 SDU Encounter: [id number]      Assessment:  Principal Problem:    Altered mental status  Active Problems:    Parkinsonism ([address])    Acquired hypothyroidism    Depression    Subarachnoid hemorrhage (HCC)    Lactic acidosis    Hypoxia    Acute renal failure (ARF) (HCC)    Goals of care, counseling/discussion    Pulmonary embolism ([address])      Plan:  Unfortunate 75-year-old presenting with altered mental status and found to have bilateral upper lobe pulmonary embolism. She is on intravenous heparin. Mild troponin elevation is likely secondary to that. EKG does not demonstrate ischemia. There is also concern in reference to cholecystitis. Patient's sister is at the bedside and is aware of the situation. There is discussion about goals of care. For now I agree with her management. An echocardiogram has been ordered. We will follow with you. Thank you for this consultation. History of Present Illness   Physician Requesting Consult: Adri Cristobal MD  Reason for Consult / Principal Problem: Change in mental status  HPI: Jacques Licona is a 77y.o. year old female who presents with change in mental status. Patient had been in a rehabilitation facility recovering from hip surgery. Patient was found to be unresponsive. Radiologic work-up revealed bilateral upper lobe pulmonary embolism. MRI of the brain revealed no evidence of acute ischemia. Patient was noted to have an elevation in troponin I peaking at 788. EKG demonstrated NSR with nonspecific ST segment changes. Patient is currently poorly responsive. Her sister and their  is in attendance. Per review of the record there is concern as well about acute cholecystitis. Patient is currently on intravenous heparin. She is on nasal cannula oxygen and appears comfortable. Most recent CMP demonstrates potassium of 5.5 with a creatinine of 2.27.   AST and ALT are elevated. Lactic acid is down to 2.4. Hemoglobin is 8.3 with a white blood cell count of 12.1. Chest x-ray revealed no acute disease. Patient in sinus rhythm on telemetry. Consults    Review of Systems:  Review of Systems -not obtainable given altered mental status    Historical Information   Past Medical History:   Diagnosis Date   • Anxiety    • Depression    • Memory loss    • Thyroid activity decreased      Past Surgical History:   Procedure Laterality Date   • BREAST BIOPSY      years ago- Benign   • JOINT REPLACEMENT Left    • TN HEMIARTHROPLASTY HIP PARTIAL Right 7/23/2023    Procedure: HEMIARTHROPLASTY HIP TOTAL with removal of hardware;  Surgeon: Karla Scott MD;  Location:  MAIN OR;  Service: Orthopedics   • TN OPTX FEM SHFT FX W/INSJ IMED IMPLT W/WO SCREW Right 5/9/2019    Procedure: INSERTION NAIL IM FEMUR ANTEGRADE (TROCHANTERIC), RIGHT;  Surgeon: Jean-Paul Menard MD;  Location:  MAIN OR;  Service: Orthopedics   • TN REMOVAL IMPLANT DEEP Right 6/30/2023    Procedure: REMOVAL HARDWARE TFN DYNAMIC SCREW;  Surgeon: Jean-Paul Menard MD;  Location: [facility] MAIN OR;  Service: Orthopedics     Social History     Substance and Sexual Activity   Alcohol Use Not Currently     Social History     Substance and Sexual Activity   Drug Use No     Social History     Tobacco Use   Smoking Status Never   Smokeless Tobacco Never     Family History: non-contributory    Meds/Allergies   all current active meds have been reviewed  No Known Allergies    Objective   Vitals: Blood pressure 110/58, pulse 81, temperature 98.2 °F (36.8 °C), temperature source Temporal, resp. rate 22, weight 53.3 kg (117 lb 8.1 oz), SpO2 93 %. , Body mass index is 17.87 kg/m².,   Orthostatic Blood Pressures    Flowsheet Row Most Recent Value   Blood Pressure 110/58 filed at 08/05/2023 0800   Patient Position - Orthostatic VS Lying filed at 08/05/2023 0800            Intake/Output Summary (Last 24 hours) at 8/5/2023 1232  Last data filed at 8/5/2023 0753  Gross per 24 hour   Intake 3533.58 ml   Output 660 ml   Net 2873.58 ml       Invasive Devices     Peripheral Intravenous Line  Duration           Peripheral IV 08/04/23 Dorsal (posterior); Left Forearm <1 day    Peripheral IV 08/04/23 Left Antecubital <1 day          Drain  Duration           External Urinary Catheter 8 days    Urethral Catheter 16 Fr. <1 day                Physical Exam  Vitals reviewed. HENT:      Head: Normocephalic and atraumatic. Eyes:      Conjunctiva/sclera: Conjunctivae normal.      Pupils: Pupils are equal, round, and reactive to light. Cardiovascular:      Rate and Rhythm: Normal rate. Heart sounds: Normal heart sounds. Pulmonary:      Effort: Pulmonary effort is normal.      Breath sounds: Normal breath sounds. Abdominal:      General: Bowel sounds are normal.   Skin:     General: Skin is warm and dry.          Lab Results:   Admission on 08/04/2023   Component Date Value   • Sodium 08/04/2023 135    • Potassium 08/04/2023 5.9 (H)    • Chloride 08/04/2023 99    • CO2 08/04/2023 21    • ANION GAP 08/04/2023 15    • BUN 08/04/2023 30 (H)    • Creatinine 08/04/2023 2.73 (H)    • Glucose 08/04/2023 156 (H)    • Calcium 08/04/2023 9.4    • eGFR 08/04/2023 16    • WBC 08/04/2023 11.55 (H)    • RBC 08/04/2023 2.88 (L)    • Hemoglobin 08/04/2023 8.5 (L)    • Hematocrit 08/04/2023 27.9 (L)    • MCV 08/04/2023 97    • MCH 08/04/2023 29.5    • MCHC 08/04/2023 30.5 (L)    • RDW 08/04/2023 14.6    • Platelets 68/28/6802 321    • MPV 08/04/2023 8.8 (L)    • Protime 08/04/2023 14.5    • INR 08/04/2023 1.06    • PTT 08/04/2023 25    • hs TnI 0hr 08/04/2023 788 (H)    • SARS-CoV-2 08/04/2023 Negative    • INFLUENZA A PCR 08/04/2023 Negative    • INFLUENZA B PCR 08/04/2023 Negative    • RSV PCR 08/04/2023 Negative    • Ventricular Rate 08/04/2023 90    • Atrial Rate 08/04/2023 90    • ME Interval 08/04/2023 116    • QRSD Interval 08/04/2023 76    • QT Interval 08/04/2023 352 • QTC Interval 08/04/2023 430    • P Axis 08/04/2023 60    • QRS Axis 08/04/2023 82    • T Wave Axis 08/04/2023 67    • POC Glucose 08/04/2023 174 (H)    • Blood Culture 08/04/2023 Received in Microbiology Lab. Culture in Progress. • Blood Culture 08/04/2023 Received in Microbiology Lab. Culture in Progress.     • LACTIC ACID 08/04/2023 5.5 (HH)    • Procalcitonin 08/04/2023 0.57 (H)    • Color, UA 08/04/2023 Yellow    • Clarity, UA 08/04/2023 Clear    • Specific Gravity, UA 08/04/2023 1.015    • pH, UA 08/04/2023 7.0    • Leukocytes, UA 08/04/2023 Negative    • Nitrite, UA 08/04/2023 Negative    • Protein, UA 08/04/2023 Trace (A)    • Glucose, UA 08/04/2023 30 (3/100%) (A)    • Ketones, UA 08/04/2023 Negative    • Urobilinogen, UA 08/04/2023 <2.0    • Bilirubin, UA 08/04/2023 Negative    • Occult Blood, UA 08/04/2023 Negative    • hs TnI 2hr 08/04/2023 663 (H)    • Delta 2hr hsTnI 08/04/2023 -125    • pH, Art i-STAT 08/04/2023 7.449    • pCO2, Art i-STAT 08/04/2023 27.4 (LL)    • pO2, ART i-STAT 08/04/2023 117.0    • BE, i-STAT 08/04/2023 -4 (L)    • HCO3, Art i-STAT 08/04/2023 19.0 (L)    • CO2, i-STAT 08/04/2023 20 (L)    • O2 Sat, i-STAT 08/04/2023 99 (H)    • SODIUM, I-STAT 08/04/2023 133 (L)    • Potassium, i-STAT 08/04/2023 5.3    • Calcium, Ionized i-STAT 08/04/2023 1.12    • Hct, i-STAT 08/04/2023 22 (L)    • Hgb, i-STAT 08/04/2023 7.5 (L)    • Glucose, i-STAT 08/04/2023 131    • POC FIO2 08/04/2023 44    • Specimen Type 08/04/2023 ARTERIAL    • SITE 08/04/2023 Right Radial    • JUANCHO TEST 08/04/2023 Positive Allens Test    • D-Dimer, Quant 08/04/2023 13.64 (H)    • LACTIC ACID 08/04/2023 4.2 (HH)    • hs TnI 4hr 08/04/2023 700 (H)    • Delta 4hr hsTnI 08/04/2023 -88    • Sodium 08/04/2023 136    • Potassium 08/04/2023 5.9 (H)    • Chloride 08/04/2023 103    • CO2 08/04/2023 22    • ANION GAP 08/04/2023 11    • BUN 08/04/2023 32 (H)    • Creatinine 08/04/2023 2.55 (H)    • Glucose 08/04/2023 156 (H) • Calcium 08/04/2023 8.8    • eGFR 08/04/2023 18    • RBC, UA 08/04/2023 0-1    • WBC, UA 08/04/2023 4-10 (A)    • Epithelial Cells 08/04/2023 Occasional    • Bacteria, UA 08/04/2023 Moderate (A)    • Hyaline Casts, UA 08/04/2023 4-10 (A)    • Granular Casts, UA 08/04/2023 0-3 (A)    • Amorphous Crystals, UA 08/04/2023 Moderate    • Ca Oxalate Ayana, UA 08/04/2023 Occasional (A)    • OTHER OBSERVATIONS 08/04/2023 Renal Tubule Epithelial Cells Present    • BNP 08/04/2023 542 (H)    • TSH 3RD GENERATON 08/04/2023 2. 214    • Sodium 08/04/2023 140    • Potassium 08/04/2023 4.7    • Chloride 08/04/2023 105    • CO2 08/04/2023 24    • ANION GAP 08/04/2023 11    • BUN 08/04/2023 33 (H)    • Creatinine 08/04/2023 2.53 (H)    • Glucose 08/04/2023 54 (L)    • Calcium 08/04/2023 9.3    • eGFR 08/04/2023 18    • POC Glucose 08/04/2023 145 (H)    • PTT 08/05/2023 >210 (HH)    • POC Glucose 08/04/2023 91    • LACTIC ACID 08/05/2023 4.1 (HH)    • LACTIC ACID 08/05/2023 2.4 (HH)    • Sodium 08/05/2023 138    • Potassium 08/05/2023 5.5 (H)    • Chloride 08/05/2023 106    • CO2 08/05/2023 21    • ANION GAP 08/05/2023 11    • BUN 08/05/2023 37 (H)    • Creatinine 08/05/2023 2.27 (H)    • Glucose 08/05/2023 118    • Calcium 08/05/2023 8.7    • AST 08/05/2023 503 (H)    • ALT 08/05/2023 425 (H)    • Alkaline Phosphatase 08/05/2023 80    • Total Protein 08/05/2023 6.1 (L)    • Albumin 08/05/2023 3.6    • Total Bilirubin 08/05/2023 0.38    • eGFR 08/05/2023 21    • WBC 08/05/2023 12.18 (H)    • RBC 08/05/2023 2.81 (L)    • Hemoglobin 08/05/2023 8.3 (L)    • Hematocrit 08/05/2023 27.9 (L)    • MCV 08/05/2023 99 (H)    • MCH 08/05/2023 29.5    • MCHC 08/05/2023 29.7 (L)    • RDW 08/05/2023 15.1    • MPV 08/05/2023 9.1    • Platelets 56/38/3153 319    • nRBC 08/05/2023 0    • Neutrophils Relative 08/05/2023 83 (H)    • Immat GRANS % 08/05/2023 1    • Lymphocytes Relative 08/05/2023 11 (L)    • Monocytes Relative 08/05/2023 5    • Eosinophils Relative 08/05/2023 0    • Basophils Relative 08/05/2023 0    • Neutrophils Absolute 08/05/2023 10.12 (H)    • Immature Grans Absolute 08/05/2023 0.16    • Lymphocytes Absolute 08/05/2023 1.32    • Monocytes Absolute 08/05/2023 0.57    • Eosinophils Absolute 08/05/2023 0.00    • Basophils Absolute 08/05/2023 0.01    • PTT 08/05/2023 171 (HH)        Imaging: I have personally reviewed pertinent reports. CT chest abdomen pelvis wo contrast    Result Date: 8/4/2023  Narrative: CT CHEST, ABDOMEN AND PELVIS WITHOUT IV CONTRAST INDICATION:   altered. COMPARISON: Prior CT scan of the pelvis dated 12/30/2021 and prior CT scan of the abdomen pelvis dated 6/16/2019. TECHNIQUE: CT examination of the chest, abdomen and pelvis was performed without intravenous contrast. Multiplanar 2D reformatted images were created from the source data. This examination, like all CT scans performed in the [facility], was performed utilizing techniques to minimize radiation dose exposure, including the use of iterative reconstruction and automated exposure control. Radiation dose length product (DLP) for this visit:  601.41 mGy-cm Enteric contrast was not administered. FINDINGS: CHEST LUNGS: Minimal dependent atelectasis in the visualized lower lobes. There is a bleb in the right lower lobe posteromedially. No focal airspace consolidation. No suspicious nodules or masses. No obvious endobronchial lesion. PLEURA:  Unremarkable. HEART/GREAT VESSELS: Heart is unremarkable for patient's age. No thoracic aortic aneurysm. Scattered coronary artery calcifications. Scattered aortic calcifications. No pericardial effusion. MEDIASTINUM AND JOSE: Small mediastinal lymph nodes are seen but these are not enlarged by CT size criteria. No significant hilar or axillary lymphadenopathy. CHEST WALL AND LOWER NECK:  Unremarkable. ABDOMEN LIVER/BILIARY TREE:  Unremarkable.  GALLBLADDER: There appears to be prominent gallbladder wall thickening and questionable trace pericholecystic fluid. No calcified gallstones are seen but cannot exclude acute cholecystitis. This can be further evaluated with ultrasound. SPLEEN:  Unremarkable. PANCREAS:  Unremarkable. ADRENAL GLANDS:  Unremarkable. KIDNEYS/URETERS: There is retained contrast seen within both kidneys from the prior CT study. There is some excreted contrast also seen. Correlation for any underlying renal insufficiency advised. There is scarring seen in the right kidney. Multifocal areas of decreased cortical enhancement seen within the right kidney. Differential considerations would include multifocal infarcts or pyelonephritis. Clinical correlation advised. No hydronephrosis. STOMACH AND BOWEL: There is no bowel obstruction. . No focal inflammatory process. APPENDIX: A normal appendix was visualized. ABDOMINOPELVIC CAVITY:  No ascites. No pneumoperitoneum. No lymphadenopathy. VESSELS: The abdominal aorta is calcified. No retroperitoneal hematoma. PELVIS REPRODUCTIVE ORGANS:  Unremarkable for patient's age. URINARY BLADDER: Contrast seen within the bladder. There is a Reynoso catheter in place ABDOMINAL WALL/INGUINAL REGIONS:  Unremarkable. OSSEOUS STRUCTURES: Stable mild compression fracture deformity of L4. There is a moderate compression fracture deformity of T12 that is age-indeterminate. There is also stable minimal chronic compression fracture of the superior endplate of L1. There are  cannulated lag screws transfixing the left femoral neck. There is a right hip arthroplasty. Impression: Gallbladder wall thickening and questionable trace pericholecystic fluid. No calcified gallstones are seen but cannot exclude acute cholecystitis. This can be further evaluated with ultrasound. Retained contrast seen within both kidneys from the prior CT study. Correlation for underlying renal insufficiency advised. Multifocal areas of decreased cortical enhancement seen within the right kidney. Differential considerations would include multifocal infarcts or pyelonephritis. Clinical correlation advised. Thoracolumbar compression fractures as above. Workstation performed: [id number]     MRI brain wo contrast    Result Date: 8/4/2023  Narrative: MRI BRAIN WITHOUT CONTRAST INDICATION: R29.90: Unspecified symptoms and signs involving the nervous system. COMPARISON:   7/24/2023 TECHNIQUE:  Multiplanar, multisequence imaging of the brain was performed. IMAGE QUALITY:  Diagnostic. FINDINGS: BRAIN PARENCHYMA:  There is no discrete mass, mass effect or midline shift. Right parietal subarachnoid hemosiderin staining likely old as was seen on the prior exam.  There is no evidence of acute infarction and diffusion imaging is unremarkable. Stable  periventricular and subcortical T2/FLAIR hyperintense foci, partially confluent, consistent with advanced microangiopathic disease. White matter lesions also present in the midbrain and alexi. VENTRICLES: Ventriculomegaly similar to prior SELLA AND PITUITARY GLAND:  Normal. ORBITS:  Normal. PARANASAL SINUSES:  Normal. VASCULATURE:  Evaluation of the major intracranial vasculature demonstrates appropriate flow voids. CALVARIUM AND SKULL BASE:  Normal. EXTRACRANIAL SOFT TISSUES:  Normal.     Impression: 1. No MR evidence of acute ischemia. 2. Similar white matter disease advanced chronic microvascular disease in addition to similar ventriculomegaly Workstation performed: [id number]     XR chest portable - 1 view    Result Date: 8/4/2023  Narrative: CHEST INDICATION:   altered mental status. COMPARISON: Compared with 7/24/2023 EXAM PERFORMED/VIEWS:  XR CHEST PORTABLE FINDINGS: Cardiomediastinal silhouette appears unremarkable. The lungs are clear. No pneumothorax or pleural effusion. Osseous structures appear within normal limits for patient age. Right proximal humerus hardware unchanged     Impression: No acute cardiopulmonary disease.  Workstation performed: [id number]     CT stroke alert brain    Result Date: 8/4/2023  Narrative: CT BRAIN - STROKE ALERT PROTOCOL INDICATION:   Stroke Alert. COMPARISON: CT dated 7/27/2023 and MRI dated 7/24/2023. TECHNIQUE:  CT examination of the brain was performed. In addition to axial images, coronal reformatted images were created and submitted for interpretation. Radiation dose length product (DLP) for this visit:  888. 67 mGy-cm . This examination, like all CT scans performed in the [facility], was performed utilizing techniques to minimize radiation dose exposure, including the use of iterative  reconstruction and automated exposure control. IMAGE QUALITY:  Diagnostic. FINDINGS: PARENCHYMA: No acute infarct, hemorrhage, mass or mass effect. Small focus of right parietal subarachnoid hemorrhage is no longer appreciated. Stable diminished attenuation in the periventricular and subcortical white matter due to advanced chronic microangiopathy. VENTRICLES AND EXTRA-AXIAL SPACES: Stable mild ventricular prominence out of proportion to degree of cortical volume loss, correlate for NPH. VISUALIZED ORBITS: Normal visualized orbits. PARANASAL SINUSES: Normal visualized paranasal sinuses. CALVARIUM AND EXTRACRANIAL SOFT TISSUES:   Normal.     Impression: No acute intracranial pathology. Small focus of subarachnoid hemorrhage is no longer seen. Stable chronic microangiopathy. Stable ventricular prominence, correlate for NPH. Findings were directly discussed with Vivian Ascencio  at 2:00 p.m. Workstation performed: [id number]     CTA stroke alert (head/neck)    Result Date: 8/4/2023  Narrative: CTA NECK AND BRAIN WITH CONTRAST INDICATION: Stroke Alert COMPARISON:   CTA dated 7/24/2023. TECHNIQUE:   Post contrast imaging was performed after administration of iodinated contrast through the neck and brain. Post contrast axial 0.625 mm images timed to opacify the arterial system. 3D rendering was performed on an independent workstation.    MIP reconstructions performed. Coronal reconstructions were performed of the noncontrast portion of the brain. Radiation dose length product (DLP) for this visit:  320.49 mGy-cm . This examination, like all CT scans performed in the [facility], was performed utilizing techniques to minimize radiation dose exposure, including the use of iterative  reconstruction and automated exposure control. IV Contrast:  85 mL of iohexol (OMNIPAQUE) IMAGE QUALITY:   Diagnostic FINDINGS: CERVICAL VASCULATURE AORTIC ARCH AND GREAT VESSELS: Mild atherosclerotic disease of the arch, proximal great vessels and visualized subclavian vessels. No significant stenosis. RIGHT VERTEBRAL ARTERY CERVICAL SEGMENT:  Normal origin. The vessel is normal in caliber throughout the neck. LEFT VERTEBRAL ARTERY CERVICAL SEGMENT: Dominant. Normal origin. The vessel is normal in caliber throughout the neck. RIGHT EXTRACRANIAL CAROTID SEGMENT:  Normal caliber common carotid artery. Normal bifurcation and cervical internal carotid artery. No stenosis or dissection. LEFT EXTRACRANIAL CAROTID SEGMENT: Minimal calcified plaque at the bifurcation. No significant stenosis or dissection. NASCET criteria was used to determine the degree of internal carotid artery diameter stenosis. INTRACRANIAL VASCULATURE INTERNAL CAROTID ARTERIES: Minimal calcified plaque in the left ICA. No significant stenosis. ANTERIOR CIRCULATION:  Symmetric A1 segments and anterior cerebral arteries with normal enhancement. Normal anterior communicating artery. MIDDLE CEREBRAL ARTERY CIRCULATION:  M1 segment and middle cerebral artery branches demonstrate normal enhancement bilaterally. DISTAL VERTEBRAL ARTERIES: Right vertebral artery is hypoplastic. Normal distal vertebral arteries. Left posterior inferior cerebellar artery and right AICA/PICA origins are normal. BASILAR ARTERY:  Basilar artery is normal in caliber. Normal superior cerebellar arteries.  POSTERIOR CEREBRAL ARTERIES: Right posterior cerebral artery arises from the basilar tip. Fetal-type left PCA. No significant stenosis. Right P-comm is hypoplastic. VENOUS STRUCTURES:  Normal. NON VASCULAR ANATOMY BONY STRUCTURES:  No acute osseous abnormality. SOFT TISSUES OF THE NECK:  Normal. THORACIC INLET: New partially imaged acute pulmonary emboli in the upper lobes. Impression: No significant stenosis of the cervical carotid or vertebral arteries. No significant intracranial stenosis, large vessel occlusion or aneurysm. New incompletely imaged acute pulmonary emboli in the bilateral upper lobes. Findings were directly discussed with Vivian Ascencio  at 2:00 p.m. Workstation performed: [id number]     XR abdomen 1 view kub    Result Date: 7/27/2023  Narrative: ABDOMEN INDICATION:   Confirm Dobbhoff tube, portable exam. COMPARISON: CT abdomen/pelvis 7/16/2019. VIEWS:  AP supine. The left flank and the pelvis are excluded from the field-of-view. Images: 2 FINDINGS: Weighted enteric tube courses inferior to the diaphragm, tip overlying the gastric body. There is a nonobstructive bowel gas pattern. No discernible free air on this supine study. Upright or left lateral decubitus imaging is more sensitive to detect subtle free air in the appropriate setting. No pathologic calcifications or soft tissue masses. Visualized lung bases are clear. Visualized osseous structures are unremarkable for the patient's age. Impression: Enteric tube with tip overlying the stomach. Workstation performed: [id number]     CT head wo contrast    Result Date: 7/27/2023  Narrative: CT BRAIN - WITHOUT CONTRAST INDICATION:   Subarachnoid hemorrhage [facility]), follow up f/u [facility]. COMPARISON: CT and MRI dated 7/24/2023. TECHNIQUE:  CT examination of the brain was performed. Multiplanar 2D reformatted images were created from the source data. Radiation dose length product (DLP) for this visit:  1144.19 mGy-cm .   This examination, like all CT scans performed in the [facility], was performed utilizing techniques to minimize radiation dose exposure, including the use of iterative reconstruction and automated exposure control. IMAGE QUALITY:  Diagnostic. FINDINGS: PARENCHYMA: Stable small focus of right posterior parietal subarachnoid hemorrhage. No new hemorrhage. No mass effect, shift or herniation. Stable diminished attenuation in the periventricular and subcortical white matter as well as the alexi likely due to advanced chronic microangiopathy. VENTRICLES AND EXTRA-AXIAL SPACES: Stable mild ventricular enlargement out of proportion to degree of cortical volume loss, correlate for NPH. VISUALIZED ORBITS: Normal visualized orbits. PARANASAL SINUSES: Normal visualized paranasal sinuses. CALVARIUM AND EXTRACRANIAL SOFT TISSUES:  Normal.     Impression: Stable small focus of right parietal subarachnoid hemorrhage. No new hemorrhage. Stable advanced chronic microangiopathy. Stable mild ventriculomegaly, correlate for NPH. Workstation performed: [id number]     EEG Routine and awake    Result Date: 2023  Narrative: Table formatting from the original result was not included. Routine EEG Patient Name:  Tommy Glass  MRN: [id number] :  1955 File #: [id number] Age: 69 y.o. Ordering Provider: Ivett Burnham PA-C  Study Start-End: 23 13:17-13:47             Study type: Routine EEG ICD 10 diagnosis: Encephalopathy, unspecified G93.40 ------------------------------------------------- Patient History: Tommy Glass is a 69 y.o. female with a PMH of dementia and hypothyroidism, found down. EEG ordered to evaluate for seizure-like activity.  Relevant medications include: No AEDs Sedation: No Intubated: No Paralyzed: No ------------------------------------------------- 32 channel digital recording with electrodes placed according to the International 10-20 system with continuous video, ECG, EOG and the possible addition of T1/T2 electrodes. This study was monitored by a monitoring technologist.  The physician interpreting the study had access to the data throughout the recording. The recording was technically satisfactory. ------------------------------------------------- Description: Background: The background organization was disorganized. The background lacked organization or clearly defined anterior-posterior and frequency gradients. There was no discernible posterior dominant rhythm. The predominant awake background activity was generalized irregular theta range activity (5-7Hz) intermixed with runs of genearlized periodic discharges with triphasic morphology (2Hz). Sleep: During clinical quiescence GPDs resolved and there were often diffuse low amplitude 5-9 Hz activities. There were no clear features of stage II sleep. Reactivity: The background was reactive to external stimuli (GPDs emerged with verbal and tactile stimulation). Photic stimulation did not provoke photic driving. Hyperventilation was deferred. Interictal abnormalities: None Rhythmic/Periodic patterns: None Seizures: None Events: No push buttons were activated. Other findings: Samples of the single channel ECG demonstrated regular rhythm ------------------------------------------------- EEG impression: This was an abnormal routine EEG recording due to: 1. Theta range activity 2. Runs of generalized periodic discharges with triphasic morphology (triphasic waves). Clinical Interpretation: This abnormal study was consistent with moderate cerebral dysfunction. No electrographic seizures, interictal epileptiform discharges (seizure tendency) or focal slowing were seen. No diagnostic clinical events were captured.  Deborah Gibbs MD Clinical Neurophysiology Fellow, PGY-5 [id number] [facility], Neurology department Selma Peralta MD  [facility] Diplomate, [facility]'     MRI brain wo contrast    Result Date: 7/25/2023  Narrative: MRI BRAIN WITHOUT CONTRAST INDICATION: Altered mental status. COMPARISON:   7/21/2023. TECHNIQUE:  Multiplanar, multisequence imaging of the brain was performed. IMAGE QUALITY:  Diagnostic. FINDINGS: BRAIN PARENCHYMA: Stable periventricular and subcortical T2/FLAIR hyperintense foci, partially confluent, consistent with advanced microangiopathic disease. White matter lesions also present in the midbrain and alexi. No restricted diffusion to indicate an acute infarct. Stable hemosiderin in right parietal sulci consistent with chronic subarachnoid hemorrhage. Single focus of susceptibility artifact and corresponding diffusion hyperintensity in the right posterior parietal region, medial to the area of more chronic hemorrhage, corresponds to focus of acute subarachnoid hemorrhage on the head CT. VENTRICLES: Stable enlargement of the ventricles and sulci consistent with chronic volume loss. No hydrocephalus or extra-axial collection. SELLA AND PITUITARY GLAND: Limited evaluation. ORBITS:  Normal. PARANASAL SINUSES:  Normal. VASCULATURE:  Evaluation of the major intracranial vasculature demonstrates appropriate flow voids. CALVARIUM AND SKULL BASE: No osseous lesion. EXTRACRANIAL SOFT TISSUES: No soft tissue mass. Impression: 1. Stable punctate focus of acute hemorrhage in the right posterior parietal region. More laterally located foci of stable, likely subacute to chronic subarachnoid hemorrhage in the right inferior parietal and superior temporal region. No mass effect or vasogenic edema. 2. No evidence of acute infarct. Workstation performed: [id number]     CTA head and neck w wo contrast    Result Date: 7/24/2023  Narrative: CTA NECK AND BRAIN WITH AND WITHOUT CONTRAST INDICATION: ?SAH COMPARISON:   CT head without contrast 7/24/2023. MRI brain neuroquant without contrast 7/21/2023.  TECHNIQUE:  Routine CT imaging of the Brain without contrast.  Post contrast imaging was performed after administration of iodinated contrast through the neck and brain. Post contrast axial 0.625 mm images timed to opacify the arterial system. 3D rendering was performed on an independent workstation. MIP reconstructions performed. Coronal reconstructions were performed of the noncontrast portion of the brain. Radiation dose length product (DLP) for this visit:  1643.11 mGy-cm . This examination, like all CT scans performed in the [facility], was performed utilizing techniques to minimize radiation dose exposure, including the use of iterative reconstruction and automated exposure control. IV Contrast:  85 mL of iohexol (OMNIPAQUE) IMAGE QUALITY:   Diagnostic FINDINGS: NONCONTRAST BRAIN PARENCHYMA AND EXTRA-AXIAL SPACES: Unchanged acute trace subarachnoid hemorrhage in right posterior parietal region (2:26). Decreased attenuation is noted in periventricular and subcortical white matter demonstrating an appearance that is statistically most likely to represent advanced microangiopathic change. No CT signs of acute infarction. No intracranial mass, mass effect or midline shift. No acute parenchymal hemorrhage. VENTRICLES : Stable mild ventriculomegaly out of proportion to degree of cerebral atrophy. No acute intraventricular or extra-axial hemorrhage. VISUALIZED ORBITS: Normal visualized orbits. PARANASAL SINUSES: Normal visualized paranasal sinuses. CERVICAL VASCULATURE AORTIC ARCH AND GREAT VESSELS:  Mild calcified atherosclerotic disease of the arch, proximal great vessels and visualized subclavian vessels. No significant stenosis. RIGHT VERTEBRAL ARTERY CERVICAL SEGMENT:  Normal origin. The vessel is mildly hypoplastic in caliber throughout the neck. LEFT VERTEBRAL ARTERY CERVICAL SEGMENT:  Normal origin. The vessel is normal and dominant in caliber throughout the neck. RIGHT EXTRACRANIAL CAROTID SEGMENT:  Normal caliber common carotid artery.   Normal bifurcation and cervical internal carotid artery. No stenosis or dissection. LEFT EXTRACRANIAL CAROTID SEGMENT:  Normal caliber common carotid artery. Mild calcified atherosclerotic disease in carotid bifurcation. Normal cervical internal carotid artery. No stenosis or dissection. NASCET criteria was used to determine the degree of internal carotid artery diameter stenosis. INTRACRANIAL VASCULATURE Question small arteriovenous fistula in right anteromedial orbital region ([id number]). INTERNAL CAROTID ARTERIES:  Normal enhancement of the intracranial portions of the internal carotid arteries. Minimal calcified atheroslerotic disease in left ICA cavernous segment. Normal ophthalmic artery origins. Normal ICA terminus. ANTERIOR CIRCULATION:  Symmetric A1 segments and anterior cerebral arteries with normal enhancement. Normal anterior communicating artery. MIDDLE CEREBRAL ARTERY CIRCULATION:  M1 segment and middle cerebral artery branches demonstrate normal enhancement bilaterally. DISTAL VERTEBRAL ARTERIES:  Patent mildly hypoplastic right and dominant left distal vertebral arteries. Left posterior inferior cerebellar artery origin is normal. Right AICA-PICA, normal variant. Normal vertebral basilar junction. BASILAR ARTERY:  Basilar artery is normal in caliber. Normal superior cerebellar arteries. POSTERIOR CEREBRAL ARTERIES: Both posterior cerebral arteries arises from the basilar tip. Both arteries demonstrate normal enhancement. Normal left posterior communicating artery. Absent right posterior communicating artery, normal variant. VENOUS STRUCTURES:  Normal. NON VASCULAR ANATOMY BONY STRUCTURES:  No acute osseous abnormality. Biparietal osteodystrophy. Mild multilevel degenerative changes cervical spine. Diffuse osteopenia. SOFT TISSUES OF THE NECK:  Unremarkable. THORACIC INLET:  Normal.     Impression: Unchanged acute trace subarachnoid hemorrhage in right posterior parietal region.  Stable mild ventriculomegaly out of proportion to degree of cerebral atrophy. Question normal pressure hydrocephalus. Question small arteriovenous fistula in right anteromedial orbital region. Recommend consultation with the [facility], a division of [address] [place] [address] Neuroscience at [phone]. Negative CTA head and neck for large vessel occlusion, dissection, aneurysm, or high-grade stenosis. Additional chronic/incidental findings as detailed above. The study was marked in [facility] for immediate notification. Workstation performed: [id number]     CT head wo contrast    Result Date: 7/24/2023  Narrative: CT BRAIN - WITHOUT CONTRAST INDICATION:   Delirium ams. COMPARISON: CT brain July 19, 2023 TECHNIQUE:  CT examination of the brain was performed. Multiplanar 2D reformatted images were created from the source data. Radiation dose length product (DLP) for this visit:  1144.29 mGy-cm . This examination, like all CT scans performed in the [facility], was performed utilizing techniques to minimize radiation dose exposure, including the use of iterative reconstruction and automated exposure control. IMAGE QUALITY:  Diagnostic. FINDINGS: PARENCHYMA: New sulcal hyperdensity in the right posterior parietal lobe is suspicious for sulcal subarachnoid hemorrhage. This is not seen even in retrospect on the recent CT dated July 19, 2023. There is sulcal susceptibility artifact on the interval MRI from July 21, 2023 however the MRI finding appears slightly more anterior when compared to the current CT finding. Low-density periventricular white matter compatible with moderate to severe chronic microangiopathic changes. VENTRICLES AND EXTRA-AXIAL SPACES:  Normal for the patient's age. VISUALIZED ORBITS: Normal visualized orbits. PARANASAL SINUSES: Normal visualized paranasal sinuses.  CALVARIUM AND EXTRACRANIAL SOFT TISSUES:  Normal.     Impression: New sulcal hyperdensity in the right posterior parietal lobe suspicious for small volume subarachnoid hemorrhage new since the prior CT of July 19, 2023. Review of the recent MRI from July 21, 2023 demonstrates hemosiderin deposition in sulci slightly more anterior to the current finding. The possibility of recurrent subarachnoid hemorrhage in this location should be considered. The study was marked in [facility] for immediate notification. Workstation performed: [id number]     XR chest portable    Result Date: 7/24/2023  Narrative: CHEST INDICATION:   acute change in mental status. COMPARISON: 6/16/2019 EXAM PERFORMED/VIEWS:  XR CHEST PORTABLE FINDINGS: Patient is rotated. Right apex partially obscured by the patient's chin. Cardiomediastinal silhouette appears unremarkable. The lungs are clear. No pneumothorax or pleural effusion. Partially imaged orthopedic hardware in the proximal right humerus. Visualized osseous structures appear otherwise unremarkable for the patient's age. Impression: No acute cardiopulmonary disease. Workstation performed: [id number]     XR pelvis ap only 1 or 2 vw    Result Date: 7/23/2023  Narrative: PELVIS INDICATION:   s/p hemiarthroplasty. COMPARISON: July 22, 2023. VIEWS:  XR PELVIS AP ONLY 1 OR 2 VW Images: 2 FINDINGS: No acute fracture or hip dislocation. There has been a total right hip replacement since the comparison study. Tunneling defects from prior hardware. 2 orthopedic screws again noted in left hip. Old healed fracture deformity right inferior pubic ramus. No lytic or blastic osseous lesion. Soft tissues are unremarkable. The visualized lumbar spine is unremarkable. Impression: Status post right hip arthroplasty with no acute findings. Workstation performed: [id number]     XR hip/pelv 2-3 vws right if performed    Result Date: 7/22/2023  Narrative: RIGHT HIP INDICATION:   pain.  COMPARISON: 7/13/2023, 6/30/2020 VIEWS:  XR HIP/PELV 2-3 VWS RIGHT W PELVIS IF PERFORMED FINDINGS: Again demonstration of femoral neck fracture with varus angulation status post removal of dynamic hip screw. Some lucency along the fracture line may represent mild interval displacement versus resorptive change. Trochanteric nail remains in place. Stable appearance of screw fixation of left proximal femur. Moderate right hip osteoarthritis is seen. No lytic or blastic osseous lesion. Soft tissues are unremarkable. The visualized lumbar spine is unremarkable. Impression: Again demonstration of femoral neck fracture with varus angulation status post removal of dynamic hip screw. Some lucency along the fracture line may represent mild interval displacement versus resorptive change. Trochanteric nail remains in place. Workstation performed: [id number]     MRI brain NeuroQuant wo contrast    Result Date: 7/21/2023  Narrative: MRI BRAIN WITHOUT CONTRAST, NeuroQuant IMAGING INDICATION: encephalopathy superimposed on dementia; evaluate for stroke . COMPARISON:   CT head 7/19/2023 TECHNIQUE:  Multiplanar, multisequence imaging of the brain was performed. Sagittal 3D volumetric imaging processed by [employer] creating General Morphology and Age Related Atrophy reports. IMAGE QUALITY:  Diagnostic. FINDINGS: BRAIN PARENCHYMA:  There is no discrete mass, mass effect or midline shift. Brainstem and cerebellum demonstrate normal signal. There is no intracranial hemorrhage. There is no evidence of acute infarction and diffusion imaging is unremarkable. Small scattered hyperintensities on T2/FLAIR imaging are noted in the periventricular and subcortical white matter demonstrating an appearance that is statistically most likely to represent moderate microangiopathic change. Similar chronic microvascular changes are noted within the central alexi. QUANTITATIVE: Exam Quality: Adequate for volumetric analysis.  Hippocampus Hippocampal Occupancy Score (HOC):                   0.50 Percentile for similar age:                              1 Total hippocampal volume (cc):                           5.59 Percentile for similar age:                              12 Entorhinal cortex (cc)                                            5.02 Percentile for similar age:                                   76 Superior Lateral ventricular volume (cc):             98.39 Percentile for similar age:                             80 Inferior lateral ventricular volume (cc):                    5.63 Percentile for similar age:                                80 Quantitative conclusions: Hippocampal Volume:                       Lower limits of normal (between 5th through 25th percentile) Entorhinal Volume:                            Normal volume Superior Lateral Ventricle Volume:     High Volume Inferior Lateral Ventricle Volume:       High Volume Concordance between qualitative and quantitative hippocampal volume assessment: Concordant. Change in brain volumes: No previous volumetric study for comparison Mean hippocampal volume loss among normal elderly: 0.7% per year, (-0.3 to 1.7;  Berny 2008; also Sunny 2010). VENTRICLES:  Enlargement of ventricles and extra-axial CSF spaces, out of proportion to the patient's age most consistent with cerebral and cerebellar atrophy. SELLA AND PITUITARY GLAND:  Normal. ORBITS:  Normal. PARANASAL SINUSES:  Normal. VASCULATURE:  Evaluation of the major intracranial vasculature demonstrates appropriate flow voids. CALVARIUM AND SKULL BASE:  Normal. EXTRACRANIAL SOFT TISSUES:  Normal.     Impression: 1. No acute infarction, edema, or mass effect. 2.  Moderate chronic microangiopathic ischemic changes. 3.  NeuroQuant analysis was performed: Lower limits of normal hippocampal volume and enlarged inferior lateral and overall ventricular system, suggestive of global ex-vacuo dilatation. The additional finding of an abnormal Hippocampal Occupancy Score, ([address]), is concerning for a mesial temporal lobe focused Neurodegenerative process. Recommend reevaluation with Neuroquant imaging in 6 months.  Workstation performed: [id number]     XR pelvis ap only 1 or 2 vw    Result Date: 7/21/2023  Narrative: PELVIS INDICATION:   Z47.89: Encounter for other orthopedic aftercare. COMPARISON: 5/10/2022 VIEWS:  XR PELVIS AP ONLY 1 OR 2 VW FINDINGS: Interval removal of the right femoral neck screw with varus collapse of the femoral neck fracture. The right intramedullary nail and distal interlocking screw remain in place. Partially-threaded screws again noted in the left femoral neck. Severe right hip osteoarthritis. No lytic or blastic osseous lesion. Soft tissues are unremarkable. Degenerative changes in the visualized lower lumbar spine. Impression: Interval removal of the right femoral neck screw with varus collapse of the right femoral neck fracture. Severe right hip osteoarthritis. Workstation performed: [id number]     CT spine cervical without contrast    Result Date: 7/19/2023  Narrative: CT CERVICAL SPINE - WITHOUT CONTRAST INDICATION:   Neck trauma (Age >= 65y) fall possible headstrike. COMPARISON:  None. TECHNIQUE:  CT examination of the cervical spine was performed without intravenous contrast.  Contiguous axial images were obtained. Multiplanar 2D reformatted images were created from the source data. Radiation dose length product (DLP) for this visit:  1124.1 mGy-cm . This examination, like all CT scans performed in the [facility], was performed utilizing techniques to minimize radiation dose exposure, including the use of iterative  reconstruction and automated exposure control. IMAGE QUALITY:  Diagnostic. FINDINGS: ALIGNMENT:  Normal alignment of the cervical spine. No subluxation. VERTEBRAE:  No fracture. DEGENERATIVE CHANGES:  Mild multilevel cervical degenerative changes are noted without critical central canal stenosis. PREVERTEBRAL AND PARASPINAL SOFT TISSUES: Unremarkable THORACIC INLET:  Normal.     Impression: No cervical spine fracture or traumatic malalignment.  Workstation performed: [id number]     CT head wo contrast    Result Date: 7/19/2023  Narrative: CT BRAIN - WITHOUT CONTRAST INDICATION:   Head trauma, moderate-severe fall possible headstrike. COMPARISON:  5/10/2019. TECHNIQUE:  CT examination of the brain was performed. Multiplanar 2D reformatted images were created from the source data. Radiation dose length product (DLP) for this visit:  871.75 mGy-cm . This examination, like all CT scans performed in the [facility], was performed utilizing techniques to minimize radiation dose exposure, including the use of iterative  reconstruction and automated exposure control. IMAGE QUALITY:  Diagnostic. FINDINGS: PARENCHYMA: Decreased attenuation is noted in periventricular and subcortical white matter demonstrating an appearance that is statistically most likely to represent moderate microangiopathic change; this appearance is similar when compared to most recent prior examination. No CT signs of acute infarction. No intracranial mass, mass effect or midline shift. No acute parenchymal hemorrhage. VENTRICLES AND EXTRA-AXIAL SPACES:  Normal for the patient's age. VISUALIZED ORBITS: Normal visualized orbits. PARANASAL SINUSES: Normal visualized paranasal sinuses. CALVARIUM AND EXTRACRANIAL SOFT TISSUES:  Normal.     Impression: No acute intracranial abnormality. Workstation performed: [id number]       EKG: Personally reviewed by Elsy Madrigal MD     Counseling / Coordination of Care  Total floor / unit time spent today 30 minutes. Greater than 50% of total time was spent with the patient and / or family counseling and / or coordination of care.

## 2023-08-05 NOTE — PLAN OF CARE
Problem: Potential for Falls  Goal: Patient will remain free of falls  Description: INTERVENTIONS:  - Educate patient/family on patient safety including physical limitations  - Instruct patient to call for assistance with activity   - Consult OT/PT to assist with strengthening/mobility   - Keep Call bell within reach  - Keep bed low and locked with side rails adjusted as appropriate  - Keep care items and personal belongings within reach  - Initiate and maintain comfort rounds  - Apply yellow socks and bracelet for high fall risk patients  - Consider moving patient to room near nurses station  Outcome: Progressing     Problem: MOBILITY - ADULT  Goal: Maintain or return to baseline ADL function  Description: INTERVENTIONS:  -  Assess patient's ability to carry out ADLs; assess patient's baseline for ADL function and identify physical deficits which impact ability to perform ADLs (bathing, care of mouth/teeth, toileting, grooming, dressing, etc.)  - Assess/evaluate cause of self-care deficits   - Assess range of motion  - Assess patient's mobility; develop plan if impaired  - Assess patient's need for assistive devices and provide as appropriate  - Encourage maximum independence but intervene and supervise when necessary  - Involve family in performance of ADLs  - Assess for home care needs following discharge   - Consider OT consult to assist with ADL evaluation and planning for discharge  - Provide patient education as appropriate  Outcome: Progressing  Goal: Maintains/Returns to pre admission functional level  Description: INTERVENTIONS:  - Perform BMAT or MOVE assessment daily.   - Set and communicate daily mobility goal to care team and patient/family/caregiver.    - Collaborate with rehabilitation services on mobility goals if consulted  - Out of bed for toileting  - Record patient progress and toleration of activity level   Outcome: Progressing     Problem: PAIN - ADULT  Goal: Verbalizes/displays adequate comfort level or baseline comfort level  Description: Interventions:  - Encourage patient to monitor pain and request assistance  - Assess pain using appropriate pain scale  - Administer analgesics based on type and severity of pain and evaluate response  - Implement non-pharmacological measures as appropriate and evaluate response  - Consider cultural and social influences on pain and pain management  - Notify physician/advanced practitioner if interventions unsuccessful or patient reports new pain  Outcome: Progressing     Problem: INFECTION - ADULT  Goal: Absence or prevention of progression during hospitalization  Description: INTERVENTIONS:  - Assess and monitor for signs and symptoms of infection  - Monitor lab/diagnostic results  - Monitor all insertion sites, i.e. indwelling lines, tubes, and drains  - Monitor endotracheal if appropriate and nasal secretions for changes in amount and color  - [place] appropriate cooling/warming therapies per order  - Administer medications as ordered  - Instruct and encourage patient and family to use good hand hygiene technique  - Identify and instruct in appropriate isolation precautions for identified infection/condition  Outcome: Progressing  Goal: Absence of fever/infection during neutropenic period  Description: INTERVENTIONS:  - Monitor WBC    Outcome: Progressing     Problem: SAFETY ADULT  Goal: Patient will remain free of falls  Description: INTERVENTIONS:  - Educate patient/family on patient safety including physical limitations  - Instruct patient to call for assistance with activity   - Consult OT/PT to assist with strengthening/mobility   - Keep Call bell within reach  - Keep bed low and locked with side rails adjusted as appropriate  - Keep care items and personal belongings within reach  - Initiate and maintain comfort rounds  - Make Fall Risk Sign visible to staff  - Apply yellow socks and bracelet for high fall risk patients  - Consider moving patient to room near nurses station  Outcome: Progressing  Goal: Maintain or return to baseline ADL function  Description: INTERVENTIONS:  -  Assess patient's ability to carry out ADLs; assess patient's baseline for ADL function and identify physical deficits which impact ability to perform ADLs (bathing, care of mouth/teeth, toileting, grooming, dressing, etc.)  - Assess/evaluate cause of self-care deficits   - Assess range of motion  - Assess patient's mobility; develop plan if impaired  - Assess patient's need for assistive devices and provide as appropriate  - Encourage maximum independence but intervene and supervise when necessary  - Involve family in performance of ADLs  - Assess for home care needs following discharge   - Consider OT consult to assist with ADL evaluation and planning for discharge  - Provide patient education as appropriate  Outcome: Progressing  Goal: Maintains/Returns to pre admission functional level  Description: INTERVENTIONS:  - Perform BMAT or MOVE assessment daily.   - Set and communicate daily mobility goal to care team and patient/family/caregiver.    - Collaborate with rehabilitation services on mobility goals if consulted  - Out of bed for toileting  - Record patient progress and toleration of activity level   Outcome: Progressing     Problem: DISCHARGE PLANNING  Goal: Discharge to home or other facility with appropriate resources  Description: INTERVENTIONS:  - Identify barriers to discharge w/patient and caregiver  - Arrange for needed discharge resources and transportation as appropriate  - Identify discharge learning needs (meds, wound care, etc.)  - Arrange for interpretive services to assist at discharge as needed  - Refer to Case Management Department for coordinating discharge planning if the patient needs post-hospital services based on physician/advanced practitioner order or complex needs related to functional status, cognitive ability, or social support system  Outcome: Progressing     Problem: Knowledge Deficit  Goal: Patient/family/caregiver demonstrates understanding of disease process, treatment plan, medications, and discharge instructions  Description: Complete learning assessment and assess knowledge base. Interventions:  - Provide teaching at level of understanding  - Provide teaching via preferred learning methods  Outcome: Progressing     Problem: NEUROSENSORY - ADULT  Goal: Achieves stable or improved neurological status  Description: INTERVENTIONS  - Monitor and report changes in neurological status  - Monitor vital signs such as temperature, blood pressure, glucose, and any other labs ordered   - Initiate measures to prevent increased intracranial pressure  - Monitor for seizure activity and implement precautions if appropriate      Outcome: Progressing  Goal: Achieves maximal functionality and self care  Description: INTERVENTIONS  - Monitor swallowing and airway patency with patient fatigue and changes in neurological status  - Encourage and assist patient to increase activity and self care.    - Encourage visually impaired, hearing impaired and aphasic patients to use assistive/communication devices  Outcome: Progressing     Problem: CARDIOVASCULAR - ADULT  Goal: Maintains optimal cardiac output and hemodynamic stability  Description: INTERVENTIONS:  - Monitor I/O, vital signs and rhythm  - Monitor for S/S and trends of decreased cardiac output  - Administer and titrate ordered vasoactive medications to optimize hemodynamic stability  - Assess quality of pulses, skin color and temperature  - Assess for signs of decreased coronary artery perfusion  - Instruct patient to report change in severity of symptoms  Outcome: Progressing     Problem: RESPIRATORY - ADULT  Goal: Achieves optimal ventilation and oxygenation  Description: INTERVENTIONS:  - Assess for changes in respiratory status  - Assess for changes in mentation and behavior  - Position to facilitate oxygenation and minimize respiratory effort  - Oxygen administered by appropriate delivery if ordered  - Initiate smoking cessation education as indicated  - Encourage broncho-pulmonary hygiene including cough, deep breathe, Incentive Spirometry  - Assess the need for suctioning and aspirate as needed  - Assess and instruct to report SOB or any respiratory difficulty  - Respiratory Therapy support as indicated  Outcome: Progressing     Problem: GENITOURINARY - ADULT  Goal: Maintains or returns to baseline urinary function  Description: INTERVENTIONS:  - Assess urinary function  - Encourage oral fluids to ensure adequate hydration if ordered  - Administer IV fluids as ordered to ensure adequate hydration  - Administer ordered medications as needed  - Offer frequent toileting  - Follow urinary retention protocol if ordered  Outcome: Progressing  Goal: Absence of urinary retention  Description: INTERVENTIONS:  - Assess patient’s ability to void and empty bladder  - Monitor I/O  - Bladder scan as needed  - Discuss with physician/AP medications to alleviate retention as needed  - Discuss catheterization for long term situations as appropriate  Outcome: Progressing  Goal: Urinary catheter remains patent  Description: INTERVENTIONS:  - Assess patency of urinary catheter  - If patient has a chronic cummins, consider changing catheter if non-functioning  - Follow guidelines for intermittent irrigation of non-functioning urinary catheter  Outcome: Progressing     Problem: METABOLIC, FLUID AND ELECTROLYTES - ADULT  Goal: Electrolytes maintained within normal limits  Description: INTERVENTIONS:  - Monitor labs and assess patient for signs and symptoms of electrolyte imbalances  - Administer electrolyte replacement as ordered  - Monitor response to electrolyte replacements, including repeat lab results as appropriate  - Instruct patient on fluid and nutrition as appropriate  Outcome: Progressing  Goal: Fluid balance maintained  Description: INTERVENTIONS:  - Monitor labs   - Monitor I/O and WT  - Instruct patient on fluid and nutrition as appropriate  - Assess for signs & symptoms of volume excess or deficit  Outcome: Progressing     Problem: SKIN/TISSUE INTEGRITY - ADULT  Goal: Skin Integrity remains intact(Skin Breakdown Prevention)  Description: Assess:  -Inspect skin when repositioning, toileting, and assisting with ADLS  -Assess extremities for adequate circulation and sensation     Bed Management:  -Have minimal linens on bed & keep smooth, unwrinkled  -Change linens as needed when moist or perspiring      Toileting:  -Offer bedside commode      Activity:  -Encourage activity and walks on unit  -Encourage or provide ROM exercises   -Use appropriate equipment to lift or move patient in bed      Skin Care:  -Avoid use of baby powder, tape, friction and shearing, hot water or constrictive clothing  -Do not massage red bony areas      Outcome: Progressing     Problem: MUSCULOSKELETAL - ADULT  Goal: Maintain or return mobility to safest level of function  Description: INTERVENTIONS:  - Assess patient's ability to carry out ADLs; assess patient's baseline for ADL function and identify physical deficits which impact ability to perform ADLs (bathing, care of mouth/teeth, toileting, grooming, dressing, etc.)  - Assess/evaluate cause of self-care deficits   - Assess range of motion  - Assess patient's mobility  - Assess patient's need for assistive devices and provide as appropriate  - Encourage maximum independence but intervene and supervise when necessary  - Involve family in performance of ADLs  - Assess for home care needs following discharge   - Consider OT consult to assist with ADL evaluation and planning for discharge  - Provide patient education as appropriate  Outcome: Progressing     Problem: Prexisting or High Potential for Compromised Skin Integrity  Goal: Skin integrity is maintained or improved  Description: INTERVENTIONS:  - Identify patients at risk for skin breakdown  - Assess and monitor skin integrity  - Assess and monitor nutrition and hydration status  - Monitor labs   - Assess for incontinence   - Turn and reposition patient  - Assist with mobility/ambulation  - Relieve pressure over bony prominences  - Avoid friction and shearing  - Provide appropriate hygiene as needed including keeping skin clean and dry  - Evaluate need for skin moisturizer/barrier cream  - Collaborate with interdisciplinary team   - Patient/family teaching  - Consider wound care consult   Outcome: Progressing

## 2023-08-05 NOTE — PROGRESS NOTES
[address]  Progress Note  Name: Howie Gray  MRN: [id number]  Unit/Bed#: -01 SDU I Date of Admission: 8/4/2023   Date of Service: 8/5/2023 I Hospital Day: 1    Assessment/Plan   Transaminitis  Assessment & Plan  · , . No hyperbilirubinemia  · CT AP with "Gallbladder wall thickening and questionable trace pericholecystic fluid. No calcified gallstones are seen but cannot exclude acute cholecystitis"  · Right upper quadrant ultrasound was ordered and did not reveal findings of acute cholecystitis although it did have gallbladder wall thickening with pericholecystic fluid  · Patient did grimace with right upper quadrant palpation  · Currently afebrile without leukocytosis, no vomiting. · Transaminitis could all be due to acute illness and hypoperfusion with endorgan damage but will continue IV Zosyn for empiric treatment for now and repeat a.m. LFTs  · AM CBC, trend lactate  · Extensive discussion with sister who would not not wish to pursue any intervention at this time if she were to have acute cholecystitis regardless    Elevated troponin  Assessment & Plan  · Non-MI troponin elevation secondary to PE  · Cardiology was consulted  · We will follow-up echo if still within goals of care by tomorrow    Pulmonary embolism [facility])  Assessment & Plan  · CTA stroke protocol noted new incompletely imaged acute PE in bilateral upper lobes.   · Patient cannot receive CT PE study given acute renal failure  · Heparin drip was initiated yesterday, after discussion with family we will continue for the time being  · Respiratory status is somewhat tenuous as patient is tachypneic today and briefly required increase in nasal cannula requirement to 6 L however has since been weaned back down to 3 L nasal cannula  · Monitor O2 requirement  · Follow-up echo    Acute renal failure (ARF) ([facility])  Assessment & Plan  · Baseline creatinine 0.6-0.9, admission creatinine 2.73, then received IV contrast for CTA stroke alert  · Started on IV fluids with gradual improvement of creatinine  · CTAP shows decreased cortical enhancement of the right kidney which could be multiple infarcts versus pyelo-  · UA bland but is currently on IV Zosyn  · Continue IV heparin  · Nephrology consulted  · Advise continued IV fluids and serial labs  · A.m. BMP  · Continue medical management of hyperkalemia  · Reynoso was placed in the ED, given that patient is critically ill will maintain Reynoso until goals of care discussions are finalized    Hypoxia  Assessment & Plan  · Patient currently requiring 3 L nasal cannula, brief escalation to 6 L during the day but has since been weaned back down to 3 L  · Secondary to acute PE  · Continue heparin drip    Lactic acidosis  Assessment & Plan  · Likely secondary to acute Amina  · Continue IV fluids  · Continue IV antibiotics    Altered mental status  Assessment & Plan  · Patient does have advanced Alzheimer's and was recently noted to have decreased arousal and altered mentation on admission in July  · CTA stroke protocol shows no large vessel occlusion, aneurysm or stenosis. No evidence of hemorrhage.   · MRI negative for acute stroke, advanced chronic microvascular disease and ventriculomegaly  · Neuro following, in light of acute PE they are advising cautious decision making regarding heparin drip as she is at risk of subarachnoid hemorrhage given recent [facility]  · We will continue to monitor neuro exam  · Appears this is all likely toxic metabolic encephalopathy in setting of acute PE   · Arousable but nonverbal presently    Subarachnoid hemorrhage [facility])  Assessment & Plan  · Patient with recent small spontaneous subarachnoid hemorrhage on 7/24  · Spoke directly with neurology today who felt it was important to note that patient is still at risk of recurrent bleed and ultimately advised weighing the risk versus the benefits of IV heparin drip  · Spoke with patient's sister at length who ultimately is opting to continue heparin drip at this time  · Patient has remained lethargic and essentially nonverbal since admission although she has intermittently opened her eyes with no acute worsening of LOC since admission thus far, nonfocal on exam  · Continue monitoring her neuro exam closely  · Low threshold for repeat CT head if patient becomes completely obtunded however family is to make a final decision tomorrow regarding withdrawal of care so will hold off on aggressive workup     * Goals of care, counseling/discussion  Assessment & Plan  · Patient overall with very poor prognosis in the setting of acute PE on heparin drip with recent spontaneous subarachnoid hemorrhage at risk of rebleed  · Evidence of endorgan damage with FRANCISCO JAVIER and transaminitis, troponemia  · Unclear if patient has an acute beth for which she would be a poor operative candidate  · She also has evidence of right renal infarcts vs pyelo  · Mentation continues to be altered with decreased arousal all in the setting of advanced Alzheimer's  · All of the above was presented to patient's sister this morning and explained at length that patient's prognosis is guarded at this time  · She personally feels comfort care is likely appropriate however her sister is driving up from [facility] tomorrow and they will both decide tomorrow if they would like to fully withdraw care  · In the time being she wants to continue IV antibiotics, IV heparin drip, and IV fluids and continued medical management but would like to hold off on any further aggressive interventions or procedures  · Continue current medical management and follow-up with final decision regarding goals of care tomorrow      VTE Pharmacologic Prophylaxis: VTE Score: 6 High Risk (Score >/= 5) - Pharmacological DVT Prophylaxis Ordered: heparin drip. Sequential Compression Devices Ordered.     Patient Centered Rounds: I performed bedside rounds with nursing staff today.  Discussions with Specialists or Other Care Team Provider: Neurology, nursing    Education and Discussions with Family / Patient: Updated  (sister) at bedside. Total Time Spent on Date of Encounter in care of patient: 45 minutes This time was spent on one or more of the following: performing physical exam; counseling and coordination of care; obtaining or reviewing history; documenting in the medical record; reviewing/ordering tests, medications or procedures; communicating with other healthcare professionals and discussing with patient's family/caregivers. Current Length of Stay: 1 day(s)  Current Patient Status: Inpatient   Certification Statement: The patient will continue to require additional inpatient hospital stay due to acute PE, AMS, FRANCISCO JAVIER and transaminitis  Discharge Plan: Anticipate discharge in 24-48 hrs to discharge location to be determined pending rehab evaluations. Code Status: Level 3 - DNAR and DNI    Subjective:   Unable to answer any questions for me. Sister says mentation has not improved. Objective:     Vitals:   Temp (24hrs), Av.3 °F (36.8 °C), Min:97.3 °F (36.3 °C), Max:99.1 °F (37.3 °C)    Temp:  [97.3 °F (36.3 °C)-99.1 °F (37.3 °C)] 99.1 °F (37.3 °C)  HR:  [79-98] 86  Resp:  [15-24] 24  BP: ()/(51-60) 104/58  SpO2:  [88 %-99 %] 91 %  Body mass index is 17.87 kg/m². Input and Output Summary (last 24 hours): Intake/Output Summary (Last 24 hours) at 2023 1709  Last data filed at 2023 1651  Gross per 24 hour   Intake 3240.26 ml   Output 695 ml   Net 2545.26 ml       Physical Exam:   Physical Exam  Vitals and nursing note reviewed. Constitutional:       Appearance: She is ill-appearing. HENT:      Head: Normocephalic and atraumatic. Right Ear: External ear normal.      Left Ear: External ear normal.      Nose: Nose normal.      Mouth/Throat:      Pharynx: Oropharynx is clear.    Eyes:      Conjunctiva/sclera: Conjunctivae normal. Cardiovascular:      Rate and Rhythm: Normal rate and regular rhythm. Pulses: Normal pulses. Heart sounds: Normal heart sounds. No murmur heard. No friction rub. No gallop. Pulmonary:      Breath sounds: No stridor. No wheezing, rhonchi or rales. Comments: 3 L nasal cannula in place, tachypneic  Abdominal:      General: Abdomen is flat. Bowel sounds are normal. There is no distension. Palpations: There is no mass. Tenderness: There is no abdominal tenderness. There is no guarding or rebound. Hernia: No hernia is present. Comments: Patient grimaced with palpation of upper abdomen specifically over right upper quadrant   Musculoskeletal:      Cervical back: Normal range of motion. Right lower leg: No edema. Left lower leg: No edema.    Neurological:      Comments: Lethargic although intermittently opens eyes to gaze at sister but no verbal response to any questions          Additional Data:     Labs:  Results from last 7 days   Lab Units 08/05/23  0526   WBC Thousand/uL 12.18*   HEMOGLOBIN g/dL 8.3*   HEMATOCRIT % 27.9*   PLATELETS Thousands/uL 319   NEUTROS PCT % 83*   LYMPHS PCT % 11*   MONOS PCT % 5   EOS PCT % 0     Results from last 7 days   Lab Units 08/05/23  0526   SODIUM mmol/L 138   POTASSIUM mmol/L 5.5*   CHLORIDE mmol/L 106   CO2 mmol/L 21   BUN mg/dL 37*   CREATININE mg/dL 2.27*   ANION GAP mmol/L 11   CALCIUM mg/dL 8.7   ALBUMIN g/dL 3.6   TOTAL BILIRUBIN mg/dL 0.38   ALK PHOS U/L 80   ALT U/L 425*   AST U/L 503*   GLUCOSE RANDOM mg/dL 118     Results from last 7 days   Lab Units 08/04/23  1335   INR  1.06     Results from last 7 days   Lab Units 08/05/23  1232 08/04/23  2158 08/04/23  1941 08/04/23  1330   POC GLUCOSE mg/dl 126 91 145* 174*         Results from last 7 days   Lab Units 08/05/23  0526 08/05/23  0040 08/04/23  1731 08/04/23  1440   LACTIC ACID mmol/L 2.4* 4.1* 4.2* 5.5*   PROCALCITONIN ng/ml  --   --   --  0.57* Lines/Drains:  Invasive Devices     Peripheral Intravenous Line  Duration           Peripheral IV 08/04/23 Left Antecubital 1 day    Peripheral IV 08/04/23 Dorsal (posterior); Left Forearm <1 day          Drain  Duration           External Urinary Catheter 8 days    Urethral Catheter 16 Fr. 1 day              Urinary Catheter:  Goal for removal: Remove after 48 hrs of I/O monitoring               Imaging: Reviewed radiology reports from this admission including: ultrasound(s)    Recent Cultures (last 7 days):   Results from last 7 days   Lab Units 08/04/23  1440   BLOOD CULTURE  Received in Microbiology Lab. Culture in Progress. Received in Microbiology Lab. Culture in Progress. Last 24 Hours Medication List:   Current Facility-Administered Medications   Medication Dose Route Frequency Provider Last Rate   • heparin (porcine)  3-30 Units/kg/hr (Order-Specific) Intravenous Titrated Gerald Smith MD 12 Units/kg/hr (08/05/23 1330)   • heparin (porcine)  2,200 Units Intravenous Q6H PRN Gerald Smith MD     • heparin (porcine)  4,400 Units Intravenous Q6H PRN Gerald Smith MD     • ondansetron  4 mg Intravenous Q6H PRN Gerald Smith MD     • piperacillin-tazobactam  3.375 g Intravenous Q12H Gerald Smith MD     • sodium chloride  75 mL/hr Intravenous Continuous Gerald Smith MD 75 mL/hr (08/04/23 2019)        Today, Patient Was Seen By: Claudean Roberts, PA-C    **Please Note: This note may have been constructed using a voice recognition system. **

## 2023-08-05 NOTE — PROGRESS NOTES
The patient’s standard-infusion Piperacillin-Tazobactam / Zosyn (infused over 30-60 minutes) has been converted to extended-infusion (infused over 4 hours) per [facility], [facility] Extended-Infusion Piperacillin-Tazobactam Protocol for Adults as approved by the Pharmacy and Therapeutics Committee (accessible here on MyNET).      The patient met ALL eligible criteria:    Age >= 25years old   Critical Care patient    And did NOT have ANY exclusions:     Emergency Department or Operating Room patient  Drug incompatibilities that could NOT be avoided with timing or separate line administration    The following are reminders for Nursing regarding administration:  Infuse the first dose of Zosyn over 30min as a load (if new start), and then all subsequent doses will be given as an extended-infusion over 4 hours (see dosing below)  Use primary tubing as an intermittent infusion; change out primary tubing every 24 hours   Ensure full dose of the medication is given at the appropriate rate  Most incompatible drugs can be scheduled during times when the Zosyn is not being infused; however, if one requires administration during the same time, a separate site or lumen MUST be used  If access is limited and an incompatible medication urgently needs to be given, the Zosyn extended-infusion can be held for up to 30min (remember to flush line before/after)  Extended-infusion Zosyn does NOT require special timing around hemodialysis (it can even be given simultaneously)  If a patient needs an urgent MRI while Zosyn is infusing and there is not a MRI-compatible pump available for use, finish the infusion over the traditional length (30min) and ask Pharmacy to reschedule the next doses so that they start a few hours earlier  Pharmacy will assist nursing in troubleshooting other administration issues as they arise  Dosing for Piperacillin-Tazobactam  CrCl (mL/min) Traditional Dosing Extended-Infusion Dosing #   CrCl > 40 High-Dose  CrCl > 40 Low-Dose 4.5g Q6H (over 30min)  3.375g IV Q6H (over 30min) 3.375g IV Q8H (over 4hr)*    *1st dose loaded over 30min, then start extended-infusion dosing 4hr later   CrCl 20-40 High-Dose  CrCl 20-40 Low-Dose 3.375g IV Q6H (over 30min)  2.25g IV Q6H (over 30min)    CrCl < 20 High-Dose  CrCl < 20 Low-Dose 2.25g IV Q6H (over 30min)  2.25g IV Q8H (over 30min) 3.375g IV Q12H (over 4hr)*    *1st dose loaded over 30min, then start extended-infusion dosing 6hr later   Hemo/Peritoneal Dialysis High-Dose  Hemo/Peritoneal Dialysis Low-Dose 2.25g IV Q6H (over 30min)  2.25g IV Q8H (over 30min)    CVVH/D High-Dose  CVVH/D Low-Dose 3.375g IV Q6H (over 30min)  2.25 IV Q6H (over 30min) 3.375g IV Q8H (over 4hr)*    *1st dose loaded over 30min, then start extended-infusion dosing 4hr later   # = Use 4.5g dosing (same interval) if morbidly obese (BMI ?40)    Please call the Pharmacy with any questions or concerns.

## 2023-08-05 NOTE — CONSULTS
Pulmonary Consultation   Zhou Ferro 77 y.o. female MRN: [id number]  Unit/Bed#: -01 SDU Encounter: [id number]      Reason for consultation: Pulmonary embolism    Requesting physician: Samuel Vidales MD    Impressions/Recommendations:     · Acute hypoxic respiratory failure secondary to acute bilateral pulmonary emboli in the setting of postoperative phase (recent hip surgery)-degree of clot burden is not entirely clear given limited images of the upper lobes. Having said that, clinically does not appear to be submassive in nature and given renal insufficiency, would not subject her to additional dye load to further evaluate clot burden. Await echocardiogram.  Currently on heparin infusion. Depending on ongoing family discussions regarding goals of care, patient will require a minimum of 3-6 months of anticoagulation. · Acute encephalopathy of unclear etiology-CT of the head unremarkable. Neurology following. · Acute cholecystitis-surgery is following. Family is contemplating whether they would wish to pursue intervention    · Acute kidney injury-baseline creatinine 0.75    Chief Complaint: Unable to offer complaints    History of Present Illness   HPI:  Zhou Ferro is a 77 y.o. female who was admitted last night with altered mental status. She presented as a stroke alert. There was no evidence of stroke on CT, however the visualized upper lung zones showed acute bilateral pulmonary emboli. Patient has a recent surgical history, including removal of right hip hardware on 6/30/2023, followed by right hip replacement due to right femoral neck fracture on 7/23/2023. She has been in a nursing facility for rehabilitation. Sister is at the bedside and provides information. Patient is unable to offer any complaints. She has no pre-existing lung disease including asthma or emphysema. Sister is not aware of any prior VTE events. Patient is on a heparin infusion.     Review of systems: Review of Systems   Unable to perform ROS: Mental status change     Historical Information   Past Medical History:   Diagnosis Date   • Anxiety    • Depression    • Memory loss    • Thyroid activity decreased      Past Surgical History:   Procedure Laterality Date   • BREAST BIOPSY      years ago- Benign   • JOINT REPLACEMENT Left    • IA HEMIARTHROPLASTY HIP PARTIAL Right 7/23/2023    Procedure: HEMIARTHROPLASTY HIP TOTAL with removal of hardware;  Surgeon: Gage Armendariz MD;  Location:  MAIN OR;  Service: Orthopedics   • IA OPTX FEM SHFT FX W/INSJ IMED IMPLT W/WO SCREW Right 5/9/2019    Procedure: INSERTION NAIL IM FEMUR ANTEGRADE (TROCHANTERIC), RIGHT;  Surgeon: Amy Stauffer MD;  Location:  MAIN OR;  Service: Orthopedics   • IA REMOVAL IMPLANT DEEP Right 6/30/2023    Procedure: REMOVAL HARDWARE TFN DYNAMIC SCREW;  Surgeon: Amy Stauffer MD;  Location:  MAIN OR;  Service: Orthopedics     Family History   Problem Relation Age of Onset   • Hypertension Mother    • Parkinsonism Mother 80        memory issues    • Depression Mother    • Melanoma Father    • No Known Problems Maternal Grandmother    • No Known Problems Maternal Grandfather    • No Known Problems Paternal Grandmother    • Heart attack Paternal Grandfather    • Depression Other    • Skin cancer Sister    • Breast cancer Maternal Aunt         over age 48   • No Known Problems Paternal Aunt    • No Known Problems Sister        Tobacco history: Lifelong non-smoker      Meds/Allergies   Current Facility-Administered Medications   Medication Dose Route Frequency   • heparin (porcine) 25,000 units in 0.45% NaCl 250 mL infusion (premix)  3-30 Units/kg/hr (Order-Specific) Intravenous Titrated   • heparin (porcine) injection 2,200 Units  2,200 Units Intravenous Q6H PRN   • heparin (porcine) injection 4,400 Units  4,400 Units Intravenous Q6H PRN   • ondansetron (ZOFRAN) injection 4 mg  4 mg Intravenous Q6H PRN   • piperacillin-tazobactam (ZOSYN) IVPB 3.375 g  3.375 g Intravenous Q12H   • sodium chloride 0.9 % infusion  75 mL/hr Intravenous Continuous     No Known Allergies    Vitals: Blood pressure 110/58, pulse 81, temperature 98.2 °F (36.8 °C), temperature source Temporal, resp. rate 22, weight 53.3 kg (117 lb 8.1 oz), SpO2 93 %. ,  3 L, Body mass index is 17.87 kg/m². Intake/Output Summary (Last 24 hours) at 8/5/2023 1050  Last data filed at 8/5/2023 0753  Gross per 24 hour   Intake 3533.58 ml   Output 660 ml   Net 2873.58 ml       Physical Exam  Constitutional:       General: She is not in acute distress. HENT:      Head: Normocephalic. Eyes:      General: No scleral icterus. Neck:      Comments: (+) JVD  Pulmonary:      Breath sounds: No wheezing, rhonchi or rales. Abdominal:      General: There is no distension. Palpations: Abdomen is soft. Tenderness: There is no abdominal tenderness. Musculoskeletal:         General: No swelling. Cervical back: Neck supple. No rigidity. Neurological:      Comments: Eyes are open but she does not follow commands or respond to noxious stimulus         Labs: I have personally reviewed pertinent lab results.     Results from last 7 days   Lab Units 08/05/23  0526 08/04/23  1429 08/04/23  1335   WBC Thousand/uL 12.18*  --  11.55*   HEMOGLOBIN g/dL 8.3*  --  8.5*   I STAT HEMOGLOBIN g/dl  --  7.5*  --    HEMATOCRIT % 27.9*  --  27.9*   HEMATOCRIT, ISTAT %  --  22*  --    PLATELETS Thousands/uL 319  --  321         Results from last 7 days   Lab Units 08/05/23  0526 08/04/23  2204 08/04/23  1731 08/04/23  1429   POTASSIUM mmol/L 5.5* 4.7 5.9*  --    CHLORIDE mmol/L 106 105 103  --    CO2 mmol/L 21 24 22  --    CO2, I-STAT mmol/L  --   --   --  20*   BUN mg/dL 37* 33* 32*  --    CREATININE mg/dL 2.27* 2.53* 2.55*  --    CALCIUM mg/dL 8.7 9.3 8.8  --    ALK PHOS U/L 80  --   --   --    ALT U/L 425*  --   --   --    AST U/L 503*  --   --   --    GLUCOSE, ISTAT mg/dl  --   --   --  131     Results from last 7 days   Lab Units 08/05/23  0034 08/04/23  1335   INR   --  1.06   PTT seconds >210* 25     PCT 0.57  Lactic acid 2.4, down from 5.5      Imaging and other studies: I have personally reviewed pertinent reports. and I have personally reviewed pertinent films in PACS visualized upper lung fields on CTA of the head and neck show acute bilateral upper lobe pulmonary emboli    Code Status: Level 3 - DNAR and DNI    Thank you for allowing us to participate in the care of your patient.     Shereen Weller, DO

## 2023-08-05 NOTE — ASSESSMENT & PLAN NOTE
· , . No hyperbilirubinemia  · CT AP with "Gallbladder wall thickening and questionable trace pericholecystic fluid. No calcified gallstones are seen but cannot exclude acute cholecystitis"  · Right upper quadrant ultrasound was ordered and did not reveal findings of acute cholecystitis although it did have gallbladder wall thickening with pericholecystic fluid  · Patient did grimace with right upper quadrant palpation  · Currently afebrile without leukocytosis, no vomiting. · Transaminitis could all be due to acute illness and hypoperfusion with endorgan damage but will continue IV Zosyn for empiric treatment for now and repeat a.m.  LFTs  · AM CBC, trend lactate  · Extensive discussion with sister who would not not wish to pursue any intervention at this time if she were to have acute cholecystitis regardless

## 2023-08-05 NOTE — ASSESSMENT & PLAN NOTE
· Patient overall with very poor prognosis in the setting of acute PE on heparin drip with recent spontaneous subarachnoid hemorrhage at risk of rebleed  · Evidence of endorgan damage with FRANCISCO JAVIER and transaminitis, troponemia  · Unclear if patient has an acute beth for which she would be a poor operative candidate  · She also has evidence of right renal infarcts vs pyelo  · Mentation continues to be altered with decreased arousal all in the setting of advanced Alzheimer's  · All of the above was presented to patient's sister this morning and explained at length that patient's prognosis is guarded at this time  · She personally feels comfort care is likely appropriate however [place] [facility] [place] [facility] and they will both decide tomorrow if they would like to fully withdraw care  · In the time being she wants to continue IV antibiotics, IV heparin drip, and IV fluids and continued medical management but would like to hold off on any further aggressive interventions or procedures  · Continue current medical management and follow-up with final decision regarding goals of care tomorrow

## 2023-08-05 NOTE — CONSULTS
Consultation - Nephrology   Joe Rapp 77 y.o. female MRN: [id number]  Unit/Bed#: -01 SDU Encounter: [id number]    ASSESSMENT and PLAN:  #1 acute kidney injury (POA)  Baseline creatinine 0.6-0.9  Admitted with a creatinine of 2.73, status post CTA as part of the stroke alert. Imaging studies of the abdomen showed retained contrast in both kidneys. Continue with IV fluids, creatinine is slowly improving down to 2.27 today. Monitor volume status closely. Monitor closely ins and outs, avoid relative hypotension  Follow serial labs. 2 acute hypoxemic respiratory failure in the setting of acute bilateral pulmonary emboli seen on CTA as part of a stroke alert. Currently on heparin infusion, pulmonology on board. 3 acute encephalopathy, CAT scan of the head unremarkable. 4 acute cholecystitis, primary team on discussions with family regarding goal of care    5 hyperkalemia, continue medical management prn, follow serial labs    6 anemia, hemoglobin low but is stable, monitor H&H    7 history of dementia      SUMMARY OF RECOMMENDATIONS:  Continue with IV fluid for now. Follow serial labs and continue medical management for hyperkalemia as needed. Avoid relative hypotension. Continue ongoing discussion with family regarding goal of care. HISTORY OF PRESENT ILLNESS:  Requesting Physician: Alondra Prince MD  Reason for Consult: Acute kidney injury, hyperkalemia    Joe Rapp is a 77 y.o. female who was admitted to [address] after presenting with altered mental status/encephalopathy. A renal consultation is requested today for assistance in the management of acute kidney injury, hyperkalemia.   Patient with history of dementia, recent surgical history including removal of rib hip hardware on 6/30 followed by right hip replacement on 7/23 patient was in a nursing facility for rehabilitation, apparently yesterday below altered mental status, was brought to the hospital, a stroke alert was called, CTA did not show any acute abnormalities but incidental finding of acute pulmonary emboli in the bilateral upper lobes also patient found to be in acute renal failure and hyperkalemia, was started on IV fluid, received medical treatment for hyperkalemia. CAT scan of the abdomen shows acute cholecystitis. Evaluation patient lying in bed, opening eyes to voice but does not answer questions or follow commands. ROS limited. Patient's sister is at bedside primary team is currently discussing with family regarding goals of care.     PAST MEDICAL HISTORY:  Past Medical History:   Diagnosis Date   • Anxiety    • Depression    • Memory loss    • Thyroid activity decreased        PAST SURGICAL HISTORY:  Past Surgical History:   Procedure Laterality Date   • BREAST BIOPSY      years ago- Benign   • JOINT REPLACEMENT Left    • [place] HEMIARTHROPLASTY HIP PARTIAL Right 7/23/2023    Procedure: HEMIARTHROPLASTY HIP TOTAL with removal of hardware;  Surgeon: Conner Dennsi MD;  Location: [facility] MAIN OR;  Service: Orthopedics   • UT OPTX FEM SHFT FX W/INSJ IMED IMPLT W/WO SCREW Right 5/9/2019    Procedure: INSERTION NAIL IM FEMUR ANTEGRADE (TROCHANTERIC), RIGHT;  Surgeon: Halley Dumont MD;  Location:  MAIN OR;  Service: Orthopedics   • UT REMOVAL IMPLANT DEEP Right 6/30/2023    Procedure: REMOVAL HARDWARE TFN DYNAMIC SCREW;  Surgeon: Halley Dumont MD;  Location: [facility] MAIN OR;  Service: Orthopedics       SOCIAL HISTORY:  Social History     Substance and Sexual Activity   Alcohol Use Not Currently     Social History     Substance and Sexual Activity   Drug Use No     Social History     Tobacco Use   Smoking Status Never   Smokeless Tobacco Never       FAMILY HISTORY:  Family History   Problem Relation Age of Onset   • Hypertension Mother    • Parkinsonism Mother 80        memory issues    • Depression Mother    • Melanoma Father    • No Known Problems Maternal Grandmother    • No Known Problems Maternal Grandfather    • No Known Problems Paternal Grandmother    • Heart attack Paternal Grandfather    • Depression Other    • Skin cancer Sister    • Breast cancer Maternal Aunt         over age 48   • No Known Problems Paternal Aunt    • No Known Problems Sister        ALLERGIES:  No Known Allergies    MEDICATIONS:    Current Facility-Administered Medications:   •  heparin (porcine) 25,000 units in 0.45% NaCl 250 mL infusion (premix), 3-30 Units/kg/hr (Order-Specific), Intravenous, Titrated, Jonah Mejia MD, Last Rate: 6.6 mL/hr at 08/05/23 1330, 12 Units/kg/hr at 08/05/23 1330  •  heparin (porcine) injection 2,200 Units, 2,200 Units, Intravenous, Q6H PRN, Jonah Mejia MD  •  heparin (porcine) injection 4,400 Units, 4,400 Units, Intravenous, Q6H PRN, Jonah Mejia MD  •  ondansetron (ZOFRAN) injection 4 mg, 4 mg, Intravenous, Q6H PRN, Jonah Mejia MD  •  piperacillin-tazobactam (ZOSYN) IVPB 3.375 g, 3.375 g, Intravenous, Q12H, Jonah Mejia MD  •  sodium chloride 0.9 % infusion, 75 mL/hr, Intravenous, Continuous, Jonah Mejia MD, Last Rate: 75 mL/hr at 08/04/23 2019, 75 mL/hr at 08/04/23 2019    REVIEW OF SYSTEMS:  ROS limited given patient encephalopathy as well as underlying dementia    PHYSICAL EXAM:  Current Weight: Weight - Scale: 53.3 kg (117 lb 8.1 oz)  First Weight: Weight - Scale: 54.8 kg (120 lb 13 oz)  Vitals:    08/05/23 1000 08/05/23 1100 08/05/23 1200 08/05/23 1300   BP: 112/58 114/55 107/60 105/58   BP Location:   Right arm    Pulse: 91 93 90 98   Resp: (!) 23 (!) 23 18 (!) 24   Temp:   98.3 °F (36.8 °C)    TempSrc:   Temporal    SpO2: 91% 91% 90% (!) 88%   Weight:           Intake/Output Summary (Last 24 hours) at 8/5/2023 1432  Last data filed at 8/5/2023 1401  Gross per 24 hour   Intake 3909.06 ml   Output 820 ml   Net 3089.06 ml       General: conscious, opening eyes to voice but does not follow commands, in not acute distress  Eyes: conjunctivae pale, anicteric sclerae  ENT: lips and mucous membranes dry, oxygen via nasal cannula  Neck: supple, no JVD  Chest: clear breath sounds bilateral, no crackles, ronchus or wheezings  CVS: distinct S1 & S2, normal rate, regular rhythm  Abdomen: soft, non-tender, non-distended, normoactive bowel sounds  Extremities: no edema of both legs  Skin: no rash  Neuro: awake, opening eyes to voice but does not follow commands        Invasive Devices:   Urethral Catheter 16 Fr. (Active)   Amt returned on insertion(mL) 200 mL 08/04/23 1542   Reasons to continue Urinary Catheter  Accurate I&O assessment in critically ill patients (48 hr. max) 08/05/23 1401   Goal for Removal Remove after 48 hrs of I/O monitoring 08/05/23 1401   Site Assessment Clean;Skin intact; Bleeding 08/05/23 1401   [place] Care Done 08/05/23 0900   Collection Container Standard drainage bag 08/05/23 1401   Securement Method Securing device (Describe) 08/05/23 1401   Output (mL) 60 mL 08/05/23 1401       Lab Results:   Results from last 7 days   Lab Units 08/05/23  0526 08/04/23  2204 08/04/23  1731 08/04/23  1429 08/04/23  1335   WBC Thousand/uL 12.18*  --   --   --  11.55*   HEMOGLOBIN g/dL 8.3*  --   --   --  8.5*   I STAT HEMOGLOBIN g/dl  --   --   --  7.5*  --    HEMATOCRIT % 27.9*  --   --   --  27.9*   HEMATOCRIT, ISTAT %  --   --   --  22*  --    PLATELETS Thousands/uL 319  --   --   --  321   SODIUM mmol/L 138 140 136  --  135   POTASSIUM mmol/L 5.5* 4.7 5.9*  --  5.9*   CHLORIDE mmol/L 106 105 103  --  99   CO2 mmol/L 21 24 22  --  21   CO2, I-STAT mmol/L  --   --   --  20*  --    BUN mg/dL 37* 33* 32*  --  30*   CREATININE mg/dL 2.27* 2.53* 2.55*  --  2.73*   CALCIUM mg/dL 8.7 9.3 8.8  --  9.4   ALK PHOS U/L 80  --   --   --   --    ALT U/L 425*  --   --   --   --    AST U/L 503*  --   --   --   --    GLUCOSE, ISTAT mg/dl  --   --   --  131  --        Other Studies:   CTA stroke alert, reported incomplete imaged acute pulmonary emboli in the bilateral upper lobes    CT chest abdomen and pelvis without contrast reported as retained contrast in both kidneys, gallbladder wall thickening questionable trace pericholecystic fluid      Portions of the record may have been created with voice recognition software. Occasional wrong word or "sound a like" substitutions may have occurred due to the inherent limitations of voice recognition software. Read the chart carefully and recognize, using context, where substitutions have occurred. If you have any questions, please contact the dictating provider.

## 2023-08-06 NOTE — ASSESSMENT & PLAN NOTE
· CTA stroke protocol noted new incompletely imaged acute PE in bilateral upper lobes.   · Patient cannot receive CT PE study given acute renal failure  · Heparin drip was initiated yesterday, after discussion with family we will continue for the time being  · Respiratory status is somewhat tenuous as patient is tachypneic today and briefly required increase in nasal cannula requirement to 6 L however has since been weaned back down to 3 L nasal cannula  · No need for ECHO given transition to comfort measures   · Continue O2 for comfort  · Stop heparin drip

## 2023-08-06 NOTE — ASSESSMENT & PLAN NOTE
· , . No hyperbilirubinemia  · CT AP with "Gallbladder wall thickening and questionable trace pericholecystic fluid. No calcified gallstones are seen but cannot exclude acute cholecystitis"  · Right upper quadrant ultrasound was ordered and did not reveal findings of acute cholecystitis although it did have gallbladder wall thickening with pericholecystic fluid  · Patient did grimace with right upper quadrant palpation  · Currently afebrile without leukocytosis, no vomiting.     · Suspect shock liver as RUQ US negative   · No further work up needed

## 2023-08-06 NOTE — ASSESSMENT & PLAN NOTE
· Patient does have advanced Alzheimer's and was recently noted to have decreased arousal and altered mentation on admission in July  · CTA stroke protocol shows no large vessel occlusion, aneurysm or stenosis. No evidence of hemorrhage.   · MRI negative for acute stroke, advanced chronic microvascular disease and ventriculomegaly  · Neuro following, in light of acute PE they are advising cautious decision making regarding heparin drip as she is at risk of subarachnoid hemorrhage given recent [facility]  · Stop neuro checks given comfort measures  · Pleasure feeds as able

## 2023-08-06 NOTE — PROGRESS NOTES
[address] [facility]  Progress Note  Name: Filemon Spaulding  MRN: [id number]  Unit/Bed#: -01 SDU I Date of Admission: 8/4/2023   Date of Service: 8/6/2023 I Hospital Day: 2    Assessment/Plan   * Goals of care, counseling/discussion  Assessment & Plan  · Patient overall with very poor prognosis in the setting of acute PE on heparin drip with recent spontaneous subarachnoid hemorrhage at risk of rebleed  · Evidence of endorgan damage with FRANCISCO JAVIER and transaminitis, troponemia  · Patient does not have acute cholecystitis based on RUQ US  · She also has evidence of right renal infarcts vs pyelo  · Mentation continues to be altered with decreased arousal all in the setting of advanced Alzheimer's  · All of the above was presented to patient's sister this morning and explained at length that patient's prognosis is guarded at this time  · She personally feels comfort care is likely appropriate however her sister is driving up from [facility] tomorrow and they will both decide tomorrow if they would like to fully withdraw care  · After further discussion between patient's sisters they agree that the patient has been saying for some time that she is tired of hurting and that her will to live has gone down so they feel that comfort measures are most appropriate at this time   · Hospice consult via Case Management placed   · Stop Heparin drip, IVF, IV antibiotics   · Comfort measures only   · Pleasure feeds  · Dilaudid, Ativan, and Glycopyrrolate ordered PRN   · She has been downgraded to med/surg    Altered mental status  Assessment & Plan  · Patient does have advanced Alzheimer's and was recently noted to have decreased arousal and altered mentation on admission in July  · CTA stroke protocol shows no large vessel occlusion, aneurysm or stenosis. No evidence of hemorrhage.   · MRI negative for acute stroke, advanced chronic microvascular disease and ventriculomegaly  · Neuro following, in light of acute PE they are advising cautious decision making regarding heparin drip as she is at risk of subarachnoid hemorrhage given recent [facility]  · Stop neuro checks given comfort measures  · Pleasure feeds as able     Pulmonary embolism (HCC)  Assessment & Plan  · CTA stroke protocol noted new incompletely imaged acute PE in bilateral upper lobes. · Patient cannot receive CT PE study given acute renal failure  · Heparin drip was initiated yesterday, after discussion with family we will continue for the time being  · Respiratory status is somewhat tenuous as patient is tachypneic today and briefly required increase in nasal cannula requirement to 6 L however has since been weaned back down to 3 L nasal cannula  · No need for ECHO given transition to comfort measures   · Continue O2 for comfort  · Stop heparin drip    Elevated troponin  Assessment & Plan  · Non-MI troponin elevation secondary to PE  · Cardiology was consulted  · ECHO not necessary given now level 4     Transaminitis  Assessment & Plan  · , . No hyperbilirubinemia  · CT AP with "Gallbladder wall thickening and questionable trace pericholecystic fluid. No calcified gallstones are seen but cannot exclude acute cholecystitis"  · Right upper quadrant ultrasound was ordered and did not reveal findings of acute cholecystitis although it did have gallbladder wall thickening with pericholecystic fluid  · Patient did grimace with right upper quadrant palpation  · Currently afebrile without leukocytosis, no vomiting.     · Suspect shock liver as RUQ US negative   · No further work up needed    Subarachnoid hemorrhage [facility])  Assessment & Plan  · Patient with recent small spontaneous subarachnoid hemorrhage on 7/24  · Spoke directly with neurology today who felt it was important to note that patient is still at risk of recurrent bleed and ultimately advised weighing the risk versus the benefits of IV heparin drip  · Spoke with patient's sister at length who ultimately is opting to continue heparin drip at this time  · Patient has remained lethargic and essentially nonverbal since admission although she has intermittently opened her eyes with no acute worsening of LOC since admission thus far, nonfocal on exam  · Stop neuro checks   · Heparin drip off  · No further imaging     Acute renal failure (ARF) (HCC)  Assessment & Plan  · Baseline creatinine 0.6-0.9, admission creatinine 2.73, then received IV contrast for CTA stroke alert  · Started on IV fluids with gradual improvement of creatinine  · CTAP shows decreased cortical enhancement of the right kidney which could be multiple infarcts versus pyelo  · Stop antibiotics   · Continue cummins for comfort   · No further lab draws     Hypoxia  Assessment & Plan  · Patient currently requiring 3 L nasal cannula, brief escalation to 6 L during the day but has since been weaned back down to 3 L  · Secondary to acute PE  · No further heparin drip per family     Lactic acidosis  Assessment & Plan  · Lactic acid on 8.5 was 2.4, not rechecked  · No further rechecks given comfort measures     Parkinsonism ([address])  Assessment & Plan  · Noted history, contributes to severity of illness and debility              VTE Pharmacologic Prophylaxis: VTE Score: 6 High Risk (Score >/= 5) - Pharmacological DVT Prophylaxis Contraindicated. Sequential Compression Devices Ordered. Patient Centered Rounds: I performed bedside rounds with nursing staff today. Discussions with Specialists or Other Care Team Provider: Discussed with RN    Education and Discussions with Family / Patient: Updated  (sister and multiple other family members) at bedside.     Total Time Spent on Date of Encounter in care of patient: 45 minutes This time was spent on one or more of the following: performing physical exam; counseling and coordination of care; obtaining or reviewing history; documenting in the medical record; reviewing/ordering tests, medications or procedures; communicating with other healthcare professionals and discussing with patient's family/caregivers. Current Length of Stay: 2 day(s)  Current Patient Status: Inpatient   Certification Statement: The patient will continue to require additional inpatient hospital stay due to pending arrangement of Hospice Services   Discharge Plan: Anticipate discharge in 24-48 hrs to determination by Hospice team level of appropriateness    Code Status: Level 4 - Comfort Care    Subjective:   Patient remains lethargic and unable to answer questions or participate in exam. RN reports no events last 24 hours. Discussed on going work up with multiple family members. They asked a lot of reasonable and intelligent questions regarding the patient's work up and prognosis that I answered to the best of my ability and after further deliberation her sisters, one of which is her POA, made the decision to transition the patient to comfort measures only. Objective:     Vitals:   Temp (24hrs), Av.7 °F (37.1 °C), Min:97.4 °F (36.3 °C), Max:99.2 °F (37.3 °C)    Temp:  [97.4 °F (36.3 °C)-99.2 °F (37.3 °C)] 97.4 °F (36.3 °C)  HR:  [82-87] 82  Resp:  [17-24] 23  BP: (104-118)/(58-64) 110/64  SpO2:  [91 %-94 %] 93 %  Body mass index is 17.9 kg/m². Input and Output Summary (last 24 hours): Intake/Output Summary (Last 24 hours) at 2023 1348  Last data filed at 2023 1130  Gross per 24 hour   Intake 2211.85 ml   Output 685 ml   Net 1526.85 ml       Physical Exam:   Physical Exam  Constitutional:       General: She is sleeping. She is not in acute distress. Appearance: Normal appearance. She is underweight. She is ill-appearing. She is not diaphoretic. Interventions: Nasal cannula in place. HENT:      Head: Normocephalic and atraumatic. Mouth/Throat:      Mouth: Mucous membranes are dry. Eyes:      General: No scleral icterus.      Comments: Eyes closed   Cardiovascular:      Rate and Rhythm: Normal rate and regular rhythm. Pulses: Normal pulses. Heart sounds: Normal heart sounds, S1 normal and S2 normal. No murmur heard. No systolic murmur is present. No diastolic murmur is present. No gallop. No S3 or S4 sounds. Pulmonary:      Effort: Pulmonary effort is normal. No accessory muscle usage or respiratory distress. Breath sounds: Normal breath sounds. No stridor. No decreased breath sounds, wheezing, rhonchi or rales. Chest:      Chest wall: No tenderness. Abdominal:      General: Bowel sounds are normal. There is no distension. Palpations: Abdomen is soft. Tenderness: There is no abdominal tenderness. There is no guarding. Musculoskeletal:      Right lower leg: No edema. Left lower leg: No edema. Skin:     General: Skin is warm and dry. Coloration: Skin is not jaundiced. Neurological:      Mental Status: She is lethargic. GCS: GCS eye subscore is 1. GCS verbal subscore is 1. GCS motor subscore is 4.           Additional Data:     Labs:  Results from last 7 days   Lab Units 08/06/23  0300   WBC Thousand/uL 16.43*   HEMOGLOBIN g/dL 8.4*   HEMATOCRIT % 28.2*   PLATELETS Thousands/uL 325   NEUTROS PCT % 87*   LYMPHS PCT % 7*   MONOS PCT % 4   EOS PCT % 0     Results from last 7 days   Lab Units 08/06/23  0300   SODIUM mmol/L 141   POTASSIUM mmol/L 4.5   CHLORIDE mmol/L 110*   CO2 mmol/L 21   BUN mg/dL 45*   CREATININE mg/dL 2.05*   ANION GAP mmol/L 10   CALCIUM mg/dL 8.3*   ALBUMIN g/dL 3.3*   TOTAL BILIRUBIN mg/dL 0.36   ALK PHOS U/L 71   ALT U/L 475*   AST U/L 301*   GLUCOSE RANDOM mg/dL 140     Results from last 7 days   Lab Units 08/04/23  1335   INR  1.06     Results from last 7 days   Lab Units 08/05/23  1232 08/04/23  2158 08/04/23  1941 08/04/23  1330   POC GLUCOSE mg/dl 126 91 145* 174*         Results from last 7 days   Lab Units 08/05/23  0526 08/05/23  0040 08/04/23  1731 08/04/23  1440   LACTIC ACID mmol/L 2.4* 4.1* 4.2* 5.5*   PROCALCITONIN ng/ml  --   --   --  0.57*       Lines/Drains:  Invasive Devices     Peripheral Intravenous Line  Duration           Peripheral IV 08/04/23 Dorsal (posterior); Left Forearm 1 day          Drain  Duration           External Urinary Catheter 9 days    Urethral Catheter 16 Fr. 1 day              Urinary Catheter:  Goal for removal: N/A- Discharging with [place]               Imaging: No pertinent imaging reviewed. Recent Cultures (last 7 days):   Results from last 7 days   Lab Units 08/04/23  1440   BLOOD CULTURE  No Growth at 24 hrs. No Growth at 24 hrs. Last 24 Hours Medication List:   Current Facility-Administered Medications   Medication Dose Route Frequency Provider Last Rate   • glycopyrrolate  0.1 mg Intravenous Q1H PRN Merilee Spatz, PA-C     • HYDROmorphone  0.5 mg Intravenous Q1H PRN Merilee Spatz, PA-C     • LORazepam  1 mg Intravenous Q1H PRN Merilee Spatz, PA-C     • ondansetron  4 mg Intravenous Q6H PRN Dave Iyer MD          Today, Patient Was Seen By: Merilee Spatz, PA-C    **Please Note: This note may have been constructed using a voice recognition system. **

## 2023-08-06 NOTE — ASSESSMENT & PLAN NOTE
· Patient currently requiring 3 L nasal cannula, brief escalation to 6 L during the day but has since been weaned back down to 3 L  · Secondary to acute PE  · No further heparin drip per family

## 2023-08-06 NOTE — ASSESSMENT & PLAN NOTE
· Patient overall with very poor prognosis in the setting of acute PE on heparin drip with recent spontaneous subarachnoid hemorrhage at risk of rebleed  · Evidence of endorgan damage with FRANCISCO JAVIER and transaminitis, troponemia  · Patient does not have acute cholecystitis based on RUQ US  · She also has evidence of right renal infarcts vs pyelo  · Mentation continues to be altered with decreased arousal all in the setting of advanced Alzheimer's  · All of the above was presented to patient's sister this morning and explained at length that patient's prognosis is guarded at this time  · She personally feels comfort care is likely appropriate however her sister is driving up from [place] tomorrow and they will both decide tomorrow if they would like to fully withdraw care  · After further discussion between patient's sisters they agree that the patient has been saying for some time that she is tired of hurting and that her will to live has gone down so they feel that comfort measures are most appropriate at this time   · Hospice consult via Case Management placed   · Stop Heparin drip, IVF, IV antibiotics   · Comfort measures only   · Pleasure feeds  · Dilaudid, Ativan, and Glycopyrrolate ordered PRN   · She has been downgraded to med/surg

## 2023-08-06 NOTE — ASSESSMENT & PLAN NOTE
· Patient with recent small spontaneous subarachnoid hemorrhage on 7/24  · Spoke directly with neurology today who felt it was important to note that patient is still at risk of recurrent bleed and ultimately advised weighing the risk versus the benefits of IV heparin drip  · Spoke with patient's sister at length who ultimately is opting to continue heparin drip at this time  · Patient has remained lethargic and essentially nonverbal since admission although she has intermittently opened her eyes with no acute worsening of LOC since admission thus far, nonfocal on exam  · Stop neuro checks   · Heparin drip off  · No further imaging

## 2023-08-06 NOTE — ASSESSMENT & PLAN NOTE
· Baseline creatinine 0.6-0.9, admission creatinine 2.73, then received IV contrast for CTA stroke alert  · Started on IV fluids with gradual improvement of creatinine  · CTAP shows decreased cortical enhancement of the right kidney which could be multiple infarcts versus pyelo  · Stop antibiotics   · Continue cummins for comfort   · No further lab draws

## 2023-08-06 NOTE — PLAN OF CARE
Problem: Potential for Falls  Goal: Patient will remain free of falls  Description: INTERVENTIONS:  - Educate patient/family on patient safety including physical limitations  - Instruct patient to call for assistance with activity   - Consult OT/PT to assist with strengthening/mobility   - Keep Call bell within reach  - Keep bed low and locked with side rails adjusted as appropriate  - Keep care items and personal belongings within reach  - Initiate and maintain comfort rounds  - Make Fall Risk Sign visible to staff  - Apply yellow socks and bracelet for high fall risk patients  - Consider moving patient to room near nurses station  Outcome: Progressing     Problem: MOBILITY - ADULT  Goal: Maintain or return to baseline ADL function  Description: INTERVENTIONS:  -  Assess patient's ability to carry out ADLs; assess patient's baseline for ADL function and identify physical deficits which impact ability to perform ADLs (bathing, care of mouth/teeth, toileting, grooming, dressing, etc.)  - Assess/evaluate cause of self-care deficits   - Assess range of motion  - Assess patient's mobility; develop plan if impaired  - Assess patient's need for assistive devices and provide as appropriate  - Encourage maximum independence but intervene and supervise when necessary  - Involve family in performance of ADLs  - Assess for home care needs following discharge   - Consider OT consult to assist with ADL evaluation and planning for discharge  - Provide patient education as appropriate  Outcome: Progressing  Goal: Maintains/Returns to pre admission functional level  Description: INTERVENTIONS:  - Perform BMAT or MOVE assessment daily.   - Set and communicate daily mobility goal to care team and patient/family/caregiver.    - Collaborate with rehabilitation services on mobility goals if consulted  - Out of bed for toileting  - Record patient progress and toleration of activity level   Outcome: Progressing     Problem: PAIN - ADULT  Goal: Verbalizes/displays adequate comfort level or baseline comfort level  Description: Interventions:  - Encourage patient to monitor pain and request assistance  - Assess pain using appropriate pain scale  - Administer analgesics based on type and severity of pain and evaluate response  - Implement non-pharmacological measures as appropriate and evaluate response  - Consider cultural and social influences on pain and pain management  - Notify physician/advanced practitioner if interventions unsuccessful or patient reports new pain  Outcome: Progressing     Problem: INFECTION - ADULT  Goal: Absence or prevention of progression during hospitalization  Description: INTERVENTIONS:  - Assess and monitor for signs and symptoms of infection  - Monitor lab/diagnostic results  - Monitor all insertion sites, i.e. indwelling lines, tubes, and drains  - Monitor endotracheal if appropriate and nasal secretions for changes in amount and color  - Decker appropriate cooling/warming therapies per order  - Administer medications as ordered  - Instruct and encourage patient and family to use good hand hygiene technique  - Identify and instruct in appropriate isolation precautions for identified infection/condition  Outcome: Progressing  Goal: Absence of fever/infection during neutropenic period  Description: INTERVENTIONS:  - Monitor WBC    Outcome: Progressing     Problem: SAFETY ADULT  Goal: Patient will remain free of falls  Description: INTERVENTIONS:  - Educate patient/family on patient safety including physical limitations  - Instruct patient to call for assistance with activity   - Consult OT/PT to assist with strengthening/mobility   - Keep Call bell within reach  - Keep bed low and locked with side rails adjusted as appropriate  - Keep care items and personal belongings within reach  - Initiate and maintain comfort rounds  - Make Fall Risk Sign visible to staff  - Apply yellow socks and bracelet for high fall risk patients  - Consider moving patient to room near nurses station  Outcome: Progressing  Goal: Maintain or return to baseline ADL function  Description: INTERVENTIONS:  -  Assess patient's ability to carry out ADLs; assess patient's baseline for ADL function and identify physical deficits which impact ability to perform ADLs (bathing, care of mouth/teeth, toileting, grooming, dressing, etc.)  - Assess/evaluate cause of self-care deficits   - Assess range of motion  - Assess patient's mobility; develop plan if impaired  - Assess patient's need for assistive devices and provide as appropriate  - Encourage maximum independence but intervene and supervise when necessary  - Involve family in performance of ADLs  - Assess for home care needs following discharge   - Consider OT consult to assist with ADL evaluation and planning for discharge  - Provide patient education as appropriate  Outcome: Progressing  Goal: Maintains/Returns to pre admission functional level  Description: INTERVENTIONS:  - Perform BMAT or MOVE assessment daily.   - Set and communicate daily mobility goal to care team and patient/family/caregiver.    - Collaborate with rehabilitation services on mobility goals if consulted  - Out of bed for toileting  - Record patient progress and toleration of activity level   Outcome: Progressing     Problem: DISCHARGE PLANNING  Goal: Discharge to home or other facility with appropriate resources  Description: INTERVENTIONS:  - Identify barriers to discharge w/patient and caregiver  - Arrange for needed discharge resources and transportation as appropriate  - Identify discharge learning needs (meds, wound care, etc.)  - Arrange for interpretive services to assist at discharge as needed  - Refer to Case Management Department for coordinating discharge planning if the patient needs post-hospital services based on physician/advanced practitioner order or complex needs related to functional status, cognitive ability, or social support system  Outcome: Progressing     Problem: Knowledge Deficit  Goal: Patient/family/caregiver demonstrates understanding of disease process, treatment plan, medications, and discharge instructions  Description: Complete learning assessment and assess knowledge base. Interventions:  - Provide teaching at level of understanding  - Provide teaching via preferred learning methods  Outcome: Progressing     Problem: NEUROSENSORY - ADULT  Goal: Achieves stable or improved neurological status  Description: INTERVENTIONS  - Monitor and report changes in neurological status  - Monitor vital signs such as temperature, blood pressure, glucose, and any other labs ordered   - Initiate measures to prevent increased intracranial pressure  - Monitor for seizure activity and implement precautions if appropriate      Outcome: Progressing  Goal: Achieves maximal functionality and self care  Description: INTERVENTIONS  - Monitor swallowing and airway patency with patient fatigue and changes in neurological status  - Encourage and assist patient to increase activity and self care.    - Encourage visually impaired, hearing impaired and aphasic patients to use assistive/communication devices  Outcome: Progressing     Problem: CARDIOVASCULAR - ADULT  Goal: Maintains optimal cardiac output and hemodynamic stability  Description: INTERVENTIONS:  - Monitor I/O, vital signs and rhythm  - Monitor for S/S and trends of decreased cardiac output  - Administer and titrate ordered vasoactive medications to optimize hemodynamic stability  - Assess quality of pulses, skin color and temperature  - Assess for signs of decreased coronary artery perfusion  - Instruct patient to report change in severity of symptoms  Outcome: Progressing     Problem: RESPIRATORY - ADULT  Goal: Achieves optimal ventilation and oxygenation  Description: INTERVENTIONS:  - Assess for changes in respiratory status  - Assess for changes in mentation and behavior  - Position to facilitate oxygenation and minimize respiratory effort  - Oxygen administered by appropriate delivery if ordered  - Initiate smoking cessation education as indicated  - Encourage broncho-pulmonary hygiene including cough, deep breathe, Incentive Spirometry  - Assess the need for suctioning and aspirate as needed  - Assess and instruct to report SOB or any respiratory difficulty  - Respiratory Therapy support as indicated  Outcome: Progressing     Problem: GENITOURINARY - ADULT  Goal: Maintains or returns to baseline urinary function  Description: INTERVENTIONS:  - Assess urinary function  - Encourage oral fluids to ensure adequate hydration if ordered  - Administer IV fluids as ordered to ensure adequate hydration  - Administer ordered medications as needed  - Offer frequent toileting  - Follow urinary retention protocol if ordered  Outcome: Progressing  Goal: Absence of urinary retention  Description: INTERVENTIONS:  - Assess patient’s ability to void and empty bladder  - Monitor I/O  - Bladder scan as needed  - Discuss with physician/AP medications to alleviate retention as needed  - Discuss catheterization for long term situations as appropriate  Outcome: Progressing  Goal: Urinary catheter remains patent  Description: INTERVENTIONS:  - Assess patency of urinary catheter  - If patient has a chronic cummins, consider changing catheter if non-functioning  - Follow guidelines for intermittent irrigation of non-functioning urinary catheter  Outcome: Progressing     Problem: METABOLIC, FLUID AND ELECTROLYTES - ADULT  Goal: Electrolytes maintained within normal limits  Description: INTERVENTIONS:  - Monitor labs and assess patient for signs and symptoms of electrolyte imbalances  - Administer electrolyte replacement as ordered  - Monitor response to electrolyte replacements, including repeat lab results as appropriate  - Instruct patient on fluid and nutrition as appropriate  Outcome: Progressing  Goal: Fluid balance maintained  Description: INTERVENTIONS:  - Monitor labs   - Monitor I/O and WT  - Instruct patient on fluid and nutrition as appropriate  - Assess for signs & symptoms of volume excess or deficit  Outcome: Progressing     Problem: SKIN/TISSUE INTEGRITY - ADULT  Goal: Skin Integrity remains intact(Skin Breakdown Prevention)  Description: Assess:  -Inspect skin when repositioning, toileting, and assisting with ADLS  -Assess extremities for adequate circulation and sensation     Bed Management:  -Have minimal linens on bed & keep smooth, unwrinkled    Toileting:  -Offer bedside commode    Activity:  -Encourage activity and walks on unit  -Encourage or provide ROM exercises   -Use appropriate equipment to lift or move patient in bed      Skin Care:  -Avoid use of baby powder, tape, friction and shearing, hot water or constrictive clothing  -Do not massage red bony areas      Outcome: Progressing     Problem: MUSCULOSKELETAL - ADULT  Goal: Maintain or return mobility to safest level of function  Description: INTERVENTIONS:  - Assess patient's ability to carry out ADLs; assess patient's baseline for ADL function and identify physical deficits which impact ability to perform ADLs (bathing, care of mouth/teeth, toileting, grooming, dressing, etc.)  - Assess/evaluate cause of self-care deficits   - Assess range of motion  - Assess patient's mobility  - Assess patient's need for assistive devices and provide as appropriate  - Encourage maximum independence but intervene and supervise when necessary  - Involve family in performance of ADLs  - Assess for home care needs following discharge   - Consider OT consult to assist with ADL evaluation and planning for discharge  - Provide patient education as appropriate  Outcome: Progressing     Problem: Prexisting or High Potential for Compromised Skin Integrity  Goal: Skin integrity is maintained or improved  Description: INTERVENTIONS:  - Identify patients at risk for skin breakdown  - Assess and monitor skin integrity  - Assess and monitor nutrition and hydration status  - Monitor labs   - Assess for incontinence   - Turn and reposition patient  - Assist with mobility/ambulation  - Relieve pressure over bony prominences  - Avoid friction and shearing  - Provide appropriate hygiene as needed including keeping skin clean and dry  - Evaluate need for skin moisturizer/barrier cream  - Collaborate with interdisciplinary team   - Patient/family teaching  - Consider wound care consult   Outcome: Progressing     Problem: SAFETY,RESTRAINT: NV/NON-SELF DESTRUCTIVE BEHAVIOR  Goal: Remains free of harm/injury (restraint for non violent/non self-detsructive behavior)  Description: INTERVENTIONS:  - Instruct patient/family regarding restraint use   - Assess and monitor physiologic and psychological status   - Provide interventions and comfort measures to meet assessed patient needs   - Identify and implement measures to help patient regain control  - Assess readiness for release of restraint   Outcome: Progressing  Goal: Returns to optimal restraint-free functioning  Description: INTERVENTIONS:  - Assess the patient's behavior and symptoms that indicate continued need for restraint  - Identify and implement measures to help patient regain control  - Assess readiness for release of restraint   Outcome: Progressing

## 2023-08-06 NOTE — PROGRESS NOTES
Progress Note - Pulmonary   Mark Anthony Knox 77 y.o. female MRN: [id number]  Unit/Bed#: -01 SDU Encounter: [id number]      Assessment/Plan:    · Acute hypoxic respiratory failure secondary to acute bilateral pulmonary emboli in the setting of recent hip surgery-currently on heparin infusion. Depending on goals of care discussion, would require a minimum of 3-6 months of anticoagulation. Currently on 3 L nasal cannula. Titrate to maintain saturations above 88%    · Acute encephalopathy with underlying dementia-mental status remains very poor, minimally interactive    No significant change in overall status. Family meeting planned with internal medicine. We we will sign off, but are available as needed    Chief Complaint: Unable to offer complaints    Subjective:   No significant overnight events. She will open eyes to verbal stimulus but not interact or answer any questions    Objective:     Vitals: Blood pressure 110/64, pulse 82, temperature (!) 97.4 °F (36.3 °C), temperature source Temporal, resp. rate (!) 23, weight 53.4 kg (117 lb 11.6 oz), SpO2 93 %. ,  3 L, Body mass index is 17.9 kg/m². Intake/Output Summary (Last 24 hours) at 8/6/2023 0913  Last data filed at 8/6/2023 0600  Gross per 24 hour   Intake 1879.72 ml   Output 595 ml   Net 1284.72 ml       Physical Exam:      General:  Opens eyes to verbal stimulus, no distress   HEENT: No scleral icterus, EOMI, moist mucosa    Heart:  Regular rate and rhythm, no murmur   Lungs: Shallow respirations   Abdomen: Soft, nontender, normal bowel sounds   Extremities: No clubbing, cyanosis or edema    Labs: I have personally reviewed pertinent lab results.     Results from last 7 days   Lab Units 08/06/23  0300 08/05/23  0526 08/04/23  1429 08/04/23  1335   WBC Thousand/uL 16.43* 12.18*  --  11.55*   HEMOGLOBIN g/dL 8.4* 8.3*  --  8.5*   I STAT HEMOGLOBIN g/dl  --   --  7.5*  --    HEMATOCRIT % 28.2* 27.9*  --  27.9*   HEMATOCRIT, ISTAT %  --   --  22*  -- PLATELETS Thousands/uL 325 319  --  321         Results from last 7 days   Lab Units 08/06/23  0300 08/05/23  0526 08/04/23  2204 08/04/23  1731 08/04/23  1429   POTASSIUM mmol/L 4.5 5.5* 4.7   < >  --    CHLORIDE mmol/L 110* 106 105   < >  --    CO2 mmol/L 21 21 24   < >  --    CO2, I-STAT mmol/L  --   --   --   --  20*   BUN mg/dL 45* 37* 33*   < >  --    CREATININE mg/dL 2.05* 2.27* 2.53*   < >  --    CALCIUM mg/dL 8.3* 8.7 9.3   < >  --    ALK PHOS U/L 71 80  --   --   --    ALT U/L 475* 425*  --   --   --    AST U/L 301* 503*  --   --   --    GLUCOSE, ISTAT mg/dl  --   --   --   --  131    < > = values in this interval not displayed. Results from last 7 days   Lab Units 08/06/23  0300 08/05/23  2025 08/05/23  0910 08/05/23  0034 08/04/23  1335   INR   --   --   --   --  1.06   PTT seconds 89* 64* 171*   < > 25    < > = values in this interval not displayed.

## 2023-08-06 NOTE — PROGRESS NOTES
NEPHROLOGY PROGRESS NOTE   Chetan Wade 77 y.o. female MRN: [id number]  Unit/Bed#: -01 SDU Encounter: [id number]      ASSESSMENT & PLAN:  1 acute kidney injury (POA) baseline creatinine 0.6-0.9. Admitted with a creatinine of 2.73, kidney function slowly improving with creatinine down to 2.05 today with IV fluids. Mental status unchanged, continue with gentle IV fluids for now. Follow daily labs. Continue ongoing discussion regarding goals of care. 2 acute hypoxemic respiratory failure in the setting of acute bilateral pulmonary embolism seen on CTA as part of a stroke alert. Currently on heparin infusion, pulmonology signed off.    3 acute encephalopathy on top of underlying dementia CAT scan of the head unremarkable    4 transaminitis, CAT scan with gallbladder wall thickening    5 hemodynamics, pressure is stable, avoid relative hypotension      SUBJECTIVE:  Patient seen and examined, does not interact or answer questions. ROS unable to be obtained. Patient's sister is at bedside.   No significant events overnight    OBJECTIVE:  Current Weight: Weight - Scale: 53.4 kg (117 lb 11.6 oz)  Vitals:    08/06/23 0800   BP: 110/64   Pulse: 82   Resp: (!) 23   Temp: (!) 97.4 °F (36.3 °C)   SpO2: 93%       Intake/Output Summary (Last 24 hours) at 8/6/2023 1244  Last data filed at 8/6/2023 1130  Gross per 24 hour   Intake 2211.85 ml   Output 685 ml   Net 1526.85 ml       General: Somnolent, does not follow commands, in not acute distress  Eyes: Eyes are closed  ENT: lips and mucous membranes dry, oxygen via nasal cannula  Neck: supple, no JVD  Chest: clear breath sounds bilateral, no crackles, ronchus or wheezings  CVS: distinct S1 & S2, normal rate, regular rhythm  Abdomen: soft, non-tender, non-distended, normoactive bowel sounds  Extremities: no edema of both legs  Skin: no rash  Neuro: Does not interact or answer questions      Medications:    Current Facility-Administered Medications:   •  heparin (porcine) 25,000 units in 0.45% NaCl 250 mL infusion (premix), 3-30 Units/kg/hr (Order-Specific), Intravenous, Titrated, Dora Hudson MD, Last Rate: 6.6 mL/hr at 08/05/23 2352, 12 Units/kg/hr at 08/05/23 2352  •  heparin (porcine) injection 2,200 Units, 2,200 Units, Intravenous, Q6H PRN, Dora Hudson MD  •  heparin (porcine) injection 4,400 Units, 4,400 Units, Intravenous, Q6H PRN, Dora Hudson MD  •  ondansetron [facility]) injection 4 mg, 4 mg, Intravenous, Q6H PRN, Dora Hudson MD  •  sodium chloride 0.9 % infusion, 75 mL/hr, Intravenous, Continuous, Dora Hudson MD, Last Rate: 75 mL/hr at 08/06/23 1117, 75 mL/hr at 08/06/23 1117    Invasive Devices:   Urethral Catheter 16 Fr. (Active)   Amt returned on insertion(mL) 200 mL 08/04/23 1542   Reasons to continue Urinary Catheter  Accurate I&O assessment in critically ill patients (48 hr. max) 08/05/23 1930   Goal for Removal Voiding trial when ambulation improves 08/05/23 1930   Site Assessment Clean;Skin intact; Patent 08/05/23 1930   [place] Care Done 08/06/23 0900   Collection Container Standard drainage bag 08/05/23 1930   Securement Method Securing device (Describe) 08/05/23 1930   Output (mL) 300 mL 08/06/23 0600       Lab Results:   Results from last 7 days   Lab Units 08/06/23  0300 08/05/23  0526 08/04/23  2204 08/04/23  1731 08/04/23  1429 08/04/23  1335   WBC Thousand/uL 16.43* 12.18*  --   --   --  11.55*   HEMOGLOBIN g/dL 8.4* 8.3*  --   --   --  8.5*   I STAT HEMOGLOBIN g/dl  --   --   --   --  7.5*  --    HEMATOCRIT % 28.2* 27.9*  --   --   --  27.9*   HEMATOCRIT, ISTAT %  --   --   --   --  22*  --    PLATELETS Thousands/uL 325 319  --   --   --  321   SODIUM mmol/L 141 138 140   < >  --  135   POTASSIUM mmol/L 4.5 5.5* 4.7   < >  --  5.9*   CHLORIDE mmol/L 110* 106 105   < >  --  99   CO2 mmol/L 21 21 24   < >  --  21   CO2, I-STAT mmol/L  --   --   --   --  20*  --    BUN mg/dL 45* 37* 33*   < >  --  30* CREATININE mg/dL 2.05* 2.27* 2.53*   < >  --  2.73*   CALCIUM mg/dL 8.3* 8.7 9.3   < >  --  9.4   ALK PHOS U/L 71 80  --   --   --   --    ALT U/L 475* 425*  --   --   --   --    AST U/L 301* 503*  --   --   --   --    GLUCOSE, ISTAT mg/dl  --   --   --   --  131  --     < > = values in this interval not displayed. Previous work up:  See previous notes      Portions of the record may have been created with voice recognition software. Occasional wrong word or "sound a like" substitutions may have occurred due to the inherent limitations of voice recognition software. Read the chart carefully and recognize, using context, where substitutions have occurred. If you have any questions, please contact the dictating provider.

## 2023-08-06 NOTE — ASSESSMENT & PLAN NOTE
· Non-MI troponin elevation secondary to PE  · Cardiology was consulted  · ECHO not necessary given now level 4

## 2023-08-06 NOTE — PLAN OF CARE
Problem: Potential for Falls  Goal: Patient will remain free of falls  Description: INTERVENTIONS:  - Educate patient/family on patient safety including physical limitations  - Instruct patient to call for assistance with activity   - Consult OT/PT to assist with strengthening/mobility   - Keep Call bell within reach  - Keep bed low and locked with side rails adjusted as appropriate  - Keep care items and personal belongings within reach  - Initiate and maintain comfort rounds  - Make Fall Risk Sign visible to staff  - Apply yellow socks and bracelet for high fall risk patients  - Consider moving patient to room near nurses station  Outcome: Progressing     Problem: MOBILITY - ADULT  Goal: Maintain or return to baseline ADL function  Description: INTERVENTIONS:  -  Assess patient's ability to carry out ADLs; assess patient's baseline for ADL function and identify physical deficits which impact ability to perform ADLs (bathing, care of mouth/teeth, toileting, grooming, dressing, etc.)  - Assess/evaluate cause of self-care deficits   - Assess range of motion  - Assess patient's mobility; develop plan if impaired  - Assess patient's need for assistive devices and provide as appropriate  - Encourage maximum independence but intervene and supervise when necessary  - Involve family in performance of ADLs  - Assess for home care needs following discharge   - Consider OT consult to assist with ADL evaluation and planning for discharge  - Provide patient education as appropriate  Outcome: Progressing  Goal: Maintains/Returns to pre admission functional level  Description: INTERVENTIONS:  - Perform BMAT or MOVE assessment daily.   - Set and communicate daily mobility goal to care team and patient/family/caregiver.    - Collaborate with rehabilitation services on mobility goals if consulted  - Out of bed for toileting  - Record patient progress and toleration of activity level   Outcome: Progressing     Problem: PAIN - ADULT  Goal: Verbalizes/displays adequate comfort level or baseline comfort level  Description: Interventions:  - Encourage patient to monitor pain and request assistance  - Assess pain using appropriate pain scale  - Administer analgesics based on type and severity of pain and evaluate response  - Implement non-pharmacological measures as appropriate and evaluate response  - Consider cultural and social influences on pain and pain management  - Notify physician/advanced practitioner if interventions unsuccessful or patient reports new pain  Outcome: Progressing     Problem: INFECTION - ADULT  Goal: Absence or prevention of progression during hospitalization  Description: INTERVENTIONS:  - Assess and monitor for signs and symptoms of infection  - Monitor lab/diagnostic results  - Monitor all insertion sites, i.e. indwelling lines, tubes, and drains  - Monitor endotracheal if appropriate and nasal secretions for changes in amount and color  - [place] appropriate cooling/warming therapies per order  - Administer medications as ordered  - Instruct and encourage patient and family to use good hand hygiene technique  - Identify and instruct in appropriate isolation precautions for identified infection/condition  Outcome: Progressing  Goal: Absence of fever/infection during neutropenic period  Description: INTERVENTIONS:  - Monitor WBC    Outcome: Progressing     Problem: SAFETY ADULT  Goal: Patient will remain free of falls  Description: INTERVENTIONS:  - Educate patient/family on patient safety including physical limitations  - Instruct patient to call for assistance with activity   - Consult OT/PT to assist with strengthening/mobility   - Keep Call bell within reach  - Keep bed low and locked with side rails adjusted as appropriate  - Keep care items and personal belongings within reach  - Initiate and maintain comfort rounds  - Make Fall Risk Sign visible to staff  - Apply yellow socks and bracelet for high fall risk patients  - Consider moving patient to room near nurses station  Outcome: Progressing  Goal: Maintain or return to baseline ADL function  Description: INTERVENTIONS:  -  Assess patient's ability to carry out ADLs; assess patient's baseline for ADL function and identify physical deficits which impact ability to perform ADLs (bathing, care of mouth/teeth, toileting, grooming, dressing, etc.)  - Assess/evaluate cause of self-care deficits   - Assess range of motion  - Assess patient's mobility; develop plan if impaired  - Assess patient's need for assistive devices and provide as appropriate  - Encourage maximum independence but intervene and supervise when necessary  - Involve family in performance of ADLs  - Assess for home care needs following discharge   - Consider OT consult to assist with ADL evaluation and planning for discharge  - Provide patient education as appropriate  Outcome: Progressing  Goal: Maintains/Returns to pre admission functional level  Description: INTERVENTIONS:  - Perform BMAT or MOVE assessment daily.   - Set and communicate daily mobility goal to care team and patient/family/caregiver.    - Collaborate with rehabilitation services on mobility goals if consulted  - Out of bed for toileting  - Record patient progress and toleration of activity level   Outcome: Progressing     Problem: DISCHARGE PLANNING  Goal: Discharge to home or other facility with appropriate resources  Description: INTERVENTIONS:  - Identify barriers to discharge w/patient and caregiver  - Arrange for needed discharge resources and transportation as appropriate  - Identify discharge learning needs (meds, wound care, etc.)  - Arrange for interpretive services to assist at discharge as needed  - Refer to Case Management Department for coordinating discharge planning if the patient needs post-hospital services based on physician/advanced practitioner order or complex needs related to functional status, cognitive ability, or social support system  Outcome: Progressing     Problem: Knowledge Deficit  Goal: Patient/family/caregiver demonstrates understanding of disease process, treatment plan, medications, and discharge instructions  Description: Complete learning assessment and assess knowledge base. Interventions:  - Provide teaching at level of understanding  - Provide teaching via preferred learning methods  Outcome: Progressing     Problem: NEUROSENSORY - ADULT  Goal: Achieves stable or improved neurological status  Description: INTERVENTIONS  - Monitor and report changes in neurological status  - Monitor vital signs such as temperature, blood pressure, glucose, and any other labs ordered   - Initiate measures to prevent increased intracranial pressure  - Monitor for seizure activity and implement precautions if appropriate      Outcome: Progressing  Goal: Achieves maximal functionality and self care  Description: INTERVENTIONS  - Monitor swallowing and airway patency with patient fatigue and changes in neurological status  - Encourage and assist patient to increase activity and self care.    - Encourage visually impaired, hearing impaired and aphasic patients to use assistive/communication devices  Outcome: Progressing     Problem: CARDIOVASCULAR - ADULT  Goal: Maintains optimal cardiac output and hemodynamic stability  Description: INTERVENTIONS:  - Monitor I/O, vital signs and rhythm  - Monitor for S/S and trends of decreased cardiac output  - Administer and titrate ordered vasoactive medications to optimize hemodynamic stability  - Assess quality of pulses, skin color and temperature  - Assess for signs of decreased coronary artery perfusion  - Instruct patient to report change in severity of symptoms  Outcome: Progressing     Problem: RESPIRATORY - ADULT  Goal: Achieves optimal ventilation and oxygenation  Description: INTERVENTIONS:  - Assess for changes in respiratory status  - Assess for changes in mentation and behavior  - Position to facilitate oxygenation and minimize respiratory effort  - Oxygen administered by appropriate delivery if ordered  - Initiate smoking cessation education as indicated  - Encourage broncho-pulmonary hygiene including cough, deep breathe, Incentive Spirometry  - Assess the need for suctioning and aspirate as needed  - Assess and instruct to report SOB or any respiratory difficulty  - Respiratory Therapy support as indicated  Outcome: Progressing     Problem: GENITOURINARY - ADULT  Goal: Maintains or returns to baseline urinary function  Description: INTERVENTIONS:  - Assess urinary function  - Encourage oral fluids to ensure adequate hydration if ordered  - Administer IV fluids as ordered to ensure adequate hydration  - Administer ordered medications as needed  - Offer frequent toileting  - Follow urinary retention protocol if ordered  Outcome: Progressing  Goal: Absence of urinary retention  Description: INTERVENTIONS:  - Assess patient’s ability to void and empty bladder  - Monitor I/O  - Bladder scan as needed  - Discuss with physician/AP medications to alleviate retention as needed  - Discuss catheterization for long term situations as appropriate  Outcome: Progressing  Goal: Urinary catheter remains patent  Description: INTERVENTIONS:  - Assess patency of urinary catheter  - If patient has a chronic cummins, consider changing catheter if non-functioning  - Follow guidelines for intermittent irrigation of non-functioning urinary catheter  Outcome: Progressing     Problem: METABOLIC, FLUID AND ELECTROLYTES - ADULT  Goal: Electrolytes maintained within normal limits  Description: INTERVENTIONS:  - Monitor labs and assess patient for signs and symptoms of electrolyte imbalances  - Administer electrolyte replacement as ordered  - Monitor response to electrolyte replacements, including repeat lab results as appropriate  - Instruct patient on fluid and nutrition as appropriate  Outcome: Progressing  Goal: Fluid balance maintained  Description: INTERVENTIONS:  - Monitor labs   - Monitor I/O and WT  - Instruct patient on fluid and nutrition as appropriate  - Assess for signs & symptoms of volume excess or deficit  Outcome: Progressing     Problem: SKIN/TISSUE INTEGRITY - ADULT  Goal: Skin Integrity remains intact(Skin Breakdown Prevention)  Description: Assess:  -Inspect skin when repositioning, toileting, and assisting with ADLS  -Assess extremities for adequate circulation and sensation     Bed Management:  -Have minimal linens on bed & keep smooth, unwrinkled  -Change linens as needed when moist or perspiring    Toileting:  -Offer bedside commode    Activity:  -Encourage activity and walks on unit  -Encourage or provide ROM exercises   -Use appropriate equipment to lift or move patient in bed    Skin Care:  -Avoid use of baby powder, tape, friction and shearing, hot water or constrictive clothing  -Do not massage red bony areas    Outcome: Progressing     Problem: MUSCULOSKELETAL - ADULT  Goal: Maintain or return mobility to safest level of function  Description: INTERVENTIONS:  - Assess patient's ability to carry out ADLs; assess patient's baseline for ADL function and identify physical deficits which impact ability to perform ADLs (bathing, care of mouth/teeth, toileting, grooming, dressing, etc.)  - Assess/evaluate cause of self-care deficits   - Assess range of motion  - Assess patient's mobility  - Assess patient's need for assistive devices and provide as appropriate  - Encourage maximum independence but intervene and supervise when necessary  - Involve family in performance of ADLs  - Assess for home care needs following discharge   - Consider OT consult to assist with ADL evaluation and planning for discharge  - Provide patient education as appropriate  Outcome: Progressing     Problem: Prexisting or High Potential for Compromised Skin Integrity  Goal: Skin integrity is maintained or improved  Description: INTERVENTIONS:  - Identify patients at risk for skin breakdown  - Assess and monitor skin integrity  - Assess and monitor nutrition and hydration status  - Monitor labs   - Assess for incontinence   - Turn and reposition patient  - Assist with mobility/ambulation  - Relieve pressure over bony prominences  - Avoid friction and shearing  - Provide appropriate hygiene as needed including keeping skin clean and dry  - Evaluate need for skin moisturizer/barrier cream  - Collaborate with interdisciplinary team   - Patient/family teaching  - Consider wound care consult   Outcome: Progressing

## 2023-08-07 NOTE — PLAN OF CARE
Problem: SAFETY,RESTRAINT: NV/NON-SELF DESTRUCTIVE BEHAVIOR  Goal: Remains free of harm/injury (restraint for non violent/non self-detsructive behavior)  Description: INTERVENTIONS:  - Instruct patient/family regarding restraint use   - Assess and monitor physiologic and psychological status   - Provide interventions and comfort measures to meet assessed patient needs   - Identify and implement measures to help patient regain control  - Assess readiness for release of restraint   Outcome: Progressing  Goal: Returns to optimal restraint-free functioning  Description: INTERVENTIONS:  - Assess the patient's behavior and symptoms that indicate continued need for restraint  - Identify and implement measures to help patient regain control  - Assess readiness for release of restraint   Outcome: Progressing     Problem: MUSCULOSKELETAL - ADULT  Goal: Maintain or return mobility to safest level of function  Description: INTERVENTIONS:  - Assess patient's ability to carry out ADLs; assess patient's baseline for ADL function and identify physical deficits which impact ability to perform ADLs (bathing, care of mouth/teeth, toileting, grooming, dressing, etc.)  - Assess/evaluate cause of self-care deficits   - Assess range of motion  - Assess patient's mobility  - Assess patient's need for assistive devices and provide as appropriate  - Encourage maximum independence but intervene and supervise when necessary  - Involve family in performance of ADLs  - Assess for home care needs following discharge   - Consider OT consult to assist with ADL evaluation and planning for discharge  - Provide patient education as appropriate  Outcome: Progressing     Problem: METABOLIC, FLUID AND ELECTROLYTES - ADULT  Goal: Electrolytes maintained within normal limits  Description: INTERVENTIONS:  - Monitor labs and assess patient for signs and symptoms of electrolyte imbalances  - Administer electrolyte replacement as ordered  - Monitor response to electrolyte replacements, including repeat lab results as appropriate  - Instruct patient on fluid and nutrition as appropriate  Outcome: Progressing  Goal: Fluid balance maintained  Description: INTERVENTIONS:  - Monitor labs   - Monitor I/O and WT  - Instruct patient on fluid and nutrition as appropriate  - Assess for signs & symptoms of volume excess or deficit  Outcome: Progressing     Problem: GENITOURINARY - ADULT  Goal: Maintains or returns to baseline urinary function  Description: INTERVENTIONS:  - Assess urinary function  - Encourage oral fluids to ensure adequate hydration if ordered  - Administer IV fluids as ordered to ensure adequate hydration  - Administer ordered medications as needed  - Offer frequent toileting  - Follow urinary retention protocol if ordered  Outcome: Progressing  Goal: Absence of urinary retention  Description: INTERVENTIONS:  - Assess patient’s ability to void and empty bladder  - Monitor I/O  - Bladder scan as needed  - Discuss with physician/AP medications to alleviate retention as needed  - Discuss catheterization for long term situations as appropriate  Outcome: Progressing  Goal: Urinary catheter remains patent  Description: INTERVENTIONS:  - Assess patency of urinary catheter  - If patient has a chronic cummins, consider changing catheter if non-functioning  - Follow guidelines for intermittent irrigation of non-functioning urinary catheter  Outcome: Progressing     Problem: RESPIRATORY - ADULT  Goal: Achieves optimal ventilation and oxygenation  Description: INTERVENTIONS:  - Assess for changes in respiratory status  - Assess for changes in mentation and behavior  - Position to facilitate oxygenation and minimize respiratory effort  - Oxygen administered by appropriate delivery if ordered  - Initiate smoking cessation education as indicated  - Encourage broncho-pulmonary hygiene including cough, deep breathe, Incentive Spirometry  - Assess the need for suctioning and aspirate as needed  - Assess and instruct to report SOB or any respiratory difficulty  - Respiratory Therapy support as indicated  Outcome: Progressing     Problem: CARDIOVASCULAR - ADULT  Goal: Maintains optimal cardiac output and hemodynamic stability  Description: INTERVENTIONS:  - Monitor I/O, vital signs and rhythm  - Monitor for S/S and trends of decreased cardiac output  - Administer and titrate ordered vasoactive medications to optimize hemodynamic stability  - Assess quality of pulses, skin color and temperature  - Assess for signs of decreased coronary artery perfusion  - Instruct patient to report change in severity of symptoms  Outcome: Progressing     Problem: NEUROSENSORY - ADULT  Goal: Achieves stable or improved neurological status  Description: INTERVENTIONS  - Monitor and report changes in neurological status  - Monitor vital signs such as temperature, blood pressure, glucose, and any other labs ordered   - Initiate measures to prevent increased intracranial pressure  - Monitor for seizure activity and implement precautions if appropriate      Outcome: Progressing  Goal: Achieves maximal functionality and self care  Description: INTERVENTIONS  - Monitor swallowing and airway patency with patient fatigue and changes in neurological status  - Encourage and assist patient to increase activity and self care.    - Encourage visually impaired, hearing impaired and aphasic patients to use assistive/communication devices  Outcome: Progressing     Problem: DISCHARGE PLANNING  Goal: Discharge to home or other facility with appropriate resources  Description: INTERVENTIONS:  - Identify barriers to discharge w/patient and caregiver  - Arrange for needed discharge resources and transportation as appropriate  - Identify discharge learning needs (meds, wound care, etc.)  - Arrange for interpretive services to assist at discharge as needed  - Refer to Case Management Department for coordinating discharge planning if the patient needs post-hospital services based on physician/advanced practitioner order or complex needs related to functional status, cognitive ability, or social support system  Outcome: Progressing     Problem: SAFETY ADULT  Goal: Patient will remain free of falls  Description: INTERVENTIONS:  - Educate patient/family on patient safety including physical limitations  - Instruct patient to call for assistance with activity   - Consult OT/PT to assist with strengthening/mobility   - Keep Call bell within reach  - Keep bed low and locked with side rails adjusted as appropriate  - Keep care items and personal belongings within reach  - Initiate and maintain comfort rounds  - Make Fall Risk Sign visible to staff  - Offer Toileting every 2 Hours, in advance of need  - Initiate/Maintain bed alarm  - Obtain necessary fall risk management equipment: n/a  - Apply yellow socks and bracelet for high fall risk patients  - Consider moving patient to room near nurses station  Outcome: Progressing  Goal: Maintain or return to baseline ADL function  Description: INTERVENTIONS:  -  Assess patient's ability to carry out ADLs; assess patient's baseline for ADL function and identify physical deficits which impact ability to perform ADLs (bathing, care of mouth/teeth, toileting, grooming, dressing, etc.)  - Assess/evaluate cause of self-care deficits   - Assess range of motion  - Assess patient's mobility; develop plan if impaired  - Assess patient's need for assistive devices and provide as appropriate  - Encourage maximum independence but intervene and supervise when necessary  - Involve family in performance of ADLs  - Assess for home care needs following discharge   - Consider OT consult to assist with ADL evaluation and planning for discharge  - Provide patient education as appropriate  Outcome: Progressing  Goal: Maintains/Returns to pre admission functional level  Description: INTERVENTIONS:  - Perform BMAT or MOVE assessment daily.   - Set and communicate daily mobility goal to care team and patient/family/caregiver. - Collaborate with rehabilitation services on mobility goals if consulted  - Perform Range of Motion 3 times a day. - Reposition patient every 2 hours.   - Dangle patient 3 times a day  - Stand patient 3 times a day  - Ambulate patient 3 times a day  - Out of bed to chair 3 times a day   - Out of bed for meals 3 times a day  - Out of bed for toileting  - Record patient progress and toleration of activity level   Outcome: Progressing     Problem: INFECTION - ADULT  Goal: Absence or prevention of progression during hospitalization  Description: INTERVENTIONS:  - Assess and monitor for signs and symptoms of infection  - Monitor lab/diagnostic results  - Monitor all insertion sites, i.e. indwelling lines, tubes, and drains  - Monitor endotracheal if appropriate and nasal secretions for changes in amount and color  - [place] appropriate cooling/warming therapies per order  - Administer medications as ordered  - Instruct and encourage patient and family to use good hand hygiene technique  - Identify and instruct in appropriate isolation precautions for identified infection/condition  Outcome: Progressing  Goal: Absence of fever/infection during neutropenic period  Description: INTERVENTIONS:  - Monitor WBC    Outcome: Progressing     Problem: PAIN - ADULT  Goal: Verbalizes/displays adequate comfort level or baseline comfort level  Description: Interventions:  - Encourage patient to monitor pain and request assistance  - Assess pain using appropriate pain scale  - Administer analgesics based on type and severity of pain and evaluate response  - Implement non-pharmacological measures as appropriate and evaluate response  - Consider cultural and social influences on pain and pain management  - Notify physician/advanced practitioner if interventions unsuccessful or patient reports new pain  Outcome: Progressing Problem: MOBILITY - ADULT  Goal: Maintain or return to baseline ADL function  Description: INTERVENTIONS:  -  Assess patient's ability to carry out ADLs; assess patient's baseline for ADL function and identify physical deficits which impact ability to perform ADLs (bathing, care of mouth/teeth, toileting, grooming, dressing, etc.)  - Assess/evaluate cause of self-care deficits   - Assess range of motion  - Assess patient's mobility; develop plan if impaired  - Assess patient's need for assistive devices and provide as appropriate  - Encourage maximum independence but intervene and supervise when necessary  - Involve family in performance of ADLs  - Assess for home care needs following discharge   - Consider OT consult to assist with ADL evaluation and planning for discharge  - Provide patient education as appropriate  Outcome: Progressing  Goal: Maintains/Returns to pre admission functional level  Description: INTERVENTIONS:  - Perform BMAT or MOVE assessment daily.   - Set and communicate daily mobility goal to care team and patient/family/caregiver. - Collaborate with rehabilitation services on mobility goals if consulted  - Perform Range of Motion 3 times a day. - Reposition patient every 2 hours.   - Dangle patient 3 times a day  - Stand patient 3 times a day  - Ambulate patient 3 times a day  - Out of bed to chair 3 times a day   - Out of bed for meals 3 times a day  - Out of bed for toileting  - Record patient progress and toleration of activity level   Outcome: Progressing     Problem: Potential for Falls  Goal: Patient will remain free of falls  Description: INTERVENTIONS:  - Educate patient/family on patient safety including physical limitations  - Instruct patient to call for assistance with activity   - Consult OT/PT to assist with strengthening/mobility   - Keep Call bell within reach  - Keep bed low and locked with side rails adjusted as appropriate  - Keep care items and personal belongings within reach  - Initiate and maintain comfort rounds  - Make Fall Risk Sign visible to staff  - Offer Toileting every 2 Hours, in advance of need  - Initiate/Maintain bed alarm  - Obtain necessary fall risk management equipment: n/a  - Apply yellow socks and bracelet for high fall risk patients  - Consider moving patient to room near nurses station  Outcome: Progressing

## 2023-08-07 NOTE — DISCHARGE SUMMARY
[address] [facility]  Discharge- Kaden Hernadez 1955, 77 y.o. female MRN: [id number]  Unit/Bed#: -01 Encounter: [id number]  Primary Care Provider: Cuauhtemoc Lewis DO   Date and time admitted to hospital: 8/4/2023  1:22 PM    * Goals of care, counseling/discussion  Assessment & Plan  · Patient overall with very poor prognosis in the setting of acute PE on heparin drip with recent spontaneous subarachnoid hemorrhage at risk of rebleed  · Evidence of endorgan damage with FRANCISCO JAVIER and transaminitis, troponemia  · Patient does not have acute cholecystitis based on RUQ US  · She also has evidence of right renal infarcts vs pyelo  · Mentation continues to be altered with decreased arousal all in the setting of advanced Alzheimer's  · All of the above was presented to patient's sister and explained at length that patient's prognosis is guarded at this time  · Sister decided to transition patient to hospice care  · Hospice consult via Case Management placed   · Stop Heparin drip, IVF, IV antibiotics   · Comfort measures only   · Pleasure feeds  · Dilaudid, Ativanordered PRN   · Patient appears comfortable on my exam    Pulmonary embolism ([address])  Assessment & Plan  · CTA stroke protocol noted new incompletely imaged acute PE in bilateral upper lobes.   · Patient cannot receive CT PE study given acute renal failure  · Hospice care    Subarachnoid hemorrhage [facility])  Assessment & Plan  · Patient with recent small spontaneous subarachnoid hemorrhage on 7/24  · Previous provider spoke directly with neurology who felt it was important to note that patient is still at risk of recurrent bleed and ultimately advised weighing the risk versus the benefits of IV heparin drip  · Hospice care      Medical Problems     Resolved Problems  Date Reviewed: 8/7/2023   None       Discharging Physician / Practitioner: Meme Gautam PA-C  PCP: Cuauhtemoc Lewis DO  Admission Date:   Admission Orders (From admission, onward) Ordered        08/04/23 [address] [place] [address]  Once                      Discharge Date: 08/07/23    Consultations During Hospital Stay:  · Nephrology  · Cardiology  · Neurology  · Pulmonary    Procedures Performed:   · See Epic chart for details    Significant Findings / Test Results:   · PE and SAH    Incidental Findings:   · none     Test Results Pending at Discharge (will require follow up):   · none     Outpatient Tests Requested:  · none    Complications:  none    Reason for Admission: acute mental status change    Hospital Course:   Kaden Hernadez is a 77 y.o. female patient who originally presented to the hospital on 8/4/2023 due to acute mental status change. She was found to have bilateral PE and metabolic derangements. She was later found to have a spontaneous SAH while on anticoagulation. Decision was made to transition to hospice care. Patient will be discharged to the inpatient hospice unit. Please see above list of diagnoses and related plan for additional information. Condition at Discharge: terminal    Discharge Day Visit / Exam:   * Please refer to separate progress note for these details *    Discussion with Family: Updated  (sister) via phone. Discharge instructions/Information to patient and family:   See after visit summary for information provided to patient and family. Provisions for Follow-Up Care:  See after visit summary for information related to follow-up care and any pertinent home health orders. Disposition:   Other: Inpatient hospice unit    Planned Readmission: none     Discharge Statement:  I spent 45 minutes discharging the patient. This time was spent on the day of discharge. I had direct contact with the patient on the day of discharge. Greater than 50% of the total time was spent examining patient, answering all patient questions, arranging and discussing plan of care with patient as well as directly providing post-discharge instructions. Additional time then spent on discharge activities. Discharge Medications:  See after visit summary for reconciled discharge medications provided to patient and/or family.       **Please Note: This note may have been constructed using a voice recognition system**

## 2023-08-07 NOTE — CASE MANAGEMENT
Case Management Discharge Planning Note    Patient name Onofre Ochoa  Location [address] [facility] 214/-01 MRN [id number]  : 1955 Date 2023       Current Admission Date: 2023  Current Admission Diagnosis:Goals of care, counseling/discussion   Patient Active Problem List    Diagnosis Date Noted   • Elevated troponin 2023   • Transaminitis 2023   • Altered mental status 2023   • Lactic acidosis 2023   • Hypoxia 2023   • Acute renal failure (ARF) ([address]) 2023   • Goals of care, counseling/discussion 2023   • Pulmonary embolism ([address]) 2023   • Subarachnoid hemorrhage ([address]) 2023   • Anemia 2023   • No other household member able to render care 2023   • Fall 2023   • Moderate protein-calorie malnutrition ([address]) 2023   • Closed displaced fracture of right femoral neck ([address]) 2023   • Painful orthopaedic hardware ([address]) 2023   • Ambulatory dysfunction 2023   • Alzheimer's dementia ([address]) 2023   • Primary osteoarthritis of right hip 05/10/2022   • Closed fracture of right pelvis, initial encounter ([address]) 2022   • Greater trochanteric bursitis of right hip 2019   • Aftercare following surgery of the musculoskeletal system 2019   • Parkinsonism ([address]) 2018   • Acquired hypothyroidism 2018   • Anxiety 2018      LOS (days): 3  Geometric Mean LOS (GMLOS) (days):   Days to GMLOS:     OBJECTIVE:  Risk of Unplanned Readmission Score: 23.13         Current admission status: Inpatient   Preferred Pharmacy:   [facility]/pharmacy [id number] [place] [id number]  Phone: [phone] Fax: [phone]    Primary Care Provider: Sylvester Kebede DO    Primary Insurance: MEDICARE  Secondary Insurance: COMMERCIAL MISCELLANEOUS    DISCHARGE DETAILS:        CM attempted to reach, sister Silvana Gusman and was unable to leave a voicemail as it is full. Hospice will not assess patient until they know whether family wants inpatient or home hospice. Will continue to try to reach sister.

## 2023-08-07 NOTE — ASSESSMENT & PLAN NOTE
· Patient overall with very poor prognosis in the setting of acute PE on heparin drip with recent spontaneous subarachnoid hemorrhage at risk of rebleed  · Evidence of endorgan damage with FRANCISCO JAVIER and transaminitis, troponemia  · Patient does not have acute cholecystitis based on RUQ US  · She also has evidence of right renal infarcts vs pyelo  · Mentation continues to be altered with decreased arousal all in the setting of advanced Alzheimer's  · All of the above was presented to patient's sister and explained at length that patient's prognosis is guarded at this time  · Sister decided to transition patient to hospice care  · Hospice consult via Case Management placed   · Stop Heparin drip, IVF, IV antibiotics   · Comfort measures only   · Pleasure feeds  · Dilaudid, Ativanordered PRN   · Patient appears comfortable on my exam

## 2023-08-07 NOTE — CASE MANAGEMENT
Case Management Assessment & Discharge Planning Note    Patient name Asad Pugh  Location [address] [facility] [place] 214/-01 MRN [id number]  : 1955 Date 2023       Current Admission Date: 2023  Current Admission Diagnosis:Goals of care, counseling/discussion   Patient Active Problem List    Diagnosis Date Noted   • Elevated troponin 2023   • Transaminitis 2023   • Altered mental status 2023   • Lactic acidosis 2023   • Hypoxia 2023   • Acute renal failure (ARF) ([address]) 2023   • Goals of care, counseling/discussion 2023   • Pulmonary embolism ([address]) 2023   • Subarachnoid hemorrhage ([address]) 2023   • Anemia 2023   • No other household member able to render care 2023   • Fall 2023   • Moderate protein-calorie malnutrition ([address]) 2023   • Closed displaced fracture of right femoral neck ([address]) 2023   • Painful orthopaedic hardware ([address]) 2023   • Ambulatory dysfunction 2023   • Alzheimer's dementia ([address]) 2023   • Primary osteoarthritis of right hip 05/10/2022   • Closed fracture of right pelvis, initial encounter ([address]) 2022   • Greater trochanteric bursitis of right hip 2019   • Aftercare following surgery of the musculoskeletal system 2019   • Parkinsonism ([address]) 2018   • Acquired hypothyroidism 2018   • Anxiety 2018      LOS (days): 3  Geometric Mean LOS (GMLOS) (days):   Days to GMLOS:     OBJECTIVE:  PATIENT READMITTED TO HOSPITAL  Risk of Unplanned Readmission Score: 23.13         Current admission status: Inpatient       [id number], [address] [place] [address]  Phone: [phone] Fax: [phone]    Primary Care Provider: Steph Khalil DO    Primary Insurance: MEDICARE  Secondary Insurance: COMMERCIAL MISCELLANEOUS    ASSESSMENT:  Enma Proxies    There are no active Health Care Proxies on file. Advance Directives  Does patient have a Health Care POA?: Yes  Does patient have Advance Directives?: No  Was patient offered paperwork?: Yes (declined)  Primary Contact: SisterThanh         Readmission Root Cause  30 Day Readmission: No, Yes  Who directed you to return to the hospital?: Other (comment) (Facility)  Did you understand whom to contact if you had questions or problems?: Yes  Did you get your prescriptions before you left the hospital?: No  Reason[de-identified] Delivery service not offered  Were you able to get your prescriptions filled when you left the hospital?: Yes  Did you take your medications as prescribed?: Yes  Were you able to get to your follow-up appointments?: Yes  During previous admission, was a post-acute recommendation made?: Yes  What post-acute resources were offered?: STR  Patient was readmitted due to: Goals of care    Patient Information  Admitted from[de-identified] Facility  Mental Status: Confused  During Assessment patient was accompanied by: Not accompanied during assessment  Assessment information provided by[de-identified] Other - please comment (QTC)  Primary Caregiver: Other (Comment) (QTC)  Caregiver's Name[de-identified] QTC  Caregiver's Relationship to Patient[de-identified] Other (Specify) (QTC)  Caregiver's Telephone Number[de-identified] [phone]  Support Systems: Other (Comment) (QTC staff)  [facility]: [address] [place] [address] Street do you live in?: [address] [place] [address] entry access options.  Select all that apply.: Stairs  Number of steps to enter home.: 3  Do the steps have railings?: Yes  Type of Current Residence: Travel Trailer/ Mobile Home  In the last 12 months, was there a time when you were not able to pay the mortgage or rent on time?: No  In the last 12 months, how many places have you lived?: 1  In the last 12 months, was there a time when you did not have a steady place to sleep or slept in a shelter (including now)?: No  Homeless/housing insecurity resource given?: N/A  Living Arrangements: Other (Comment) (QTC)    Activities of Daily Living Prior to Admission  Functional Status: Assistance (min assist w/ ADLs)  Completes ADLs independently?: No  Level of ADL dependence: Assistance  Ambulates independently?: No  Level of ambulatory dependence: Assistance  Does patient use assisted devices?: Yes  Assisted Devices (DME) used: Straight Donivan Levee, Shower Chair  Does patient currently own DME?: Yes  What DME does the patient currently own?: Shower Chair, Straight Donivan Levee  Does patient have a history of Outpatient Therapy (PT/OT)?: No  Does the patient have a history of Short-Term Rehab?: Yes (QTC)  Does patient have a history of HHC?: Yes (Jessica Shanae)  Does patient currently have [facility]?: No         Patient Information Continued  Income Source: Pension/USP  Does patient have prescription coverage?: Yes  Within the past 12 months, you worried that your food would run out before you got the money to buy more.: Never true  Within the past 12 months, the food you bought just didn't last and you didn't have money to get more.: Never true  Food insecurity resource given?: N/A  Does patient receive dialysis treatments?: No  Does patient have a history of substance abuse?: No  Does patient have a history of Mental Health Diagnosis?: No         Means of Transportation  Means of Transport to Appts[de-identified] Family transport  In the past 12 months, has lack of transportation kept you from medical appointments or from getting medications?: No  In the past 12 months, has lack of transportation kept you from meetings, work, or from getting things needed for daily living?: No  Was application for public transport provided?: N/A        DISCHARGE DETAILS:    Discharge planning discussed with[de-identified] Carla Liu of Choice: Yes     CM contacted family/caregiver?: Yes  Were Treatment Team discharge recommendations reviewed with patient/caregiver?: Yes  Did patient/caregiver verbalize understanding of patient care needs?: Yes  Were patient/caregiver advised of the risks associated with not following Treatment Team discharge recommendations?: Yes    Contacts  Patient Contacts: Sister, Isabella Santamaria  Relationship to Patient[de-identified] Family  Contact Method: In Person  Reason/Outcome: Continuity of Care, Emergency Contact, Referral, Discharge [address]         Is the patient interested in [facility] at discharge?: No    DME Referral Provided  Referral made for DME?: No    Other Referral/Resources/Interventions Provided:  Interventions: Hospice         Treatment Team Recommendation: Hospice  Discharge Destination Plan[de-identified] Hospice  Transport at Discharge : Family                                      Additional Comments: Patient was readmitted from [facility]. Pt was living with sister prior in mobile home w/ 3 TOM. Pt needs assist with ADLs. Pt has hx str through [facility] and home health through [facility] ([facility] Pt has no hx OP PT/DA/MH. [facility] [place] and is vaccinated for Covid. [address] discussion was done with family. Family prefers pt go on hospice. Referral made in [facility] and reserved [facility]. Transportation pending disposition at discharge.

## 2023-08-07 NOTE — CASE MANAGEMENT
Case Management Discharge Planning Note    Patient name Kallie Owens  Location [address] [facility] [place] 214/-01 MRN [id number]  : 1955 Date 2023       Current Admission Date: 2023  Current Admission Diagnosis:Goals of care, counseling/discussion   Patient Active Problem List    Diagnosis Date Noted   • Elevated troponin 2023   • Transaminitis 2023   • Altered mental status 2023   • Lactic acidosis 2023   • Hypoxia 2023   • Acute renal failure (ARF) ([address]) 2023   • Goals of care, counseling/discussion 2023   • Pulmonary embolism ([address]) 2023   • Subarachnoid hemorrhage ([address]) 2023   • Anemia 2023   • No other household member able to render care 2023   • Fall 2023   • Moderate protein-calorie malnutrition ([address]) 2023   • Closed displaced fracture of right femoral neck ([address]) 2023   • Painful orthopaedic hardware ([address]) 2023   • Ambulatory dysfunction 2023   • Alzheimer's dementia ([address]) 2023   • Primary osteoarthritis of right hip 05/10/2022   • Closed fracture of right pelvis, initial encounter ([address]) 2022   • Greater trochanteric bursitis of right hip 2019   • Aftercare following surgery of the musculoskeletal system 2019   • Parkinsonism ([address]) 2018   • Acquired hypothyroidism 2018   • Anxiety 2018      LOS (days): 3  Geometric Mean LOS (GMLOS) (days): 3.90  Days to GMLOS:1.1     OBJECTIVE:  Risk of Unplanned Readmission Score: 22.66         Current admission status: Inpatient   [id number] [address]  Phone: [phone] Fax: [phone]    Primary Care Provider: Mayra Bobby DO    Primary Insurance: MEDICARE  Secondary Insurance: COMMERCIAL MISCELLANEOUS    DISCHARGE DETAILS:    Treatment Team Recommendation: Hospice  Discharge Destination Plan[de-identified] Hospice  Transport at Discharge : [facility]  Dispatcher Contacted: Yes  Number/Name of Dispatcher: SLELEXUS  Transported by [employer] and Unit #): [phone]  ETA of Transport (Date): 08/07/23  ETA of Transport (Time): 1500     Transfer Mode: Stretcher     Additional Comments: Transport arranged with [facility] for a 3pm dc to [place]. CM notified pt's bedside RN Lj Mcelroy, pt's family, and [facility] liasion of dc time. CMN completed; copy placed in chart and medical record bin.

## 2023-08-07 NOTE — CASE MANAGEMENT
Case Management Discharge Planning Note    Patient name Maria C Suarez  Location [address] [facility] [place] 214/-01 MRN [id number]  : 1955 Date 2023       Current Admission Date: 2023  Current Admission Diagnosis:Goals of care, counseling/discussion   Patient Active Problem List    Diagnosis Date Noted   • Elevated troponin 2023   • Transaminitis 2023   • Altered mental status 2023   • Lactic acidosis 2023   • Hypoxia 2023   • Acute renal failure (ARF) ([address]) 2023   • Goals of care, counseling/discussion 2023   • Pulmonary embolism ([address]) 2023   • Subarachnoid hemorrhage ([address]) 2023   • Anemia 2023   • No other household member able to render care 2023   • Fall 2023   • Moderate protein-calorie malnutrition ([address]) 2023   • Closed displaced fracture of right femoral neck ([address]) 2023   • Painful orthopaedic hardware ([address]) 2023   • Ambulatory dysfunction 2023   • Alzheimer's dementia ([address]) 2023   • Primary osteoarthritis of right hip 05/10/2022   • Closed fracture of right pelvis, initial encounter ([address]) 2022   • Greater trochanteric bursitis of right hip 2019   • Aftercare following surgery of the musculoskeletal system 2019   • Parkinsonism ([address]) 2018   • Acquired hypothyroidism 2018   • Anxiety 2018      LOS (days): 3  Geometric Mean LOS (GMLOS) (days):   Days to GMLOS:     OBJECTIVE:  Risk of Unplanned Readmission Score: 22.66         Current admission status: Inpatient   Preferred Pharmacy:   [id number] [place] [address]  Phone: [phone] Fax: [phone]    Primary Care Provider: Melida Garsia DO    Primary Insurance: MEDICARE  Secondary Insurance: COMMERCIAL MISCELLANEOUS    DISCHARGE DETAILS:        Pt approved for [address] [place] [address]. Requested transport via [employer].

## 2023-08-07 NOTE — PLAN OF CARE
Problem: PAIN - ADULT  Goal: Verbalizes/displays adequate comfort level or baseline comfort level  Description: Interventions:  - Encourage patient to monitor pain and request assistance  - Assess pain using appropriate pain scale  - Administer analgesics based on type and severity of pain and evaluate response  - Implement non-pharmacological measures as appropriate and evaluate response  - Consider cultural and social influences on pain and pain management  - Notify physician/advanced practitioner if interventions unsuccessful or patient reports new pain  Outcome: Progressing        Problem: METABOLIC, FLUID AND ELECTROLYTES - ADULT  Goal: Electrolytes maintained within normal limits  Description: INTERVENTIONS:  - Monitor labs and assess patient for signs and symptoms of electrolyte imbalances  - Administer electrolyte replacement as ordered  - Monitor response to electrolyte replacements, including repeat lab results as appropriate  - Instruct patient on fluid and nutrition as appropriate  Outcome: Progressing     Problem: RESPIRATORY - ADULT  Goal: Achieves optimal ventilation and oxygenation  Description: INTERVENTIONS:  - Assess for changes in respiratory status  - Assess for changes in mentation and behavior  - Position to facilitate oxygenation and minimize respiratory effort  - Oxygen administered by appropriate delivery if ordered  - Initiate smoking cessation education as indicated  - Encourage broncho-pulmonary hygiene including cough, deep breathe, Incentive Spirometry  - Assess the need for suctioning and aspirate as needed  - Assess and instruct to report SOB or any respiratory difficulty  - Respiratory Therapy support as indicated  Outcome: Progressing

## 2023-08-07 NOTE — HOSPICE NOTE
Hospice referral received this AM. Patient seen and evaluated. Approved for inpatient hospice. Met with sisters Meghan Cathy and Lizeth Case at bedside. They would like patient transferred to our inpatient unit for acute symptom management. Transport arranged for 3:00 this afternoon. RN to please keep IV in and medicate appropriately for the trip.

## 2023-08-07 NOTE — ASSESSMENT & PLAN NOTE
· CTA stroke protocol noted new incompletely imaged acute PE in bilateral upper lobes.   · Patient cannot receive CT PE study given acute renal failure  · Hospice care

## 2023-08-07 NOTE — ASSESSMENT & PLAN NOTE
· Patient with recent small spontaneous subarachnoid hemorrhage on 7/24  · Previous provider spoke directly with neurology who felt it was important to note that patient is still at risk of recurrent bleed and ultimately advised weighing the risk versus the benefits of IV heparin drip  · Hospice care

## 2023-08-07 NOTE — PROGRESS NOTES
[address]  Progress Note  Name: Brenda Mendoza  MRN: [id number]  Unit/Bed#: -01 I Date of Admission: 8/4/2023   Date of Service: 8/7/2023 I Hospital Day: 3    Assessment/Plan   * Goals of care, counseling/discussion  Assessment & Plan  · Patient overall with very poor prognosis in the setting of acute PE on heparin drip with recent spontaneous subarachnoid hemorrhage at risk of rebleed  · Evidence of endorgan damage with FRANCISCO JAVIER and transaminitis, troponemia  · Patient does not have acute cholecystitis based on RUQ US  · She also has evidence of right renal infarcts vs pyelo  · Mentation continues to be altered with decreased arousal all in the setting of advanced Alzheimer's  · All of the above was presented to patient's sister and explained at length that patient's prognosis is guarded at this time  · Sister decided to transition patient to hospice care  · Hospice consult via Case Management placed   · Stop Heparin drip, IVF, IV antibiotics   · Comfort measures only   · Pleasure feeds  · Dilaudid, Ativanordered PRN   · Patient appears comfortable on my exam    Pulmonary embolism ([address])  Assessment & Plan  · CTA stroke protocol noted new incompletely imaged acute PE in bilateral upper lobes. · Patient cannot receive CT PE study given acute renal failure  · Hospice care    Subarachnoid hemorrhage [facility])  Assessment & Plan  · Patient with recent small spontaneous subarachnoid hemorrhage on 7/24  · Previous provider spoke directly with neurology who felt it was important to note that patient is still at risk of recurrent bleed and ultimately advised weighing the risk versus the benefits of IV heparin drip  · Hospice care             VTE Pharmacologic Prophylaxis: VTE Score: 6 hospice care    Patient Centered Rounds: I performed bedside rounds with nursing staff today.   Discussions with Specialists or Other Care Team Provider: case management    Education and Discussions with Family / Patient: Updated  (sister) via phone. Total Time Spent on Date of Encounter in care of patient: 25 minutes This time was spent on one or more of the following: performing physical exam; counseling and coordination of care; obtaining or reviewing history; documenting in the medical record; reviewing/ordering tests, medications or procedures; communicating with other healthcare professionals and discussing with patient's family/caregivers. Current Length of Stay: 3 day(s)  Current Patient Status: Inpatient   Certification Statement: The patient will continue to require additional inpatient hospital stay due to hospice placement  Discharge Plan: Anticipate discharge tomorrow to facility vs inpatient hospice unit    Code Status: Level 4 - Comfort Care    Subjective:   Patient appears comfortable. Sleeping in exam. Required IM dilaudid this am.     Objective:     Vitals:   No data recorded. HR:  [78-80] 78  Resp:  [22-27] 22  BP: ()/(55-59) 97/55  SpO2:  [95 %-96 %] 95 %  Body mass index is 17.9 kg/m². Input and Output Summary (last 24 hours):      Intake/Output Summary (Last 24 hours) at 8/7/2023 1113  Last data filed at 8/6/2023 1400  Gross per 24 hour   Intake 736.3 ml   Output 340 ml   Net 396.3 ml       Physical Exam:   Physical Exam   NAD  Exam deferred as patient is hospice care    Additional Data:     Labs:  Results from last 7 days   Lab Units 08/06/23  0300   WBC Thousand/uL 16.43*   HEMOGLOBIN g/dL 8.4*   HEMATOCRIT % 28.2*   PLATELETS Thousands/uL 325   NEUTROS PCT % 87*   LYMPHS PCT % 7*   MONOS PCT % 4   EOS PCT % 0     Results from last 7 days   Lab Units 08/06/23  0300   SODIUM mmol/L 141   POTASSIUM mmol/L 4.5   CHLORIDE mmol/L 110*   CO2 mmol/L 21   BUN mg/dL 45*   CREATININE mg/dL 2.05*   ANION GAP mmol/L 10   CALCIUM mg/dL 8.3*   ALBUMIN g/dL 3.3*   TOTAL BILIRUBIN mg/dL 0.36   ALK PHOS U/L 71   ALT U/L 475*   AST U/L 301*   GLUCOSE RANDOM mg/dL 140     Results from last 7 days   Lab Units 08/04/23  1335   INR  1.06     Results from last 7 days   Lab Units 08/05/23  1232 08/04/23  2158 08/04/23  1941 08/04/23  1330   POC GLUCOSE mg/dl 126 91 145* 174*         Results from last 7 days   Lab Units 08/05/23  0526 08/05/23  0040 08/04/23  1731 08/04/23  1440   LACTIC ACID mmol/L 2.4* 4.1* 4.2* 5.5*   PROCALCITONIN ng/ml  --   --   --  0.57*       Lines/Drains:  Invasive Devices     None                       Imaging: No pertinent imaging reviewed. Recent Cultures (last 7 days):   Results from last 7 days   Lab Units 08/04/23  1440   BLOOD CULTURE  No Growth at 48 hrs. GRAM STAIN RESULT  Gram positive rods*       Last 24 Hours Medication List:   Current Facility-Administered Medications   Medication Dose Route Frequency Provider Last Rate   • HYDROmorphone  0.5 mg Intramuscular Q1H PRN Art Lee PA-C     • LORazepam  0.5 mg Oral Q8H PRN Kristan Aragon PA-C     • ondansetron  4 mg Oral Q6H PRN Kristan Aragon PA-C          Today, Patient Was Seen By: Kristan Aragon PA-C    **Please Note: This note may have been constructed using a voice recognition system. **

## 2023-08-07 NOTE — PROGRESS NOTES
-- Patient:  -- MRN: [id number]  -- Aidin Request ID: [id number]  -- Level of care reserved: Hospice  -- Partner Reserved: [facility] [address] [facility] [phone]  -- Clinical needs requested:  -- Geography searched: [id number]  -- Start of Service:  -- Request sent: 7:55am EDT on 8/7/2023 by Mike Mcintyre  -- Partner reserved: 8:13am EDT on 8/7/2023 by Mike Mcintyre  -- Choice list shared: 8:13am EDT on 8/7/2023 by Mike Mcintyre

## 2023-08-17 ENCOUNTER — HOME CARE VISIT (OUTPATIENT)
Dept: HOME HOSPICE | Facility: HOSPICE | Age: 68
End: 2023-08-17
Payer: MEDICARE

## 2023-10-03 ENCOUNTER — DOCUMENTATION (OUTPATIENT)
Dept: OTHER | Facility: HOSPITAL | Age: 68
End: 2023-10-03

## 2023-10-11 NOTE — PROGRESS NOTES
This encounter was created in error - please disregard. [Dear  ___] : Dear  [unfilled], [Consult Letter:] : I had the pleasure of evaluating your patient, [unfilled]. [Please see my note below.] : Please see my note below. [Consult Closing:] : Thank you very much for allowing me to participate in the care of this patient.  If you have any questions, please do not hesitate to contact me. [Sincerely,] : Sincerely, [FreeTextEntry3] : Neil Ortiz M.D. FACS, FASCRS

## 2023-12-02 NOTE — ASSESSMENT & PLAN NOTE
· Patient with recent small spontaneous subarachnoid hemorrhage on 7/24  · Spoke directly with neurology today who felt it was important to note that patient is still at risk of recurrent bleed and ultimately advised weighing the risk versus the benefits of IV heparin drip  · Spoke with patient's sister at length who ultimately is opting to continue heparin drip at this time  · Patient has remained lethargic and essentially nonverbal since admission although she has intermittently opened her eyes with no acute worsening of LOC since admission thus far, nonfocal on exam  · Continue monitoring her neuro exam closely  · Low threshold for repeat CT head if patient becomes completely obtunded however family is to make a final decision tomorrow regarding withdrawal of care so will hold off on aggressive workup 02-Dec-2023 11:46

## 2024-12-31 NOTE — PROGRESS NOTES
Please follow up with your Primary Care Provider within 1-2 days. Return to the Emergency Department immediately if you have any new or worsening symptoms.     Progress Note - Orthopedics   Onofre Ochoa 69 y.o. female MRN: [id number]  Unit/Bed#: UB MRI      Assessment:  69 y. o.female s/p Right removal of TFN from femur and cemented hemiarthroplasty with Dr. Marry Leahy on 7/23 for Displaced right femoral neck fracture after removal of proximal TFN screw. POD2      Plan:  · WBAT RLE w/ posterior hip precautions  · Hbg 8.4. Continue to monitor H&H and vitals. · PT/OT  · Pain control per primary  · DVT ppx - lovenox in house. ASA 81 mg BID x 6 weeks on discharge  · Follow up with Dr. Marry Leahy in 2 weeks  · Dispo: Ortho will follow      Subjective:    69 y. o.female Seen and examined at bedside. Patient is demented and non-communicative at baseline. Unable to obtain subjective portion of exam.  Lying comfortably in bed. No known issues overnight.     Labs:  0   Lab Value Date/Time    HCT 26.7 (L) 07/25/2023 0200    HCT 26.3 (L) 07/24/2023 1048    HCT 26.1 (L) 07/24/2023 0522    HGB 8.4 (L) 07/25/2023 0200    HGB 8.4 (L) 07/24/2023 1048    HGB 8.3 (L) 07/24/2023 0522    INR 1.03 07/24/2023 1916    WBC 10.06 07/25/2023 0200    WBC 8.11 07/24/2023 0522    WBC 7.54 07/23/2023 0312       Meds:    Current Facility-Administered Medications:   •  acetaminophen (TYLENOL) tablet 650 mg, 650 mg, Oral, Q6H PRN, Gage Armendariz MD, 650 mg at 07/21/23 0039  •  aluminum-magnesium hydroxide-simethicone (MAALOX) oral suspension 30 mL, 30 mL, Oral, Q6H PRN, Gage Armendariz MD  •  amLODIPine (NORVASC) tablet 5 mg, 5 mg, Oral, Daily, Rhea Johnston MD  •  buPROPion (WELLBUTRIN SR) 12 hr tablet 150 mg, 150 mg, Oral, BID, Gage Armendariz MD, 150 mg at 07/22/23 5904  •  cefTRIAXone (ROCEPHIN) IVPB (premix in dextrose) 1,000 mg 50 mL, 1,000 mg, Intravenous, Q24H, Ash Diaz MD, Last Rate: 100 mL/hr at 07/24/23 1704, 1,000 mg at 07/24/23 1704  •  cholecalciferol (VITAMIN D3) tablet 5,000 Units, 5,000 Units, Oral, Daily, Gage Armendariz MD, 5,000 Units at 07/22/23 7399  •  levothyroxine tablet 50 mcg, 50 mcg, Oral, Early Morning, Mendy Rivas MD, 50 mcg at 07/24/23 5417  •  mirtazapine (REMERON) tablet 30 mg, 30 mg, Oral, HS, Mendy Rivas MD, 30 mg at 07/22/23 2152  •  nystatin (MYCOSTATIN) oral suspension 500,000 Units, 500,000 Units, Swish & Swallow, 4x Daily, Mendy Rivas MD, 500,000 Units at 07/22/23 2152  •  ondansetron (ZOFRAN) injection 4 mg, 4 mg, Intravenous, Q6H PRN, Mendy Rivas MD  •  oxyCODONE (ROXICODONE) split tablet 2.5 mg, 2.5 mg, Oral, Q6H PRN **OR** oxyCODONE (ROXICODONE) IR tablet 5 mg, 5 mg, Oral, Q6H PRN, Mendy Rivas MD  •  risperiDONE (RisperDAL) tablet 1 mg, 1 mg, Oral, BID, Mendy Rivas MD, 1 mg at 07/22/23 1658  •  senna (SENOKOT) tablet 8.6 mg, 1 tablet, Oral, HS PRN, Mendy Rivas MD  •  sertraline (ZOLOFT) tablet 50 mg, 50 mg, Oral, Daily With Breakfast, Mendy Rivas MD, 50 mg at 07/24/23 3321  •  sodium chloride 0.9 % infusion, 75 mL/hr, Intravenous, Continuous, Mendy Rivas MD, Last Rate: 75 mL/hr at 07/23/23 1155, 75 mL/hr at 07/23/23 1155    Blood Culture:   Lab Results   Component Value Date    BLOODCX Received in Microbiology Lab. Culture in Progress. 07/24/2023    BLOODCX Received in Microbiology Lab. Culture in Progress. 07/24/2023       Wound Culture:   No results found for: "WOUNDCULT"    Ins and Outs:  I/O last 24 hours: In: -   Out: 1400 [Urine:1400]          Physical:  Vitals:    07/25/23 0639   BP: 118/63   Pulse: 89   Resp: 16   Temp: 99 °F (37.2 °C)   SpO2: 100%     Musculoskeletal: right Lower Extremity  · Skin -  No erythema or ecchymosis. Compartments soft and compressible.    · Dressing - c/d/I w/ mepilex in place  · TTP at incision site  · Sensation intact to saphenous, sural, tibial, superficial peroneal nerve, and deep peroneal  · Motor intact to +FHL/EHL, +ankle dorsi/plantar flexion  · 2+ DP pulse, symmetric bilaterally  · Digits warm and well perfused  · Capillary refill < 2 seconds      J Luis Ledesma PA-C

## 2025-06-24 NOTE — ASSESSMENT & PLAN NOTE
Ambulatory dysfunction POA in setting of dementia evidenced by inability to stand after a fall.   CT is negative for any acute findings  Patient was noticed to have gaze preference  MRI brain as above  See above L4-5 transforaminal lumbar interbody fusion, with neuromonitoring and neuronavigation (Spine Lumbar)     Two Week POV with Dr. Choudhury this day and    Pt requesting refills, pharmacy verified.    This RN to update pt once request has been reviewed and processed.

## (undated) DEVICE — INTENDED FOR TISSUE SEPARATION, AND OTHER PROCEDURES THAT REQUIRE A SHARP SURGICAL BLADE TO PUNCTURE OR CUT.: Brand: BARD-PARKER SAFETY BLADES SIZE 10, STERILE

## (undated) DEVICE — SUT VICRYL 2-0 SH 27 IN UNDYED J417H

## (undated) DEVICE — HOOD: Brand: FLYTE, SURGICOOL

## (undated) DEVICE — GLOVE INDICATOR PI UNDERGLOVE SZ 8 BLUE

## (undated) DEVICE — CAPIT HIP BIPOLAR HEAD POR PRIMARY

## (undated) DEVICE — DRAPE SHEET THREE QUARTER

## (undated) DEVICE — 3M™ IOBAN™ 2 ANTIMICROBIAL INCISE DRAPE 6650EZ: Brand: IOBAN™ 2

## (undated) DEVICE — HANDPIECE SET WITH RETRACTABLE COAXIAL FAN SPRAY TIP AND SUCTION TUBE: Brand: INTERPULSE

## (undated) DEVICE — SKIN MARKER DUAL TIP WITH RULER CAP, FLEXIBLE RULER AND LABELS: Brand: DEVON

## (undated) DEVICE — HEAVY DUTY TABLE COVER: Brand: CONVERTORS

## (undated) DEVICE — DRESSING MEPILEX AG BORDER 4 X 4 IN

## (undated) DEVICE — CHLORAPREP HI-LITE 26ML ORANGE

## (undated) DEVICE — SUT STRATAFIX SPIRAL 3-0 PGA/PCL 30 X 30 CM SXMD2B408

## (undated) DEVICE — IMPLANTABLE DEVICE
Type: IMPLANTABLE DEVICE | Site: HIP | Status: NON-FUNCTIONAL
Removed: 2023-06-30

## (undated) DEVICE — PAD GROUNDING ADULT

## (undated) DEVICE — ANTIBACTERIAL VIOLET BRAIDED (POLYGLACTIN 910), SYNTHETIC ABSORBABLE SURGICAL SUTURE: Brand: COATED VICRYL

## (undated) DEVICE — GLOVE SRG BIOGEL 7.5

## (undated) DEVICE — OCCLUSIVE GAUZE STRIP,3% BISMUTH TRIBROMOPHENATE IN PETROLATUM BLEND: Brand: XEROFORM

## (undated) DEVICE — 1820 FOAM BLOCK NEEDLE COUNTER: Brand: DEVON

## (undated) DEVICE — PROXIMATE PLUS MD MULTI-DIRECTIONAL RELEASE SKIN STAPLERS CONTAINS 35 STAINLESS STEEL STAPLES APPROXIMATE CLOSED DIMENSIONS: 6.9MM X 3.9MM WIDE: Brand: PROXIMATE

## (undated) DEVICE — DRAPE C-ARM X-RAY

## (undated) DEVICE — INTENDED FOR TISSUE SEPARATION, AND OTHER PROCEDURES THAT REQUIRE A SHARP SURGICAL BLADE TO PUNCTURE OR CUT.: Brand: BARD-PARKER SAFETY BLADES SIZE 15, STERILE

## (undated) DEVICE — SUT VICRYL PLUS 2-0 CTB-1 27 IN VCPB259H

## (undated) DEVICE — GLOVE SRG BIOGEL ORTHOPEDIC 7.5

## (undated) DEVICE — SUT VICRYL 0 CT-1 27 IN J260H

## (undated) DEVICE — SPONGE LAP 18 X 18 IN

## (undated) DEVICE — 6617 IOBAN II PATIENT ISOLATION DRAPE 5/BX,4BX/CS: Brand: STERI-DRAPE™ IOBAN™ 2

## (undated) DEVICE — GLOVE SRG BIOGEL 9

## (undated) DEVICE — SYRINGE 20ML LL

## (undated) DEVICE — GLOVE SRG BIOGEL 7

## (undated) DEVICE — PACK MAJOR ORTHO W/SPLITS PBDS

## (undated) DEVICE — 3M™ STERI-DRAPE™ U-DRAPE 1015: Brand: STERI-DRAPE™

## (undated) DEVICE — DUAL CUT SAGITTAL BLADE

## (undated) DEVICE — BETHLEHEM UNIVERSAL MINOR GEN: Brand: CARDINAL HEALTH

## (undated) DEVICE — BULB SYRINGE,IRRIGATION WITH PROTECTIVE CAP: Brand: DOVER

## (undated) DEVICE — IMPERVIOUS STOCKINETTE: Brand: DEROYAL

## (undated) DEVICE — TUBING SUCTION 5MM X 12 FT

## (undated) DEVICE — SUT STRATAFIX SPIRAL PDS PLUS 1 CTX 18 IN SXPP1A400

## (undated) DEVICE — NEPTUNE E-SEP SMOKE EVACUATION PENCIL, COATED, 70MM BLADE, PUSH BUTTON SWITCH: Brand: NEPTUNE E-SEP

## (undated) DEVICE — 3.2MM GUIDE WIRE 400MM

## (undated) DEVICE — STANDARD SURGICAL GOWN, L: Brand: CONVERTORS

## (undated) DEVICE — GAUZE SPONGES,16 PLY: Brand: CURITY

## (undated) DEVICE — GLOVE INDICATOR PI UNDERGLOVE SZ 7 BLUE

## (undated) DEVICE — CAPIT HIP STEM CEMENTED

## (undated) DEVICE — GLOVE INDICATOR PI UNDERGLOVE SZ 9 BLUE

## (undated) DEVICE — 4.0MM THREE-FLUTED DRILL BIT QC/260MM/65MM CALIBRATION

## (undated) DEVICE — ABDOMINAL PAD: Brand: DERMACEA

## (undated) DEVICE — ASTOUND STANDARD SURGICAL GOWN, XXL: Brand: CONVERTORS

## (undated) DEVICE — 3M™ STERI-STRIP™ COMPOUND BENZOIN TINCTURE 40 BAGS/CARTON 4 CARTONS/CASE C1544: Brand: 3M™ STERI-STRIP™

## (undated) DEVICE — SUT VICRYL 2-0 CT-1 27 IN J259H

## (undated) DEVICE — DRESSING MEPILEX AG BORDER 4 X 12 IN

## (undated) DEVICE — PREP IM ENCHANCED TOTAL HIP BONE                                    PREPARATION KIT: Brand: PREP-IM